# Patient Record
Sex: FEMALE | Race: WHITE | NOT HISPANIC OR LATINO | Employment: OTHER | URBAN - METROPOLITAN AREA
[De-identification: names, ages, dates, MRNs, and addresses within clinical notes are randomized per-mention and may not be internally consistent; named-entity substitution may affect disease eponyms.]

---

## 2017-01-10 ENCOUNTER — HISTORICAL (OUTPATIENT)
Dept: RADIOLOGY | Facility: HOSPITAL | Age: 67
End: 2017-01-10

## 2017-01-23 ENCOUNTER — HISTORICAL (OUTPATIENT)
Dept: LAB | Facility: HOSPITAL | Age: 67
End: 2017-01-23

## 2017-06-16 ENCOUNTER — HISTORICAL (OUTPATIENT)
Dept: RADIOLOGY | Facility: HOSPITAL | Age: 67
End: 2017-06-16

## 2017-10-16 ENCOUNTER — HISTORICAL (OUTPATIENT)
Dept: CARDIOLOGY | Facility: HOSPITAL | Age: 67
End: 2017-10-16

## 2017-10-18 ENCOUNTER — HISTORICAL (OUTPATIENT)
Dept: LAB | Facility: HOSPITAL | Age: 67
End: 2017-10-18

## 2017-10-18 LAB
ABS NEUT (OLG): 5.4 X10(3)/MCL (ref 2.1–9.2)
ALBUMIN SERPL-MCNC: 4 GM/DL (ref 3.4–5)
ALBUMIN/GLOB SERPL: 1.1 RATIO (ref 1.1–2)
ALP SERPL-CCNC: 78 UNIT/L (ref 46–116)
ALT SERPL-CCNC: 27 UNIT/L (ref 12–78)
APTT PPP: 22.3 SECOND(S) (ref 24.5–34.9)
AST SERPL-CCNC: 17 UNIT/L (ref 15–37)
BASOPHILS NFR BLD AUTO: 1 % (ref 0–2)
BILIRUB SERPL-MCNC: 0.3 MG/DL (ref 0.2–1)
BILIRUBIN DIRECT+TOT PNL SERPL-MCNC: 0.11 MG/DL (ref 0–0.2)
BILIRUBIN DIRECT+TOT PNL SERPL-MCNC: 0.23 MG/DL (ref 0–0.8)
BUN SERPL-MCNC: 42.7 MG/DL (ref 7–18)
CALCIUM SERPL-MCNC: 9.5 MG/DL (ref 8.5–10.1)
CHLORIDE SERPL-SCNC: 107 MMOL/L (ref 98–107)
CO2 SERPL-SCNC: 23.2 MMOL/L (ref 21–32)
CREAT SERPL-MCNC: 1.06 MG/DL (ref 0.6–1.3)
EOSINOPHIL # BLD AUTO: 0.2 X10(3)/MCL
EOSINOPHIL NFR BLD AUTO: 3 %
ERYTHROCYTE [DISTWIDTH] IN BLOOD BY AUTOMATED COUNT: 16.6 % (ref 11.5–17)
GLOBULIN SER-MCNC: 3.5 GM/DL (ref 2.4–3.5)
GLUCOSE SERPL-MCNC: 147 MG/DL (ref 74–106)
HCT VFR BLD AUTO: 29.6 % (ref 37–47)
HGB BLD-MCNC: 9.4 GM/DL (ref 12–16)
INR PPP: 0.96 (ref 2–3)
LYMPHOCYTES # BLD AUTO: 1.4 X10(3)/MCL
LYMPHOCYTES NFR BLD AUTO: 18 % (ref 13–40)
MCH RBC QN AUTO: 25 PG (ref 27–31)
MCHC RBC AUTO-ENTMCNC: 31.7 GM/DL (ref 33–36)
MCV RBC AUTO: 78.7 FL (ref 80–94)
MONOCYTES # BLD AUTO: 0.7 X10(3)/MCL
MONOCYTES NFR BLD AUTO: 9 % (ref 2–11)
NEUTROPHILS # BLD AUTO: 5.4 X10(3)/MCL (ref 2.1–9.2)
NEUTROPHILS NFR BLD AUTO: 70 % (ref 47–80)
PLATELET # BLD AUTO: 224 X10(3)/MCL (ref 130–400)
PMV BLD AUTO: 9.3 FL (ref 7.4–10.4)
POTASSIUM SERPL-SCNC: 5.4 MMOL/L (ref 3.5–5.1)
PROT SERPL-MCNC: 7.5 GM/DL (ref 6.4–8.2)
PROTHROMBIN TIME: 9.8 SECOND(S) (ref 9.3–11.4)
RBC # BLD AUTO: 3.77 X10(6)/MCL (ref 4.2–5.4)
SODIUM SERPL-SCNC: 144 MMOL/L (ref 136–145)
WBC # SPEC AUTO: 7.7 X10(3)/MCL (ref 4.5–11.5)

## 2017-10-25 ENCOUNTER — HISTORICAL (OUTPATIENT)
Dept: LAB | Facility: HOSPITAL | Age: 67
End: 2017-10-25

## 2017-10-25 LAB
COLOR STL: NORMAL
CONSISTENCY STL: NORMAL
HEMOCCULT SP1 STL QL: NEGATIVE

## 2017-10-26 LAB
COLOR STL: NORMAL
CONSISTENCY STL: NORMAL
HEMOCCULT SP2 STL QL: NEGATIVE

## 2017-10-27 LAB
COLOR STL: NORMAL
CONSISTENCY STL: NORMAL

## 2017-12-05 ENCOUNTER — HISTORICAL (OUTPATIENT)
Dept: LAB | Facility: HOSPITAL | Age: 67
End: 2017-12-05

## 2017-12-05 LAB
ABS NEUT (OLG): 6.33 X10(3)/MCL (ref 2.1–9.2)
APPEARANCE, UA: CLEAR
BACTERIA SPEC CULT: ABNORMAL /HPF
BASOPHILS # BLD AUTO: 0 X10(3)/MCL (ref 0–0.2)
BASOPHILS NFR BLD AUTO: 0 %
BILIRUB UR QL STRIP: ABNORMAL
BUN SERPL-MCNC: 34 MG/DL (ref 7–18)
CALCIUM SERPL-MCNC: 8.5 MG/DL (ref 8.5–10.1)
CHLORIDE SERPL-SCNC: 106 MMOL/L (ref 98–107)
CHOLEST SERPL-MCNC: 180 MG/DL (ref 0–200)
CHOLEST/HDLC SERPL: 3.6 {RATIO} (ref 0–4)
CO2 SERPL-SCNC: 21 MMOL/L (ref 21–32)
COLOR UR: YELLOW
CREAT SERPL-MCNC: 0.96 MG/DL (ref 0.55–1.02)
EOSINOPHIL # BLD AUTO: 0.3 X10(3)/MCL (ref 0–0.9)
EOSINOPHIL NFR BLD AUTO: 3 %
ERYTHROCYTE [DISTWIDTH] IN BLOOD BY AUTOMATED COUNT: 16.4 % (ref 11.5–17)
EST. AVERAGE GLUCOSE BLD GHB EST-MCNC: 223 MG/DL
GLUCOSE (UA): NEGATIVE
GLUCOSE SERPL-MCNC: 134 MG/DL (ref 74–106)
HBA1C MFR BLD: 9.4 % (ref 4.2–6.3)
HCT VFR BLD AUTO: 32.4 % (ref 37–47)
HDLC SERPL-MCNC: 50 MG/DL (ref 35–60)
HGB BLD-MCNC: 9.3 GM/DL (ref 12–16)
HGB UR QL STRIP: NEGATIVE
KETONES UR QL STRIP: NEGATIVE
LDLC SERPL CALC-MCNC: 96 MG/DL (ref 0–129)
LEUKOCYTE ESTERASE UR QL STRIP: ABNORMAL
LYMPHOCYTES # BLD AUTO: 2.1 X10(3)/MCL (ref 0.6–4.6)
LYMPHOCYTES NFR BLD AUTO: 22 %
MCH RBC QN AUTO: 25.1 PG (ref 27–31)
MCHC RBC AUTO-ENTMCNC: 28.7 GM/DL (ref 33–36)
MCV RBC AUTO: 87.3 FL (ref 80–94)
MONOCYTES # BLD AUTO: 0.8 X10(3)/MCL (ref 0.1–1.3)
MONOCYTES NFR BLD AUTO: 8 %
NEUTROPHILS # BLD AUTO: 6.33 X10(3)/MCL (ref 1.4–7.9)
NEUTROPHILS NFR BLD AUTO: 66 %
NITRITE UR QL STRIP: NEGATIVE
PH UR STRIP: 5 [PH] (ref 5–9)
PLATELET # BLD AUTO: 283 X10(3)/MCL (ref 130–400)
PMV BLD AUTO: 12.1 FL (ref 9.4–12.4)
POTASSIUM SERPL-SCNC: 5.7 MMOL/L (ref 3.5–5.1)
PROT UR QL STRIP: ABNORMAL
RBC # BLD AUTO: 3.71 X10(6)/MCL (ref 4.2–5.4)
RBC #/AREA URNS HPF: ABNORMAL /[HPF]
SODIUM SERPL-SCNC: 139 MMOL/L (ref 136–145)
SP GR UR STRIP: 1.02 (ref 1–1.03)
SQUAMOUS EPITHELIAL, UA: ABNORMAL
TRIGL SERPL-MCNC: 169 MG/DL (ref 30–150)
TSH SERPL-ACNC: 33.3 MIU/ML (ref 0.36–3.74)
UROBILINOGEN UR STRIP-ACNC: 0.2
VLDLC SERPL CALC-MCNC: 34 MG/DL
WBC # SPEC AUTO: 9.6 X10(3)/MCL (ref 4.5–11.5)
WBC #/AREA URNS HPF: ABNORMAL /[HPF]

## 2017-12-20 ENCOUNTER — HISTORICAL (OUTPATIENT)
Dept: ADMINISTRATIVE | Facility: HOSPITAL | Age: 67
End: 2017-12-20

## 2017-12-20 LAB
ABS NEUT (OLG): 5.56 X10(3)/MCL (ref 2.1–9.2)
ANISOCYTOSIS BLD QL SMEAR: 2
BASOPHILS # BLD AUTO: 0 X10(3)/MCL (ref 0–0.2)
BASOPHILS NFR BLD AUTO: 0.5 %
BASOPHILS NFR BLD MANUAL: 1 % (ref 0–2)
EOSINOPHIL # BLD AUTO: 0.2 X10(3)/MCL (ref 0–0.9)
EOSINOPHIL NFR BLD AUTO: 2 %
ERYTHROCYTE [DISTWIDTH] IN BLOOD BY AUTOMATED COUNT: 16.3 % (ref 11.5–17)
FERRITIN SERPL-MCNC: 11.5 NG/ML (ref 8–388)
FOLATE SERPL-MCNC: 24.9 NG/ML (ref 3.1–17.5)
HAPTOGLOB SERPL-MCNC: 254 MG/DL (ref 31–200)
HCT VFR BLD AUTO: 30.1 % (ref 37–47)
HGB BLD-MCNC: 9.2 GM/DL (ref 12–16)
IRON SATN MFR SERPL: 12.5 % (ref 20–50)
IRON SERPL-MCNC: 56 MCG/DL (ref 50–175)
LDH SERPL-CCNC: 198 UNIT/L (ref 84–246)
LYMPHOCYTES # BLD AUTO: 1.8 X10(3)/MCL (ref 0.6–4.6)
LYMPHOCYTES NFR BLD AUTO: 21.9 %
LYMPHOCYTES NFR BLD MANUAL: 27 % (ref 13–40)
MCH RBC QN AUTO: 25.6 PG (ref 27–31)
MCHC RBC AUTO-ENTMCNC: 30.6 GM/DL (ref 33–36)
MCV RBC AUTO: 83.8 FL (ref 80–94)
MICROCYTES BLD QL SMEAR: 2
MONOCYTES # BLD AUTO: 0.6 X10(3)/MCL (ref 0.1–1.3)
MONOCYTES NFR BLD AUTO: 7.5 %
MONOCYTES NFR BLD MANUAL: 2 % (ref 2–11)
NEUTROPHILS # BLD AUTO: 5.6 X10(3)/MCL (ref 2.1–9.2)
NEUTROPHILS NFR BLD AUTO: 68.1 %
NEUTROPHILS NFR BLD MANUAL: 70 % (ref 47–80)
PLATELET # BLD AUTO: 240 X10(3)/MCL (ref 130–400)
PLATELET # BLD EST: NORMAL 10*3/UL
PMV BLD AUTO: 11 FL (ref 9.4–12.4)
PROT SERPL-MCNC: 7 GM/DL
RBC # BLD AUTO: 3.59 X10(6)/MCL (ref 4.2–5.4)
RET# (OHS): 0.08 X10^6/ML (ref 0.02–0.08)
RETICULOCYTE COUNT AUTOMATED (OLG): 2.2 % (ref 1.1–2.1)
RHEUMATOID FACT SERPL-ACNC: 10 IU/ML (ref 0–15)
TIBC SERPL-MCNC: 447 MCG/DL (ref 250–450)
TRANSFERRIN SERPL-MCNC: 356 MG/DL (ref 200–360)
VIT B12 SERPL-MCNC: 231 PG/ML (ref 193–986)
WBC # SPEC AUTO: 8.2 X10(3)/MCL (ref 4.5–11.5)

## 2018-01-10 ENCOUNTER — HISTORICAL (OUTPATIENT)
Dept: RADIOLOGY | Facility: HOSPITAL | Age: 68
End: 2018-01-10

## 2018-01-10 ENCOUNTER — HISTORICAL (OUTPATIENT)
Dept: INFUSION THERAPY | Facility: HOSPITAL | Age: 68
End: 2018-01-10

## 2018-01-24 ENCOUNTER — HISTORICAL (OUTPATIENT)
Dept: RADIOLOGY | Facility: HOSPITAL | Age: 68
End: 2018-01-24

## 2018-01-24 ENCOUNTER — HISTORICAL (OUTPATIENT)
Dept: INFUSION THERAPY | Facility: HOSPITAL | Age: 68
End: 2018-01-24

## 2018-01-26 ENCOUNTER — HISTORICAL (OUTPATIENT)
Dept: ADMINISTRATIVE | Facility: HOSPITAL | Age: 68
End: 2018-01-26

## 2018-02-14 ENCOUNTER — HISTORICAL (OUTPATIENT)
Dept: ADMINISTRATIVE | Facility: HOSPITAL | Age: 68
End: 2018-02-14

## 2018-02-14 LAB
ABS NEUT (OLG): 5.37 X10(3)/MCL (ref 2.1–9.2)
BASOPHILS # BLD AUTO: 0 X10(3)/MCL (ref 0–0.2)
BASOPHILS NFR BLD AUTO: 0.3 %
EOSINOPHIL # BLD AUTO: 0.1 X10(3)/MCL (ref 0–0.9)
EOSINOPHIL NFR BLD AUTO: 1.6 %
ERYTHROCYTE [DISTWIDTH] IN BLOOD BY AUTOMATED COUNT: 18.3 % (ref 11.5–17)
HCT VFR BLD AUTO: 35.1 % (ref 37–47)
HGB BLD-MCNC: 11.1 GM/DL (ref 12–16)
LYMPHOCYTES # BLD AUTO: 1.4 X10(3)/MCL (ref 0.6–4.6)
LYMPHOCYTES NFR BLD AUTO: 19.1 %
MCH RBC QN AUTO: 27.5 PG (ref 27–31)
MCHC RBC AUTO-ENTMCNC: 31.6 GM/DL (ref 33–36)
MCV RBC AUTO: 86.9 FL (ref 80–94)
MONOCYTES # BLD AUTO: 0.5 X10(3)/MCL (ref 0.1–1.3)
MONOCYTES NFR BLD AUTO: 7.2 %
NEUTROPHILS # BLD AUTO: 5.4 X10(3)/MCL (ref 2.1–9.2)
NEUTROPHILS NFR BLD AUTO: 71.8 %
PLATELET # BLD AUTO: 252 X10(3)/MCL (ref 130–400)
PMV BLD AUTO: 11 FL (ref 9.4–12.4)
RBC # BLD AUTO: 4.04 X10(6)/MCL (ref 4.2–5.4)
WBC # SPEC AUTO: 7.5 X10(3)/MCL (ref 4.5–11.5)

## 2018-03-05 ENCOUNTER — HISTORICAL (OUTPATIENT)
Dept: RADIOLOGY | Facility: HOSPITAL | Age: 68
End: 2018-03-05

## 2018-03-05 LAB
BUN SERPL-MCNC: 36.6 MG/DL (ref 7–18)
CREAT SERPL-MCNC: 1.11 MG/DL (ref 0.6–1.3)

## 2018-03-23 ENCOUNTER — HISTORICAL (OUTPATIENT)
Dept: LAB | Facility: HOSPITAL | Age: 68
End: 2018-03-23

## 2018-03-23 LAB
ALBUMIN SERPL-MCNC: 3.7 GM/DL (ref 3.4–5)
ALBUMIN/GLOB SERPL: 1 {RATIO}
ALP SERPL-CCNC: 101 UNIT/L (ref 38–126)
ALT SERPL-CCNC: 20 UNIT/L (ref 12–78)
APPEARANCE, UA: CLEAR
AST SERPL-CCNC: 18 UNIT/L (ref 15–37)
BACTERIA SPEC CULT: ABNORMAL /HPF
BILIRUB SERPL-MCNC: 0.3 MG/DL (ref 0.2–1)
BILIRUB UR QL STRIP: NEGATIVE
BILIRUBIN DIRECT+TOT PNL SERPL-MCNC: 0.1 MG/DL (ref 0–0.2)
BILIRUBIN DIRECT+TOT PNL SERPL-MCNC: 0.2 MG/DL (ref 0–0.8)
BUN SERPL-MCNC: 41 MG/DL (ref 7–18)
CALCIUM SERPL-MCNC: 9.1 MG/DL (ref 8.5–10.1)
CHLORIDE SERPL-SCNC: 104 MMOL/L (ref 98–107)
CO2 SERPL-SCNC: 25 MMOL/L (ref 21–32)
COLOR UR: YELLOW
CREAT SERPL-MCNC: 0.96 MG/DL (ref 0.55–1.02)
CREAT UR-MCNC: 79 MG/DL
EST. AVERAGE GLUCOSE BLD GHB EST-MCNC: 226 MG/DL
GLOBULIN SER-MCNC: 3.7 GM/DL (ref 2.4–3.5)
GLUCOSE (UA): NEGATIVE
GLUCOSE SERPL-MCNC: 97 MG/DL (ref 74–106)
HBA1C MFR BLD: 9.5 % (ref 4.2–6.3)
HGB UR QL STRIP: NEGATIVE
KETONES UR QL STRIP: NEGATIVE
LEUKOCYTE ESTERASE UR QL STRIP: ABNORMAL
MICROALBUMIN UR-MCNC: 20.8 MG/DL
MICROALBUMIN/CREAT RATIO PNL UR: 263.3 MG/GM CR (ref 0–30)
NITRITE UR QL STRIP: NEGATIVE
PH UR STRIP: 5.5 [PH] (ref 5–9)
POTASSIUM SERPL-SCNC: 5.3 MMOL/L (ref 3.5–5.1)
PROT SERPL-MCNC: 7.4 GM/DL (ref 6.4–8.2)
PROT UR QL STRIP: ABNORMAL
RBC #/AREA URNS HPF: ABNORMAL /HPF
SODIUM SERPL-SCNC: 137 MMOL/L (ref 136–145)
SP GR UR STRIP: 1.02 (ref 1–1.03)
SQUAMOUS EPITHELIAL, UA: ABNORMAL
T4 SERPL-MCNC: 7.7 MCG/DL (ref 4.7–13.3)
TSH SERPL-ACNC: 6.68 MIU/ML (ref 0.36–3.74)
UA WBC MAN: ABNORMAL /HPF
UROBILINOGEN UR STRIP-ACNC: 0.2

## 2018-05-10 ENCOUNTER — HISTORICAL (OUTPATIENT)
Dept: LAB | Facility: HOSPITAL | Age: 68
End: 2018-05-10

## 2018-05-10 LAB
T4 FREE SERPL-MCNC: 0.82 NG/DL (ref 0.76–1.46)
TSH SERPL-ACNC: 9.31 MIU/L (ref 0.36–3.74)

## 2018-06-08 ENCOUNTER — HISTORICAL (OUTPATIENT)
Dept: HEMATOLOGY/ONCOLOGY | Facility: CLINIC | Age: 68
End: 2018-06-08

## 2018-06-08 LAB
ABS NEUT (OLG): 5.67 X10(3)/MCL (ref 2.1–9.2)
BASOPHILS # BLD AUTO: 0 X10(3)/MCL (ref 0–0.2)
BASOPHILS NFR BLD AUTO: 0.5 %
EOSINOPHIL # BLD AUTO: 0.2 X10(3)/MCL (ref 0–0.9)
EOSINOPHIL NFR BLD AUTO: 2.1 %
ERYTHROCYTE [DISTWIDTH] IN BLOOD BY AUTOMATED COUNT: 14.3 % (ref 11.5–17)
FERRITIN SERPL-MCNC: 268.6 NG/ML (ref 8–388)
FOLATE SERPL-MCNC: 21.3 NG/ML (ref 3.1–17.5)
HCT VFR BLD AUTO: 31.4 % (ref 37–47)
HGB BLD-MCNC: 9.8 GM/DL (ref 12–16)
IRON SATN MFR SERPL: 13.3 % (ref 20–50)
IRON SERPL-MCNC: 51 MCG/DL (ref 50–175)
LYMPHOCYTES # BLD AUTO: 1.8 X10(3)/MCL (ref 0.6–4.6)
LYMPHOCYTES NFR BLD AUTO: 21.7 %
MCH RBC QN AUTO: 29 PG (ref 27–31)
MCHC RBC AUTO-ENTMCNC: 31.2 GM/DL (ref 33–36)
MCV RBC AUTO: 92.9 FL (ref 80–94)
MONOCYTES # BLD AUTO: 0.7 X10(3)/MCL (ref 0.1–1.3)
MONOCYTES NFR BLD AUTO: 8.2 %
NEUTROPHILS # BLD AUTO: 5.7 X10(3)/MCL (ref 2.1–9.2)
NEUTROPHILS NFR BLD AUTO: 67.5 %
PLATELET # BLD AUTO: 232 X10(3)/MCL (ref 130–400)
PMV BLD AUTO: 11 FL (ref 9.4–12.4)
RBC # BLD AUTO: 3.38 X10(6)/MCL (ref 4.2–5.4)
RET# (OHS): 0.1 X10^6/ML (ref 0.02–0.08)
RETICULOCYTE COUNT AUTOMATED (OLG): 3 % (ref 1.1–2.1)
TIBC SERPL-MCNC: 384 MCG/DL (ref 250–450)
TRANSFERRIN SERPL-MCNC: 294 MG/DL (ref 200–360)
VIT B12 SERPL-MCNC: 303 PG/ML (ref 193–986)
WBC # SPEC AUTO: 8.4 X10(3)/MCL (ref 4.5–11.5)

## 2018-06-15 ENCOUNTER — HISTORICAL (OUTPATIENT)
Dept: INFUSION THERAPY | Facility: HOSPITAL | Age: 68
End: 2018-06-15

## 2018-06-19 ENCOUNTER — HISTORICAL (OUTPATIENT)
Dept: RADIOLOGY | Facility: HOSPITAL | Age: 68
End: 2018-06-19

## 2018-06-20 ENCOUNTER — HISTORICAL (OUTPATIENT)
Dept: ADMINISTRATIVE | Facility: HOSPITAL | Age: 68
End: 2018-06-20

## 2018-06-20 LAB
ABS NEUT (OLG): 5.05 X10(3)/MCL (ref 2.1–9.2)
ALBUMIN SERPL-MCNC: 3.7 GM/DL (ref 3.4–5)
ALBUMIN/GLOB SERPL: 1 {RATIO}
ALP SERPL-CCNC: 87 UNIT/L (ref 38–126)
ALT SERPL-CCNC: 22 UNIT/L (ref 12–78)
AST SERPL-CCNC: 16 UNIT/L (ref 15–37)
BASOPHILS # BLD AUTO: 0 X10(3)/MCL (ref 0–0.2)
BASOPHILS NFR BLD AUTO: 0 %
BILIRUB SERPL-MCNC: 0.2 MG/DL (ref 0.2–1)
BILIRUBIN DIRECT+TOT PNL SERPL-MCNC: 0.1 MG/DL (ref 0–0.2)
BILIRUBIN DIRECT+TOT PNL SERPL-MCNC: 0.1 MG/DL (ref 0–0.8)
BUN SERPL-MCNC: 27 MG/DL (ref 7–18)
CALCIUM SERPL-MCNC: 8.6 MG/DL (ref 8.5–10.1)
CHLORIDE SERPL-SCNC: 105 MMOL/L (ref 98–107)
CO2 SERPL-SCNC: 29 MMOL/L (ref 21–32)
CREAT SERPL-MCNC: 0.79 MG/DL (ref 0.55–1.02)
CREAT UR-MCNC: 98 MG/DL
EOSINOPHIL # BLD AUTO: 0.2 X10(3)/MCL (ref 0–0.9)
EOSINOPHIL NFR BLD AUTO: 2 %
ERYTHROCYTE [DISTWIDTH] IN BLOOD BY AUTOMATED COUNT: 14 % (ref 11.5–17)
EST. AVERAGE GLUCOSE BLD GHB EST-MCNC: 200 MG/DL
GLOBULIN SER-MCNC: 3.6 GM/DL (ref 2.4–3.5)
GLUCOSE SERPL-MCNC: 70 MG/DL (ref 74–106)
HBA1C MFR BLD: 8.6 % (ref 4.2–6.3)
HCT VFR BLD AUTO: 32.9 % (ref 37–47)
HGB BLD-MCNC: 10 GM/DL (ref 12–16)
LYMPHOCYTES # BLD AUTO: 1.5 X10(3)/MCL (ref 0.6–4.6)
LYMPHOCYTES NFR BLD AUTO: 20 %
MCH RBC QN AUTO: 28.1 PG (ref 27–31)
MCHC RBC AUTO-ENTMCNC: 30.4 GM/DL (ref 33–36)
MCV RBC AUTO: 92.4 FL (ref 80–94)
MICROALBUMIN UR-MCNC: 39 MG/DL
MICROALBUMIN/CREAT RATIO PNL UR: 398 MG/GM CR (ref 0–30)
MONOCYTES # BLD AUTO: 0.6 X10(3)/MCL (ref 0.1–1.3)
MONOCYTES NFR BLD AUTO: 8 %
NEUTROPHILS # BLD AUTO: 5.05 X10(3)/MCL (ref 2.1–9.2)
NEUTROPHILS NFR BLD AUTO: 68 %
PLATELET # BLD AUTO: 220 X10(3)/MCL (ref 130–400)
PMV BLD AUTO: 11.4 FL (ref 9.4–12.4)
POTASSIUM SERPL-SCNC: 4 MMOL/L (ref 3.5–5.1)
PROT SERPL-MCNC: 7.3 GM/DL (ref 6.4–8.2)
RBC # BLD AUTO: 3.56 X10(6)/MCL (ref 4.2–5.4)
SODIUM SERPL-SCNC: 143 MMOL/L (ref 136–145)
TSH SERPL-ACNC: 2.76 MIU/L (ref 0.36–3.74)
WBC # SPEC AUTO: 7.4 X10(3)/MCL (ref 4.5–11.5)

## 2018-06-22 ENCOUNTER — HISTORICAL (OUTPATIENT)
Dept: INFUSION THERAPY | Facility: HOSPITAL | Age: 68
End: 2018-06-22

## 2018-06-25 ENCOUNTER — HISTORICAL (OUTPATIENT)
Dept: LAB | Facility: HOSPITAL | Age: 68
End: 2018-06-25

## 2018-06-26 ENCOUNTER — HISTORICAL (OUTPATIENT)
Dept: ADMINISTRATIVE | Facility: HOSPITAL | Age: 68
End: 2018-06-26

## 2018-08-27 ENCOUNTER — HISTORICAL (OUTPATIENT)
Dept: PREADMISSION TESTING | Facility: HOSPITAL | Age: 68
End: 2018-08-27

## 2018-08-27 ENCOUNTER — HISTORICAL (OUTPATIENT)
Dept: LAB | Facility: HOSPITAL | Age: 68
End: 2018-08-27

## 2018-08-27 LAB
ABS NEUT (OLG): 5.04 X10(3)/MCL (ref 2.1–9.2)
ALBUMIN SERPL-MCNC: 3.6 GM/DL (ref 3.4–5)
ALBUMIN/GLOB SERPL: 1.1 {RATIO}
ALP SERPL-CCNC: 172 UNIT/L (ref 38–126)
ALT SERPL-CCNC: 22 UNIT/L (ref 12–78)
AST SERPL-CCNC: 8 UNIT/L (ref 15–37)
BASOPHILS # BLD AUTO: 0 X10(3)/MCL (ref 0–0.2)
BASOPHILS NFR BLD AUTO: 0 %
BILIRUB SERPL-MCNC: 0.5 MG/DL (ref 0.2–1)
BILIRUBIN DIRECT+TOT PNL SERPL-MCNC: 0.1 MG/DL (ref 0–0.2)
BILIRUBIN DIRECT+TOT PNL SERPL-MCNC: 0.4 MG/DL (ref 0–0.8)
BUN SERPL-MCNC: 31 MG/DL (ref 7–18)
CALCIUM SERPL-MCNC: 8.4 MG/DL (ref 8.5–10.1)
CHLORIDE SERPL-SCNC: 104 MMOL/L (ref 98–107)
CO2 SERPL-SCNC: 27 MMOL/L (ref 21–32)
CREAT SERPL-MCNC: 1.1 MG/DL (ref 0.55–1.02)
EOSINOPHIL # BLD AUTO: 0.2 X10(3)/MCL (ref 0–0.9)
EOSINOPHIL NFR BLD AUTO: 2 %
ERYTHROCYTE [DISTWIDTH] IN BLOOD BY AUTOMATED COUNT: 14.6 % (ref 11.5–17)
EST. AVERAGE GLUCOSE BLD GHB EST-MCNC: 194 MG/DL
GLOBULIN SER-MCNC: 3.4 GM/DL (ref 2.4–3.5)
GLUCOSE SERPL-MCNC: 450 MG/DL (ref 74–106)
HBA1C MFR BLD: 8.4 % (ref 4.2–6.3)
HCT VFR BLD AUTO: 33.3 % (ref 37–47)
HGB BLD-MCNC: 10.2 GM/DL (ref 12–16)
LYMPHOCYTES # BLD AUTO: 1.4 X10(3)/MCL (ref 0.6–4.6)
LYMPHOCYTES NFR BLD AUTO: 19 %
MCH RBC QN AUTO: 29.3 PG (ref 27–31)
MCHC RBC AUTO-ENTMCNC: 30.6 GM/DL (ref 33–36)
MCV RBC AUTO: 95.7 FL (ref 80–94)
MONOCYTES # BLD AUTO: 0.6 X10(3)/MCL (ref 0.1–1.3)
MONOCYTES NFR BLD AUTO: 8 %
NEUTROPHILS # BLD AUTO: 5.04 X10(3)/MCL (ref 1.4–7.9)
NEUTROPHILS NFR BLD AUTO: 70 %
PLATELET # BLD AUTO: 180 X10(3)/MCL (ref 130–400)
PMV BLD AUTO: 12.5 FL (ref 9.4–12.4)
POTASSIUM SERPL-SCNC: 4.7 MMOL/L (ref 3.5–5.1)
PROT SERPL-MCNC: 7 GM/DL (ref 6.4–8.2)
RBC # BLD AUTO: 3.48 X10(6)/MCL (ref 4.2–5.4)
SODIUM SERPL-SCNC: 140 MMOL/L (ref 136–145)
WBC # SPEC AUTO: 7.2 X10(3)/MCL (ref 4.5–11.5)

## 2018-09-04 ENCOUNTER — HISTORICAL (OUTPATIENT)
Dept: RADIOLOGY | Facility: HOSPITAL | Age: 68
End: 2018-09-04

## 2018-09-12 ENCOUNTER — HISTORICAL (OUTPATIENT)
Dept: LAB | Facility: HOSPITAL | Age: 68
End: 2018-09-12

## 2018-09-12 LAB
BUN SERPL-MCNC: 28.2 MG/DL (ref 7–18)
CALCIUM SERPL-MCNC: 9.1 MG/DL (ref 8.5–10.1)
CHLORIDE SERPL-SCNC: 103 MMOL/L (ref 98–107)
CO2 SERPL-SCNC: 26.2 MMOL/L (ref 21–32)
CREAT SERPL-MCNC: 0.96 MG/DL (ref 0.6–1.3)
CREAT/UREA NIT SERPL: 29
EST. AVERAGE GLUCOSE BLD GHB EST-MCNC: 194 MG/DL
GLUCOSE SERPL-MCNC: 172 MG/DL (ref 74–106)
HBA1C MFR BLD: 8.4 % (ref 4.5–6.2)
POTASSIUM SERPL-SCNC: 5.3 MMOL/L (ref 3.5–5.1)
SODIUM SERPL-SCNC: 141 MMOL/L (ref 136–145)

## 2018-10-01 ENCOUNTER — HISTORICAL (OUTPATIENT)
Dept: ADMINISTRATIVE | Facility: HOSPITAL | Age: 68
End: 2018-10-01

## 2018-10-01 LAB
ABS NEUT (OLG): 9.23 X10(3)/MCL (ref 2.1–9.2)
ALBUMIN SERPL-MCNC: 3.5 GM/DL (ref 3.4–5)
ALBUMIN/GLOB SERPL: 1 {RATIO}
ALP SERPL-CCNC: 81 UNIT/L (ref 38–126)
ALT SERPL-CCNC: 15 UNIT/L (ref 12–78)
AST SERPL-CCNC: 11 UNIT/L (ref 15–37)
BASOPHILS # BLD AUTO: 0 X10(3)/MCL (ref 0–0.2)
BASOPHILS NFR BLD AUTO: 0 %
BILIRUB SERPL-MCNC: 0.3 MG/DL (ref 0.2–1)
BILIRUBIN DIRECT+TOT PNL SERPL-MCNC: 0.1 MG/DL (ref 0–0.2)
BILIRUBIN DIRECT+TOT PNL SERPL-MCNC: 0.2 MG/DL (ref 0–0.8)
BUN SERPL-MCNC: 29 MG/DL (ref 7–18)
CALCIUM SERPL-MCNC: 8.4 MG/DL (ref 8.5–10.1)
CHLORIDE SERPL-SCNC: 108 MMOL/L (ref 98–107)
CO2 SERPL-SCNC: 26 MMOL/L (ref 21–32)
CREAT SERPL-MCNC: 0.87 MG/DL (ref 0.55–1.02)
EOSINOPHIL # BLD AUTO: 0.1 X10(3)/MCL (ref 0–0.9)
EOSINOPHIL NFR BLD AUTO: 1 %
ERYTHROCYTE [DISTWIDTH] IN BLOOD BY AUTOMATED COUNT: 13.7 % (ref 11.5–17)
FERRITIN SERPL-MCNC: 701.7 NG/ML (ref 8–388)
FOLATE SERPL-MCNC: 20 NG/ML (ref 3.1–17.5)
GLOBULIN SER-MCNC: 3.6 GM/DL (ref 2.4–3.5)
GLUCOSE SERPL-MCNC: 122 MG/DL (ref 74–106)
HCT VFR BLD AUTO: 33.4 % (ref 37–47)
HGB BLD-MCNC: 10.3 GM/DL (ref 12–16)
IRON SATN MFR SERPL: 10 % (ref 20–50)
IRON SERPL-MCNC: 32 MCG/DL (ref 50–175)
LYMPHOCYTES # BLD AUTO: 1.29 X10(3)/MCL (ref 0.6–4.6)
LYMPHOCYTES NFR BLD AUTO: 11 %
MCH RBC QN AUTO: 29.3 PG (ref 27–31)
MCHC RBC AUTO-ENTMCNC: 30.8 GM/DL (ref 33–36)
MCV RBC AUTO: 95.2 FL (ref 80–94)
MONOCYTES # BLD AUTO: 0.73 X10(3)/MCL (ref 0.1–1.3)
MONOCYTES NFR BLD AUTO: 6.4 %
NEUTROPHILS # BLD AUTO: 9.23 X10(3)/MCL (ref 2.1–9.2)
NEUTROPHILS NFR BLD AUTO: 81 %
PLATELET # BLD AUTO: 221 X10(3)/MCL (ref 130–400)
PMV BLD AUTO: 12.1 FL (ref 9.4–12.4)
POTASSIUM SERPL-SCNC: 5 MMOL/L (ref 3.5–5.1)
PROT SERPL-MCNC: 7.1 GM/DL (ref 6.4–8.2)
RBC # BLD AUTO: 3.51 X10(6)/MCL (ref 4.2–5.4)
RET# (OHS): 0.09 X10^6/ML (ref 0.02–0.08)
RETICULOCYTE COUNT AUTOMATED (OLG): 2.7 % (ref 1.1–2.1)
SODIUM SERPL-SCNC: 142 MMOL/L (ref 136–145)
TIBC SERPL-MCNC: 319 MCG/DL (ref 250–450)
TRANSFERRIN SERPL-MCNC: 240 MG/DL (ref 200–360)
VIT B12 SERPL-MCNC: 328 PG/ML (ref 193–986)
WBC # SPEC AUTO: 11.4 X10(3)/MCL (ref 4.5–11.5)

## 2018-10-03 ENCOUNTER — HISTORICAL (OUTPATIENT)
Dept: BARIATRICS | Facility: HOSPITAL | Age: 68
End: 2018-10-03

## 2018-10-18 ENCOUNTER — HISTORICAL (OUTPATIENT)
Dept: INFUSION THERAPY | Facility: HOSPITAL | Age: 68
End: 2018-10-18

## 2018-10-25 ENCOUNTER — HISTORICAL (OUTPATIENT)
Dept: INFUSION THERAPY | Facility: HOSPITAL | Age: 68
End: 2018-10-25

## 2019-01-22 ENCOUNTER — HISTORICAL (OUTPATIENT)
Dept: LAB | Facility: HOSPITAL | Age: 69
End: 2019-01-22

## 2019-01-22 LAB
ABS NEUT (OLG): 5.75 X10(3)/MCL (ref 2.1–9.2)
APPEARANCE, UA: CLEAR
BACTERIA SPEC CULT: ABNORMAL
BASOPHILS # BLD AUTO: 0 X10(3)/MCL (ref 0–0.2)
BASOPHILS NFR BLD AUTO: 1 %
BILIRUB UR QL STRIP: NEGATIVE
BUN SERPL-MCNC: 41.9 MG/DL (ref 7–18)
CALCIUM SERPL-MCNC: 9.4 MG/DL (ref 8.5–10.1)
CHLORIDE SERPL-SCNC: 104 MMOL/L (ref 98–107)
CHOLEST SERPL-MCNC: 168 MG/DL (ref 0–200)
CHOLEST/HDLC SERPL: 3.7 {RATIO} (ref 0–4)
CO2 SERPL-SCNC: 30.6 MMOL/L (ref 21–32)
COLOR UR: YELLOW
CREAT SERPL-MCNC: 1.01 MG/DL (ref 0.6–1.3)
CREAT UR-MCNC: 153.8 MG/DL (ref 30–125)
CREAT/UREA NIT SERPL: 41
EOSINOPHIL # BLD AUTO: 0.3 X10(3)/MCL (ref 0–0.9)
EOSINOPHIL NFR BLD AUTO: 4 %
ERYTHROCYTE [DISTWIDTH] IN BLOOD BY AUTOMATED COUNT: 13.9 % (ref 11.5–17)
GLUCOSE (UA): NEGATIVE
GLUCOSE SERPL-MCNC: 112 MG/DL (ref 74–106)
HBA1C MFR BLD: 7.9 % (ref 4.5–6.2)
HCT VFR BLD AUTO: 34.5 % (ref 37–47)
HDLC SERPL-MCNC: 45 MG/DL (ref 40–60)
HGB BLD-MCNC: 11.1 GM/DL (ref 12–16)
HGB UR QL STRIP: NEGATIVE
IMM GRANULOCYTES # BLD AUTO: 0.06 % (ref 0–0.02)
IMM GRANULOCYTES NFR BLD AUTO: 0.7 % (ref 0–0.43)
KETONES UR QL STRIP: NEGATIVE
LDLC SERPL CALC-MCNC: 53 MG/DL (ref 0–129)
LEUKOCYTE ESTERASE UR QL STRIP: ABNORMAL
LYMPHOCYTES # BLD AUTO: 1.6 X10(3)/MCL (ref 0.6–4.6)
LYMPHOCYTES NFR BLD AUTO: 18 %
MCH RBC QN AUTO: 30.3 PG (ref 27–31)
MCHC RBC AUTO-ENTMCNC: 32.2 GM/DL (ref 33–36)
MCV RBC AUTO: 94.3 FL (ref 80–94)
MICROALBUMIN UR-MCNC: >10 MG/DL (ref 0–3)
MICROALBUMIN/CREAT RATIO PNL UR: >65 MG/GM CR (ref 0–30)
MONOCYTES # BLD AUTO: 0.7 X10(3)/MCL (ref 0.1–1.3)
MONOCYTES NFR BLD AUTO: 8 %
NEUTROPHILS # BLD AUTO: 5.75 X10(3)/MCL (ref 1.4–7.9)
NEUTROPHILS NFR BLD AUTO: 68 %
NITRITE UR QL STRIP: NEGATIVE
PH UR STRIP: 5.5 [PH] (ref 5–9)
PLATELET # BLD AUTO: 247 X10(3)/MCL (ref 130–400)
PMV BLD AUTO: 10.9 FL (ref 9.4–12.4)
POTASSIUM SERPL-SCNC: 4.2 MMOL/L (ref 3.5–5.1)
PROT UR QL STRIP: 30
RBC # BLD AUTO: 3.66 X10(6)/MCL (ref 4.2–5.4)
RBC #/AREA URNS HPF: ABNORMAL /HPF
SODIUM SERPL-SCNC: 144 MMOL/L (ref 136–145)
SP GR UR STRIP: >=1.03 (ref 1–1.03)
SQUAMOUS EPITHELIAL, UA: ABNORMAL
TRIGL SERPL-MCNC: 350 MG/DL
TSH SERPL-ACNC: 37.02 MIU/ML (ref 0.36–3.74)
UROBILINOGEN UR STRIP-ACNC: 0.2
VLDLC SERPL CALC-MCNC: 70 MG/DL
WBC # SPEC AUTO: 8.4 X10(3)/MCL (ref 4.5–11.5)
WBC #/AREA URNS HPF: ABNORMAL /HPF

## 2019-02-04 ENCOUNTER — HISTORICAL (OUTPATIENT)
Dept: HEMATOLOGY/ONCOLOGY | Facility: CLINIC | Age: 69
End: 2019-02-04

## 2019-02-04 LAB
ABS NEUT (OLG): 7.63 X10(3)/MCL (ref 2.1–9.2)
ALBUMIN SERPL-MCNC: 3.8 GM/DL (ref 3.4–5)
ALBUMIN/GLOB SERPL: 1 {RATIO}
ALP SERPL-CCNC: 121 UNIT/L (ref 38–126)
ALT SERPL-CCNC: 23 UNIT/L (ref 12–78)
AST SERPL-CCNC: 16 UNIT/L (ref 15–37)
BASOPHILS # BLD AUTO: 0 X10(3)/MCL (ref 0–0.2)
BASOPHILS NFR BLD AUTO: 0.2 %
BILIRUB SERPL-MCNC: 0.3 MG/DL (ref 0.2–1)
BILIRUBIN DIRECT+TOT PNL SERPL-MCNC: 0.1 MG/DL (ref 0–0.2)
BILIRUBIN DIRECT+TOT PNL SERPL-MCNC: 0.2 MG/DL (ref 0–0.8)
BUN SERPL-MCNC: 39 MG/DL (ref 7–18)
CALCIUM SERPL-MCNC: 8.5 MG/DL (ref 8.5–10.1)
CHLORIDE SERPL-SCNC: 101 MMOL/L (ref 98–107)
CO2 SERPL-SCNC: 29 MMOL/L (ref 21–32)
CREAT SERPL-MCNC: 0.92 MG/DL (ref 0.55–1.02)
EOSINOPHIL # BLD AUTO: 0.2 X10(3)/MCL (ref 0–0.9)
EOSINOPHIL NFR BLD AUTO: 1.8 %
ERYTHROCYTE [DISTWIDTH] IN BLOOD BY AUTOMATED COUNT: 13.5 % (ref 11.5–17)
FERRITIN SERPL-MCNC: 1102.6 NG/ML (ref 8–388)
FOLATE SERPL-MCNC: 17.5 NG/ML (ref 3.1–17.5)
GLOBULIN SER-MCNC: 3.7 GM/DL (ref 2.4–3.5)
GLUCOSE SERPL-MCNC: 208 MG/DL (ref 74–106)
HCT VFR BLD AUTO: 37.1 % (ref 37–47)
HGB BLD-MCNC: 11.6 GM/DL (ref 12–16)
IRON SATN MFR SERPL: 31.8 % (ref 20–50)
IRON SERPL-MCNC: 88 MCG/DL (ref 50–175)
LYMPHOCYTES # BLD AUTO: 1.4 X10(3)/MCL (ref 0.6–4.6)
LYMPHOCYTES NFR BLD AUTO: 14.4 %
MCH RBC QN AUTO: 29.5 PG (ref 27–31)
MCHC RBC AUTO-ENTMCNC: 31.3 GM/DL (ref 33–36)
MCV RBC AUTO: 94.4 FL (ref 80–94)
MONOCYTES # BLD AUTO: 0.7 X10(3)/MCL (ref 0.1–1.3)
MONOCYTES NFR BLD AUTO: 6.6 %
NEUTROPHILS # BLD AUTO: 7.6 X10(3)/MCL (ref 2.1–9.2)
NEUTROPHILS NFR BLD AUTO: 76.3 %
PLATELET # BLD AUTO: 268 X10(3)/MCL (ref 130–400)
PMV BLD AUTO: 10.5 FL (ref 9.4–12.4)
POTASSIUM SERPL-SCNC: 4.9 MMOL/L (ref 3.5–5.1)
PROT SERPL-MCNC: 7.5 GM/DL (ref 6.4–8.2)
RBC # BLD AUTO: 3.93 X10(6)/MCL (ref 4.2–5.4)
RET# (OHS): 0.11 X10^6/ML (ref 0.02–0.08)
RETICULOCYTE COUNT AUTOMATED (OLG): 2.9 % (ref 1.1–2.1)
SODIUM SERPL-SCNC: 136 MMOL/L (ref 136–145)
TIBC SERPL-MCNC: 277 MCG/DL (ref 250–450)
TRANSFERRIN SERPL-MCNC: 223 MG/DL (ref 200–360)
VIT B12 SERPL-MCNC: 346 PG/ML (ref 193–986)
WBC # SPEC AUTO: 10 X10(3)/MCL (ref 4.5–11.5)

## 2019-04-15 ENCOUNTER — HISTORICAL (OUTPATIENT)
Dept: ADMINISTRATIVE | Facility: HOSPITAL | Age: 69
End: 2019-04-15

## 2019-04-15 LAB
BUN SERPL-MCNC: 31 MG/DL (ref 7–18)
CALCIUM SERPL-MCNC: 9.3 MG/DL (ref 8.5–10.1)
CHLORIDE SERPL-SCNC: 108 MMOL/L (ref 98–107)
CO2 SERPL-SCNC: 25 MMOL/L (ref 21–32)
CREAT SERPL-MCNC: 0.84 MG/DL (ref 0.55–1.02)
CREAT/UREA NIT SERPL: 36.9
EST. AVERAGE GLUCOSE BLD GHB EST-MCNC: 180 MG/DL
GLUCOSE SERPL-MCNC: 79 MG/DL (ref 74–106)
HBA1C MFR BLD: 7.9 % (ref 4.2–6.3)
POTASSIUM SERPL-SCNC: 5.2 MMOL/L (ref 3.5–5.1)
SODIUM SERPL-SCNC: 141 MMOL/L (ref 136–145)

## 2019-06-04 ENCOUNTER — HISTORICAL (OUTPATIENT)
Dept: HEMATOLOGY/ONCOLOGY | Facility: CLINIC | Age: 69
End: 2019-06-04

## 2019-06-04 LAB
ABS NEUT (OLG): 6.51 X10(3)/MCL (ref 2.1–9.2)
BASOPHILS # BLD AUTO: 0 X10(3)/MCL (ref 0–0.2)
BASOPHILS NFR BLD AUTO: 0.5 %
EOSINOPHIL # BLD AUTO: 0.2 X10(3)/MCL (ref 0–0.9)
EOSINOPHIL NFR BLD AUTO: 2.5 %
ERYTHROCYTE [DISTWIDTH] IN BLOOD BY AUTOMATED COUNT: 13.7 % (ref 11.5–17)
FERRITIN SERPL-MCNC: 660.2 NG/ML (ref 8–388)
FOLATE SERPL-MCNC: 16.1 NG/ML (ref 3.1–17.5)
HCT VFR BLD AUTO: 34.6 % (ref 37–47)
HGB BLD-MCNC: 10.4 GM/DL (ref 12–16)
IRON SATN MFR SERPL: 23.1 % (ref 20–50)
IRON SERPL-MCNC: 74 MCG/DL (ref 50–175)
LYMPHOCYTES # BLD AUTO: 1.5 X10(3)/MCL (ref 0.6–4.6)
LYMPHOCYTES NFR BLD AUTO: 17.4 %
MCH RBC QN AUTO: 29 PG (ref 27–31)
MCHC RBC AUTO-ENTMCNC: 30.1 GM/DL (ref 33–36)
MCV RBC AUTO: 96.4 FL (ref 80–94)
MONOCYTES # BLD AUTO: 0.5 X10(3)/MCL (ref 0.1–1.3)
MONOCYTES NFR BLD AUTO: 6 %
NEUTROPHILS # BLD AUTO: 6.5 X10(3)/MCL (ref 2.1–9.2)
NEUTROPHILS NFR BLD AUTO: 73.3 %
PLATELET # BLD AUTO: 240 X10(3)/MCL (ref 130–400)
PMV BLD AUTO: 11.3 FL (ref 9.4–12.4)
RBC # BLD AUTO: 3.59 X10(6)/MCL (ref 4.2–5.4)
RET# (OHS): 0.09 X10^6/ML (ref 0.02–0.08)
RETICULOCYTE COUNT AUTOMATED (OLG): 2.4 % (ref 1.1–2.1)
TIBC SERPL-MCNC: 321 MCG/DL (ref 250–450)
TRANSFERRIN SERPL-MCNC: 256 MG/DL (ref 200–360)
VIT B12 SERPL-MCNC: 331 PG/ML (ref 193–986)
WBC # SPEC AUTO: 8.9 X10(3)/MCL (ref 4.5–11.5)

## 2019-10-02 ENCOUNTER — HISTORICAL (OUTPATIENT)
Dept: HEMATOLOGY/ONCOLOGY | Facility: CLINIC | Age: 69
End: 2019-10-02

## 2019-10-02 LAB
ABS NEUT (OLG): 5.41 X10(3)/MCL (ref 2.1–9.2)
BASOPHILS # BLD AUTO: 0 X10(3)/MCL (ref 0–0.2)
BASOPHILS NFR BLD AUTO: 0.4 %
EOSINOPHIL # BLD AUTO: 0.2 X10(3)/MCL (ref 0–0.9)
EOSINOPHIL NFR BLD AUTO: 3 %
ERYTHROCYTE [DISTWIDTH] IN BLOOD BY AUTOMATED COUNT: 13.9 % (ref 11.5–17)
FERRITIN SERPL-MCNC: 614.4 NG/ML (ref 8–388)
FOLATE SERPL-MCNC: 13.7 NG/ML (ref 3.1–17.5)
HCT VFR BLD AUTO: 33.4 % (ref 37–47)
HGB BLD-MCNC: 10.4 GM/DL (ref 12–16)
IRON SATN MFR SERPL: 16.5 % (ref 20–50)
IRON SERPL-MCNC: 62 MCG/DL (ref 50–175)
LYMPHOCYTES # BLD AUTO: 1.6 X10(3)/MCL (ref 0.6–4.6)
LYMPHOCYTES NFR BLD AUTO: 19.9 %
MCH RBC QN AUTO: 28.7 PG (ref 27–31)
MCHC RBC AUTO-ENTMCNC: 31.1 GM/DL (ref 33–36)
MCV RBC AUTO: 92 FL (ref 80–94)
MONOCYTES # BLD AUTO: 0.6 X10(3)/MCL (ref 0.1–1.3)
MONOCYTES NFR BLD AUTO: 7.6 %
NEUTROPHILS # BLD AUTO: 5.4 X10(3)/MCL (ref 2.1–9.2)
NEUTROPHILS NFR BLD AUTO: 68.5 %
PLATELET # BLD AUTO: 215 X10(3)/MCL (ref 130–400)
PMV BLD AUTO: 11.3 FL (ref 9.4–12.4)
RBC # BLD AUTO: 3.63 X10(6)/MCL (ref 4.2–5.4)
RET# (OHS): 0.09 X10^6/ML (ref 0.02–0.08)
RETICULOCYTE COUNT AUTOMATED (OLG): 2.6 % (ref 1.1–2.1)
TIBC SERPL-MCNC: 376 MCG/DL (ref 250–450)
TRANSFERRIN SERPL-MCNC: 266 MG/DL (ref 200–360)
VIT B12 SERPL-MCNC: 778 PG/ML (ref 193–986)
WBC # SPEC AUTO: 7.9 X10(3)/MCL (ref 4.5–11.5)

## 2019-11-21 ENCOUNTER — HISTORICAL (OUTPATIENT)
Dept: BARIATRICS | Facility: HOSPITAL | Age: 69
End: 2019-11-21

## 2020-01-09 ENCOUNTER — HISTORICAL (OUTPATIENT)
Dept: PREADMISSION TESTING | Facility: HOSPITAL | Age: 70
End: 2020-01-09

## 2020-01-09 LAB
ABS NEUT (OLG): 5.13 X10(3)/MCL (ref 2.1–9.2)
BASOPHILS # BLD AUTO: 0.1 X10(3)/MCL (ref 0–0.2)
BASOPHILS NFR BLD AUTO: 1 %
BUN SERPL-MCNC: 30 MG/DL (ref 7–18)
CALCIUM SERPL-MCNC: 9.3 MG/DL (ref 8.5–10.1)
CHLORIDE SERPL-SCNC: 104 MMOL/L (ref 98–107)
CO2 SERPL-SCNC: 22 MMOL/L (ref 21–32)
CREAT SERPL-MCNC: 1.16 MG/DL (ref 0.55–1.02)
CREAT/UREA NIT SERPL: 25.9
EOSINOPHIL # BLD AUTO: 0.4 X10(3)/MCL (ref 0–0.9)
EOSINOPHIL NFR BLD AUTO: 5 %
ERYTHROCYTE [DISTWIDTH] IN BLOOD BY AUTOMATED COUNT: 13.7 % (ref 11.5–17)
GLUCOSE SERPL-MCNC: 272 MG/DL (ref 74–106)
HCT VFR BLD AUTO: 35.2 % (ref 37–47)
HGB BLD-MCNC: 10.7 GM/DL (ref 12–16)
LYMPHOCYTES # BLD AUTO: 1.5 X10(3)/MCL (ref 0.6–4.6)
LYMPHOCYTES NFR BLD AUTO: 20 %
MCH RBC QN AUTO: 28.2 PG (ref 27–31)
MCHC RBC AUTO-ENTMCNC: 30.4 GM/DL (ref 33–36)
MCV RBC AUTO: 92.6 FL (ref 80–94)
MONOCYTES # BLD AUTO: 0.6 X10(3)/MCL (ref 0.1–1.3)
MONOCYTES NFR BLD AUTO: 8 %
NEUTROPHILS # BLD AUTO: 5.13 X10(3)/MCL (ref 2.1–9.2)
NEUTROPHILS NFR BLD AUTO: 66 %
PLATELET # BLD AUTO: 217 X10(3)/MCL (ref 130–400)
PMV BLD AUTO: 12.8 FL (ref 9.4–12.4)
POTASSIUM SERPL-SCNC: 4.9 MMOL/L (ref 3.5–5.1)
RBC # BLD AUTO: 3.8 X10(6)/MCL (ref 4.2–5.4)
SODIUM SERPL-SCNC: 138 MMOL/L (ref 136–145)
WBC # SPEC AUTO: 7.8 X10(3)/MCL (ref 4.5–11.5)

## 2020-01-21 ENCOUNTER — HISTORICAL (OUTPATIENT)
Dept: LAB | Facility: HOSPITAL | Age: 70
End: 2020-01-21

## 2020-01-21 LAB
ABS NEUT (OLG): 6.34 X10(3)/MCL (ref 2.1–9.2)
ALBUMIN SERPL-MCNC: 3.7 GM/DL (ref 3.4–5)
ALBUMIN/GLOB SERPL: 1.1 RATIO (ref 1.1–2)
ALP SERPL-CCNC: 91 UNIT/L (ref 46–116)
ALT SERPL-CCNC: 18 UNIT/L (ref 12–78)
APPEARANCE, UA: CLEAR
AST SERPL-CCNC: 17 UNIT/L (ref 15–37)
BASOPHILS # BLD AUTO: 0 X10(3)/MCL (ref 0–0.2)
BASOPHILS NFR BLD AUTO: 0 %
BILIRUB SERPL-MCNC: 0.2 MG/DL (ref 0.2–1)
BILIRUB UR QL STRIP: NEGATIVE
BILIRUBIN DIRECT+TOT PNL SERPL-MCNC: 0.08 MG/DL (ref 0–0.2)
BILIRUBIN DIRECT+TOT PNL SERPL-MCNC: 0.12 MG/DL (ref 0–0.8)
BUN SERPL-MCNC: 34 MG/DL (ref 7–18)
CALCIUM SERPL-MCNC: 9.5 MG/DL (ref 8.5–10.1)
CHLORIDE SERPL-SCNC: 104 MMOL/L (ref 98–107)
CO2 SERPL-SCNC: 26.9 MMOL/L (ref 21–32)
COLOR UR: YELLOW
CREAT SERPL-MCNC: 0.85 MG/DL (ref 0.6–1.3)
CREAT UR-MCNC: 110 MG/DL
EOSINOPHIL # BLD AUTO: 0.8 X10(3)/MCL (ref 0–0.9)
EOSINOPHIL NFR BLD AUTO: 8 %
ERYTHROCYTE [DISTWIDTH] IN BLOOD BY AUTOMATED COUNT: 14.2 % (ref 11.5–17)
EST. AVERAGE GLUCOSE BLD GHB EST-MCNC: 212 MG/DL
GLOBULIN SER-MCNC: 3.5 GM/DL (ref 2.4–3.5)
GLUCOSE (UA): NEGATIVE
GLUCOSE SERPL-MCNC: 175 MG/DL (ref 74–106)
HBA1C MFR BLD: 9 % (ref 4.5–6.2)
HCT VFR BLD AUTO: 31.6 % (ref 37–47)
HGB BLD-MCNC: 10.1 GM/DL (ref 12–16)
HGB UR QL STRIP: NEGATIVE
IMM GRANULOCYTES # BLD AUTO: 0.06 % (ref 0–0.02)
IMM GRANULOCYTES NFR BLD AUTO: 0.6 % (ref 0–0.43)
KETONES UR QL STRIP: ABNORMAL
LEUKOCYTE ESTERASE UR QL STRIP: ABNORMAL
LYMPHOCYTES # BLD AUTO: 1.6 X10(3)/MCL (ref 0.6–4.6)
LYMPHOCYTES NFR BLD AUTO: 17 %
MCH RBC QN AUTO: 29 PG (ref 27–31)
MCHC RBC AUTO-ENTMCNC: 32 GM/DL (ref 33–36)
MCV RBC AUTO: 90.8 FL (ref 80–94)
MICROALBUMIN UR-MCNC: 27.5 MG/DL
MICROALBUMIN/CREAT RATIO PNL UR: 250 MG/GM CR (ref 0–30)
MONOCYTES # BLD AUTO: 0.7 X10(3)/MCL (ref 0.1–1.3)
MONOCYTES NFR BLD AUTO: 7 %
NEUTROPHILS # BLD AUTO: 6.34 X10(3)/MCL (ref 1.4–7.9)
NEUTROPHILS NFR BLD AUTO: 66 %
NITRITE UR QL STRIP: NEGATIVE
PH UR STRIP: 5.5 [PH] (ref 5–9)
PLATELET # BLD AUTO: 240 X10(3)/MCL (ref 130–400)
PMV BLD AUTO: 11.3 FL (ref 9.4–12.4)
POTASSIUM SERPL-SCNC: 5 MMOL/L (ref 3.5–5.1)
PROT SERPL-MCNC: 7.2 GM/DL (ref 6.4–8.2)
PROT UR QL STRIP: 100
RBC # BLD AUTO: 3.48 X10(6)/MCL (ref 4.2–5.4)
SODIUM SERPL-SCNC: 143 MMOL/L (ref 136–145)
SP GR UR STRIP: >=1.03 (ref 1–1.03)
TSH SERPL-ACNC: 15.71 MIU/ML (ref 0.36–3.74)
UROBILINOGEN UR STRIP-ACNC: 0.2
WBC # SPEC AUTO: 9.6 X10(3)/MCL (ref 4.5–11.5)

## 2020-01-22 ENCOUNTER — HISTORICAL (OUTPATIENT)
Dept: LAB | Facility: HOSPITAL | Age: 70
End: 2020-01-22

## 2020-01-22 LAB
EST. AVERAGE GLUCOSE BLD GHB EST-MCNC: 212 MG/DL
HBA1C MFR BLD: 9 % (ref 4.2–6.3)

## 2020-02-05 ENCOUNTER — HISTORICAL (OUTPATIENT)
Dept: LAB | Facility: HOSPITAL | Age: 70
End: 2020-02-05

## 2020-02-05 ENCOUNTER — HISTORICAL (OUTPATIENT)
Dept: INFUSION THERAPY | Facility: HOSPITAL | Age: 70
End: 2020-02-05

## 2020-02-05 LAB
ABS NEUT (OLG): 7.49 X10(3)/MCL (ref 2.1–9.2)
ALBUMIN SERPL-MCNC: 3.2 GM/DL (ref 3.4–5)
ALBUMIN/GLOB SERPL: 0.8 RATIO (ref 1.1–2)
ALP SERPL-CCNC: 106 UNIT/L (ref 46–116)
ALT SERPL-CCNC: 19 UNIT/L (ref 12–78)
AST SERPL-CCNC: 14 UNIT/L (ref 15–37)
BASOPHILS # BLD AUTO: 0 X10(3)/MCL (ref 0–0.2)
BASOPHILS NFR BLD AUTO: 0 %
BILIRUB SERPL-MCNC: 0.5 MG/DL (ref 0.2–1)
BILIRUBIN DIRECT+TOT PNL SERPL-MCNC: 0.2 MG/DL (ref 0–0.2)
BILIRUBIN DIRECT+TOT PNL SERPL-MCNC: 0.3 MG/DL (ref 0–0.8)
BUN SERPL-MCNC: 17.3 MG/DL (ref 7–18)
CALCIUM SERPL-MCNC: 8.9 MG/DL (ref 8.5–10.1)
CHLORIDE SERPL-SCNC: 106 MMOL/L (ref 98–107)
CO2 SERPL-SCNC: 28.1 MMOL/L (ref 21–32)
CREAT SERPL-MCNC: 0.79 MG/DL (ref 0.6–1.3)
EOSINOPHIL # BLD AUTO: 0.2 X10(3)/MCL (ref 0–0.9)
EOSINOPHIL NFR BLD AUTO: 2 %
ERYTHROCYTE [DISTWIDTH] IN BLOOD BY AUTOMATED COUNT: 14.7 % (ref 11.5–17)
EST. AVERAGE GLUCOSE BLD GHB EST-MCNC: 154 MG/DL
GLOBULIN SER-MCNC: 3.9 GM/DL (ref 2.4–3.5)
GLUCOSE SERPL-MCNC: 120 MG/DL (ref 74–106)
HBA1C MFR BLD: 7 % (ref 4.5–6.2)
HCT VFR BLD AUTO: 33.7 % (ref 37–47)
HGB BLD-MCNC: 10.4 GM/DL (ref 12–16)
IMM GRANULOCYTES # BLD AUTO: 0.24 % (ref 0–0.02)
IMM GRANULOCYTES NFR BLD AUTO: 2.4 % (ref 0–0.43)
LYMPHOCYTES # BLD AUTO: 1.3 X10(3)/MCL (ref 0.6–4.6)
LYMPHOCYTES NFR BLD AUTO: 13 %
MCH RBC QN AUTO: 28.8 PG (ref 27–31)
MCHC RBC AUTO-ENTMCNC: 30.9 GM/DL (ref 33–36)
MCV RBC AUTO: 93.4 FL (ref 80–94)
MONOCYTES # BLD AUTO: 1 X10(3)/MCL (ref 0.1–1.3)
MONOCYTES NFR BLD AUTO: 10 %
NEUTROPHILS # BLD AUTO: 7.49 X10(3)/MCL (ref 1.4–7.9)
NEUTROPHILS NFR BLD AUTO: 73 %
PLATELET # BLD AUTO: 287 X10(3)/MCL (ref 130–400)
PMV BLD AUTO: 10.6 FL (ref 9.4–12.4)
POTASSIUM SERPL-SCNC: 4.2 MMOL/L (ref 3.5–5.1)
PROT SERPL-MCNC: 7.1 GM/DL (ref 6.4–8.2)
RBC # BLD AUTO: 3.61 X10(6)/MCL (ref 4.2–5.4)
SODIUM SERPL-SCNC: 146 MMOL/L (ref 136–145)
WBC # SPEC AUTO: 10.2 X10(3)/MCL (ref 4.5–11.5)

## 2020-03-09 ENCOUNTER — HISTORICAL (OUTPATIENT)
Dept: HEMATOLOGY/ONCOLOGY | Facility: CLINIC | Age: 70
End: 2020-03-09

## 2020-03-09 LAB
ABS NEUT (OLG): 5.16 X10(3)/MCL (ref 2.1–9.2)
BASOPHILS # BLD AUTO: 0 X10(3)/MCL (ref 0–0.2)
BASOPHILS NFR BLD AUTO: 0.4 %
EOSINOPHIL # BLD AUTO: 0.1 X10(3)/MCL (ref 0–0.9)
EOSINOPHIL NFR BLD AUTO: 1.4 %
ERYTHROCYTE [DISTWIDTH] IN BLOOD BY AUTOMATED COUNT: 13.6 % (ref 11.5–17)
ERYTHROCYTE [SEDIMENTATION RATE] IN BLOOD: 62 MM/HR (ref 0–20)
FERRITIN SERPL-MCNC: 1276.7 NG/ML (ref 8–388)
HCT VFR BLD AUTO: 33.5 % (ref 37–47)
HGB BLD-MCNC: 10.2 GM/DL (ref 12–16)
IRON SATN MFR SERPL: 24.7 % (ref 20–50)
IRON SERPL-MCNC: 77 MCG/DL (ref 50–175)
LYMPHOCYTES # BLD AUTO: 1.2 X10(3)/MCL (ref 0.6–4.6)
LYMPHOCYTES NFR BLD AUTO: 16.8 %
MCH RBC QN AUTO: 29 PG (ref 27–31)
MCHC RBC AUTO-ENTMCNC: 30.4 GM/DL (ref 33–36)
MCV RBC AUTO: 95.2 FL (ref 80–94)
MONOCYTES # BLD AUTO: 0.5 X10(3)/MCL (ref 0.1–1.3)
MONOCYTES NFR BLD AUTO: 7.7 %
NEUTROPHILS # BLD AUTO: 5.2 X10(3)/MCL (ref 2.1–9.2)
NEUTROPHILS NFR BLD AUTO: 73.3 %
PLATELET # BLD AUTO: 228 X10(3)/MCL (ref 130–400)
PMV BLD AUTO: 10.7 FL (ref 9.4–12.4)
RBC # BLD AUTO: 3.52 X10(6)/MCL (ref 4.2–5.4)
RET# (OHS): 0.09 X10^6/ML (ref 0.02–0.08)
RETICULOCYTE COUNT AUTOMATED (OLG): 2.5 % (ref 1.1–2.1)
TIBC SERPL-MCNC: 312 MCG/DL (ref 250–450)
TRANSFERRIN SERPL-MCNC: 242 MG/DL (ref 200–360)
VIT B12 SERPL-MCNC: 419 PG/ML (ref 193–986)
WBC # SPEC AUTO: 7 X10(3)/MCL (ref 4.5–11.5)

## 2020-05-13 ENCOUNTER — HISTORICAL (OUTPATIENT)
Dept: ADMINISTRATIVE | Facility: HOSPITAL | Age: 70
End: 2020-05-13

## 2020-05-13 LAB
ABS NEUT (OLG): 3.95 X10(3)/MCL (ref 2.1–9.2)
ALBUMIN SERPL-MCNC: 3.7 GM/DL (ref 3.4–5)
ALBUMIN/GLOB SERPL: 1.1 RATIO (ref 1.1–2)
ALP SERPL-CCNC: 125 UNIT/L (ref 40–150)
ALT SERPL-CCNC: 6 UNIT/L (ref 0–55)
APPEARANCE, UA: CLEAR
AST SERPL-CCNC: 11 UNIT/L (ref 5–34)
BACTERIA SPEC CULT: ABNORMAL /HPF
BASOPHILS # BLD AUTO: 0 X10(3)/MCL (ref 0–0.2)
BASOPHILS NFR BLD AUTO: 0 %
BILIRUB SERPL-MCNC: 0.3 MG/DL
BILIRUB UR QL STRIP: NEGATIVE
BILIRUBIN DIRECT+TOT PNL SERPL-MCNC: 0.1 MG/DL (ref 0–0.5)
BILIRUBIN DIRECT+TOT PNL SERPL-MCNC: 0.2 MG/DL (ref 0–0.8)
BUN SERPL-MCNC: 30.5 MG/DL (ref 9.8–20.1)
CALCIUM SERPL-MCNC: 8.8 MG/DL (ref 8.4–10.2)
CHLORIDE SERPL-SCNC: 102 MMOL/L (ref 98–107)
CHOLEST SERPL-MCNC: 132 MG/DL
CHOLEST/HDLC SERPL: 4 {RATIO} (ref 0–5)
CO2 SERPL-SCNC: 25 MMOL/L (ref 23–31)
COLOR UR: YELLOW
CREAT SERPL-MCNC: 0.98 MG/DL (ref 0.55–1.02)
CREAT UR-MCNC: 79.9 MG/DL (ref 45–106)
EOSINOPHIL # BLD AUTO: 0.6 X10(3)/MCL (ref 0–0.9)
EOSINOPHIL NFR BLD AUTO: 9 %
ERYTHROCYTE [DISTWIDTH] IN BLOOD BY AUTOMATED COUNT: 14.5 % (ref 11.5–17)
EST. AVERAGE GLUCOSE BLD GHB EST-MCNC: 191.5 MG/DL
FERRITIN SERPL-MCNC: 1023.52 NG/ML (ref 4.63–204)
GLOBULIN SER-MCNC: 3.4 GM/DL (ref 2.4–3.5)
GLUCOSE (UA): NEGATIVE
GLUCOSE SERPL-MCNC: 216 MG/DL (ref 82–115)
HBA1C MFR BLD: 8.3 %
HCT VFR BLD AUTO: 31.5 % (ref 37–47)
HDLC SERPL-MCNC: 36 MG/DL (ref 35–60)
HGB BLD-MCNC: 9.7 GM/DL (ref 12–16)
HGB UR QL STRIP: NEGATIVE
IRON SATN MFR SERPL: 23 % (ref 20–50)
IRON SERPL-MCNC: 67 UG/DL (ref 50–170)
KETONES UR QL STRIP: NEGATIVE
LDLC SERPL CALC-MCNC: 49 MG/DL (ref 50–140)
LEUKOCYTE ESTERASE UR QL STRIP: ABNORMAL
LYMPHOCYTES # BLD AUTO: 1.2 X10(3)/MCL (ref 0.6–4.6)
LYMPHOCYTES NFR BLD AUTO: 19 %
MCH RBC QN AUTO: 29 PG (ref 27–31)
MCHC RBC AUTO-ENTMCNC: 30.8 GM/DL (ref 33–36)
MCV RBC AUTO: 94.3 FL (ref 80–94)
MICROALBUMIN UR-MCNC: 317.1 UG/ML
MICROALBUMIN/CREAT RATIO PNL UR: 396.9 MG/GM CR (ref 0–30)
MONOCYTES # BLD AUTO: 0.5 X10(3)/MCL (ref 0.1–1.3)
MONOCYTES NFR BLD AUTO: 8 %
NEUTROPHILS # BLD AUTO: 3.95 X10(3)/MCL (ref 2.1–9.2)
NEUTROPHILS NFR BLD AUTO: 62 %
NITRITE UR QL STRIP: NEGATIVE
PH UR STRIP: 5 [PH] (ref 5–9)
PLATELET # BLD AUTO: 185 X10(3)/MCL (ref 130–400)
PMV BLD AUTO: 12.1 FL (ref 9.4–12.4)
POTASSIUM SERPL-SCNC: 4.9 MMOL/L (ref 3.5–5.1)
PROT SERPL-MCNC: 7.1 GM/DL (ref 5.8–7.6)
PROT UR QL STRIP: ABNORMAL
RBC # BLD AUTO: 3.34 X10(6)/MCL (ref 4.2–5.4)
RBC #/AREA URNS HPF: ABNORMAL /[HPF]
SODIUM SERPL-SCNC: 140 MMOL/L (ref 136–145)
SP GR UR STRIP: 1.02 (ref 1–1.03)
SQUAMOUS EPITHELIAL, UA: ABNORMAL
T4 FREE SERPL-MCNC: 0.85 NG/DL (ref 0.7–1.48)
TIBC SERPL-MCNC: 227 UG/DL (ref 70–310)
TIBC SERPL-MCNC: 294 UG/DL (ref 250–450)
TRANSFERRIN SERPL-MCNC: 251 MG/DL (ref 173–360)
TRIGL SERPL-MCNC: 235 MG/DL (ref 37–140)
TSH SERPL-ACNC: 5.15 UIU/ML (ref 0.35–4.94)
UROBILINOGEN UR STRIP-ACNC: 1
VLDLC SERPL CALC-MCNC: 47 MG/DL
WBC # SPEC AUTO: 6.4 X10(3)/MCL (ref 4.5–11.5)
WBC #/AREA URNS HPF: ABNORMAL /[HPF]

## 2020-06-19 ENCOUNTER — HISTORICAL (OUTPATIENT)
Dept: RADIOLOGY | Facility: HOSPITAL | Age: 70
End: 2020-06-19

## 2020-06-19 LAB — POC CREATININE: 0.8 MG/DL (ref 0.6–1.3)

## 2020-07-02 ENCOUNTER — HISTORICAL (OUTPATIENT)
Dept: RADIOLOGY | Facility: HOSPITAL | Age: 70
End: 2020-07-02

## 2020-07-02 LAB — BMD RECOMMENDATION EXT: NORMAL

## 2020-07-24 ENCOUNTER — HISTORICAL (OUTPATIENT)
Dept: HEMATOLOGY/ONCOLOGY | Facility: CLINIC | Age: 70
End: 2020-07-24

## 2020-07-24 LAB
ABS NEUT (OLG): 5.09 X10(3)/MCL (ref 2.1–9.2)
ALBUMIN SERPL-MCNC: 3.8 GM/DL (ref 3.4–4.8)
ALBUMIN/GLOB SERPL: 1.1 RATIO (ref 1.1–2)
ALP SERPL-CCNC: 98 UNIT/L (ref 40–150)
ALT SERPL-CCNC: 15 UNIT/L (ref 0–55)
AST SERPL-CCNC: 18 UNIT/L (ref 5–34)
BASOPHILS # BLD AUTO: 0 X10(3)/MCL (ref 0–0.2)
BASOPHILS NFR BLD AUTO: 0.4 %
BILIRUB SERPL-MCNC: 0.3 MG/DL
BILIRUBIN DIRECT+TOT PNL SERPL-MCNC: 0.1 MG/DL (ref 0–0.5)
BILIRUBIN DIRECT+TOT PNL SERPL-MCNC: 0.2 MG/DL (ref 0–0.8)
BUN SERPL-MCNC: 55.9 MG/DL (ref 9.8–20.1)
CALCIUM SERPL-MCNC: 9.2 MG/DL (ref 8.4–10.2)
CHLORIDE SERPL-SCNC: 97 MMOL/L (ref 98–107)
CO2 SERPL-SCNC: 25 MMOL/L (ref 23–31)
CREAT SERPL-MCNC: 1.05 MG/DL (ref 0.55–1.02)
EOSINOPHIL # BLD AUTO: 0.2 X10(3)/MCL (ref 0–0.9)
EOSINOPHIL NFR BLD AUTO: 2.6 %
ERYTHROCYTE [DISTWIDTH] IN BLOOD BY AUTOMATED COUNT: 13.8 % (ref 11.5–17)
FERRITIN SERPL-MCNC: 1442.94 NG/ML (ref 4.63–204)
GLOBULIN SER-MCNC: 3.5 GM/DL (ref 2.4–3.5)
GLUCOSE SERPL-MCNC: 307 MG/DL (ref 82–115)
HCT VFR BLD AUTO: 32.3 % (ref 37–47)
HGB BLD-MCNC: 10.4 GM/DL (ref 12–16)
IRON SATN MFR SERPL: 29 % (ref 20–50)
IRON SERPL-MCNC: 82 UG/DL (ref 50–170)
LYMPHOCYTES # BLD AUTO: 1.3 X10(3)/MCL (ref 0.6–4.6)
LYMPHOCYTES NFR BLD AUTO: 17.8 %
MCH RBC QN AUTO: 29 PG (ref 27–31)
MCHC RBC AUTO-ENTMCNC: 32.2 GM/DL (ref 33–36)
MCV RBC AUTO: 90 FL (ref 80–94)
MONOCYTES # BLD AUTO: 0.6 X10(3)/MCL (ref 0.1–1.3)
MONOCYTES NFR BLD AUTO: 7.8 %
NEUTROPHILS # BLD AUTO: 5.1 X10(3)/MCL (ref 2.1–9.2)
NEUTROPHILS NFR BLD AUTO: 71 %
PLATELET # BLD AUTO: 220 X10(3)/MCL (ref 130–400)
PMV BLD AUTO: 11.4 FL (ref 9.4–12.4)
POTASSIUM SERPL-SCNC: 5.6 MMOL/L (ref 3.5–5.1)
PROT SERPL-MCNC: 7.3 GM/DL (ref 5.8–7.6)
RBC # BLD AUTO: 3.59 X10(6)/MCL (ref 4.2–5.4)
RET# (OHS): 0.08 X10^6/ML (ref 0.02–0.08)
RETICULOCYTE COUNT AUTOMATED (OLG): 2.2 % (ref 1.1–2.1)
SODIUM SERPL-SCNC: 134 MMOL/L (ref 136–145)
TIBC SERPL-MCNC: 200 UG/DL (ref 70–310)
TIBC SERPL-MCNC: 282 UG/DL (ref 250–450)
TRANSFERRIN SERPL-MCNC: 242 MG/DL (ref 173–360)
WBC # SPEC AUTO: 7.2 X10(3)/MCL (ref 4.5–11.5)

## 2020-07-27 ENCOUNTER — HISTORICAL (OUTPATIENT)
Dept: ADMINISTRATIVE | Facility: HOSPITAL | Age: 70
End: 2020-07-27

## 2020-07-27 LAB
ANION GAP SERPL CALC-SCNC: 12 MMOL/L
BUN SERPL-MCNC: >50 MG/DL (ref 8–26)
CHLORIDE SERPL-SCNC: 105 MMOL/L (ref 98–109)
CREAT SERPL-MCNC: 0.9 MG/DL (ref 0.6–1.3)
GLUCOSE SERPL-MCNC: 160 MG/DL (ref 70–105)
HCT VFR BLD CALC: 35 % (ref 38–51)
HGB BLD-MCNC: 11.9 MG/DL (ref 12–17)
POC IONIZED CALCIUM: 1.08 MMOL/L (ref 1.12–1.32)
POC TCO2: 24 MMOL/L (ref 24–29)
POTASSIUM BLD-SCNC: 4.9 MMOL/L (ref 3.5–4.9)
SODIUM BLD-SCNC: 135 MMOL/L (ref 138–146)

## 2020-08-04 ENCOUNTER — HISTORICAL (OUTPATIENT)
Dept: RADIOLOGY | Facility: HOSPITAL | Age: 70
End: 2020-08-04

## 2020-08-14 ENCOUNTER — HISTORICAL (OUTPATIENT)
Dept: HEMATOLOGY/ONCOLOGY | Facility: CLINIC | Age: 70
End: 2020-08-14

## 2020-08-14 LAB
ABS NEUT (OLG): 4.47 X10(3)/MCL (ref 2.1–9.2)
ALBUMIN SERPL-MCNC: 3.6 GM/DL (ref 3.4–5)
ALBUMIN/GLOB SERPL: 1.1 RATIO (ref 1.1–2)
ALP SERPL-CCNC: 92 UNIT/L (ref 40–150)
ALT SERPL-CCNC: 12 UNIT/L (ref 0–55)
AST SERPL-CCNC: 10 UNIT/L (ref 5–34)
BASOPHILS # BLD AUTO: 0 X10(3)/MCL (ref 0–0.2)
BASOPHILS NFR BLD AUTO: 0.6 %
BILIRUB SERPL-MCNC: 0.3 MG/DL
BILIRUBIN DIRECT+TOT PNL SERPL-MCNC: 0.1 MG/DL (ref 0–0.5)
BILIRUBIN DIRECT+TOT PNL SERPL-MCNC: 0.2 MG/DL (ref 0–0.8)
BUN SERPL-MCNC: 30.4 MG/DL (ref 9.8–20.1)
CALCIUM SERPL-MCNC: 9.2 MG/DL (ref 8.4–10.2)
CHLORIDE SERPL-SCNC: 102 MMOL/L (ref 98–107)
CO2 SERPL-SCNC: 29 MMOL/L (ref 23–31)
CREAT SERPL-MCNC: 0.9 MG/DL (ref 0.55–1.02)
EOSINOPHIL # BLD AUTO: 0.2 X10(3)/MCL (ref 0–0.9)
EOSINOPHIL NFR BLD AUTO: 3 %
ERYTHROCYTE [DISTWIDTH] IN BLOOD BY AUTOMATED COUNT: 13.7 % (ref 11.5–17)
FERRITIN SERPL-MCNC: 1199.73 NG/ML (ref 4.63–204)
FOLATE SERPL-MCNC: 17 NG/ML (ref 7–31.4)
GLOBULIN SER-MCNC: 3.2 GM/DL (ref 2.4–3.5)
GLUCOSE SERPL-MCNC: 176 MG/DL (ref 82–115)
HCT VFR BLD AUTO: 33.1 % (ref 37–47)
HGB BLD-MCNC: 10.5 GM/DL (ref 12–16)
IRON SATN MFR SERPL: 37 % (ref 20–50)
IRON SERPL-MCNC: 95 UG/DL (ref 50–170)
LYMPHOCYTES # BLD AUTO: 1.3 X10(3)/MCL (ref 0.6–4.6)
LYMPHOCYTES NFR BLD AUTO: 19.7 %
MCH RBC QN AUTO: 29.1 PG (ref 27–31)
MCHC RBC AUTO-ENTMCNC: 31.7 GM/DL (ref 33–36)
MCV RBC AUTO: 91.7 FL (ref 80–94)
MONOCYTES # BLD AUTO: 0.5 X10(3)/MCL (ref 0.1–1.3)
MONOCYTES NFR BLD AUTO: 7.8 %
NEUTROPHILS # BLD AUTO: 4.5 X10(3)/MCL (ref 2.1–9.2)
NEUTROPHILS NFR BLD AUTO: 68.1 %
PLATELET # BLD AUTO: 197 X10(3)/MCL (ref 130–400)
PMV BLD AUTO: 10.7 FL (ref 9.4–12.4)
POTASSIUM SERPL-SCNC: 5.3 MMOL/L (ref 3.5–5.1)
PROT SERPL-MCNC: 6.8 GM/DL (ref 5.8–7.6)
RBC # BLD AUTO: 3.61 X10(6)/MCL (ref 4.2–5.4)
RET# (OHS): 0.1 X10^6/ML (ref 0.02–0.08)
RETICULOCYTE COUNT AUTOMATED (OLG): 2.8 % (ref 1.1–2.1)
SODIUM SERPL-SCNC: 141 MMOL/L (ref 136–145)
TIBC SERPL-MCNC: 164 UG/DL (ref 70–310)
TIBC SERPL-MCNC: 259 UG/DL (ref 250–450)
TRANSFERRIN SERPL-MCNC: 227 MG/DL (ref 173–360)
VIT B12 SERPL-MCNC: 850 PG/ML (ref 213–816)
WBC # SPEC AUTO: 6.6 X10(3)/MCL (ref 4.5–11.5)

## 2020-09-08 ENCOUNTER — HISTORICAL (OUTPATIENT)
Dept: HEPATOLOGY | Facility: HOSPITAL | Age: 70
End: 2020-09-08

## 2020-09-30 ENCOUNTER — HISTORICAL (OUTPATIENT)
Dept: RADIOLOGY | Facility: HOSPITAL | Age: 70
End: 2020-09-30

## 2020-09-30 ENCOUNTER — HISTORICAL (OUTPATIENT)
Dept: ADMINISTRATIVE | Facility: HOSPITAL | Age: 70
End: 2020-09-30

## 2020-09-30 LAB
EST. AVERAGE GLUCOSE BLD GHB EST-MCNC: 203 MG/DL
HBA1C MFR BLD: 8.7 %
TSH SERPL-ACNC: 7.42 UIU/ML (ref 0.35–4.94)

## 2020-10-27 ENCOUNTER — HISTORICAL (OUTPATIENT)
Dept: INFUSION THERAPY | Facility: HOSPITAL | Age: 70
End: 2020-10-27

## 2020-10-28 ENCOUNTER — HISTORICAL (OUTPATIENT)
Dept: INFUSION THERAPY | Facility: HOSPITAL | Age: 70
End: 2020-10-28

## 2020-10-29 ENCOUNTER — HISTORICAL (OUTPATIENT)
Dept: INFUSION THERAPY | Facility: HOSPITAL | Age: 70
End: 2020-10-29

## 2021-01-19 ENCOUNTER — HISTORICAL (OUTPATIENT)
Dept: HEMATOLOGY/ONCOLOGY | Facility: CLINIC | Age: 71
End: 2021-01-19

## 2021-01-19 LAB
ABS NEUT (OLG): 8.72 X10(3)/MCL (ref 2.1–9.2)
ALBUMIN SERPL-MCNC: 4 GM/DL (ref 3.4–4.8)
ALBUMIN/GLOB SERPL: 1.1 RATIO (ref 1.1–2)
ALP SERPL-CCNC: 103 UNIT/L (ref 40–150)
ALT SERPL-CCNC: 10 UNIT/L (ref 0–55)
AST SERPL-CCNC: 15 UNIT/L (ref 5–34)
BASOPHILS # BLD AUTO: 0 X10(3)/MCL (ref 0–0.2)
BASOPHILS NFR BLD AUTO: 0 %
BILIRUB SERPL-MCNC: 0.2 MG/DL
BILIRUBIN DIRECT+TOT PNL SERPL-MCNC: 0.1 MG/DL (ref 0–0.5)
BILIRUBIN DIRECT+TOT PNL SERPL-MCNC: 0.1 MG/DL (ref 0–0.8)
BUN SERPL-MCNC: 36.1 MG/DL (ref 9.8–20.1)
CALCIUM SERPL-MCNC: 8.9 MG/DL (ref 8.4–10.2)
CHLORIDE SERPL-SCNC: 97 MMOL/L (ref 98–107)
CO2 SERPL-SCNC: 24 MMOL/L (ref 23–31)
CREAT SERPL-MCNC: 1.47 MG/DL (ref 0.55–1.02)
DEPRECATED CALCIDIOL+CALCIFEROL SERPL-MC: 40.9 NG/ML (ref 30–80)
EOSINOPHIL # BLD AUTO: 0.2 X10(3)/MCL (ref 0–0.9)
EOSINOPHIL NFR BLD AUTO: 1 %
ERYTHROCYTE [DISTWIDTH] IN BLOOD BY AUTOMATED COUNT: 13.7 % (ref 11.5–17)
EST. AVERAGE GLUCOSE BLD GHB EST-MCNC: 205.9 MG/DL
FERRITIN SERPL-MCNC: 1016.26 NG/ML (ref 4.63–204)
GLOBULIN SER-MCNC: 3.6 GM/DL (ref 2.4–3.5)
GLUCOSE SERPL-MCNC: 379 MG/DL (ref 82–115)
HBA1C MFR BLD: 8.8 %
HCT VFR BLD AUTO: 34.7 % (ref 37–47)
HGB BLD-MCNC: 11 GM/DL (ref 12–16)
IRON SATN MFR SERPL: 28 % (ref 20–50)
IRON SERPL-MCNC: 77 UG/DL (ref 50–170)
LYMPHOCYTES # BLD AUTO: 1.4 X10(3)/MCL (ref 0.6–4.6)
LYMPHOCYTES NFR BLD AUTO: 13 %
MCH RBC QN AUTO: 28.3 PG (ref 27–31)
MCHC RBC AUTO-ENTMCNC: 31.7 GM/DL (ref 33–36)
MCV RBC AUTO: 89.2 FL (ref 80–94)
MONOCYTES # BLD AUTO: 0.8 X10(3)/MCL (ref 0.1–1.3)
MONOCYTES NFR BLD AUTO: 7 %
NEUTROPHILS # BLD AUTO: 8.72 X10(3)/MCL (ref 2.1–9.2)
NEUTROPHILS NFR BLD AUTO: 78 %
PLATELET # BLD AUTO: 223 X10(3)/MCL (ref 130–400)
PMV BLD AUTO: 12.6 FL (ref 9.4–12.4)
POTASSIUM SERPL-SCNC: 4.8 MMOL/L (ref 3.5–5.1)
PROT SERPL-MCNC: 7.6 GM/DL (ref 5.8–7.6)
RBC # BLD AUTO: 3.89 X10(6)/MCL (ref 4.2–5.4)
SODIUM SERPL-SCNC: 134 MMOL/L (ref 136–145)
TIBC SERPL-MCNC: 196 UG/DL (ref 70–310)
TIBC SERPL-MCNC: 273 UG/DL (ref 250–450)
TRANSFERRIN SERPL-MCNC: 238 MG/DL (ref 173–360)
TSH SERPL-ACNC: 7.56 UIU/ML (ref 0.35–4.94)
WBC # SPEC AUTO: 11.1 X10(3)/MCL (ref 4.5–11.5)

## 2021-02-09 LAB
LEFT EYE DM RETINOPATHY: POSITIVE
RIGHT EYE DM RETINOPATHY: POSITIVE

## 2021-02-10 ENCOUNTER — HISTORICAL (OUTPATIENT)
Dept: RADIOLOGY | Facility: HOSPITAL | Age: 71
End: 2021-02-10

## 2021-02-12 ENCOUNTER — HISTORICAL (OUTPATIENT)
Dept: ADMINISTRATIVE | Facility: HOSPITAL | Age: 71
End: 2021-02-12

## 2021-02-12 LAB — SARS-COV-2 RNA RESP QL NAA+PROBE: NOT DETECTED

## 2021-03-11 ENCOUNTER — HISTORICAL (OUTPATIENT)
Dept: HEMATOLOGY/ONCOLOGY | Facility: CLINIC | Age: 71
End: 2021-03-11

## 2021-03-11 LAB
ABS NEUT (OLG): 3.59 X10(3)/MCL (ref 2.1–9.2)
ALBUMIN SERPL-MCNC: 4.2 GM/DL (ref 3.4–4.8)
ALBUMIN/GLOB SERPL: 1.2 RATIO (ref 1.1–2)
ALP SERPL-CCNC: 75 UNIT/L (ref 40–150)
ALT SERPL-CCNC: 12 UNIT/L (ref 0–55)
AST SERPL-CCNC: 14 UNIT/L (ref 5–34)
BASOPHILS # BLD AUTO: 0 X10(3)/MCL (ref 0–0.2)
BASOPHILS NFR BLD AUTO: 0.7 %
BILIRUB SERPL-MCNC: 0.3 MG/DL
BILIRUBIN DIRECT+TOT PNL SERPL-MCNC: 0.1 MG/DL (ref 0–0.5)
BILIRUBIN DIRECT+TOT PNL SERPL-MCNC: 0.2 MG/DL (ref 0–0.8)
BUN SERPL-MCNC: 46.1 MG/DL (ref 9.8–20.1)
CALCIUM SERPL-MCNC: 9.3 MG/DL (ref 8.4–10.2)
CHLORIDE SERPL-SCNC: 105 MMOL/L (ref 98–107)
CO2 SERPL-SCNC: 26 MMOL/L (ref 23–31)
CREAT SERPL-MCNC: 1.2 MG/DL (ref 0.55–1.02)
EOSINOPHIL # BLD AUTO: 0.2 X10(3)/MCL (ref 0–0.9)
EOSINOPHIL NFR BLD AUTO: 3.4 %
ERYTHROCYTE [DISTWIDTH] IN BLOOD BY AUTOMATED COUNT: 14.5 % (ref 11.5–17)
FERRITIN SERPL-MCNC: 719.91 NG/ML (ref 4.63–204)
GLOBULIN SER-MCNC: 3.4 GM/DL (ref 2.4–3.5)
GLUCOSE SERPL-MCNC: 159 MG/DL (ref 82–115)
HCT VFR BLD AUTO: 32.9 % (ref 37–47)
HGB BLD-MCNC: 10.2 GM/DL (ref 12–16)
IRON SATN MFR SERPL: 39 % (ref 20–50)
IRON SERPL-MCNC: 108 UG/DL (ref 50–170)
LYMPHOCYTES # BLD AUTO: 1.3 X10(3)/MCL (ref 0.6–4.6)
LYMPHOCYTES NFR BLD AUTO: 22.6 %
MCH RBC QN AUTO: 29.2 PG (ref 27–31)
MCHC RBC AUTO-ENTMCNC: 31 GM/DL (ref 33–36)
MCV RBC AUTO: 94.3 FL (ref 80–94)
MONOCYTES # BLD AUTO: 0.5 X10(3)/MCL (ref 0.1–1.3)
MONOCYTES NFR BLD AUTO: 8.7 %
NEUTROPHILS # BLD AUTO: 3.6 X10(3)/MCL (ref 2.1–9.2)
NEUTROPHILS NFR BLD AUTO: 63.7 %
PLATELET # BLD AUTO: 203 X10(3)/MCL (ref 130–400)
PMV BLD AUTO: 11.1 FL (ref 9.4–12.4)
POTASSIUM SERPL-SCNC: 5.1 MMOL/L (ref 3.5–5.1)
PROT SERPL-MCNC: 7.6 GM/DL (ref 5.8–7.6)
RBC # BLD AUTO: 3.49 X10(6)/MCL (ref 4.2–5.4)
SODIUM SERPL-SCNC: 145 MMOL/L (ref 136–145)
TIBC SERPL-MCNC: 171 UG/DL (ref 70–310)
TIBC SERPL-MCNC: 279 UG/DL (ref 250–450)
TRANSFERRIN SERPL-MCNC: 241 MG/DL (ref 173–360)
TSH SERPL-ACNC: 14.95 UIU/ML (ref 0.35–4.94)
WBC # SPEC AUTO: 5.6 X10(3)/MCL (ref 4.5–11.5)

## 2021-04-02 ENCOUNTER — HOSPITAL ENCOUNTER (OUTPATIENT)
Dept: ADMINISTRATIVE | Facility: HOSPITAL | Age: 71
End: 2021-04-03
Attending: INTERNAL MEDICINE | Admitting: INTERNAL MEDICINE

## 2021-04-02 LAB
ABS NEUT (OLG): 5.16 X10(3)/MCL (ref 2.1–9.2)
ALBUMIN SERPL-MCNC: 3.8 GM/DL (ref 3.4–4.8)
ALBUMIN/GLOB SERPL: 1 RATIO (ref 1.1–2)
ALP SERPL-CCNC: 104 UNIT/L (ref 40–150)
ALT SERPL-CCNC: 9 UNIT/L (ref 0–55)
APTT PPP: 33.2 SECOND(S) (ref 23.2–33.7)
AST SERPL-CCNC: 14 UNIT/L (ref 5–34)
BASOPHILS # BLD AUTO: 0 X10(3)/MCL (ref 0–0.2)
BASOPHILS NFR BLD AUTO: 0 %
BILIRUB SERPL-MCNC: 0.2 MG/DL
BILIRUBIN DIRECT+TOT PNL SERPL-MCNC: 0.1 MG/DL (ref 0–0.5)
BILIRUBIN DIRECT+TOT PNL SERPL-MCNC: 0.1 MG/DL (ref 0–0.8)
BUN SERPL-MCNC: 42.1 MG/DL (ref 9.8–20.1)
CALCIUM SERPL-MCNC: 8.9 MG/DL (ref 8.4–10.2)
CHLORIDE SERPL-SCNC: 100 MMOL/L (ref 98–107)
CHOLEST SERPL-MCNC: 268 MG/DL
CHOLEST/HDLC SERPL: 9 {RATIO} (ref 0–5)
CK SERPL-CCNC: 141 U/L (ref 29–168)
CO2 SERPL-SCNC: 24 MMOL/L (ref 23–31)
CREAT SERPL-MCNC: 1.33 MG/DL (ref 0.55–1.02)
CRP SERPL HS-MCNC: 0.48 MG/DL
EOSINOPHIL # BLD AUTO: 0.1 X10(3)/MCL (ref 0–0.9)
EOSINOPHIL NFR BLD AUTO: 1 %
ERYTHROCYTE [DISTWIDTH] IN BLOOD BY AUTOMATED COUNT: 14.4 % (ref 11.5–17)
ERYTHROCYTE [SEDIMENTATION RATE] IN BLOOD: 53 MM/HR (ref 0–20)
EST. AVERAGE GLUCOSE BLD GHB EST-MCNC: 185.8 MG/DL
GLOBULIN SER-MCNC: 3.9 GM/DL (ref 2.4–3.5)
GLUCOSE SERPL-MCNC: 468 MG/DL (ref 82–115)
HBA1C MFR BLD: 8.1 %
HCT VFR BLD AUTO: 31.7 % (ref 37–47)
HDLC SERPL-MCNC: 31 MG/DL (ref 35–60)
HGB BLD-MCNC: 10 GM/DL (ref 12–16)
INR PPP: 0.9 (ref 0–1.3)
LDLC SERPL CALC-MCNC: 22 MG/DL (ref 50–140)
LYMPHOCYTES # BLD AUTO: 1.3 X10(3)/MCL (ref 0.6–4.6)
LYMPHOCYTES NFR BLD AUTO: 18 %
MCH RBC QN AUTO: 30 PG (ref 27–31)
MCHC RBC AUTO-ENTMCNC: 31.5 GM/DL (ref 33–36)
MCV RBC AUTO: 95.2 FL (ref 80–94)
MONOCYTES # BLD AUTO: 0.6 X10(3)/MCL (ref 0.1–1.3)
MONOCYTES NFR BLD AUTO: 9 %
NEUTROPHILS # BLD AUTO: 5.16 X10(3)/MCL (ref 2.1–9.2)
NEUTROPHILS NFR BLD AUTO: 71 %
PLATELET # BLD AUTO: 252 X10(3)/MCL (ref 130–400)
PMV BLD AUTO: 11.6 FL (ref 9.4–12.4)
POTASSIUM SERPL-SCNC: 6 MMOL/L (ref 3.5–5.1)
PROT SERPL-MCNC: 7.7 GM/DL (ref 5.8–7.6)
PROTHROMBIN TIME: 12.2 SECOND(S) (ref 11.1–13.7)
RBC # BLD AUTO: 3.33 X10(6)/MCL (ref 4.2–5.4)
SODIUM SERPL-SCNC: 135 MMOL/L (ref 136–145)
TRIGL SERPL-MCNC: 1073 MG/DL (ref 37–140)
TROPONIN I SERPL-MCNC: <0.01 NG/ML (ref 0–0.04)
VLDLC SERPL CALC-MCNC: 215 MG/DL
WBC # SPEC AUTO: 7.2 X10(3)/MCL (ref 4.5–11.5)

## 2021-04-03 LAB
ALBUMIN SERPL-MCNC: 3.3 GM/DL (ref 3.4–4.8)
ALBUMIN/GLOB SERPL: 1 RATIO (ref 1.1–2)
ALP SERPL-CCNC: 85 UNIT/L (ref 40–150)
ALT SERPL-CCNC: 8 UNIT/L (ref 0–55)
AMPHET UR QL SCN: NEGATIVE
APPEARANCE, UA: CLEAR
AST SERPL-CCNC: 10 UNIT/L (ref 5–34)
BACTERIA SPEC CULT: ABNORMAL /HPF
BARBITURATE SCN PRESENT UR: NEGATIVE
BENZODIAZ UR QL SCN: NEGATIVE
BILIRUB SERPL-MCNC: 0.3 MG/DL
BILIRUB UR QL STRIP: NEGATIVE
BILIRUBIN DIRECT+TOT PNL SERPL-MCNC: 0.1 MG/DL (ref 0–0.5)
BILIRUBIN DIRECT+TOT PNL SERPL-MCNC: 0.2 MG/DL (ref 0–0.8)
BUN SERPL-MCNC: 37.9 MG/DL (ref 9.8–20.1)
CALCIUM SERPL-MCNC: 8.5 MG/DL (ref 8.4–10.2)
CANNABINOIDS UR QL SCN: NEGATIVE
CHLORIDE SERPL-SCNC: 106 MMOL/L (ref 98–107)
CO2 SERPL-SCNC: 24 MMOL/L (ref 23–31)
COCAINE UR QL SCN: NEGATIVE
COLOR UR: YELLOW
CREAT SERPL-MCNC: 0.95 MG/DL (ref 0.55–1.02)
GLOBULIN SER-MCNC: 3.2 GM/DL (ref 2.4–3.5)
GLUCOSE (UA): ABNORMAL
GLUCOSE SERPL-MCNC: 278 MG/DL (ref 82–115)
HGB UR QL STRIP: NEGATIVE
KETONES UR QL STRIP: NEGATIVE
LEUKOCYTE ESTERASE UR QL STRIP: NEGATIVE
NITRITE UR QL STRIP: NEGATIVE
OPIATES UR QL SCN: NEGATIVE
PCP UR QL: NEGATIVE
PH UR STRIP.AUTO: 5 [PH] (ref 5–7.5)
PH UR STRIP: 5 [PH] (ref 5–9)
POTASSIUM SERPL-SCNC: 5.4 MMOL/L (ref 3.5–5.1)
PROT SERPL-MCNC: 6.5 GM/DL (ref 5.8–7.6)
PROT UR QL STRIP: NEGATIVE
RBC #/AREA URNS HPF: ABNORMAL /[HPF]
SODIUM SERPL-SCNC: 138 MMOL/L (ref 136–145)
SP GR FLD REFRACTOMETRY: 1.02 (ref 1–1.03)
SP GR UR STRIP: 1.02 (ref 1–1.03)
SQUAMOUS EPITHELIAL, UA: ABNORMAL /HPF (ref 0–4)
UROBILINOGEN UR STRIP-ACNC: 0.2
WBC #/AREA URNS HPF: ABNORMAL /[HPF]

## 2021-04-08 ENCOUNTER — HISTORICAL (OUTPATIENT)
Dept: ADMINISTRATIVE | Facility: HOSPITAL | Age: 71
End: 2021-04-08

## 2021-04-08 LAB
ALBUMIN SERPL-MCNC: 4.1 GM/DL (ref 3.4–4.8)
ALBUMIN/GLOB SERPL: 1.2 RATIO (ref 1.1–2)
ALP SERPL-CCNC: 75 UNIT/L (ref 40–150)
ALT SERPL-CCNC: 11 UNIT/L (ref 0–55)
AST SERPL-CCNC: 18 UNIT/L (ref 5–34)
BILIRUB SERPL-MCNC: 0.2 MG/DL
BILIRUBIN DIRECT+TOT PNL SERPL-MCNC: 0.1 MG/DL (ref 0–0.5)
BILIRUBIN DIRECT+TOT PNL SERPL-MCNC: 0.1 MG/DL (ref 0–0.8)
BUN SERPL-MCNC: 31.7 MG/DL (ref 9.8–20.1)
CALCIUM SERPL-MCNC: 10.3 MG/DL (ref 8.4–10.2)
CHLORIDE SERPL-SCNC: 108 MMOL/L (ref 98–107)
CHOLEST SERPL-MCNC: 208 MG/DL
CHOLEST/HDLC SERPL: 5 {RATIO} (ref 0–5)
CO2 SERPL-SCNC: 21 MMOL/L (ref 23–31)
CREAT SERPL-MCNC: 0.99 MG/DL (ref 0.55–1.02)
GLOBULIN SER-MCNC: 3.5 GM/DL (ref 2.4–3.5)
GLUCOSE SERPL-MCNC: 140 MG/DL (ref 82–115)
HDLC SERPL-MCNC: 41 MG/DL (ref 35–60)
LDLC SERPL CALC-MCNC: 115 MG/DL (ref 50–140)
POTASSIUM SERPL-SCNC: 4.6 MMOL/L (ref 3.5–5.1)
PROT SERPL-MCNC: 7.6 GM/DL (ref 5.8–7.6)
SODIUM SERPL-SCNC: 143 MMOL/L (ref 136–145)
T3RU NFR SERPL: 35.5 % (ref 31–39)
T4 FREE SERPL-MCNC: 1.07 NG/DL (ref 0.7–1.48)
TRIGL SERPL-MCNC: 261 MG/DL (ref 37–140)
TSH SERPL-ACNC: 5.84 UIU/ML (ref 0.35–4.94)
VLDLC SERPL CALC-MCNC: 52 MG/DL

## 2021-04-30 ENCOUNTER — HISTORICAL (OUTPATIENT)
Dept: ADMINISTRATIVE | Facility: HOSPITAL | Age: 71
End: 2021-04-30

## 2021-06-04 ENCOUNTER — HISTORICAL (OUTPATIENT)
Dept: HEMATOLOGY/ONCOLOGY | Facility: CLINIC | Age: 71
End: 2021-06-04

## 2021-06-04 LAB
ABS NEUT (OLG): 3.39 X10(3)/MCL (ref 2.1–9.2)
ALBUMIN % SPEP (OHS): 52.74 % (ref 48.1–59.5)
ALBUMIN SERPL-MCNC: 3.7 GM/DL (ref 3.4–4.8)
ALBUMIN SERPL-MCNC: 3.7 GM/DL (ref 3.4–4.8)
ALBUMIN/GLOB SERPL: 1.2 RATIO (ref 1.1–2)
ALBUMIN/GLOB SERPL: 1.2 RATIO (ref 1.1–2)
ALP SERPL-CCNC: 105 UNIT/L (ref 40–150)
ALPHA 1 GLOB (OHS): 0.23 GM/DL (ref 0–0.4)
ALPHA 1 GLOB% (OHS): 3.27 % (ref 2.3–4.9)
ALPHA 2 GLOB % (OHS): 14.25 % (ref 6.9–13)
ALPHA 2 GLOB (OHS): 0.98 GM/DL (ref 0.4–1)
ALT SERPL-CCNC: 11 UNIT/L (ref 0–55)
AST SERPL-CCNC: 14 UNIT/L (ref 5–34)
BASOPHILS # BLD AUTO: 0 X10(3)/MCL (ref 0–0.2)
BASOPHILS NFR BLD AUTO: 0.6 %
BETA GLOB (OHS): 1.11 GM/DL (ref 0.7–1.3)
BETA GLOB% (OHS): 16.05 % (ref 13.8–19.7)
BILIRUB SERPL-MCNC: 0.2 MG/DL
BILIRUBIN DIRECT+TOT PNL SERPL-MCNC: 0.1 MG/DL (ref 0–0.5)
BILIRUBIN DIRECT+TOT PNL SERPL-MCNC: 0.1 MG/DL (ref 0–0.8)
BUN SERPL-MCNC: 31.1 MG/DL (ref 9.8–20.1)
CALCIUM SERPL-MCNC: 9.4 MG/DL (ref 8.4–10.2)
CHLORIDE SERPL-SCNC: 111 MMOL/L (ref 98–107)
CO2 SERPL-SCNC: 23 MMOL/L (ref 23–31)
CREAT SERPL-MCNC: 0.82 MG/DL (ref 0.55–1.02)
EOSINOPHIL # BLD AUTO: 0.2 X10(3)/MCL (ref 0–0.9)
EOSINOPHIL NFR BLD AUTO: 3 %
ERYTHROCYTE [DISTWIDTH] IN BLOOD BY AUTOMATED COUNT: 13.1 % (ref 11.5–17)
FERRITIN SERPL-MCNC: 775.01 NG/ML (ref 4.63–204)
FOLATE SERPL-MCNC: 12.7 NG/ML (ref 7–31.4)
GAMMA GLOBULIN % (OHS): 13.7 % (ref 10.1–21.9)
GAMMA GLOBULIN (OHS): 0.95 GM/DL (ref 0.4–1.8)
GLOBULIN SER-MCNC: 3.2 GM/DL (ref 2.4–3.5)
GLOBULIN SER-MCNC: 3.2 GM/DL (ref 2.4–3.5)
GLUCOSE SERPL-MCNC: 129 MG/DL (ref 82–115)
HCT VFR BLD AUTO: 30.6 % (ref 37–47)
HGB BLD-MCNC: 9.7 GM/DL (ref 12–16)
IFE INTERPRETATION (OHS): ABNORMAL
IGA SERPL-MCNC: 151 MG/DL (ref 69–517)
IGG SERPL-MCNC: 897 MG/DL (ref 552–1632)
IGM SERPL-MCNC: 53 MG/DL (ref 33–293)
IRON SATN MFR SERPL: 26 % (ref 20–50)
IRON SERPL-MCNC: 71 UG/DL (ref 50–170)
LYMPHOCYTES # BLD AUTO: 1.2 X10(3)/MCL (ref 0.6–4.6)
LYMPHOCYTES NFR BLD AUTO: 21.6 %
M SPIKE % (OHS): ABNORMAL
M SPIKE (OHS): ABNORMAL
MCH RBC QN AUTO: 29.4 PG (ref 27–31)
MCHC RBC AUTO-ENTMCNC: 31.7 GM/DL (ref 33–36)
MCV RBC AUTO: 92.7 FL (ref 80–94)
MONOCYTES # BLD AUTO: 0.5 X10(3)/MCL (ref 0.1–1.3)
MONOCYTES NFR BLD AUTO: 10.2 %
NEUTROPHILS # BLD AUTO: 3.4 X10(3)/MCL (ref 2.1–9.2)
NEUTROPHILS NFR BLD AUTO: 63.7 %
PEP GRAPH (OHS): ABNORMAL
PLATELET # BLD AUTO: 196 X10(3)/MCL (ref 130–400)
PMV BLD AUTO: 10.2 FL (ref 9.4–12.4)
POTASSIUM SERPL-SCNC: 4.5 MMOL/L (ref 3.5–5.1)
PROT SERPL-MCNC: 6.9 GM/DL (ref 5.8–7.6)
PROT SERPL-MCNC: 6.9 GM/DL (ref 5.8–7.6)
RBC # BLD AUTO: 3.3 X10(6)/MCL (ref 4.2–5.4)
SODIUM SERPL-SCNC: 146 MMOL/L (ref 136–145)
TIBC SERPL-MCNC: 205 UG/DL (ref 70–310)
TIBC SERPL-MCNC: 276 UG/DL (ref 250–450)
TRANSFERRIN SERPL-MCNC: 254 MG/DL (ref 173–360)
VIT B12 SERPL-MCNC: 611 PG/ML (ref 213–816)
WBC # SPEC AUTO: 5.3 X10(3)/MCL (ref 4.5–11.5)

## 2021-06-17 ENCOUNTER — HISTORICAL (OUTPATIENT)
Dept: INFUSION THERAPY | Facility: HOSPITAL | Age: 71
End: 2021-06-17

## 2021-06-17 LAB
ABS NEUT (OLG): 6.13 X10(3)/MCL (ref 2.1–9.2)
BASOPHILS # BLD AUTO: 0 X10(3)/MCL (ref 0–0.2)
BASOPHILS NFR BLD AUTO: 0.3 %
EOSINOPHIL # BLD AUTO: 0.2 X10(3)/MCL (ref 0–0.9)
EOSINOPHIL NFR BLD AUTO: 2.8 %
ERYTHROCYTE [DISTWIDTH] IN BLOOD BY AUTOMATED COUNT: 13.2 % (ref 11.5–17)
HCT VFR BLD AUTO: 31.9 % (ref 37–47)
HGB BLD-MCNC: 10.2 GM/DL (ref 12–16)
LYMPHOCYTES # BLD AUTO: 1.8 X10(3)/MCL (ref 0.6–4.6)
LYMPHOCYTES NFR BLD AUTO: 20 %
MCH RBC QN AUTO: 28.7 PG (ref 27–31)
MCHC RBC AUTO-ENTMCNC: 32 GM/DL (ref 33–36)
MCV RBC AUTO: 89.9 FL (ref 80–94)
MONOCYTES # BLD AUTO: 0.7 X10(3)/MCL (ref 0.1–1.3)
MONOCYTES NFR BLD AUTO: 7.7 %
NEUTROPHILS # BLD AUTO: 6.1 X10(3)/MCL (ref 2.1–9.2)
NEUTROPHILS NFR BLD AUTO: 68.8 %
PLATELET # BLD AUTO: 246 X10(3)/MCL (ref 130–400)
PMV BLD AUTO: 10.7 FL (ref 9.4–12.4)
RBC # BLD AUTO: 3.55 X10(6)/MCL (ref 4.2–5.4)
WBC # SPEC AUTO: 8.9 X10(3)/MCL (ref 4.5–11.5)

## 2021-06-30 ENCOUNTER — HISTORICAL (OUTPATIENT)
Dept: INFUSION THERAPY | Facility: HOSPITAL | Age: 71
End: 2021-06-30

## 2021-06-30 LAB
ABS NEUT (OLG): 6.13 X10(3)/MCL (ref 2.1–9.2)
BASOPHILS # BLD AUTO: 0 X10(3)/MCL (ref 0–0.2)
BASOPHILS NFR BLD AUTO: 0.4 %
EOSINOPHIL # BLD AUTO: 0.2 X10(3)/MCL (ref 0–0.9)
EOSINOPHIL NFR BLD AUTO: 1.9 %
ERYTHROCYTE [DISTWIDTH] IN BLOOD BY AUTOMATED COUNT: 14.3 % (ref 11.5–17)
HCT VFR BLD AUTO: 31.6 % (ref 37–47)
HGB BLD-MCNC: 9.9 GM/DL (ref 12–16)
LYMPHOCYTES # BLD AUTO: 1.3 X10(3)/MCL (ref 0.6–4.6)
LYMPHOCYTES NFR BLD AUTO: 15.9 %
MCH RBC QN AUTO: 28.4 PG (ref 27–31)
MCHC RBC AUTO-ENTMCNC: 31.3 GM/DL (ref 33–36)
MCV RBC AUTO: 90.5 FL (ref 80–94)
MONOCYTES # BLD AUTO: 0.7 X10(3)/MCL (ref 0.1–1.3)
MONOCYTES NFR BLD AUTO: 8.3 %
NEUTROPHILS # BLD AUTO: 6.1 X10(3)/MCL (ref 2.1–9.2)
NEUTROPHILS NFR BLD AUTO: 73.3 %
PLATELET # BLD AUTO: 224 X10(3)/MCL (ref 130–400)
PMV BLD AUTO: 10.2 FL (ref 9.4–12.4)
RBC # BLD AUTO: 3.49 X10(6)/MCL (ref 4.2–5.4)
WBC # SPEC AUTO: 8.4 X10(3)/MCL (ref 4.5–11.5)

## 2021-07-15 ENCOUNTER — HISTORICAL (OUTPATIENT)
Dept: INFUSION THERAPY | Facility: HOSPITAL | Age: 71
End: 2021-07-15

## 2021-07-15 LAB
ABS NEUT (OLG): 5.3 X10(3)/MCL (ref 2.1–9.2)
ALBUMIN SERPL-MCNC: 3.5 GM/DL (ref 3.4–4.8)
ALBUMIN/GLOB SERPL: 1 RATIO (ref 1.1–2)
ALP SERPL-CCNC: 108 UNIT/L (ref 40–150)
ALT SERPL-CCNC: 14 UNIT/L (ref 0–55)
AST SERPL-CCNC: 17 UNIT/L (ref 5–34)
BASOPHILS # BLD AUTO: 0 X10(3)/MCL (ref 0–0.2)
BASOPHILS NFR BLD AUTO: 0.4 %
BILIRUB SERPL-MCNC: 0.3 MG/DL
BILIRUBIN DIRECT+TOT PNL SERPL-MCNC: 0.1 MG/DL (ref 0–0.5)
BILIRUBIN DIRECT+TOT PNL SERPL-MCNC: 0.2 MG/DL (ref 0–0.8)
BUN SERPL-MCNC: 29.9 MG/DL (ref 9.8–20.1)
CALCIUM SERPL-MCNC: 9.6 MG/DL (ref 8.4–10.2)
CHLORIDE SERPL-SCNC: 107 MMOL/L (ref 98–107)
CO2 SERPL-SCNC: 24 MMOL/L (ref 23–31)
CREAT SERPL-MCNC: 0.92 MG/DL (ref 0.55–1.02)
EOSINOPHIL # BLD AUTO: 0.2 X10(3)/MCL (ref 0–0.9)
EOSINOPHIL NFR BLD AUTO: 2.1 %
ERYTHROCYTE [DISTWIDTH] IN BLOOD BY AUTOMATED COUNT: 14.3 % (ref 11.5–17)
GLOBULIN SER-MCNC: 3.5 GM/DL (ref 2.4–3.5)
GLUCOSE SERPL-MCNC: 254 MG/DL (ref 82–115)
HCT VFR BLD AUTO: 34.2 % (ref 37–47)
HGB BLD-MCNC: 10.7 GM/DL (ref 12–16)
LYMPHOCYTES # BLD AUTO: 1.2 X10(3)/MCL (ref 0.6–4.6)
LYMPHOCYTES NFR BLD AUTO: 16 %
MCH RBC QN AUTO: 27.9 PG (ref 27–31)
MCHC RBC AUTO-ENTMCNC: 31.3 GM/DL (ref 33–36)
MCV RBC AUTO: 89.3 FL (ref 80–94)
MONOCYTES # BLD AUTO: 0.6 X10(3)/MCL (ref 0.1–1.3)
MONOCYTES NFR BLD AUTO: 7.6 %
NEUTROPHILS # BLD AUTO: 5.3 X10(3)/MCL (ref 2.1–9.2)
NEUTROPHILS NFR BLD AUTO: 73.6 %
PLATELET # BLD AUTO: 231 X10(3)/MCL (ref 130–400)
PMV BLD AUTO: 10.9 FL (ref 9.4–12.4)
POTASSIUM SERPL-SCNC: 5.6 MMOL/L (ref 3.5–5.1)
PROT SERPL-MCNC: 7 GM/DL (ref 5.8–7.6)
RBC # BLD AUTO: 3.83 X10(6)/MCL (ref 4.2–5.4)
SODIUM SERPL-SCNC: 143 MMOL/L (ref 136–145)
WBC # SPEC AUTO: 7.2 X10(3)/MCL (ref 4.5–11.5)

## 2021-07-21 ENCOUNTER — HISTORICAL (OUTPATIENT)
Dept: ADMINISTRATIVE | Facility: HOSPITAL | Age: 71
End: 2021-07-21

## 2021-07-21 LAB
ABS NEUT (OLG): 6.8 X10(3)/MCL (ref 2.1–9.2)
BASOPHILS # BLD AUTO: 0 X10(3)/MCL (ref 0–0.2)
BASOPHILS NFR BLD AUTO: 0.4 %
EOSINOPHIL # BLD AUTO: 0.2 X10(3)/MCL (ref 0–0.9)
EOSINOPHIL NFR BLD AUTO: 2 %
ERYTHROCYTE [DISTWIDTH] IN BLOOD BY AUTOMATED COUNT: 14.6 % (ref 11.5–17)
HCT VFR BLD AUTO: 35.7 % (ref 37–47)
HGB BLD-MCNC: 11 GM/DL (ref 12–16)
LYMPHOCYTES # BLD AUTO: 1.7 X10(3)/MCL (ref 0.6–4.6)
LYMPHOCYTES NFR BLD AUTO: 17.6 %
MCH RBC QN AUTO: 28.1 PG (ref 27–31)
MCHC RBC AUTO-ENTMCNC: 30.8 GM/DL (ref 33–36)
MCV RBC AUTO: 91.1 FL (ref 80–94)
MONOCYTES # BLD AUTO: 0.9 X10(3)/MCL (ref 0.1–1.3)
MONOCYTES NFR BLD AUTO: 9.1 %
NEUTROPHILS # BLD AUTO: 6.8 X10(3)/MCL (ref 2.1–9.2)
NEUTROPHILS NFR BLD AUTO: 70.1 %
PLATELET # BLD AUTO: 286 X10(3)/MCL (ref 130–400)
PMV BLD AUTO: 10.6 FL (ref 9.4–12.4)
RBC # BLD AUTO: 3.92 X10(6)/MCL (ref 4.2–5.4)
WBC # SPEC AUTO: 9.7 X10(3)/MCL (ref 4.5–11.5)

## 2021-07-28 ENCOUNTER — HISTORICAL (OUTPATIENT)
Dept: INFUSION THERAPY | Facility: HOSPITAL | Age: 71
End: 2021-07-28

## 2021-07-28 LAB
ABS NEUT (OLG): 4.96 X10(3)/MCL (ref 2.1–9.2)
BASOPHILS # BLD AUTO: 0 X10(3)/MCL (ref 0–0.2)
BASOPHILS NFR BLD AUTO: 0.4 %
EOSINOPHIL # BLD AUTO: 0.1 X10(3)/MCL (ref 0–0.9)
EOSINOPHIL NFR BLD AUTO: 1.8 %
ERYTHROCYTE [DISTWIDTH] IN BLOOD BY AUTOMATED COUNT: 14.4 % (ref 11.5–17)
HCT VFR BLD AUTO: 37.5 % (ref 37–47)
HGB BLD-MCNC: 11.7 GM/DL (ref 12–16)
LYMPHOCYTES # BLD AUTO: 1.5 X10(3)/MCL (ref 0.6–4.6)
LYMPHOCYTES NFR BLD AUTO: 20.3 %
MCH RBC QN AUTO: 28.1 PG (ref 27–31)
MCHC RBC AUTO-ENTMCNC: 31.2 GM/DL (ref 33–36)
MCV RBC AUTO: 89.9 FL (ref 80–94)
MONOCYTES # BLD AUTO: 0.6 X10(3)/MCL (ref 0.1–1.3)
MONOCYTES NFR BLD AUTO: 8.2 %
NEUTROPHILS # BLD AUTO: 5 X10(3)/MCL (ref 2.1–9.2)
NEUTROPHILS NFR BLD AUTO: 69 %
PLATELET # BLD AUTO: 242 X10(3)/MCL (ref 130–400)
PMV BLD AUTO: 11 FL (ref 9.4–12.4)
RBC # BLD AUTO: 4.17 X10(6)/MCL (ref 4.2–5.4)
WBC # SPEC AUTO: 7.2 X10(3)/MCL (ref 4.5–11.5)

## 2021-08-11 ENCOUNTER — HISTORICAL (OUTPATIENT)
Dept: INFUSION THERAPY | Facility: HOSPITAL | Age: 71
End: 2021-08-11

## 2021-08-11 LAB
ABS NEUT (OLG): 4.78 X10(3)/MCL (ref 2.1–9.2)
BASOPHILS # BLD AUTO: 0 X10(3)/MCL (ref 0–0.2)
BASOPHILS NFR BLD AUTO: 0.3 %
EOSINOPHIL # BLD AUTO: 0.3 X10(3)/MCL (ref 0–0.9)
EOSINOPHIL NFR BLD AUTO: 3.6 %
ERYTHROCYTE [DISTWIDTH] IN BLOOD BY AUTOMATED COUNT: 13.9 % (ref 11.5–17)
HCT VFR BLD AUTO: 31.4 % (ref 37–47)
HGB BLD-MCNC: 9.7 GM/DL (ref 12–16)
LYMPHOCYTES # BLD AUTO: 1.6 X10(3)/MCL (ref 0.6–4.6)
LYMPHOCYTES NFR BLD AUTO: 22.2 %
MCH RBC QN AUTO: 27.6 PG (ref 27–31)
MCHC RBC AUTO-ENTMCNC: 30.9 GM/DL (ref 33–36)
MCV RBC AUTO: 89.5 FL (ref 80–94)
MONOCYTES # BLD AUTO: 0.6 X10(3)/MCL (ref 0.1–1.3)
MONOCYTES NFR BLD AUTO: 7.7 %
NEUTROPHILS # BLD AUTO: 4.8 X10(3)/MCL (ref 2.1–9.2)
NEUTROPHILS NFR BLD AUTO: 65.8 %
PLATELET # BLD AUTO: 197 X10(3)/MCL (ref 130–400)
PMV BLD AUTO: 10.7 FL (ref 9.4–12.4)
RBC # BLD AUTO: 3.51 X10(6)/MCL (ref 4.2–5.4)
WBC # SPEC AUTO: 7.3 X10(3)/MCL (ref 4.5–11.5)

## 2021-08-25 ENCOUNTER — HISTORICAL (OUTPATIENT)
Dept: INFUSION THERAPY | Facility: HOSPITAL | Age: 71
End: 2021-08-25

## 2021-08-25 LAB
ABS NEUT (OLG): 4.97 X10(3)/MCL (ref 2.1–9.2)
BASOPHILS # BLD AUTO: 0 X10(3)/MCL (ref 0–0.2)
BASOPHILS NFR BLD AUTO: 0.3 %
EOSINOPHIL # BLD AUTO: 0.2 X10(3)/MCL (ref 0–0.9)
EOSINOPHIL NFR BLD AUTO: 3.2 %
ERYTHROCYTE [DISTWIDTH] IN BLOOD BY AUTOMATED COUNT: 14.9 % (ref 11.5–17)
HCT VFR BLD AUTO: 34.5 % (ref 37–47)
HGB BLD-MCNC: 10.4 GM/DL (ref 12–16)
LYMPHOCYTES # BLD AUTO: 1.3 X10(3)/MCL (ref 0.6–4.6)
LYMPHOCYTES NFR BLD AUTO: 17.9 %
MCH RBC QN AUTO: 27.7 PG (ref 27–31)
MCHC RBC AUTO-ENTMCNC: 30.1 GM/DL (ref 33–36)
MCV RBC AUTO: 92 FL (ref 80–94)
MONOCYTES # BLD AUTO: 0.6 X10(3)/MCL (ref 0.1–1.3)
MONOCYTES NFR BLD AUTO: 8.1 %
NEUTROPHILS # BLD AUTO: 5 X10(3)/MCL (ref 2.1–9.2)
NEUTROPHILS NFR BLD AUTO: 70.2 %
PLATELET # BLD AUTO: 208 X10(3)/MCL (ref 130–400)
PMV BLD AUTO: 11 FL (ref 9.4–12.4)
RBC # BLD AUTO: 3.75 X10(6)/MCL (ref 4.2–5.4)
WBC # SPEC AUTO: 7.1 X10(3)/MCL (ref 4.5–11.5)

## 2021-09-01 ENCOUNTER — HISTORICAL (OUTPATIENT)
Dept: ADMINISTRATIVE | Facility: HOSPITAL | Age: 71
End: 2021-09-01

## 2021-09-01 LAB
ABS NEUT (OLG): 6.94 X10(3)/MCL (ref 2.1–9.2)
ALBUMIN SERPL-MCNC: 3.8 GM/DL (ref 3.4–4.8)
ALBUMIN/GLOB SERPL: 1.2 RATIO (ref 1.1–2)
ALP SERPL-CCNC: 85 UNIT/L (ref 40–150)
ALT SERPL-CCNC: 15 UNIT/L (ref 0–55)
APPEARANCE, UA: CLEAR
AST SERPL-CCNC: 13 UNIT/L (ref 5–34)
BACTERIA SPEC CULT: ABNORMAL /HPF
BASOPHILS # BLD AUTO: 0 X10(3)/MCL (ref 0–0.2)
BASOPHILS NFR BLD AUTO: 0 %
BILIRUB SERPL-MCNC: 0.3 MG/DL
BILIRUB UR QL STRIP: NEGATIVE
BILIRUBIN DIRECT+TOT PNL SERPL-MCNC: 0.1 MG/DL (ref 0–0.5)
BILIRUBIN DIRECT+TOT PNL SERPL-MCNC: 0.2 MG/DL (ref 0–0.8)
BUN SERPL-MCNC: 43.3 MG/DL (ref 9.8–20.1)
CALCIUM SERPL-MCNC: 9 MG/DL (ref 8.4–10.2)
CHLORIDE SERPL-SCNC: 106 MMOL/L (ref 98–107)
CHOLEST SERPL-MCNC: 163 MG/DL
CHOLEST/HDLC SERPL: 5 {RATIO} (ref 0–5)
CO2 SERPL-SCNC: 25 MMOL/L (ref 23–31)
COLOR UR: YELLOW
CREAT SERPL-MCNC: 1.01 MG/DL (ref 0.55–1.02)
CREAT UR-MCNC: 124.7 MG/DL (ref 45–106)
DEPRECATED CALCIDIOL+CALCIFEROL SERPL-MC: 41 NG/ML (ref 30–80)
EOSINOPHIL # BLD AUTO: 0.2 X10(3)/MCL (ref 0–0.9)
EOSINOPHIL NFR BLD AUTO: 2 %
ERYTHROCYTE [DISTWIDTH] IN BLOOD BY AUTOMATED COUNT: 15.7 % (ref 11.5–17)
EST. AVERAGE GLUCOSE BLD GHB EST-MCNC: 174.3 MG/DL
GLOBULIN SER-MCNC: 3.2 GM/DL (ref 2.4–3.5)
GLUCOSE (UA): NEGATIVE
GLUCOSE SERPL-MCNC: 202 MG/DL (ref 82–115)
HBA1C MFR BLD: 7.7 %
HCT VFR BLD AUTO: 35.7 % (ref 37–47)
HDLC SERPL-MCNC: 33 MG/DL (ref 35–60)
HGB BLD-MCNC: 10.8 GM/DL (ref 12–16)
HGB UR QL STRIP: NEGATIVE
IMM GRANULOCYTES # BLD AUTO: 0.07 10*3/UL
IMM GRANULOCYTES NFR BLD AUTO: 1 %
KETONES UR QL STRIP: ABNORMAL
LDLC SERPL CALC-MCNC: ABNORMAL MG/DL (ref 50–140)
LEUKOCYTE ESTERASE UR QL STRIP: ABNORMAL
LYMPHOCYTES # BLD AUTO: 1.3 X10(3)/MCL (ref 0.6–4.6)
LYMPHOCYTES NFR BLD AUTO: 14 %
MCH RBC QN AUTO: 27.6 PG (ref 27–31)
MCHC RBC AUTO-ENTMCNC: 30.3 GM/DL (ref 33–36)
MCV RBC AUTO: 91.3 FL (ref 80–94)
MICROALBUMIN UR-MCNC: 625.6 UG/ML
MICROALBUMIN/CREAT RATIO PNL UR: 501.7 MG/GM CR (ref 0–30)
MONOCYTES # BLD AUTO: 0.7 X10(3)/MCL (ref 0.1–1.3)
MONOCYTES NFR BLD AUTO: 8 %
NEUTROPHILS # BLD AUTO: 6.94 X10(3)/MCL (ref 2.1–9.2)
NEUTROPHILS NFR BLD AUTO: 75 %
NITRITE UR QL STRIP: NEGATIVE
PH UR STRIP: 5 [PH] (ref 5–9)
PLATELET # BLD AUTO: 224 X10(3)/MCL (ref 130–400)
PMV BLD AUTO: 11.5 FL (ref 9.4–12.4)
POTASSIUM SERPL-SCNC: 5 MMOL/L (ref 3.5–5.1)
PROT SERPL-MCNC: 7 GM/DL (ref 5.8–7.6)
PROT UR QL STRIP: ABNORMAL
RBC # BLD AUTO: 3.91 X10(6)/MCL (ref 4.2–5.4)
RBC #/AREA URNS HPF: ABNORMAL /[HPF]
SODIUM SERPL-SCNC: 145 MMOL/L (ref 136–145)
SP GR UR STRIP: 1.02 (ref 1–1.03)
SQUAMOUS EPITHELIAL, UA: ABNORMAL /HPF (ref 0–4)
TRIGL SERPL-MCNC: 492 MG/DL (ref 37–140)
TSH SERPL-ACNC: 0.46 UIU/ML (ref 0.35–4.94)
UROBILINOGEN UR STRIP-ACNC: 1
VLDLC SERPL CALC-MCNC: ABNORMAL MG/DL
WBC # SPEC AUTO: 9.3 X10(3)/MCL (ref 4.5–11.5)
WBC #/AREA URNS HPF: 14 /HPF (ref 0–3)

## 2021-09-08 ENCOUNTER — HISTORICAL (OUTPATIENT)
Dept: RADIOLOGY | Facility: HOSPITAL | Age: 71
End: 2021-09-08

## 2021-10-20 ENCOUNTER — HISTORICAL (OUTPATIENT)
Dept: INFUSION THERAPY | Facility: HOSPITAL | Age: 71
End: 2021-10-20

## 2021-10-20 LAB
ABS NEUT (OLG): 5.63 X10(3)/MCL (ref 2.1–9.2)
ALBUMIN SERPL-MCNC: 3.6 GM/DL (ref 3.4–4.8)
ALBUMIN/GLOB SERPL: 1.1 RATIO (ref 1.1–2)
ALP SERPL-CCNC: 110 UNIT/L (ref 40–150)
ALT SERPL-CCNC: 10 UNIT/L (ref 0–55)
AST SERPL-CCNC: 10 UNIT/L (ref 5–34)
BASOPHILS # BLD AUTO: 0 X10(3)/MCL (ref 0–0.2)
BASOPHILS NFR BLD AUTO: 0.6 %
BILIRUB SERPL-MCNC: 0.2 MG/DL
BILIRUBIN DIRECT+TOT PNL SERPL-MCNC: 0.1 MG/DL (ref 0–0.5)
BILIRUBIN DIRECT+TOT PNL SERPL-MCNC: 0.1 MG/DL (ref 0–0.8)
BUN SERPL-MCNC: 30.9 MG/DL (ref 9.8–20.1)
CALCIUM SERPL-MCNC: 9.6 MG/DL (ref 8.7–10.5)
CHLORIDE SERPL-SCNC: 111 MMOL/L (ref 98–107)
CO2 SERPL-SCNC: 26 MMOL/L (ref 23–31)
CREAT SERPL-MCNC: 0.94 MG/DL (ref 0.55–1.02)
EOSINOPHIL # BLD AUTO: 0.2 X10(3)/MCL (ref 0–0.9)
EOSINOPHIL NFR BLD AUTO: 2.6 %
ERYTHROCYTE [DISTWIDTH] IN BLOOD BY AUTOMATED COUNT: 14.6 % (ref 11.5–17)
GLOBULIN SER-MCNC: 3.4 GM/DL (ref 2.4–3.5)
GLUCOSE SERPL-MCNC: 170 MG/DL (ref 82–115)
HCT VFR BLD AUTO: 30.9 % (ref 37–47)
HGB BLD-MCNC: 9.2 GM/DL (ref 12–16)
LYMPHOCYTES # BLD AUTO: 1.5 X10(3)/MCL (ref 0.6–4.6)
LYMPHOCYTES NFR BLD AUTO: 18.7 %
MCH RBC QN AUTO: 27.5 PG (ref 27–31)
MCHC RBC AUTO-ENTMCNC: 29.8 GM/DL (ref 33–36)
MCV RBC AUTO: 92.5 FL (ref 80–94)
MONOCYTES # BLD AUTO: 0.8 X10(3)/MCL (ref 0.1–1.3)
MONOCYTES NFR BLD AUTO: 9.2 %
NEUTROPHILS # BLD AUTO: 5.6 X10(3)/MCL (ref 2.1–9.2)
NEUTROPHILS NFR BLD AUTO: 68.4 %
PLATELET # BLD AUTO: 216 X10(3)/MCL (ref 130–400)
PMV BLD AUTO: 10.9 FL (ref 9.4–12.4)
POTASSIUM SERPL-SCNC: 5 MMOL/L (ref 3.5–5.1)
PROT SERPL-MCNC: 7 GM/DL (ref 5.8–7.6)
RBC # BLD AUTO: 3.34 X10(6)/MCL (ref 4.2–5.4)
SODIUM SERPL-SCNC: 147 MMOL/L (ref 136–145)
WBC # SPEC AUTO: 8.2 X10(3)/MCL (ref 4.5–11.5)

## 2021-11-03 ENCOUNTER — HISTORICAL (OUTPATIENT)
Dept: INFUSION THERAPY | Facility: HOSPITAL | Age: 71
End: 2021-11-03

## 2021-11-03 LAB
ABS NEUT (OLG): 4.58 X10(3)/MCL (ref 2.1–9.2)
BASOPHILS # BLD AUTO: 0 X10(3)/MCL (ref 0–0.2)
BASOPHILS NFR BLD AUTO: 0.6 %
EOSINOPHIL # BLD AUTO: 0.1 X10(3)/MCL (ref 0–0.9)
EOSINOPHIL NFR BLD AUTO: 1.9 %
ERYTHROCYTE [DISTWIDTH] IN BLOOD BY AUTOMATED COUNT: 14.6 % (ref 11.5–17)
HCT VFR BLD AUTO: 32.4 % (ref 37–47)
HGB BLD-MCNC: 10.1 GM/DL (ref 12–16)
LYMPHOCYTES # BLD AUTO: 1.1 X10(3)/MCL (ref 0.6–4.6)
LYMPHOCYTES NFR BLD AUTO: 17.6 %
MCH RBC QN AUTO: 28.2 PG (ref 27–31)
MCHC RBC AUTO-ENTMCNC: 31.2 GM/DL (ref 33–36)
MCV RBC AUTO: 90.5 FL (ref 80–94)
MONOCYTES # BLD AUTO: 0.5 X10(3)/MCL (ref 0.1–1.3)
MONOCYTES NFR BLD AUTO: 7.4 %
NEUTROPHILS # BLD AUTO: 4.6 X10(3)/MCL (ref 2.1–9.2)
NEUTROPHILS NFR BLD AUTO: 72.2 %
PLATELET # BLD AUTO: 223 X10(3)/MCL (ref 130–400)
PMV BLD AUTO: 11.4 FL (ref 9.4–12.4)
RBC # BLD AUTO: 3.58 X10(6)/MCL (ref 4.2–5.4)
WBC # SPEC AUTO: 6.4 X10(3)/MCL (ref 4.5–11.5)

## 2021-11-17 ENCOUNTER — HISTORICAL (OUTPATIENT)
Dept: INFUSION THERAPY | Facility: HOSPITAL | Age: 71
End: 2021-11-17

## 2021-11-17 LAB
ABS NEUT (OLG): 4.14 X10(3)/MCL (ref 2.1–9.2)
BASOPHILS # BLD AUTO: 0 X10(3)/MCL (ref 0–0.2)
BASOPHILS NFR BLD AUTO: 0.5 %
EOSINOPHIL # BLD AUTO: 0.1 X10(3)/MCL (ref 0–0.9)
EOSINOPHIL NFR BLD AUTO: 1.8 %
ERYTHROCYTE [DISTWIDTH] IN BLOOD BY AUTOMATED COUNT: 14.3 % (ref 11.5–17)
HCT VFR BLD AUTO: 34.1 % (ref 37–47)
HGB BLD-MCNC: 10.4 GM/DL (ref 12–16)
LYMPHOCYTES # BLD AUTO: 1.3 X10(3)/MCL (ref 0.6–4.6)
LYMPHOCYTES NFR BLD AUTO: 20.9 %
MCH RBC QN AUTO: 27.9 PG (ref 27–31)
MCHC RBC AUTO-ENTMCNC: 30.5 GM/DL (ref 33–36)
MCV RBC AUTO: 91.4 FL (ref 80–94)
MONOCYTES # BLD AUTO: 0.6 X10(3)/MCL (ref 0.1–1.3)
MONOCYTES NFR BLD AUTO: 10.3 %
NEUTROPHILS # BLD AUTO: 4.1 X10(3)/MCL (ref 2.1–9.2)
NEUTROPHILS NFR BLD AUTO: 66.3 %
PLATELET # BLD AUTO: 200 X10(3)/MCL (ref 130–400)
PMV BLD AUTO: 11.2 FL (ref 9.4–12.4)
RBC # BLD AUTO: 3.73 X10(6)/MCL (ref 4.2–5.4)
WBC # SPEC AUTO: 6.2 X10(3)/MCL (ref 4.5–11.5)

## 2021-12-01 LAB — CRC RECOMMENDATION EXT: NORMAL

## 2021-12-02 ENCOUNTER — HISTORICAL (OUTPATIENT)
Dept: INFUSION THERAPY | Facility: HOSPITAL | Age: 71
End: 2021-12-02

## 2021-12-02 LAB
ABS NEUT (OLG): 6.48 X10(3)/MCL (ref 2.1–9.2)
BASOPHILS # BLD AUTO: 0 X10(3)/MCL (ref 0–0.2)
BASOPHILS NFR BLD AUTO: 0.3 %
EOSINOPHIL # BLD AUTO: 0.1 X10(3)/MCL (ref 0–0.9)
EOSINOPHIL NFR BLD AUTO: 1.4 %
ERYTHROCYTE [DISTWIDTH] IN BLOOD BY AUTOMATED COUNT: 14 % (ref 11.5–17)
HCT VFR BLD AUTO: 37.7 % (ref 37–47)
HGB BLD-MCNC: 11.9 GM/DL (ref 12–16)
LYMPHOCYTES # BLD AUTO: 1.3 X10(3)/MCL (ref 0.6–4.6)
LYMPHOCYTES NFR BLD AUTO: 15.4 %
MCH RBC QN AUTO: 27.9 PG (ref 27–31)
MCHC RBC AUTO-ENTMCNC: 31.6 GM/DL (ref 33–36)
MCV RBC AUTO: 88.3 FL (ref 80–94)
MONOCYTES # BLD AUTO: 0.6 X10(3)/MCL (ref 0.1–1.3)
MONOCYTES NFR BLD AUTO: 7.4 %
NEUTROPHILS # BLD AUTO: 6.5 X10(3)/MCL (ref 2.1–9.2)
NEUTROPHILS NFR BLD AUTO: 75.3 %
PLATELET # BLD AUTO: 242 X10(3)/MCL (ref 130–400)
PMV BLD AUTO: 11.4 FL (ref 9.4–12.4)
RBC # BLD AUTO: 4.27 X10(6)/MCL (ref 4.2–5.4)
WBC # SPEC AUTO: 8.6 X10(3)/MCL (ref 4.5–11.5)

## 2021-12-15 ENCOUNTER — HISTORICAL (OUTPATIENT)
Dept: INFUSION THERAPY | Facility: HOSPITAL | Age: 71
End: 2021-12-15

## 2021-12-15 LAB
ABS NEUT (OLG): 5.54 X10(3)/MCL (ref 2.1–9.2)
BASOPHILS # BLD AUTO: 0 X10(3)/MCL (ref 0–0.2)
BASOPHILS NFR BLD AUTO: 0.4 %
EOSINOPHIL # BLD AUTO: 0.2 X10(3)/MCL (ref 0–0.9)
EOSINOPHIL NFR BLD AUTO: 2 %
ERYTHROCYTE [DISTWIDTH] IN BLOOD BY AUTOMATED COUNT: 14 % (ref 11.5–17)
HCT VFR BLD AUTO: 34.9 % (ref 37–47)
HGB BLD-MCNC: 10.8 GM/DL (ref 12–16)
LYMPHOCYTES # BLD AUTO: 1.5 X10(3)/MCL (ref 0.6–4.6)
LYMPHOCYTES NFR BLD AUTO: 19.5 %
MCH RBC QN AUTO: 27.8 PG (ref 27–31)
MCHC RBC AUTO-ENTMCNC: 30.9 GM/DL (ref 33–36)
MCV RBC AUTO: 89.9 FL (ref 80–94)
MONOCYTES # BLD AUTO: 0.6 X10(3)/MCL (ref 0.1–1.3)
MONOCYTES NFR BLD AUTO: 7 %
NEUTROPHILS # BLD AUTO: 5.5 X10(3)/MCL (ref 2.1–9.2)
NEUTROPHILS NFR BLD AUTO: 70.7 %
PLATELET # BLD AUTO: 210 X10(3)/MCL (ref 130–400)
PMV BLD AUTO: 11.1 FL (ref 9.4–12.4)
RBC # BLD AUTO: 3.88 X10(6)/MCL (ref 4.2–5.4)
WBC # SPEC AUTO: 7.8 X10(3)/MCL (ref 4.5–11.5)

## 2021-12-29 ENCOUNTER — HISTORICAL (OUTPATIENT)
Dept: INFUSION THERAPY | Facility: HOSPITAL | Age: 71
End: 2021-12-29

## 2021-12-29 LAB
ABS NEUT (OLG): 5.16 X10(3)/MCL (ref 2.1–9.2)
BASOPHILS # BLD AUTO: 0 X10(3)/MCL (ref 0–0.2)
BASOPHILS NFR BLD AUTO: 0.4 %
EOSINOPHIL # BLD AUTO: 0.1 X10(3)/MCL (ref 0–0.9)
EOSINOPHIL NFR BLD AUTO: 1.7 %
ERYTHROCYTE [DISTWIDTH] IN BLOOD BY AUTOMATED COUNT: 14.6 % (ref 11.5–17)
HCT VFR BLD AUTO: 37.8 % (ref 37–47)
HGB BLD-MCNC: 11.8 GM/DL (ref 12–16)
LYMPHOCYTES # BLD AUTO: 1.1 X10(3)/MCL (ref 0.6–4.6)
LYMPHOCYTES NFR BLD AUTO: 15.4 %
MCH RBC QN AUTO: 27.9 PG (ref 27–31)
MCHC RBC AUTO-ENTMCNC: 31.2 GM/DL (ref 33–36)
MCV RBC AUTO: 89.4 FL (ref 80–94)
MONOCYTES # BLD AUTO: 0.5 X10(3)/MCL (ref 0.1–1.3)
MONOCYTES NFR BLD AUTO: 7.4 %
NEUTROPHILS # BLD AUTO: 5.2 X10(3)/MCL (ref 2.1–9.2)
NEUTROPHILS NFR BLD AUTO: 74.8 %
PLATELET # BLD AUTO: 199 X10(3)/MCL (ref 130–400)
PMV BLD AUTO: 11.2 FL (ref 9.4–12.4)
RBC # BLD AUTO: 4.23 X10(6)/MCL (ref 4.2–5.4)
WBC # SPEC AUTO: 6.9 X10(3)/MCL (ref 4.5–11.5)

## 2022-01-12 LAB
LEFT EYE DM RETINOPATHY: POSITIVE
RIGHT EYE DM RETINOPATHY: POSITIVE

## 2022-01-20 ENCOUNTER — HISTORICAL (OUTPATIENT)
Dept: ADMINISTRATIVE | Facility: HOSPITAL | Age: 72
End: 2022-01-20

## 2022-01-20 LAB
ABS NEUT (OLG): 8.05 X10(3)/MCL (ref 2.1–9.2)
ALBUMIN SERPL-MCNC: 3.8 GM/DL (ref 3.4–4.8)
ALBUMIN/GLOB SERPL: 1.1 RATIO (ref 1.1–2)
ALP SERPL-CCNC: 93 UNIT/L (ref 40–150)
ALT SERPL-CCNC: 12 UNIT/L (ref 0–55)
AST SERPL-CCNC: 14 UNIT/L (ref 5–34)
BASOPHILS # BLD AUTO: 0 X10(3)/MCL (ref 0–0.2)
BASOPHILS NFR BLD AUTO: 0.3 %
BILIRUB SERPL-MCNC: 0.3 MG/DL
BILIRUBIN DIRECT+TOT PNL SERPL-MCNC: 0.1 MG/DL (ref 0–0.5)
BILIRUBIN DIRECT+TOT PNL SERPL-MCNC: 0.2 MG/DL (ref 0–0.8)
BUN SERPL-MCNC: 22.6 MG/DL (ref 9.8–20.1)
CALCIUM SERPL-MCNC: 9.8 MG/DL (ref 8.7–10.5)
CHLORIDE SERPL-SCNC: 102 MMOL/L (ref 98–107)
CO2 SERPL-SCNC: 25 MMOL/L (ref 23–31)
CREAT SERPL-MCNC: 0.96 MG/DL (ref 0.55–1.02)
EOSINOPHIL # BLD AUTO: 0.1 X10(3)/MCL (ref 0–0.9)
EOSINOPHIL NFR BLD AUTO: 0.8 %
ERYTHROCYTE [DISTWIDTH] IN BLOOD BY AUTOMATED COUNT: 14 % (ref 11.5–17)
EST CREAT CLEARANCE SER (OHS): 39.07 ML/MIN
FERRITIN SERPL-MCNC: 1098.16 NG/ML (ref 4.63–204)
FOLATE SERPL-MCNC: 8.8 NG/ML (ref 7–31.4)
GLOBULIN SER-MCNC: 3.4 GM/DL (ref 2.4–3.5)
GLUCOSE SERPL-MCNC: 167 MG/DL (ref 82–115)
HCT VFR BLD AUTO: 34.2 % (ref 37–47)
HGB BLD-MCNC: 10.5 GM/DL (ref 12–16)
IRON SATN MFR SERPL: 23 % (ref 20–50)
IRON SERPL-MCNC: 68 UG/DL (ref 50–170)
LYMPHOCYTES # BLD AUTO: 1.2 X10(3)/MCL (ref 0.6–4.6)
LYMPHOCYTES NFR BLD AUTO: 11.5 %
MCH RBC QN AUTO: 28 PG (ref 27–31)
MCHC RBC AUTO-ENTMCNC: 30.7 GM/DL (ref 33–36)
MCV RBC AUTO: 91.2 FL (ref 80–94)
MONOCYTES # BLD AUTO: 0.6 X10(3)/MCL (ref 0.1–1.3)
MONOCYTES NFR BLD AUTO: 6.5 %
NEUTROPHILS # BLD AUTO: 8 X10(3)/MCL (ref 2.1–9.2)
NEUTROPHILS NFR BLD AUTO: 80.5 %
PLATELET # BLD AUTO: 263 X10(3)/MCL (ref 130–400)
PMV BLD AUTO: 11.2 FL (ref 9.4–12.4)
POTASSIUM SERPL-SCNC: 5.8 MMOL/L (ref 3.5–5.1)
PROT SERPL-MCNC: 7.2 GM/DL (ref 5.8–7.6)
RBC # BLD AUTO: 3.75 X10(6)/MCL (ref 4.2–5.4)
RET# (OHS): 0.08 X10^6/ML (ref 0.02–0.08)
RETICULOCYTE COUNT AUTOMATED (OLG): 2.1 % (ref 1.1–2.1)
SODIUM SERPL-SCNC: 139 MMOL/L (ref 136–145)
TIBC SERPL-MCNC: 222 UG/DL (ref 70–310)
TIBC SERPL-MCNC: 290 UG/DL (ref 250–450)
TRANSFERRIN SERPL-MCNC: 255 MG/DL (ref 173–360)
VIT B12 SERPL-MCNC: 415 PG/ML (ref 213–816)
WBC # SPEC AUTO: 10 X10(3)/MCL (ref 4.5–11.5)

## 2022-02-02 ENCOUNTER — HISTORICAL (OUTPATIENT)
Dept: INFUSION THERAPY | Facility: HOSPITAL | Age: 72
End: 2022-02-02

## 2022-02-02 LAB
ABS NEUT (OLG): 4.63 (ref 2.1–9.2)
BASOPHILS # BLD AUTO: 0 10*3/UL (ref 0–0.2)
BASOPHILS NFR BLD AUTO: 0.3 %
EOSINOPHIL # BLD AUTO: 0.2 10*3/UL (ref 0–0.9)
EOSINOPHIL NFR BLD AUTO: 3 %
ERYTHROCYTE [DISTWIDTH] IN BLOOD BY AUTOMATED COUNT: 14.5 % (ref 11.5–17)
HCT VFR BLD AUTO: 30 % (ref 37–47)
HGB BLD-MCNC: 9.3 G/DL (ref 12–16)
LYMPHOCYTES # BLD AUTO: 1.4 10*3/UL (ref 0.6–4.6)
LYMPHOCYTES NFR BLD AUTO: 19.4 %
MANUAL DIFF? (OHS): NO
MCH RBC QN AUTO: 28.2 PG (ref 27–31)
MCHC RBC AUTO-ENTMCNC: 31 G/DL (ref 33–36)
MCV RBC AUTO: 90.9 FL (ref 80–94)
MONOCYTES # BLD AUTO: 0.7 10*3/UL (ref 0.1–1.3)
MONOCYTES NFR BLD AUTO: 10 %
NEUTROPHILS # BLD AUTO: 4.6 10*3/UL (ref 2.1–9.2)
NEUTROPHILS NFR BLD AUTO: 65.9 %
PLATELET # BLD AUTO: 203 10*3/UL (ref 130–400)
PMV BLD AUTO: 11 FL (ref 9.4–12.4)
RBC # BLD AUTO: 3.3 10*6/UL (ref 4.2–5.4)
WBC # SPEC AUTO: 7 10*3/UL (ref 4.5–11.5)

## 2022-02-16 ENCOUNTER — HISTORICAL (OUTPATIENT)
Dept: INFUSION THERAPY | Facility: HOSPITAL | Age: 72
End: 2022-02-16

## 2022-02-16 LAB
ABS NEUT (OLG): 3.68 (ref 2.1–9.2)
BASOPHILS # BLD AUTO: 0 10*3/UL (ref 0–0.2)
BASOPHILS NFR BLD AUTO: 0.4 %
EOSINOPHIL # BLD AUTO: 0.1 10*3/UL (ref 0–0.9)
EOSINOPHIL NFR BLD AUTO: 2 %
ERYTHROCYTE [DISTWIDTH] IN BLOOD BY AUTOMATED COUNT: 15 % (ref 11.5–17)
HCT VFR BLD AUTO: 33.1 % (ref 37–47)
HGB BLD-MCNC: 10 G/DL (ref 12–16)
LYMPHOCYTES # BLD AUTO: 1 10*3/UL (ref 0.6–4.6)
LYMPHOCYTES NFR BLD AUTO: 19.4 %
MANUAL DIFF? (OHS): NO
MCH RBC QN AUTO: 27.8 PG (ref 27–31)
MCHC RBC AUTO-ENTMCNC: 30.2 G/DL (ref 33–36)
MCV RBC AUTO: 91.9 FL (ref 80–94)
MONOCYTES # BLD AUTO: 0.5 10*3/UL (ref 0.1–1.3)
MONOCYTES NFR BLD AUTO: 9.3 %
NEUTROPHILS # BLD AUTO: 3.7 10*3/UL (ref 2.1–9.2)
NEUTROPHILS NFR BLD AUTO: 68.5 %
PLATELET # BLD AUTO: 219 10*3/UL (ref 130–400)
PMV BLD AUTO: 10.9 FL (ref 9.4–12.4)
RBC # BLD AUTO: 3.6 10*6/UL (ref 4.2–5.4)
WBC # SPEC AUTO: 5.4 10*3/UL (ref 4.5–11.5)

## 2022-03-11 ENCOUNTER — HISTORICAL (OUTPATIENT)
Dept: INFUSION THERAPY | Facility: HOSPITAL | Age: 72
End: 2022-03-11

## 2022-03-11 ENCOUNTER — HISTORICAL (OUTPATIENT)
Dept: ADMINISTRATIVE | Facility: HOSPITAL | Age: 72
End: 2022-03-11

## 2022-03-11 LAB
ABS NEUT (OLG): 5.03 (ref 2.1–9.2)
BASOPHILS # BLD AUTO: 0 10*3/UL (ref 0–0.2)
BASOPHILS NFR BLD AUTO: 0.1 %
EOSINOPHIL # BLD AUTO: 0.1 10*3/UL (ref 0–0.9)
EOSINOPHIL NFR BLD AUTO: 0.9 %
ERYTHROCYTE [DISTWIDTH] IN BLOOD BY AUTOMATED COUNT: 13.7 % (ref 11.5–17)
FLUAV AG UPPER RESP QL IA.RAPID: NEGATIVE
FLUBV AG UPPER RESP QL IA.RAPID: NEGATIVE
HCT VFR BLD AUTO: 33.7 % (ref 37–47)
HGB BLD-MCNC: 10.5 G/DL (ref 12–16)
LYMPHOCYTES # BLD AUTO: 1.2 10*3/UL (ref 0.6–4.6)
LYMPHOCYTES NFR BLD AUTO: 16.9 %
MANUAL DIFF? (OHS): NO
MCH RBC QN AUTO: 27.9 PG (ref 27–31)
MCHC RBC AUTO-ENTMCNC: 31.2 G/DL (ref 33–36)
MCV RBC AUTO: 89.4 FL (ref 80–94)
MONOCYTES # BLD AUTO: 0.6 10*3/UL (ref 0.1–1.3)
MONOCYTES NFR BLD AUTO: 8.4 %
NEUTROPHILS # BLD AUTO: 5 10*3/UL (ref 2.1–9.2)
NEUTROPHILS NFR BLD AUTO: 73.3 %
PLATELET # BLD AUTO: 227 10*3/UL (ref 130–400)
PMV BLD AUTO: 11.1 FL (ref 9.4–12.4)
PROBE CHECK (OHS): NORMAL
RBC # BLD AUTO: 3.77 10*6/UL (ref 4.2–5.4)
SARS-COV-2 RNA RESP QL NAA+PROBE: DETECTED
WBC # SPEC AUTO: 6.9 10*3/UL (ref 4.5–11.5)

## 2022-03-25 ENCOUNTER — HISTORICAL (OUTPATIENT)
Dept: INFUSION THERAPY | Facility: HOSPITAL | Age: 72
End: 2022-03-25

## 2022-03-25 LAB
ABS NEUT (OLG): 4.78 (ref 2.1–9.2)
BASOPHILS # BLD AUTO: 0 10*3/UL (ref 0–0.2)
BASOPHILS NFR BLD AUTO: 0.5 %
EOSINOPHIL # BLD AUTO: 0.1 10*3/UL (ref 0–0.9)
EOSINOPHIL NFR BLD AUTO: 1.4 %
ERYTHROCYTE [DISTWIDTH] IN BLOOD BY AUTOMATED COUNT: 14.5 % (ref 11.5–17)
HCT VFR BLD AUTO: 37.5 % (ref 37–47)
HGB BLD-MCNC: 11.4 G/DL (ref 12–16)
LYMPHOCYTES # BLD AUTO: 1.1 10*3/UL (ref 0.6–4.6)
LYMPHOCYTES NFR BLD AUTO: 16.9 %
MANUAL DIFF? (OHS): NO
MCH RBC QN AUTO: 27.7 PG (ref 27–31)
MCHC RBC AUTO-ENTMCNC: 30.4 G/DL (ref 33–36)
MCV RBC AUTO: 91 FL (ref 80–94)
MONOCYTES # BLD AUTO: 0.6 10*3/UL (ref 0.1–1.3)
MONOCYTES NFR BLD AUTO: 8.4 %
NEUTROPHILS # BLD AUTO: 4.8 10*3/UL (ref 2.1–9.2)
NEUTROPHILS NFR BLD AUTO: 72.6 %
PLATELET # BLD AUTO: 281 10*3/UL (ref 130–400)
PMV BLD AUTO: 10.8 FL (ref 9.4–12.4)
RBC # BLD AUTO: 4.12 10*6/UL (ref 4.2–5.4)
WBC # SPEC AUTO: 6.6 10*3/UL (ref 4.5–11.5)

## 2022-04-08 ENCOUNTER — HISTORICAL (OUTPATIENT)
Dept: INFUSION THERAPY | Facility: HOSPITAL | Age: 72
End: 2022-04-08

## 2022-04-08 LAB
ABS NEUT (OLG): 6.15 (ref 2.1–9.2)
BASOPHILS # BLD AUTO: 0 10*3/UL (ref 0–0.2)
BASOPHILS NFR BLD AUTO: 0.2 %
EOSINOPHIL # BLD AUTO: 0.2 10*3/UL (ref 0–0.9)
EOSINOPHIL NFR BLD AUTO: 1.9 %
ERYTHROCYTE [DISTWIDTH] IN BLOOD BY AUTOMATED COUNT: 13.9 % (ref 11.5–17)
HCT VFR BLD AUTO: 37.7 % (ref 37–47)
HGB BLD-MCNC: 11.5 G/DL (ref 12–16)
LYMPHOCYTES # BLD AUTO: 1.6 10*3/UL (ref 0.6–4.6)
LYMPHOCYTES NFR BLD AUTO: 18.7 %
MANUAL DIFF? (OHS): NO
MCH RBC QN AUTO: 27.4 PG (ref 27–31)
MCHC RBC AUTO-ENTMCNC: 30.5 G/DL (ref 33–36)
MCV RBC AUTO: 89.8 FL (ref 80–94)
MONOCYTES # BLD AUTO: 0.6 10*3/UL (ref 0.1–1.3)
MONOCYTES NFR BLD AUTO: 6.5 %
NEUTROPHILS # BLD AUTO: 6.2 10*3/UL (ref 2.1–9.2)
NEUTROPHILS NFR BLD AUTO: 72.5 %
PLATELET # BLD AUTO: 253 10*3/UL (ref 130–400)
PMV BLD AUTO: 11 FL (ref 9.4–12.4)
RBC # BLD AUTO: 4.2 10*6/UL (ref 4.2–5.4)
WBC # SPEC AUTO: 8.5 10*3/UL (ref 4.5–11.5)

## 2022-04-21 DIAGNOSIS — D64.9 ANEMIA, UNSPECIFIED TYPE: ICD-10-CM

## 2022-04-22 ENCOUNTER — HISTORICAL (OUTPATIENT)
Dept: INFUSION THERAPY | Facility: HOSPITAL | Age: 72
End: 2022-04-22
Payer: MEDICARE

## 2022-04-22 LAB
ABS NEUT (OLG): 4.4 (ref 2.1–9.2)
BASOPHILS # BLD AUTO: 0 10*3/UL (ref 0–0.2)
BASOPHILS NFR BLD AUTO: 0.1 %
EOSINOPHIL # BLD AUTO: 0.2 10*3/UL (ref 0–0.9)
EOSINOPHIL NFR BLD AUTO: 2.2 %
ERYTHROCYTE [DISTWIDTH] IN BLOOD BY AUTOMATED COUNT: 14.3 % (ref 11.5–17)
HCT VFR BLD AUTO: 34 % (ref 37–47)
HGB BLD-MCNC: 10.5 G/DL (ref 12–16)
LYMPHOCYTES # BLD AUTO: 1.8 10*3/UL (ref 0.6–4.6)
LYMPHOCYTES NFR BLD AUTO: 25.7 %
MANUAL DIFF? (OHS): NO
MCH RBC QN AUTO: 27.7 PG (ref 27–31)
MCHC RBC AUTO-ENTMCNC: 30.9 G/DL (ref 33–36)
MCV RBC AUTO: 89.7 FL (ref 80–94)
MONOCYTES # BLD AUTO: 0.5 10*3/UL (ref 0.1–1.3)
MONOCYTES NFR BLD AUTO: 7.7 %
NEUTROPHILS # BLD AUTO: 4.4 10*3/UL (ref 2.1–9.2)
NEUTROPHILS NFR BLD AUTO: 64 %
PLATELET # BLD AUTO: 232 10*3/UL (ref 130–400)
PMV BLD AUTO: 10.7 FL (ref 9.4–12.4)
RBC # BLD AUTO: 3.79 10*6/UL (ref 4.2–5.4)
WBC # SPEC AUTO: 6.9 10*3/UL (ref 4.5–11.5)

## 2022-04-29 DIAGNOSIS — N18.9 ANEMIA IN CHRONIC KIDNEY DISEASE, UNSPECIFIED CKD STAGE: ICD-10-CM

## 2022-04-29 DIAGNOSIS — D64.9 ANEMIA, UNSPECIFIED TYPE: Primary | ICD-10-CM

## 2022-04-29 DIAGNOSIS — D63.1 ANEMIA IN CHRONIC KIDNEY DISEASE, UNSPECIFIED CKD STAGE: ICD-10-CM

## 2022-04-30 NOTE — OP NOTE
Patient:   Alison Langston            MRN: [id number]            FIN: [id number]               Age:   70 years     Sex:  Female     :  1950   Associated Diagnoses:   None   Author:   Feliciano Mills MD      PREOPERATIVE DIAGNOSIS: Cataract Right eye    POSTOPERATIVE DIAGNOSIS: Cataract Right eye    PROCEDURE: Phacoemulsification with intraocular lens implantations Right eye    SURGEON: Feliciano Mills MD    ASSISTANT: ROGELIO Tillman    ANESTHEISA: MAC    COMPLICATIONS: NONE    DESCRIPTION OF PROCEDURE: The patient was to the [employer] laser.  The patient was marked at 0 & 180 degrees.   A time out was performed.  The capsulotomy and lens fragmentation were completed.  Then the patient was brought into the operating suite, where the patient was correctly identified as was the operative eye via timeout.  The patient was prepped and draped in a sterile ophthalmic fashion.  A lid speculum was placed in the operative eye and the microscope was brought into place.  The eye was then marked using a axis marker for the desired position of the IOL implant.  A 1.0 mm paracentesis was then made at (12) o'clock.  The anterior chamber was filled with Endocoat.  A (temporal) clear corneal incision was made with a 2.4 mm keratome blade.  A 6.0 mm corneal marking ring was used to meng the cornea centered over the visual axis.  A 5.00 mm continuous curvilinear capsulorhexis was fashioned using a cystotome and microcapsular forceps.  Hydrodissection and hydrodelineation was performed with unpreserved 1% Xylocaine.  The nucleus was then phacoemulsified with the Abbott phacoemulsification hand-piece with a total of (2) EFX.  The cortex was then removed with the I/A hand-piece.  The capsular bag was filled with Helon.  The lens model ([id number]) with the power of (22.0) was placed in the capsular bag at (160) degrees.  The Helon was then removed from the eye with the I/A hand -piece.  The anterior chamber was inflated and the  wounds were hydrated with BSS.  The wounds were checked with Weck-Deann sponges and found to be watertight.  The lid speculum was removed and topical antibiotics were placed on the operative eye.  The patient was brought to the PACU in good condition.

## 2022-04-30 NOTE — PROGRESS NOTES
Patient:   Alison Langston            MRN: [id number]            FIN: [id number]               Age:   70 years     Sex:  Female     :  1950   Associated Diagnoses:   None   Author:   Martine QUINN, Yeny BOSTON        Referring Physician: Dr Howard  PCP: Same       Visit Information     Problem List:  1. Normocytic/hypochromic anemia with normal bone marrow biopsy done 18.    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 Iron deficiency  3. Multiple medical comorbidities, including hypothyroidism.     Current Treatment:  Observation     Treatment History:  Injectafter x 2 1/10 and 18.   PO Iron prior to that  Injectafer x2 treatments 06/15/18 and 18, and in   Desferal 500mg x 3 doses 10/27/20-10/29/20 for iron overload    HPI/Clinical History:  70-year-old female kindly referred by Dr. Howard for further evaluation workup of her normocytic anemia, fairly long-standing in nature. The patient states she's been anemic off and on since she was first pregnant many years ago. She denies any history of any GI bleeding, but states she has taken iron pills in the past. She underwent a colonoscopy 2017 by Dr. Cardenas which showed normal mucosa and the entire colon. Single polyp of benign appearance was noted in the sigmoid colon with additional medium internal hemorrhoids noted. EGD was done the same day with no report available, with biopsies from the EGD revealing unremarkable intestinal mucosa from duodenal biopsy with no evidence of celiac sprue. Biopsy of the stomach reveal mild reactive gastropathy only. No evidence of H. pylori.  The patient went back to see Dr. Howard in December at which time CBC revealed hemoglobin of 9.3, hematocrit 32.4, MCV of 87.3, and normal WBC and platelets. TSH was elevated at 33.3, and her diabetes was noted to be under fairly poor control based on her hemoglobin A1c.    She was then referred to hematology for  further evaluation and workup of her normocytic anemia.  Lab work done 12/20/17 showed iron sat of 12.5 and Ferritin of 11.5 (patient had been on PO iron bid).  Siria negative;  SPEP/WILLIAMS negative;  Retic count normal.  Epo level normal at 11.5.   MJ/DS DNA all normal.  Peripheral smear showed mild normocytic, hypochromic anemia with little variation in red cell size and shape.  Morphologic red cell variants include a few gisele cells, ovalocytes, elliptocytes, and rare acanthocytes.  No schistocytes are seen.  Mild rouleaux formation is seen.  Polychromasia is present.  Leukocytes are normal in number, differential, and morphology.  A few reactive lymphocytes are present.  Platelets are normal in number and morphology.  A few giant platelets are present.  Bone marrow biopsy done 1/26/2018 showed normocellular bone marrow with trilineage hematopoiesis--> no evidence of dysplasia or malignancy.  Normal cytogenetics noted.      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  Patient had persistent elevation of ferritin, therefore a MRI of the abdomen was done on 9/30/2020 and showed hepatomegaly with estimated liver iron concentration of 126 µmol/g, additional iron deposition in the spleen.  There was also a suspected 0.9 cm cystic lesion within the pancreatic body and a 1 year follow-up MRCP is suggested for surveillance.       PMHx:  Hypertension, hypothyroidism, diabetes mellitus, history of TIAs  PSHx:  Multiple surgeries in the past including removal of benign skull tumor, thyroidectomy for Hashimoto's thyroiditis, parotidectomy, back surgery, carpal tunnel surgery, total abdominal hysterectomy, hernia repair  Social Hx:  Patient is  and retired. She is a nonsmoker and nondrinker.  Family Hx:  Negative for any family history of cancers or other blood disorders.      Chief Complaint    Follow Up      Interval History     Patient presents to clinic for scheduled follow up appointment. She  "continues to report chronic fatigue which is unchanged. Denies HA, SOB, CP, N/V/C/D. No melena or BRBPR. No night sweats or unintentional weightloss. Appetite stable. VSS. CBC today reviewed- shows Hgb at 9.2, remainder of counts stable overall. CMP pending.       The National Cancer [place] Toxicity Scores:      Review of Systems   14 point review of systems done in full with pertinent positives as noted above in interval history.      Health Status   Allergies:    Allergic Reactions (Selected)  Severity Not Documented  Aricept- Hallucinations.  Baclofen- Hallucinations.  Contrast Dye- Rash.  Erythromycin- Upset stomach.  Grass- Per allergy test.  Iodine- Rash.  Lortab- "makes me crazy".  Shrimp- Rash.,    Allergies (8) Active Reaction  Aricept Hallucinations  baclofen Hallucinations  Contrast Dye Rash  erythromycin Upset Stomach  Grass per allergy test  iodine Rash  Lortab "Makes me crazy"  Shrimp Rash     Current medications:  (Selected)   Outpatient Medications  Future  Retacrit: 20,000 units, form: Soln, Subcutaneous, Once, first dose 09/08/21 14:00:00 CDT, stop date 09/08/21 14:00:00 CDT, Routine, Non-dialysis patients, Weeks 11, Future Order  Prescriptions  Prescribed  Diabetic shoes and 3 pairs of insoles: Diabetic shoes and 3 pairs of insoles, See Instructions, Diabetic shoes and 3 pairs of insoles, # 1 EA, 0 Refill(s), Pharmacy: [facility]  Flonase 50 mcg/inh nasal spray: 1 spray(s), Nasal, BID, # 1 bottle(s), 0 Refill(s), Pharmacy: [facility]  Tresiba FlexTouch 200 units/mL subcutaneous solution: 12 units, Subcutaneous, Daily, Increase insulin by 2 units every 3 days until you reach fasting blood sugar goal between 80 and 130 or you reach 20 units, # 9 mL, 6 Refill(s), other reason (Rx)  True Metrix Test Strips: True Metrix Test Strips, See Instructions, Patient testing BID  E11.9, # 100 EA, 11 Refill(s), Pharmacy: [facility], 154, cm, Height/Length Dosing, 06/01/20 " 13:33:00 CDT, 70.7, kg, Weight Dosing, 06/01/20 13:33:00 CDT  True metrix: True metrix, See Instructions, Patient testing BID   E11.9, # 100 EA, 11 Refill(s), Pharmacy: [facility]  atorvastatin 20 mg oral tablet: See Instructions, Take 1 tablet by mouth once daily, # 90 tab(s), 2 Refill(s), Pharmacy: [facility], 155, cm, Height/Length Dosing, 04/03/21 3:14:00 CDT, 73.6, kg, Weight Dosing, 04/03/21 3:14:00 CDT  betamethasone-clotrimazole 0.05%-1% topical cream: 1 adi, TOP, BID, # 45 gm, 0 Refill(s), Pharmacy: [facility], 154, cm, Height/Length Dosing, 04/19/21 10:31:00 CDT, 73.45, kg, Weight Dosing, 04/19/21 10:31:00 CDT  busPIRone 10 mg oral tablet: 10 mg = 1 tab(s), Oral, TID, PRN PRN anxiety, # 90 tab(s), 1 Refill(s), Pharmacy: [facility], 153, cm, Height/Length Dosing, 04/05/21 9:35:00 CDT, 72, kg, Weight Dosing, 04/05/21 9:35:00 CDT  gabapentin 600 mg oral tablet: 600 mg = 1 tab(s), Oral, TID, # 180 tab(s), 4 Refill(s), Pharmacy: [facility], 154, cm, Height/Length Dosing, 09/01/21 9:03:00 CDT, 70.8, kg, Weight Dosing, 09/01/21 9:03:00 CDT  gemfibrozil 600 mg oral tablet: See Instructions, TAKE 1 TABLET TWICE A DAY, # 180 tab(s), 3 Refill(s), Pharmacy: [facility], 155, cm, Height/Length Dosing, 03/24/21 8:47:00 CDT, 74.5, kg, Weight Dosing, 03/24/21 8:47:00 CDT  hydrochlorothiazide 12.5 mg oral tablet: 12.5 mg = 1 tab(s), Oral, Daily, # 90 tab(s), 1 Refill(s), Pharmacy: [facility], 154, cm, Height/Length Dosing, 04/30/21 9:36:00 CDT, 73.45, kg, Weight Dosing, 04/30/21 9:36:00 CDT  levothyroxine 200 mcg (0.2 mg) oral tablet: 200 mcg = 1 tab(s), Oral, Daily, # 90 tab(s), 3 Refill(s), Pharmacy: [facility], 155, cm, Height/Length Dosing, 01/26/21 9:29:00 CST, 72.5, kg, Weight Dosing, 01/26/21 9:31:00 CST  losartan 100 mg oral tablet: 100 mg = 1 tab(s), Oral, Daily, # 90 tab(s), 4 Refill(s), Pharmacy: [facility], 155, cm,  Height/Length Dosing, 10/29/20 14:23:00 CDT, 72.3, kg, Weight Dosing, 10/29/20 14:23:00 CDT  meclizine 12.5 mg oral tablet: 12.5 mg = 1 tab(s), Oral, TID, PRN PRN for dizziness, # 30 tab(s), 1 Refill(s), Pharmacy: [facility] 415, 155, cm, Height/Length Dosing, 10/29/20 14:23:00 CDT, 72.3, kg, Weight Dosing, 10/29/20 14:23:00 CDT  metFORMIN 1000 mg oral tablet: See Instructions, TAKE 1 TABLET TWICE A DAY, # 180 tab(s), 3 Refill(s), Pharmacy: [facility] 415, 155, cm, Height/Length Dosing, 10/29/20 14:23:00 CDT, 72.3, kg, Weight Dosing, 10/29/20 14:23:00 CDT  omeprazole 40 mg oral DR capsule: 40 mg = 1 cap(s), Oral, Daily, # 90 cap(s), 4 Refill(s), Pharmacy: [facility], 155, cm, Height/Length Dosing, 01/26/21 9:29:00 CST, 72.5, kg, Weight Dosing, 01/26/21 9:31:00 CST  traZODone 100 mg oral tablet: 200 mg = 2 tab(s), Oral, Once a day (at bedtime), # 180 tab(s), 0 Refill(s), Pharmacy: [facility]  true metrix glucose monitor: true metrix glucose monitor, See Instructions, to check bloodsugars BID, E11.9, # 1 EA, 5 Refill(s), Pharmacy: [facility], 154, cm, Height/Length Dosing, 04/19/21 10:31:00 CDT, 73.45, kg, Weight Dosing, 04/19/21 10:31:00 CDT  true metrix glucose testing strips: true metrix glucose testing strips, See Instructions, true metrix glucose testing strips to check glucose twice daily  Dx: E11.9, # 100 EA, 3 Refill(s), Pharmacy: [facility], 155, cm, Height/Length Dosing, 10/29/20 14:23:00 CDT, 72.3, kg, We...  Documented Medications  Documented  NovoLIN 70/30 FlexPen subcutaneous suspension: Subcutaneous, Daily, PRN PRN blood glucose, TID as needed per sliding scale, 0 Refill(s)  aspirin 81 mg oral Delayed Release (EC) tablet: 81 mg = 1 tab(s), Oral, Daily, 0 Refill(s)   Problem list:    All Problems  Anemia / SNOMED CT 567461352 / Confirmed  Anemia in CKD (chronic kidney disease) / SNOMED CT 7388388952 / Confirmed  Anxiety / SNOMED CT 78525670  / Confirmed  Arthritis / SNOMED CT 5360494 / Confirmed  Cerebrovascular accident / SNOMED CT 508908347 / Confirmed  Chronic back pain / SNOMED CT 742783462 / Confirmed  Depressive disorder / SNOMED CT 00968111 / Confirmed  Benign essential tremor / SNOMED CT 4690332530 / Confirmed  Gastroesophageal reflux disease / SNOMED CT 147625778 / Confirmed  H/O cardiac surgery / SNOMED CT 638299350 / Confirmed  H/O: TIA / SNOMED CT 295337503 / Confirmed  2007  History of CVA in adulthood / SNOMED CT 3848650371 / Confirmed  Hyperlipidemia / SNOMED CT 31203229 / Confirmed  Hypertension / SNOMED CT [id number] / Confirmed  Hypothyroidism / SNOMED CT 73405261 / Confirmed  Incisional hernia / SNOMED CT 606682194 / Confirmed  Incisional hernia / SNOMED CT 263745467 / Confirmed  CRISTINA (iron deficiency anemia) / SNOMED CT 545978819 / Confirmed  Anxiety and depression / SNOMED CT 401004335 / Confirmed  Myoclonus / SNOMED CT 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 / Confirmed  Normocytic anemia / SNOMED CT 410663630 / Confirmed  Osteoporosis / SNOMED CT 890490331 / Confirmed  Elevated ferritin / SNOMED CT 996356476 / Confirmed  Tremor / SNOMED CT 24128067 / Confirmed  Type 2 diabetes mellitus with hyperglycemia, with long-term current use of insulin / SNOMED CT 210389598 / Confirmed  Vertigo / SNOMED CT [id number] / Confirmed  wears glasses / SNOMED CT 754995442 / Confirmed  Restless leg syndrome / SNOMED CT 0227930218 / Confirmed  Resolved: Anxiety / ICD-9-.00  Resolved: At risk for aspiration / SNOMED CT 7505853390  Problem automatically updated based on documentation on Cough, Oxygen Therapy, Aspiration Risk, CN IX, X Swallowing Gag Reflex, GI Symptoms, Nutrition Risk Factors, and/or Enteral Tube Type.  Resolved: At risk for falls / SNOMED CT 761731449  Problem automatically updated based on documentation on History of Falls, History of Falls in last 3 months [place], [place] Fall Risk Score and/or  ADLs.  Resolved: At risk for impaired skin integrity / SNOMED CT 471983434  Problem automatically updated based on documentation on Skin Abnormality Type, Pressure Ulcer Present on Admission, Oxygen Therapy, Mask/Delivery Type, Pressure Point, and/or a Agusto Score of less than 16.  Resolved: At risk for infection / SNOMED CT 192316799  Problem automatically updated based on documentation on Surgical Drain Tube Type and/or Skin Abnormality Type.  Resolved: At risk of pressure sore / SNOMED CT 594014164  Resolved: Bowel dysfunction / SNOMED CT 524959078  Problem added automatically by system based on documentation of Bowel Sounds as Hyperactive, Hyperactive, or Absent;  GI Symptoms as Hyperactive, Constipation, or Diarrhea and/or Passing Flatus as No.  Resolved: Diabetes / ICD-9-.00  Resolved: Disorder of fluid balance / SNOMED CT 951067583  Problem automatically updated based on documentation on Edema, Anasarca, Edema, Generalized, Edema, Bilateral Periorbital, Edema, Face, Edema,, Bilateral Arm, Edema, Left Arm, Edema, Right Arm, Edema, Bilateral Hand, Edema, Left Hand, Edema, Right Hand, Edema, Labia, Edema, Scrotum, Edema, Penile, Edema, Bilateral Upper Leg, Edema, Bilateral Lower Leg, Edema, Bilateral Pretibial, Edema, Bilateral Ankle, Edema, Bilateral Pedal, GI Symptoms, Skin Turgor General, and/or Gestational Age Method.  Resolved: H/O: arthritis / SNOMED CT 855732568  Resolved: blood clots / SNOMED CT 6086980307  Resolved: Hernia / SNOMED CT 2794951437  Resolved: Hypertension / ICD-9-.9  Resolved: Impaired mobility / SNOMED CT 129602294  Problem automatically updated based on documentation on Assistive Device, ADLs, Activity Status ADLs, Musculoskeletal Range of Motion, Musculoskeletal Activity, Special Orthopedic Devices, and/or Traction Type.  Resolved: Impaired skin integrity / SNOMED CT 58074462  Problem automatically updated based on documentation on Skin Abnormality Type and/or Pressure  Ulcer Present on Admisison.  Resolved: Ineffective airway clearance / SNOMED CT 100569907  Problem automatically updated based on documentation of Shortness of Breath on Respiratory Symptoms; SpO2 less than 92%; Unable to clear secretions or Weak on Cough.  Resolved: Iron overload / SNOMED CT 671754489  Resolved: Kidney stone / SNOMED CT 096876631  Resolved: Kidney stones / SNOMED CT 094XF426-[id number]  Resolved: migraines / SNOMED CT 13402433  Resolved: Obesity / SNOMED CT 7889891395  Resolved: Pancreatitis / SNOMED CT 966472250  Resolved: Pancreatitis / SNOMED CT [id number]  last attack many yrs ago  Resolved: Panic attacks / SNOMED CT [id number]  Resolved: Patent ductus arteriosus / SNOMED CT 899168338  had closure of PDA in 2007- no further issues  Resolved: Review of care plan / SNOMED CT 8719830188  Resolved: Shortness of breath / SNOMED CT 797240214  Resolved: Thyroid disease / SNOMED CT 158884432  Resolved: TIA / SNOMED CT 757041530  2010  Resolved: Type 2 diabetes mellitus / SNOMED CT 768417765  Resolved: Vertigo / SNOMED CT 8137871025  Resolved: weakness / SNOMED CT 02180985  Canceled: Acid reflux / SNOMED CT 433223744  Canceled: Depression / SNOMED CT 50566321  Canceled: Cataracts / SNOMED CT 9196968932  Canceled: dementia / SNOMED CT 17237745  Canceled: Diabetes mellitus / SNOMED CT 106499005  Canceled: thyroid disease / SNOMED CT 36611944  Canceled: Gastric reflux / SNOMED CT 397371676  Canceled: Hypertensive disorder / SNOMED CT 4900043723  Canceled: Hypothyroidism / SNOMED CT [id number]-[id number]  Canceled: high cholesterol / SNOMED CT 701951208  cardiologist is Dr. Valentino; last date of visit=unknown  Canceled: Restless legs syndrome / SNOMED CT 33014888  Canceled: Risk for falls / SNOMED CT [id number]-[id number]  Canceled: Anemia / SNOMED CT 716456436,    Active Problems (28)  Anemia   Anemia in  CKD (chronic kidney disease)   Anxiety   Anxiety and depression   Arthritis   Benign essential tremor   Cerebrovascular accident   Chronic back pain   Depressive disorder   Elevated ferritin   Gastroesophageal reflux disease   H/O cardiac surgery   H/O: TIA   History of CVA in adulthood   Hyperlipidemia   Hypertension   Hypothyroidism   CRISTINA (iron deficiency anemia)   Incisional hernia   Incisional hernia   Myoclonus   Normocytic anemia   Osteoporosis   Restless leg syndrome   Tremor   Type 2 diabetes mellitus with hyperglycemia, with long-term current use of insulin   Vertigo   wears glasses         Physical Examination   Vital Signs   10/20/2021 13:07 CDT     Temperature Oral          36.6 DegC                             Temperature Oral (calculated)             97.88 DegF                             Peripheral Pulse Rate     75 bpm                             Respiratory Rate          18 br/min                             SpO2                      99 %                             Oxygen Therapy            Room air                             Systolic Blood Pressure   164 mmHg  HI                             Diastolic Blood Pressure  65 mmHg                             Blood Pressure Location   Right arm                             Manual Cuff BP            No                             O2 SAT at rest            99 %     Measurements from flowsheet : Measurements   10/20/2021 13:07 CDT     Weight Dosing             69.000 kg                             Weight Measured           69 kg                             Weight Measured and Calculated in Lbs     152.12 lb                             Height/Length Dosing      154.00 cm                             Height/Length Measured    154 cm                             BSA Measured              1.72 m2                             Body Mass Index Measured  29.09 kg/m2              General:  Alert and oriented, No acute distress.    Eye:  Pupils are equal, round and  reactive to light, Extraocular movements are intact, Normal conjunctiva.    HENT:  Normocephalic, Oral mucosa is moist.    Neck:  Supple, No lymphadenopathy.    Respiratory:  Lungs are clear to auscultation, Respirations are non-labored, Breath sounds are equal.    Cardiovascular:  Normal rate, Regular rhythm, No edema.    Gastrointestinal:  Soft, Non-tender, Normal bowel sounds, obese abdomen- unable to palpate splenic tip.    Integumentary:  Warm, Dry.    Neurologic:  Alert, Oriented, No focal deficits.    Psychiatric:  Cooperative, Appropriate mood & affect.    Lymphatics:  No lymphadenopathy neck, axilla, groin.    Cognition and Speech:  Oriented, Speech clear and coherent.    The National Cancer [place] Toxicity Scores:   ECOG Performance Scale: 1- Strenuous physical activity restricted; fully ambulatory and able to carry out light work.      Review / Management        Results review:  Lab results   10/20/2021 12:57 CDT     WBC                       8.2 x10(3)/mcL                             RBC                       3.34 x10(6)/mcL  LOW                             Hgb                       9.2 gm/dL  LOW                             Hct                       30.9 %  LOW                             Platelet                  216 x10(3)/mcL                             MCV                       92.5 fL                             MCH                       27.5 pg                             MCHC                      29.8 gm/dL  LOW                             RDW                       14.6 %                             MPV                       10.9 fL                             Abs Neut                  5.63 x10(3)/mcL                             NEUT%                     68.4 %  NA                             NEUT#                     5.6 x10(3)/mcL                             LYMPH%                    18.7 %  NA                             LYMPH#                    1.5 x10(3)/mcL                              MONO%                     9.2 %  NA                             MONO#                     0.8 x10(3)/mcL                             EOS%                      2.6 %  NA                             EOS#                      0.2 x10(3)/mcL                             BASO%                     0.6 %  NA                             BASO#                     0.0 x10(3)/mcL  , Interpretation.       Impression and Plan   Diagnoses:  1. Normocytic anemia with component of iron deficiency, in addition to anemia chronic disease, requiring IV Iron after poor response to PO iron.   2. Hypothyroidism, thought to be a component contributing to her anemia.    3. Normal Bone marrow biopsy done 1/26/18  4. Multiple medical comorbidities  5. Elevated ferritin -ruled out hemochromatosis. Received Desferal 500mg x 3 doses 10/27/20-10/29/20.    Plan:  -Patient doing well overall clinically with no worsening clinical symptoms    -Continue Procrit subq as needed--  next injection today    -Return to clinic in 3 months for OV and clinical examination     -Labs: CBC, CMP, iron studies, b12, folate prior to appt    All questions were answered to the best of my ability.   Yeny MATUTE, FNP-C

## 2022-05-02 DIAGNOSIS — D64.9 ANEMIA, UNSPECIFIED TYPE: Primary | ICD-10-CM

## 2022-05-02 NOTE — HISTORICAL [facility] CERNER
This is a historical note converted from Johnnie. Formatting and pictures may have been removed.  Please reference Johnnie for original formatting and attached multimedia. Chief Complaint  patient reports confusion, dizziness and increased weakness on right side earlier today. hx TIA  History of Present Illness  On call for Dr Howard  ?  Ms. Langston is a 70-year-old female?with HTN, HLD, DM 2, CVA, TIA and DVT that presented to the ED with complaints of dizziness?and some right-sided leg weakness.  According to the patient she has been having some dizziness?for the last few days. ?They had a crawfish boil yesterday?during which patient felt somewhat lightheaded?and dizziness with the room spinning kind of feeling. ?She also complained of some blurry vision. ?Never had any slurred speech or?focal deficit except for the subjective feeling of right-sided weakness.  She?presented to the ED and was admitted for a TIA work-up.  ?  Patient was seen and examined this morning.? Her  was at bedside and I?called her daughter Adrienne?who is a nurse and spoke to her over the phone.  Patient has a history of a spinal cord stimulator?and the MRI will not be?completed until Monday.? However patient?very eager to go home and?seems to be back at baseline.? She does not want to stay?over the weekend.? After discussing with Adrienne, we?decided to repeat a CT head?and depending on the results, possible discharge later today?if?CT head remains stable.  ?  Review of Systems  Constitutional:?no fever, fatigue, weakness  Eye:?no vision loss, eye redness, drainage, or pain  ENMT:?no sore throat, ear pain, sinus pain/congestion, nasal congestion/drainage  Respiratory:?no cough, no wheezing, no shortness of breath  Cardiovascular:?no chest pain, no palpitations, no edema  Gastrointestinal:?no nausea, vomiting, or diarrhea. No abdominal pain  Genitourinary:?no dysuria, no urinary frequency or urgency, no hematuria  Hema/Lymph:?no abnormal  bruising or bleeding  Endocrine:?no heat or cold intolerance, no excessive thirst or excessive urination  Musculoskeletal:?no muscle or joint pain, no joint swelling, chronic neuropathy  Integumentary:?no skin rash or abnormal lesion  Neurologic: no headache,? dizziness,? weakness, no ?numbness  ?  Physical Exam  Vitals & Measurements  T:?37? ?C (Oral)? TMIN:?37? ?C (Oral)? TMAX:?37.1? ?C (Oral)? HR:?69(Peripheral)? RR:?19? BP:?159/63? SpO2:?94%? WT:?73.6?kg? BMI:?30.63?  ??General: ?Alert, oriented,?no acute distress?  ??Skin: ?Warm, dry, intact?  ??Head: ?Normocephalic, atraumatic?  ??Neck: ?Supple, trachea midline, no tenderness?  ??Eye: ?Pupils are equal, round and reactive to light, extraocular movements are intact?  ??Cardiovascular:?S1-S2,?regular rate and rhythm, No murmur, Normal peripheral perfusion?  ??Respiratory: ?Lungs are clear to auscultation, respirations are non-labored?  ??Gastrointestinal: ?Soft, Nontender, obese, bowel sounds present  ??Back: ?Nontender, Normal range of motion. ?History of spinal cord stimulator  ??Musculoskeletal: ?Normal ROM, Not normal strength, ?  ??Neurological: ?Alert and oriented to person, place, time, and situation, No focal neurological deficit observed, unable to?appreciate?any right-sided weakness on exam  ??Psychiatric: ?Cooperative, appropriate mood & affect?  ?  Assessment/Plan  Benign essential HTN?I10  ?-Home medications will be resumed, patient is completely intact  -Low-sodium diet  ?  ?  Focal motor weakness?[id number]  ?-Possible TIA  -Initial CT?negative in ED  -Patient does have a spinal cord stimulator,?MRI cannot be done until Monday  -At this time?we will repeat another CT?and if remains stable, possible discharge later today  -Carotid ultrasound negative?for any?hemodynamically significant stenosis bilaterally  -Echocardiogram pending, will get completed before DC  -Patient already on statin, continue  ?  ?  Generalized  weakness?R53.1  ?-Patient seems to be back at baseline  ?  Ordered:  MRI Brain W/O Contrast, Awaiting DC, 04/03/21 8:14:00 CDT, Other (please specify), CVA, None, Ambulatory, Patient Has IV?, TODAY, if after hours in AM, Rad Type, Generalized weakness, 04/03/21 8:14:00 CDT  ?  Orders:  CT Head W/O Contrast, Stat, 04/03/21 9:19:00 CDT, Unilateral weakness/focal weakness, None, Ambulatory, Patient Has IV?, Rad Type, Schedule this test, 04/03/21 9:19:00 CDT  Dietary Supplements, 04/03/21 8:15:00 CDT, Boost Glucose Control Chocolate, TID  Diabetes mellitus type 2  -Insulin sliding scale with Accu-Cheks before meals and at bedtime  -Resume Metformin  -Holding Tresiba since patient was n.p.o.?until this morning  ?  Hyperlipidemia  -Continue statin  ?  Peripheral neuropathy  -On gabapentin, resumed this morning  ?  Plan of care has been discussed with the patient, her ?and their daughter in detail.? Possible discharge later today depending on CT results.? All of their questions have been addressed and answered to the best of their understanding and satisfaction  ?   Problem List/Past Medical History  Ongoing  Anemia  Anxiety  Arthritis  Benign essential tremor  Cerebrovascular accident  Chronic back pain  Depressive disorder  Elevated ferritin  Gastroesophageal reflux disease  H/O cardiac surgery  H/O: TIA  Hyperlipidemia  Hypertension  Hypothyroidism  Incisional hernia  Type 2 diabetes mellitus  Procedure/Surgical History  Application of short arm splint (forearm to hand); static (04/13/2020)  Immobilization of Left Lower Arm using Splint (04/13/2020)  Hernia Repair Incisional Laparoscopic (.) (01/27/2020)  Supplement Abdominal Wall with Synthetic Substitute, Percutaneous Endoscopic Approach (01/27/2020)  Spinal cord stimulation (05.2019)  Diabetic retinal eye exam (02/05/2018)  Biopsy Bone Marrow Aspiration (., Right) (01/26/2018)  Diagnostic bone marrow; biopsy(ies) (01/26/2018)  Extraction of Iliac Bone Marrow,  Percutaneous Approach, Diagnostic (2018)  Left shoulder repair (03/15/2017)  Arthroscopy, shoulder, surgical; decompression of subacromial space with partial acromioplasty, with coracoacromial ligament (ie, arch) release, when performed (List separately in addition to code for primary procedure). (2013)  Arthroscopy, shoulder, surgical; with rotator cuff repair. 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 ()   delivery ()  acrylic plate on skull  Biopsy of breast  bone spur  Carpal tunnel release  Cholecystectomy  Colonoscopy  Exploratory laparotomy  Hernia repair  Laparoscopy, surgical, repair, recurrent incisional hernia (includes mesh insertion, when performed); incarcerated or strangulated  Parotidectomy  PDA - Patent ductus arteriosus  Thyroidectomy  Trigger finger  Tubal ligation   Medications  Inpatient  aspirin 81 mg oral Delayed Release (EC) tablet, 81 mg= 1 tab(s), Oral, Daily  atorvastatin, 20 mg= 2 tab(s), Oral, Daily  Dextrose 50% and Water (50 mL vial/syringe), 12.5 gm= 25 mL, IV Push, Once, PRN  Dextrose 50% and Water (50 mL vial/syringe), 12.5 gm= 25 mL, IV Push, As Directed, PRN  Dextrose 50% and Water (50 mL vial/syringe), 25 gm= 50 mL, IV Push, As Directed, PRN  Dextrose 50% in Water intravenous solution, 12.5 gm= 25 mL, IV Push, As Directed, PRN  gabapentin 800 mg oral tablet, 800 mg= 1 tab(s), Oral, TID  gemfibrozil, 600 mg= 1 tab(s), Oral, BID  glucagon, 1 mg= 1 EA, IM, q10min, PRN  glucagon, 1 mg= 1 EA, IM, q10min, PRN  hydrALAZINE 20 mg/mL injectable solution, 10 mg= 0.5 mL, IV Push, q4hr, PRN  hydrochlorothiazide, 12.5 mg= 0.5 tab(s), Oral, Daily  insulin lispro, 2-14 units, Subcutaneous, As Directed, PRN  labetalol, 10 mg= 2 mL, IV Push, q10min, PRN  levothyroxine 200 mcg  (0.2 mg) oral tablet, 200 mcg= 1 tab(s), Oral, Daily  losartan, 100 mg= 4 tab(s), Oral, Daily  meclizine, 12.5 mg= 0.5 tab(s), Oral, TID, PRN  metformin 500 mg oral tablet, 500 mg= 1 tab(s), Oral, BID  NS 1,000 mL, 1000 mL, IV  Protonix, 40 mg= 1 tab(s), Oral, Daily  traZODone 100 mg oral tablet, 200 mg= 2 tab(s), Oral, Once a day (at bedtime)  Home  acetaminophen-oxycodone 325 mg-5 mg oral tablet, 1 tab(s), Oral, q6hr,? ?Not taking: Last Dose Date/Time Unknown  atorvastatin 20 mg oral tablet, See Instructions  cholecalciferol 1000 intl units (25 mcg) oral capsule, 1000 IntUnit= 1 cap(s), Oral, Daily,? ?Not taking  Diabetic shoes and 3 pairs of insoles, See Instructions  Flonase 50 mcg/inh nasal spray, 1 spray(s), Nasal, BID  gabapentin 600 mg oral tablet, See Instructions, 3 refills  gemfibrozil 600 mg oral tablet, See Instructions, 3 refills  hydrochlorothiazide 12.5 mg oral tablet, 12.5 mg= 1 tab(s), Oral, Daily  hydrochlorothiazide 25 mg oral tablet, 25 mg= 1 tab(s), Oral, Daily,? ?Not Taking per Prescriber  levothyroxine 200 mcg (0.2 mg) oral tablet, 200 mcg= 1 tab(s), Oral, Daily, 3 refills  losartan 100 mg oral tablet, 100 mg= 1 tab(s), Oral, Daily, 4 refills  meclizine 12.5 mg oral tablet, 12.5 mg= 1 tab(s), Oral, TID, PRN, 1 refills  metFORMIN 1000 mg oral tablet, See Instructions, 3 refills  omeprazole 40 mg oral DR capsule, 40 mg= 1 cap(s), Oral, Daily, 4 refills  traZODone 100 mg oral tablet, 200 mg= 2 tab(s), Oral, Once a day (at bedtime)  Tresiba FlexTouch 200 units/mL subcutaneous solution, 12 units, Subcutaneous, Daily, 6 refills,? ?Not Taking, Completed Rx  True metrix, See Instructions, 11 refills  true metrix glucose monitor, See Instructions  true metrix glucose testing strips, See Instructions, 3 refills  True Metrix Test Strips, See Instructions, 11 refills  VENLAFAXINE HCL  MG CP24, 150 mg= 1 cap(s), Oral, Daily,? ?Not taking  Zofran ODT 8 mg oral tablet, disintegrating, 8 mg= 1 tab(s),  Oral, q8hr,? ?Not taking  Allergies  Aricept?(Hallucinations)  Contrast Dye?(Rash)  Grass?(per allergy test)  Lortab?(Makes me crazy)  Shrimp?(Rash)  baclofen?(Hallucinations)  erythromycin?(Upset Stomach)  iodine?(Rash)  Social History  Abuse/Neglect  No, 2021  No, No, Yes, 2021  Alcohol - Denies Alcohol Use, 2012  Past, 2016  Employment/School  disabled, Highest education level: High school., 2013  Exercise  Exercise frequency: 1-2 times/week., 2016  Home/Environment  Lives with Spouse. Living situation: Home/Independent., 2016  Nutrition/Health  Diabetic, 2016  Sexual  Sexually active: Yes., 2016  Spiritual/Cultural  Scientology, Yes, 2020  Substance Use - Denies Substance Abuse, 2015  Never, 2016  Tobacco - Denies Tobacco Use, 2012  Never (less than 100 in lifetime), N/A, 2021  Never (less than 100 in lifetime), N/A, 2021  Family History  Alzheimers disease: Father.  Diabetes mellitus type 2: Mother and Father.  GI bleeding: Mother.  Hypoglycaemia: Negative: Father.  Primary malignant neoplasm of lung: Father.  Lab Results  Labs Last 24 Hours?  ?Chemistry? Hematology/Coagulation?   Sodium Lvl: 138 mmol/L (21 05:40:00) PT: 12.2 second(s) (21 21:45:00)   Potassium Lvl:?5.4 mmol/L?High (21 05:40:00) INR: 0.9 (21 21:45:00)   Chloride: 106 mmol/L (21 05:40:00) PTT: 33.2 second(s) (21 21:45:00)   CO2: 24 mmol/L (21 05:40:00) WBC: 7.2 x10(3)/mcL (21 21:45:00)   Calcium Lvl: 8.5 mg/dL (21 05:40:00) RBC:?3.33 x10(6)/mcL?Low (21 21:45:00)   Glucose Lvl:?278 mg/dL?High (21 05:40:00) Hgb:?10 gm/dL?Low (21 21:45:00)   EA.8 mg/dL (21 21:45:00) Hct:?31.7 %?Low (21 21:45:00)   BUN:?37.9 mg/dL?High (21 05:40:00) Platelet: 252 x10(3)/mcL (21 21:45:00)   Creatinine: 0.95 mg/dL (21 05:40:00) MCV:?95.2 fL?High (21 21:45:00)   Est Creat  Clearance Ser: 41.63 mL/min (04/03/21 07:07:01) MCH: 30 pg (04/02/21 21:45:00)   eGFR-AA: >60 (04/03/21 05:40:00) MCHC:?31.5 gm/dL?Low (04/02/21 21:45:00)   eGFR-GARCIA: >60 (04/03/21 05:40:00) RDW: 14.4 % (04/02/21 21:45:00)   Bili Total: 0.3 mg/dL (04/03/21 05:40:00) MPV: 11.6 fL (04/02/21 21:45:00)   Bili Direct: 0.1 mg/dL (04/03/21 05:40:00) Abs Neut: 5.16 x10(3)/mcL (04/02/21 21:45:00)   Bili Indirect: 0.2 mg/dL (04/03/21 05:40:00) Neutro Auto: 71 % (04/02/21 21:45:00)   AST: 10 unit/L (04/03/21 05:40:00) Lymph Auto: 18 % (04/02/21 21:45:00)   ALT: 8 unit/L (04/03/21 05:40:00) Mono Auto: 9 % (04/02/21 21:45:00)   Alk Phos: 85 unit/L (04/03/21 05:40:00) Eos Auto: 1 % (04/02/21 21:45:00)   Total Protein: 6.5 gm/dL (04/03/21 05:40:00) Abs Eos: 0.1 x10(3)/mcL (04/02/21 21:45:00)   Albumin Lvl:?3.3 gm/dL?Low (04/03/21 05:40:00) Basophil Auto: 0 % (04/02/21 21:45:00)   Globulin: 3.2 gm/dL (04/03/21 05:40:00) Abs Neutro: 5.16 x10(3)/mcL (04/02/21 21:45:00)   A/G Ratio:?1 ratio?Low (04/03/21 05:40:00) Abs Lymph: 1.3 x10(3)/mcL (04/02/21 21:45:00)   Hgb A1c:?8.1 %?High (04/02/21 21:45:00) Abs Mono: 0.6 x10(3)/mcL (04/02/21 21:45:00)   CRP High Sens: 0.48 mg/dL (04/02/21 21:45:00) Abs Baso: 0 x10(3)/mcL (04/02/21 21:45:00)   Chol:?268 mg/dL?High (04/02/21 21:45:00) Sed Rate:?53 mm/hr?High (04/02/21 21:45:00)   HDL:?31 mg/dL?Low (04/02/21 21:45:00)    Trig:?1073 mg/dL?High (04/02/21 21:45:00)    LDL:?22 mg/dL?Low (04/02/21 21:45:00)    Chol/HDL:?9?High (04/02/21 21:45:00)    VLDL: 215 (04/02/21 21:45:00)    Total CK: 141 U/L (04/02/21 21:45:00)    Troponin-I: <0.010 (04/02/21 21:45:00)    U pH: 5 (04/03/21 02:35:00)    U Spec Grav: 1.017 (04/03/21 02:35:00)    Diagnostic Results  Accession:?[id number]  Order:?XR Chest 1 View  Report Dt/Tm:?04/03/2021 08:34  Report:?  one view of the chest  ?  ?  IMPRESSION:?  ?  No acute intracranial normality. Changes of microangiopathy. Stable  appearance of the calvarium.  ?   CT HEAD:  IMPRESSION:?  ?  No acute intracranial normality. Changes of microangiopathy. Stable  appearance of the calvarium.  ?  ?

## 2022-05-09 ENCOUNTER — TELEPHONE (OUTPATIENT)
Dept: INTERNAL MEDICINE | Facility: CLINIC | Age: 72
End: 2022-05-09
Payer: MEDICARE

## 2022-05-09 NOTE — TELEPHONE ENCOUNTER
----- Message from Kenny Carlson MA sent at 5/9/2022  7:59 AM CDT -----  Regarding: PV 5/16/22 @ 9:40 Dr. Howard  1. Are there any outstanding tasks in the patient's chart? Yes, fasting labs    2. Is there any documentation in the chart? No    3.Has patient been seen in an ER, Urgent care clinic, or been admitted since last visit?  If yes, When, where, and why    4. Has patient seen any other healthcare providers since last visit?  If yes, when, where, and why    5. Has patient had any bloodwork or XR done since last visit?    6. Is patient signed up for patient portal?

## 2022-05-09 NOTE — TELEPHONE ENCOUNTER
----- Message from Camille Boogie sent at 5/9/2022  1:28 PM CDT -----  Regarding: Advice  Type:  Needs Medical Advice    Who Called: Patient  Symptoms (please be specific): difficulty swallowing, sore throat, ear pain   How long has patient had these symptoms:  Thursday  Pharmacy name and phone #:  [facility]  Would the patient rather a call back or a response via [facility]? call  Best Call Back Number: [phone]  Additional Information: Please advise

## 2022-05-09 NOTE — TELEPHONE ENCOUNTER
Spoke to pt. She stated that this has been going on since Thursday 5/5/22.  I informed pt to go to [facility] to get swabbed.

## 2022-05-10 ENCOUNTER — LAB VISIT (OUTPATIENT)
Dept: LAB | Facility: HOSPITAL | Age: 72
End: 2022-05-10
Attending: INTERNAL MEDICINE
Payer: MEDICARE

## 2022-05-10 ENCOUNTER — TELEPHONE (OUTPATIENT)
Dept: INTERNAL MEDICINE | Facility: CLINIC | Age: 72
End: 2022-05-10
Payer: MEDICARE

## 2022-05-10 DIAGNOSIS — N18.9 ANEMIA IN CHRONIC KIDNEY DISEASE, UNSPECIFIED CKD STAGE: ICD-10-CM

## 2022-05-10 DIAGNOSIS — E11.9 TYPE 2 DIABETES MELLITUS WITHOUT COMPLICATION, UNSPECIFIED WHETHER LONG TERM INSULIN USE: Primary | ICD-10-CM

## 2022-05-10 DIAGNOSIS — D63.1 ANEMIA IN CHRONIC KIDNEY DISEASE, UNSPECIFIED CKD STAGE: ICD-10-CM

## 2022-05-10 DIAGNOSIS — D64.9 ANEMIA, UNSPECIFIED TYPE: ICD-10-CM

## 2022-05-10 LAB
ALBUMIN SERPL-MCNC: 3.4 GM/DL (ref 3.4–4.8)
ALBUMIN/GLOB SERPL: 1.1 RATIO (ref 1.1–2)
ALP SERPL-CCNC: 87 UNIT/L (ref 40–150)
ALT SERPL-CCNC: 13 UNIT/L (ref 0–55)
AST SERPL-CCNC: 16 UNIT/L (ref 5–34)
BASOPHILS # BLD AUTO: 0.02 X10(3)/MCL (ref 0–0.2)
BASOPHILS NFR BLD AUTO: 0.2 %
BILIRUBIN DIRECT+TOT PNL SERPL-MCNC: 0.1 MG/DL (ref 0–0.5)
BILIRUBIN DIRECT+TOT PNL SERPL-MCNC: 0.1 MG/DL (ref 0–0.8)
BILIRUBIN DIRECT+TOT PNL SERPL-MCNC: 0.2 MG/DL
BUN SERPL-MCNC: 24.8 MG/DL (ref 9.8–20.1)
CALCIUM SERPL-MCNC: 9.1 MG/DL (ref 8.4–10.2)
CHLORIDE SERPL-SCNC: 106 MMOL/L (ref 98–107)
CO2 SERPL-SCNC: 26 MMOL/L (ref 23–31)
CREAT SERPL-MCNC: 0.91 MG/DL (ref 0.55–1.02)
EOSINOPHIL # BLD AUTO: 0.1 X10(3)/MCL (ref 0–0.9)
EOSINOPHIL NFR BLD AUTO: 1.1 %
ERYTHROCYTE [DISTWIDTH] IN BLOOD BY AUTOMATED COUNT: 14.8 % (ref 11.5–17)
FERRITIN SERPL-MCNC: 787.32 NG/ML (ref 4.63–204)
FOLATE SERPL-MCNC: 9.9 NG/ML (ref 7–31.4)
GLOBULIN SER-MCNC: 3.2 GM/DL (ref 2.4–3.5)
GLUCOSE SERPL-MCNC: 197 MG/DL (ref 82–115)
HCT VFR BLD AUTO: 34.1 % (ref 37–47)
HGB BLD-MCNC: 10.3 GM/DL (ref 12–16)
IMM GRANULOCYTES # BLD AUTO: 0.02 X10(3)/MCL (ref 0–0.02)
IMM GRANULOCYTES NFR BLD AUTO: 0.2 % (ref 0–0.43)
IRON SATN MFR SERPL: 21 % (ref 20–50)
IRON SERPL-MCNC: 43 UG/DL (ref 50–170)
LYMPHOCYTES # BLD AUTO: 1.07 X10(3)/MCL (ref 0.6–4.6)
LYMPHOCYTES NFR BLD AUTO: 11.8 %
MCH RBC QN AUTO: 27.5 PG (ref 27–31)
MCHC RBC AUTO-ENTMCNC: 30.2 MG/DL (ref 33–36)
MCV RBC AUTO: 90.9 FL (ref 80–94)
MONOCYTES # BLD AUTO: 0.7 X10(3)/MCL (ref 0.1–1.3)
MONOCYTES NFR BLD AUTO: 7.7 %
NEUTROPHILS # BLD AUTO: 7.2 X10(3)/MCL (ref 2.1–9.2)
NEUTROPHILS NFR BLD AUTO: 79 %
PLATELET # BLD AUTO: 234 X10(3)/MCL (ref 130–400)
PMV BLD AUTO: 11 FL (ref 9.4–12.4)
POTASSIUM SERPL-SCNC: 5.1 MMOL/L (ref 3.5–5.1)
PROT SERPL-MCNC: 6.6 GM/DL (ref 5.8–7.6)
RBC # BLD AUTO: 3.75 X10(6)/MCL (ref 4.2–5.4)
SODIUM SERPL-SCNC: 142 MMOL/L (ref 136–145)
TIBC SERPL-MCNC: 165 UG/DL (ref 70–310)
TIBC SERPL-MCNC: 208 UG/DL (ref 250–450)
TRANSFERRIN SERPL-MCNC: 190 MG/DL (ref 173–360)
VIT B12 SERPL-MCNC: 345 PG/ML (ref 213–816)
WBC # SPEC AUTO: 9.1 X10(3)/MCL (ref 4.5–11.5)

## 2022-05-10 PROCEDURE — 82607 VITAMIN B-12: CPT

## 2022-05-10 PROCEDURE — 83540 ASSAY OF IRON: CPT

## 2022-05-10 PROCEDURE — 36415 COLL VENOUS BLD VENIPUNCTURE: CPT

## 2022-05-10 PROCEDURE — 84075 ASSAY ALKALINE PHOSPHATASE: CPT

## 2022-05-10 PROCEDURE — 85025 COMPLETE CBC W/AUTO DIFF WBC: CPT

## 2022-05-10 PROCEDURE — 82746 ASSAY OF FOLIC ACID SERUM: CPT

## 2022-05-10 PROCEDURE — 82728 ASSAY OF FERRITIN: CPT

## 2022-05-10 NOTE — TELEPHONE ENCOUNTER
----- Message from Noah Tafoya MD sent at 5/10/2022 11:38 AM CDT -----  Regarding: RE: Covid Result  As advised patient needs appointment.  She also needs to be tested for flu which is currently circulating please schedule with [facility].  Strep swab etc      ----- Message -----  From: Karin Bales  Sent: 5/10/2022  10:02 AM CDT  To: Noah Tafoya MD  Subject: Covid Result                                     Pt called stated she did a home Covid test on last night and it was neg. Pt stated she have  a bad sore throat ,cough, ear pain and a low grade fever. Pt wanted to see what she can take Please advise?

## 2022-05-11 ENCOUNTER — OFFICE VISIT (OUTPATIENT)
Dept: INTERNAL MEDICINE | Facility: CLINIC | Age: 72
End: 2022-05-11
Payer: MEDICARE

## 2022-05-11 VITALS — OXYGEN SATURATION: 98 % | WEIGHT: 137.13 LBS | SYSTOLIC BLOOD PRESSURE: 137 MMHG | DIASTOLIC BLOOD PRESSURE: 68 MMHG

## 2022-05-11 DIAGNOSIS — J02.9 PHARYNGITIS, UNSPECIFIED ETIOLOGY: Primary | ICD-10-CM

## 2022-05-11 PROBLEM — E89.2 POSTPROCEDURAL HYPOPARATHYROIDISM: Status: ACTIVE | Noted: 2022-05-11

## 2022-05-11 PROBLEM — Z79.4 TYPE 2 DIABETES MELLITUS WITH HYPERGLYCEMIA, WITH LONG-TERM CURRENT USE OF INSULIN: Status: ACTIVE | Noted: 2022-05-11

## 2022-05-11 PROBLEM — E11.65 TYPE 2 DIABETES MELLITUS WITH HYPERGLYCEMIA, WITH LONG-TERM CURRENT USE OF INSULIN: Status: ACTIVE | Noted: 2022-05-11

## 2022-05-11 PROBLEM — I20.9 ANGINA PECTORIS, UNSPECIFIED: Status: ACTIVE | Noted: 2022-05-11

## 2022-05-11 PROCEDURE — 3288F PR FALLS RISK ASSESSMENT DOCUMENTED: ICD-10-PCS | Mod: CPTII,,, | Performed by: NURSE PRACTITIONER

## 2022-05-11 PROCEDURE — 1125F AMNT PAIN NOTED PAIN PRSNT: CPT | Mod: CPTII,,, | Performed by: NURSE PRACTITIONER

## 2022-05-11 PROCEDURE — 99214 OFFICE O/P EST MOD 30 MIN: CPT | Mod: ,,, | Performed by: NURSE PRACTITIONER

## 2022-05-11 PROCEDURE — 1160F PR REVIEW ALL MEDS BY PRESCRIBER/CLIN PHARMACIST DOCUMENTED: ICD-10-PCS | Mod: CPTII,,, | Performed by: NURSE PRACTITIONER

## 2022-05-11 PROCEDURE — 1160F RVW MEDS BY RX/DR IN RCRD: CPT | Mod: CPTII,,, | Performed by: NURSE PRACTITIONER

## 2022-05-11 PROCEDURE — 1159F PR MEDICATION LIST DOCUMENTED IN MEDICAL RECORD: ICD-10-PCS | Mod: CPTII,,, | Performed by: NURSE PRACTITIONER

## 2022-05-11 PROCEDURE — 1159F MED LIST DOCD IN RCRD: CPT | Mod: CPTII,,, | Performed by: NURSE PRACTITIONER

## 2022-05-11 PROCEDURE — 3078F DIAST BP <80 MM HG: CPT | Mod: CPTII,,, | Performed by: NURSE PRACTITIONER

## 2022-05-11 PROCEDURE — 3078F PR MOST RECENT DIASTOLIC BLOOD PRESSURE < 80 MM HG: ICD-10-PCS | Mod: CPTII,,, | Performed by: NURSE PRACTITIONER

## 2022-05-11 PROCEDURE — 99214 PR OFFICE/OUTPT VISIT, EST, LEVL IV, 30-39 MIN: ICD-10-PCS | Mod: ,,, | Performed by: NURSE PRACTITIONER

## 2022-05-11 PROCEDURE — 3075F PR MOST RECENT SYSTOLIC BLOOD PRESS GE 130-139MM HG: ICD-10-PCS | Mod: CPTII,,, | Performed by: NURSE PRACTITIONER

## 2022-05-11 PROCEDURE — 4010F PR ACE/ARB THEARPY RXD/TAKEN: ICD-10-PCS | Mod: CPTII,,, | Performed by: NURSE PRACTITIONER

## 2022-05-11 PROCEDURE — 4010F ACE/ARB THERAPY RXD/TAKEN: CPT | Mod: CPTII,,, | Performed by: NURSE PRACTITIONER

## 2022-05-11 PROCEDURE — 3288F FALL RISK ASSESSMENT DOCD: CPT | Mod: CPTII,,, | Performed by: NURSE PRACTITIONER

## 2022-05-11 PROCEDURE — 1100F PR PT FALLS ASSESS DOC 2+ FALLS/FALL W/INJURY/YR: ICD-10-PCS | Mod: CPTII,,, | Performed by: NURSE PRACTITIONER

## 2022-05-11 PROCEDURE — 3075F SYST BP GE 130 - 139MM HG: CPT | Mod: CPTII,,, | Performed by: NURSE PRACTITIONER

## 2022-05-11 PROCEDURE — 1100F PTFALLS ASSESS-DOCD GE2>/YR: CPT | Mod: CPTII,,, | Performed by: NURSE PRACTITIONER

## 2022-05-11 PROCEDURE — 1125F PR PAIN SEVERITY QUANTIFIED, PAIN PRESENT: ICD-10-PCS | Mod: CPTII,,, | Performed by: NURSE PRACTITIONER

## 2022-05-11 RX ORDER — FLUTICASONE PROPIONATE 50 MCG
1 SPRAY, SUSPENSION (ML) NASAL 2 TIMES DAILY
COMMUNITY
Start: 2022-03-30

## 2022-05-11 RX ORDER — MECLIZINE HCL 12.5 MG 12.5 MG/1
12.5 TABLET ORAL 3 TIMES DAILY PRN
COMMUNITY
Start: 2022-03-31

## 2022-05-11 RX ORDER — ATORVASTATIN CALCIUM 20 MG/1
20 TABLET, FILM COATED ORAL DAILY
COMMUNITY
Start: 2022-04-12 | End: 2023-02-03

## 2022-05-11 RX ORDER — HYDROCHLOROTHIAZIDE 12.5 MG/1
12.5 TABLET ORAL
COMMUNITY
Start: 2021-12-20 | End: 2022-12-20 | Stop reason: SDUPTHER

## 2022-05-11 RX ORDER — GABAPENTIN 300 MG/1
300 CAPSULE ORAL
COMMUNITY
Start: 2021-11-15 | End: 2022-12-15

## 2022-05-11 RX ORDER — TRAZODONE HYDROCHLORIDE 100 MG/1
200 TABLET ORAL NIGHTLY
COMMUNITY
Start: 2022-04-19 | End: 2022-10-13 | Stop reason: SDUPTHER

## 2022-05-11 RX ORDER — OMEPRAZOLE 40 MG/1
40 CAPSULE, DELAYED RELEASE ORAL
COMMUNITY
Start: 2021-02-17 | End: 2022-10-31

## 2022-05-11 RX ORDER — LEVOTHYROXINE SODIUM 200 UG/1
TABLET ORAL
COMMUNITY
Start: 2022-03-29 | End: 2023-04-05

## 2022-05-11 RX ORDER — AMOXICILLIN AND CLAVULANATE POTASSIUM 875; 125 MG/1; MG/1
1 TABLET, FILM COATED ORAL EVERY 12 HOURS
Qty: 14 TABLET | Refills: 0 | Status: SHIPPED | OUTPATIENT
Start: 2022-05-11 | End: 2022-05-18

## 2022-05-11 RX ORDER — LOSARTAN POTASSIUM 100 MG/1
100 TABLET ORAL
COMMUNITY
Start: 2022-01-31 | End: 2022-11-02

## 2022-05-11 RX ORDER — BENZONATATE 200 MG/1
200 CAPSULE ORAL 3 TIMES DAILY
COMMUNITY
Start: 2021-12-31 | End: 2022-12-15 | Stop reason: ALTCHOICE

## 2022-05-11 RX ORDER — METFORMIN HYDROCHLORIDE 1000 MG/1
TABLET ORAL
COMMUNITY
Start: 2022-02-07 | End: 2023-04-03

## 2022-05-11 RX ORDER — DEXTROSE 4 G
TABLET,CHEWABLE ORAL
COMMUNITY
Start: 2021-04-29

## 2022-05-11 RX ORDER — PRAVASTATIN SODIUM 40 MG/1
40 TABLET ORAL
COMMUNITY
End: 2022-12-15 | Stop reason: ALTCHOICE

## 2022-05-11 RX ORDER — VENLAFAXINE HYDROCHLORIDE 150 MG/1
150 CAPSULE, EXTENDED RELEASE ORAL DAILY
COMMUNITY
End: 2023-01-31 | Stop reason: SDUPTHER

## 2022-05-11 RX ORDER — GEMFIBROZIL 600 MG/1
TABLET, FILM COATED ORAL
COMMUNITY
End: 2022-07-28

## 2022-05-11 RX ORDER — INSULIN GLARGINE 100 [IU]/ML
90 INJECTION, SOLUTION SUBCUTANEOUS
COMMUNITY
End: 2024-02-01

## 2022-05-11 NOTE — PROGRESS NOTES
"     Patient ID: [id number]     Chief Complaint: Sore Throat (Pain level 10, pt state that its hard to swallow it feels like an lump, )        HPI:     Alison Langston is a 71 y.o. female here today for sore throat x 3 days  Denies fever or any other associated symptoms  No known sick contacts  Has been using chloraseptic spray prn without relief  t max 99.9    Referred to Podiatry at last visit a couple weeks ago, patient having some numbness to left foot, wants referral to physical therapy, will find out where her insurance covers and is now      MRI Lumbar spine 2018- lumbar stenosis  History of stroke.  She was seen by Dr. Felix at ENT, doing physical therapy for balance issues, at [facility] 7/2020- Osteoporosis--does not want treatment other than Ca and Vit D  UTD with pneumonia, flu, COVID vaccines  Needs shingles vaccine  Dr. Cardenas- GI; Cscope 2017- repeat in 3 years, due 12/2021  Dr. Cordova-GYN  Dr. Perez- Ortho  Dr. Williamson- hem/onc for anemia-- started on procrit every other week; thinks anemia is from CKD  Dr. Valentino- cardiology, has annual visit in January  Dr. Colby- urologist      Past Medical History:  Anemia     History reviewed. No pertinent surgical history.    Review of patient's allergies indicates:   Allergen Reactions    Baclofen Hallucinations    Donepezil Hallucinations    Erythromycin      Other reaction(s): Upset Stomach    Fluoxetine      Doesn't work    Grass pollen      Other reaction(s): per allergy test    Hydrocodone-acetaminophen      Other reaction(s): "Makes me crazy"    Ivp dye [iodinated contrast media]      Other reaction(s): Rash    Metoclopramide hcl     Rosuvastatin     Iodine Rash    Shrimp Rash       Outpatient Medications Marked as Taking for the 5/11/22 encounter (Office Visit) with JAKOB Rodríguez   Medication Sig Dispense Refill    atorvastatin (LIPITOR) 20 MG tablet Take 20 mg by mouth once daily.      benzonatate (TESSALON) 200 MG capsule " Take 200 mg by mouth 3 (three) times daily.      blood-glucose meter Misc   true metrix glucose monitor, See Instructions, to check bloodsugars BID, E11.9, # 1 EA, 5 Refill(s), Pharmacy: [facility], 154, cm, Height/Length Dosing, 04/19/21 10:31:00 CDT, 73.45, kg, Weight Dosing, 04/19/21 10:31:00 CDT      fluticasone propionate (FLONASE) 50 mcg/actuation nasal spray 1 spray by Each Nostril route 2 (two) times daily.      gabapentin (NEURONTIN) 300 MG capsule Take 300 mg by mouth.      gemfibroziL (LOPID) 600 MG tablet gemfibrozil Take No date recorded No form recorded No frequency recorded No route recorded No set duration recorded No set duration amount recorded active No dosage strength recorded No dosage strength units of measure recorded      hydroCHLOROthiazide (HYDRODIURIL) 12.5 MG Tab Take 12.5 mg by mouth.      insulin glargine (LANTUS) 100 unit/mL injection Inject 90 Units into the skin.      levothyroxine (SYNTHROID) 200 MCG tablet   See Instructions, Take 1 tablet by mouth once daily, # 90 tab(s), 3 Refill(s), Pharmacy: [facility], 153, cm, Height/Length Dosing, 03/11/22 8:36:00 CST, 65, kg, Weight Dosing, 03/11/22 8:36:00 CST      losartan (COZAAR) 100 MG tablet Take 100 mg by mouth.      meclizine (ANTIVERT) 12.5 mg tablet Take 12.5 mg by mouth 3 (three) times daily as needed.      metFORMIN (GLUCOPHAGE) 1000 MG tablet   See Instructions, TAKE 1 TABLET TWICE A DAY, # 180 tab(s), 3 Refill(s), Pharmacy: [facility], 153, cm, Height/Length Dosing, 02/02/22 8:09:00 CST, 65, kg, Weight Dosing, 02/02/22 8:09:00 CST      omeprazole (PRILOSEC) 40 MG capsule Take 40 mg by mouth.      pravastatin (PRAVACHOL) 40 MG tablet Take 40 mg by mouth.      traZODone (DESYREL) 100 MG tablet Take 200 mg by mouth nightly.      venlafaxine HCl (EFFEXOR XR ORAL) Effexor XR Take No date recorded No form recorded No frequency recorded No route recorded No set duration recorded No set  duration amount recorded active No dosage strength recorded No dosage strength units of measure recorded         Social History     Socioeconomic History    Marital status:    Tobacco Use    Smoking status: Never Smoker    Smokeless tobacco: Never Used        History reviewed. No pertinent family history.     Subjective:     ROS      See HPI for details  Constitutional: No fever,  no fatigue, no chills, no night sweats, no weight gain, no weight loss, no changes in appetite.   Eye: No redness, no acute vision loss, no blurred vision, no double vision, no eye pain  ENMT: + sore throat, no nasal drainage, no nose bleeds,  no headache, no ear pain, no ear drainage, no acute hearing loss  Respiratory: No cough, no sputum production, no shortness of breath, no hemoptysis, no wheezing.  Musculoskeletal: No muscle pain, no muscle weakness, no joint pain, no red or swollen joints.  Integumentary: No rash, no pruritis, no hair or nail changes.  All Other ROS: Negative with exception of what is documented in the history of present illness      All Other ROS: Negative except as stated in HPI.       Objective:     /68 (BP Location: Right arm, Patient Position: Sitting, BP Method: Small (Automatic))   Wt 62.2 kg (137 lb 1.6 oz)   SpO2 98%     Physical Exam  General : Alert and oriented, No acute distress, well, developed, well nourished, afebrile   Eye :  Normal conjunctiva without injection. Sclerae are nonicteric. No pallor.  HEENT : Normocephalic. Neck supple. Normal hearing. Oral mucosa is moist.  Posterior pharynx erythematous with multiple white pustules  Respiratory :  Symmetrical chest wall expansion.   Integumentary : Warm to touch. Intact. No rash.    Neurologic : Alert and oriented.  Psychiatric : Cooperative, Appropriate mood & affect.           Assessment:       ICD-10-CM ICD-9-CM   1. Pharyngitis, unspecified etiology  J02.9 462        Plan:     1. Pharyngitis, unspecified etiology  Will cover for  strept and also discussed herpangina with patient  Magic mouthwash prn pain    - amoxicillin-clavulanate 875-125mg (AUGMENTIN) 875-125 mg per tablet; Take 1 tablet by mouth every 12 (twelve) hours. for 7 days  Dispense: 14 tablet; Refill: 0  - diphenhydrAMINE-aluminum-magnesium hydroxide-simethicone-LIDOcaine HCl 2%; Magic Mouthwash:    1 Part viscous lidocaine 2%   1 Part Maalox  1 Part diphenhydramine 12.5 mg per 5 ml elixir     Swish, gargle, and spit one to two teaspoonfuls every six hours as needed. Shake well before using.    Dispense 120ml  Dispense: 120 mL; Refill: 0            f/u prn      Follow up in about 3 months (around 8/11/2022) for Revisit with Jb Jane revisit.

## 2022-05-18 ENCOUNTER — OFFICE VISIT (OUTPATIENT)
Dept: HEMATOLOGY/ONCOLOGY | Facility: CLINIC | Age: 72
End: 2022-05-18
Payer: MEDICARE

## 2022-05-18 VITALS
BODY MASS INDEX: 26.97 KG/M2 | SYSTOLIC BLOOD PRESSURE: 149 MMHG | OXYGEN SATURATION: 98 % | TEMPERATURE: 99 F | WEIGHT: 137.38 LBS | HEART RATE: 68 BPM | RESPIRATION RATE: 18 BRPM | HEIGHT: 60 IN | DIASTOLIC BLOOD PRESSURE: 69 MMHG

## 2022-05-18 DIAGNOSIS — D63.1 ANEMIA IN CHRONIC KIDNEY DISEASE, UNSPECIFIED CKD STAGE: ICD-10-CM

## 2022-05-18 DIAGNOSIS — N18.9 ANEMIA IN CHRONIC KIDNEY DISEASE, UNSPECIFIED CKD STAGE: ICD-10-CM

## 2022-05-18 DIAGNOSIS — D64.9 ANEMIA, UNSPECIFIED TYPE: Primary | ICD-10-CM

## 2022-05-18 PROCEDURE — 4010F ACE/ARB THERAPY RXD/TAKEN: CPT | Mod: CPTII,S$GLB,, | Performed by: INTERNAL MEDICINE

## 2022-05-18 PROCEDURE — 1159F PR MEDICATION LIST DOCUMENTED IN MEDICAL RECORD: ICD-10-PCS | Mod: CPTII,S$GLB,, | Performed by: INTERNAL MEDICINE

## 2022-05-18 PROCEDURE — 99213 OFFICE O/P EST LOW 20 MIN: CPT | Mod: S$GLB,,, | Performed by: INTERNAL MEDICINE

## 2022-05-18 PROCEDURE — 3008F PR BODY MASS INDEX (BMI) DOCUMENTED: ICD-10-PCS | Mod: CPTII,S$GLB,, | Performed by: INTERNAL MEDICINE

## 2022-05-18 PROCEDURE — 3077F PR MOST RECENT SYSTOLIC BLOOD PRESSURE >= 140 MM HG: ICD-10-PCS | Mod: CPTII,S$GLB,, | Performed by: INTERNAL MEDICINE

## 2022-05-18 PROCEDURE — 3078F PR MOST RECENT DIASTOLIC BLOOD PRESSURE < 80 MM HG: ICD-10-PCS | Mod: CPTII,S$GLB,, | Performed by: INTERNAL MEDICINE

## 2022-05-18 PROCEDURE — 3077F SYST BP >= 140 MM HG: CPT | Mod: CPTII,S$GLB,, | Performed by: INTERNAL MEDICINE

## 2022-05-18 PROCEDURE — 3008F BODY MASS INDEX DOCD: CPT | Mod: CPTII,S$GLB,, | Performed by: INTERNAL MEDICINE

## 2022-05-18 PROCEDURE — 1126F AMNT PAIN NOTED NONE PRSNT: CPT | Mod: CPTII,S$GLB,, | Performed by: INTERNAL MEDICINE

## 2022-05-18 PROCEDURE — 99213 PR OFFICE/OUTPT VISIT, EST, LEVL III, 20-29 MIN: ICD-10-PCS | Mod: S$GLB,,, | Performed by: INTERNAL MEDICINE

## 2022-05-18 PROCEDURE — 1160F PR REVIEW ALL MEDS BY PRESCRIBER/CLIN PHARMACIST DOCUMENTED: ICD-10-PCS | Mod: CPTII,S$GLB,, | Performed by: INTERNAL MEDICINE

## 2022-05-18 PROCEDURE — 1126F PR PAIN SEVERITY QUANTIFIED, NO PAIN PRESENT: ICD-10-PCS | Mod: CPTII,S$GLB,, | Performed by: INTERNAL MEDICINE

## 2022-05-18 PROCEDURE — 4010F PR ACE/ARB THEARPY RXD/TAKEN: ICD-10-PCS | Mod: CPTII,S$GLB,, | Performed by: INTERNAL MEDICINE

## 2022-05-18 PROCEDURE — 3288F PR FALLS RISK ASSESSMENT DOCUMENTED: ICD-10-PCS | Mod: CPTII,S$GLB,, | Performed by: INTERNAL MEDICINE

## 2022-05-18 PROCEDURE — 1101F PR PT FALLS ASSESS DOC 0-1 FALLS W/OUT INJ PAST YR: ICD-10-PCS | Mod: CPTII,S$GLB,, | Performed by: INTERNAL MEDICINE

## 2022-05-18 PROCEDURE — 3288F FALL RISK ASSESSMENT DOCD: CPT | Mod: CPTII,S$GLB,, | Performed by: INTERNAL MEDICINE

## 2022-05-18 PROCEDURE — 1101F PT FALLS ASSESS-DOCD LE1/YR: CPT | Mod: CPTII,S$GLB,, | Performed by: INTERNAL MEDICINE

## 2022-05-18 PROCEDURE — 1160F RVW MEDS BY RX/DR IN RCRD: CPT | Mod: CPTII,S$GLB,, | Performed by: INTERNAL MEDICINE

## 2022-05-18 PROCEDURE — 99999 PR PBB SHADOW E&M-EST. PATIENT-LVL V: CPT | Mod: PBBFAC,,, | Performed by: INTERNAL MEDICINE

## 2022-05-18 PROCEDURE — 3078F DIAST BP <80 MM HG: CPT | Mod: CPTII,S$GLB,, | Performed by: INTERNAL MEDICINE

## 2022-05-18 PROCEDURE — 1159F MED LIST DOCD IN RCRD: CPT | Mod: CPTII,S$GLB,, | Performed by: INTERNAL MEDICINE

## 2022-05-18 PROCEDURE — 99999 PR PBB SHADOW E&M-EST. PATIENT-LVL V: ICD-10-PCS | Mod: PBBFAC,,, | Performed by: INTERNAL MEDICINE

## 2022-05-18 NOTE — PROGRESS NOTES
Subjective:       Patient ID: Alison Langston is a 71 y.o. female.    Chief Complaint: No chief complaint on file.      Diagnosis:  1. Normocytic/hypochromic anemia with normal bone marrow biopsy done 1/26/18.    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 Iron deficiency  3. Multiple medical comorbidities, including hypothyroidism.     Current Treatment:   Observation       Treatment History:  Injectafter x 2 1/10 and 1/17/18.   PO Iron prior to that  Injectafer x2 treatments 06/15/18 and 06/22/18, and in October, 2018  Desferal 500mg x 3 doses 10/27/20-10/29/20 for iron overload      HPI   Patient kindly referred by Dr. Howard for further evaluation workup of her normocytic anemia, fairly long-standing in nature. The patient states she's been anemic off and on since she was first pregnant many years ago. She denies any history of any GI bleeding, but states she has taken iron pills in the past. She underwent a colonoscopy September 18, 2017 by Dr. Cardenas which showed normal mucosa and the entire colon. Single polyp of benign appearance was noted in the sigmoid colon with additional medium internal hemorrhoids noted. EGD was done the same day with no report available, with biopsies from the EGD revealing unremarkable intestinal mucosa from duodenal biopsy with no evidence of celiac sprue. Biopsy of the stomach reveal mild reactive gastropathy only. No evidence of H. pylori.  The patient went back to see Dr. Howard in December at which time CBC revealed hemoglobin of 9.3, hematocrit 32.4, MCV of 87.3, and normal WBC and platelets. TSH was elevated at 33.3, and her diabetes was noted to be under fairly poor control based on her hemoglobin A1c.    She was then referred to hematology for further evaluation and workup of her normocytic anemia.  Lab work done 12/20/17 showed iron sat of 12.5 and Ferritin of 11.5 (patient had been on PO iron bid).  Siria negative;  SPEP/WILLIAMS negative;  Retic  count normal.  Epo level normal at 11.5.   MJ/DS DNA all normal.  Peripheral smear showed mild normocytic, hypochromic anemia with little variation in red cell size and shape.  Morphologic red cell variants include a few gisele cells, ovalocytes, elliptocytes, and rare acanthocytes.  No schistocytes are seen.  Mild rouleaux formation is seen.  Polychromasia is present.  Leukocytes are normal in number, differential, and morphology.  A few reactive lymphocytes are present.  Platelets are normal in number and morphology.  A few giant platelets are present.  Bone marrow biopsy done 1/26/2018 showed normocellular bone marrow with trilineage hematopoiesis--> no evidence of dysplasia or malignancy.  Normal cytogenetics noted.      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  Patient had persistent elevation of ferritin, therefore a MRI of the abdomen was done on 9/30/2020 and showed hepatomegaly with estimated liver iron concentration of 126 µmol/g, additional iron deposition in the spleen.  There was also a suspected 0.9 cm cystic lesion within the pancreatic body and a 1 year follow-up MRCP is suggested for surveillance.         Interval History:   Patient presents to clinic for scheduled follow up appointment. She reports numbness to lower extremities as well as falls secondary to not being able to feel her feet.      Past Medical History:   Diagnosis Date    Anemia       No past surgical history on file.  Social History     Socioeconomic History    Marital status:    Tobacco Use    Smoking status: Never Smoker    Smokeless tobacco: Never Used      No family history on file.   Review of patient's allergies indicates:   Allergen Reactions    Baclofen Hallucinations    Donepezil Hallucinations    Erythromycin      Other reaction(s): Upset Stomach    Fluoxetine      Doesn't work    Grass pollen      Other reaction(s): per allergy test    Hydrocodone-acetaminophen      Other  "reaction(s): "Makes me crazy"    Ivp dye [iodinated contrast media]      Other reaction(s): Rash    Metoclopramide hcl     Rosuvastatin     Iodine Rash    Shrimp Rash      Review of Systems   Constitutional: Negative for chills, diaphoresis, fatigue, fever and unexpected weight change.   HENT: Negative for nasal congestion, mouth sores, sinus pressure/congestion and sore throat.    Eyes: Negative for pain and visual disturbance.   Respiratory: Negative for cough, chest tightness and shortness of breath.    Cardiovascular: Negative for chest pain, palpitations and leg swelling.   Gastrointestinal: Negative for abdominal distention, abdominal pain, blood in stool, constipation and diarrhea.   Genitourinary: Negative for dysuria, frequency and hematuria.   Musculoskeletal: Negative for arthralgias and back pain.   Integumentary:  Negative for rash.   Neurological: Positive for numbness. Negative for dizziness, weakness and headaches.        Falls from numbness to her foot   Hematological: Negative for adenopathy.   Psychiatric/Behavioral: Negative for confusion.         Objective:      Physical Exam  Vitals reviewed. Exam conducted with a chaperone present.   Constitutional:       General: She is not in acute distress.     Appearance: Normal appearance.   HENT:      Head: Normocephalic and atraumatic.      Nose: Nose normal.      Mouth/Throat:      Mouth: Mucous membranes are moist.   Eyes:      Extraocular Movements: Extraocular movements intact.      Conjunctiva/sclera: Conjunctivae normal.   Cardiovascular:      Rate and Rhythm: Normal rate and regular rhythm.      Pulses: Normal pulses.      Heart sounds: Normal heart sounds.   Pulmonary:      Effort: Pulmonary effort is normal.      Breath sounds: Normal breath sounds.   Abdominal:      General: Bowel sounds are normal.      Palpations: Abdomen is soft.   Musculoskeletal:         General: No swelling. Normal range of motion.      Cervical back: Normal range " of motion and neck supple.      Right lower leg: No edema.      Left lower leg: No edema.   Lymphadenopathy:      Cervical: No cervical adenopathy.   Skin:     General: Skin is warm and dry.   Neurological:      General: No focal deficit present.      Mental Status: She is alert and oriented to person, place, and time. Mental status is at baseline.   Psychiatric:         Mood and Affect: Mood normal.         Behavior: Behavior normal.         LABS AND IMAGING REVIEWED IN EPIC          Assessment:     1. Normocytic anemia with component of iron deficiency, in addition to anemia chronic disease, requiring IV Iron after poor response to PO iron.   2. Hypothyroidism, thought to be a component contributing to her anemia.    3. Normal Bone marrow biopsy done 1/26/18  4. Multiple medical comorbidities  5. Elevated ferritin -ruled out hemochromatosis. Received Desferal 500mg x 3 doses 10/27/20-10/29/20.  6. Neuropathy        Plan:         Patient doing well overall clinically with no worsening clinical symptoms    Continue Procrit subq as needed    Return to clinic in 4 months with a CBC, CMP, Iron studies, B12, Folate prior to appt    We will refer to GI for follow-up pancreatic cyst with MRCP.    Also, recommended that she follow-up with Dr. Howard for her neuropathy causing falls.      Mele Smith II, MD I, Natali Ribeiro LPN, acted solely as a scribe for and in the presence of, Dr. Mele Smith who performed the service.

## 2022-05-24 ENCOUNTER — TELEPHONE (OUTPATIENT)
Dept: INTERNAL MEDICINE | Facility: CLINIC | Age: 72
End: 2022-05-24
Payer: MEDICARE

## 2022-05-24 DIAGNOSIS — E11.9 TYPE 2 DIABETES MELLITUS WITHOUT COMPLICATION, UNSPECIFIED WHETHER LONG TERM INSULIN USE: Primary | ICD-10-CM

## 2022-05-24 NOTE — TELEPHONE ENCOUNTER
----- Message from Kenny Carlson MA sent at 5/24/2022  8:23 AM CDT -----  Regarding: PV 5/31/22 @ 2:40 Dr. Howard  1. Are there any outstanding tasks in the patient's chart? Yes, fasting labs    2. Is there any documentation in the chart? No    3.Has patient been seen in an ER, Urgent care clinic, or been admitted since last visit?  If yes, When, where, and why    4. Has patient seen any other healthcare providers since last visit?  If yes, when, where, and why    5. Has patient had any bloodwork or XR done since last visit?    6. Is patient signed up for patient portal?

## 2022-05-27 ENCOUNTER — DOCUMENTATION ONLY (OUTPATIENT)
Dept: HEMATOLOGY/ONCOLOGY | Facility: CLINIC | Age: 72
End: 2022-05-27
Payer: MEDICARE

## 2022-05-31 ENCOUNTER — HOSPITAL ENCOUNTER (OUTPATIENT)
Dept: RADIOLOGY | Facility: HOSPITAL | Age: 72
Discharge: HOME | End: 2022-05-31
Attending: INTERNAL MEDICINE
Payer: MEDICARE

## 2022-05-31 ENCOUNTER — OFFICE VISIT (OUTPATIENT)
Dept: INTERNAL MEDICINE | Facility: CLINIC | Age: 72
End: 2022-05-31
Payer: MEDICARE

## 2022-05-31 VITALS
TEMPERATURE: 98 F | DIASTOLIC BLOOD PRESSURE: 62 MMHG | WEIGHT: 137.88 LBS | HEIGHT: 60 IN | HEART RATE: 75 BPM | SYSTOLIC BLOOD PRESSURE: 164 MMHG | BODY MASS INDEX: 27.07 KG/M2

## 2022-05-31 DIAGNOSIS — E89.2 POSTPROCEDURAL HYPOPARATHYROIDISM: ICD-10-CM

## 2022-05-31 DIAGNOSIS — M79.675 GREAT TOE PAIN, LEFT: ICD-10-CM

## 2022-05-31 DIAGNOSIS — E11.65 TYPE 2 DIABETES MELLITUS WITH HYPERGLYCEMIA, WITH LONG-TERM CURRENT USE OF INSULIN: ICD-10-CM

## 2022-05-31 DIAGNOSIS — R20.2 PARESTHESIAS: Primary | ICD-10-CM
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
## 2022-05-31 DIAGNOSIS — Z79.4 TYPE 2 DIABETES MELLITUS WITH HYPERGLYCEMIA, WITH LONG-TERM CURRENT USE OF INSULIN: ICD-10-CM

## 2022-05-31 DIAGNOSIS — I20.9 ANGINA PECTORIS, UNSPECIFIED: ICD-10-CM

## 2022-05-31 PROBLEM — M54.9 CHRONIC BACK PAIN: Status: ACTIVE | Noted: 2022-05-31

## 2022-05-31 PROBLEM — G89.29 CHRONIC BACK PAIN: Status: ACTIVE | Noted: 2022-05-31

## 2022-05-31 PROCEDURE — 1125F AMNT PAIN NOTED PAIN PRSNT: CPT | Mod: CPTII,,, | Performed by: INTERNAL MEDICINE

## 2022-05-31 PROCEDURE — 4010F ACE/ARB THERAPY RXD/TAKEN: CPT | Mod: CPTII,,, | Performed by: INTERNAL MEDICINE

## 2022-05-31 PROCEDURE — 3051F PR MOST RECENT HEMOGLOBIN A1C LEVEL 7.0 - < 8.0%: ICD-10-PCS | Mod: CPTII,,, | Performed by: INTERNAL MEDICINE

## 2022-05-31 PROCEDURE — 3078F DIAST BP <80 MM HG: CPT | Mod: CPTII,,, | Performed by: INTERNAL MEDICINE

## 2022-05-31 PROCEDURE — 1101F PR PT FALLS ASSESS DOC 0-1 FALLS W/OUT INJ PAST YR: ICD-10-PCS | Mod: CPTII,,, | Performed by: INTERNAL MEDICINE

## 2022-05-31 PROCEDURE — 1159F PR MEDICATION LIST DOCUMENTED IN MEDICAL RECORD: ICD-10-PCS | Mod: CPTII,,, | Performed by: INTERNAL MEDICINE

## 2022-05-31 PROCEDURE — 3077F PR MOST RECENT SYSTOLIC BLOOD PRESSURE >= 140 MM HG: ICD-10-PCS | Mod: CPTII,,, | Performed by: INTERNAL MEDICINE

## 2022-05-31 PROCEDURE — 1101F PT FALLS ASSESS-DOCD LE1/YR: CPT | Mod: CPTII,,, | Performed by: INTERNAL MEDICINE

## 2022-05-31 PROCEDURE — 3288F PR FALLS RISK ASSESSMENT DOCUMENTED: ICD-10-PCS | Mod: CPTII,,, | Performed by: INTERNAL MEDICINE

## 2022-05-31 PROCEDURE — 3078F PR MOST RECENT DIASTOLIC BLOOD PRESSURE < 80 MM HG: ICD-10-PCS | Mod: CPTII,,, | Performed by: INTERNAL MEDICINE

## 2022-05-31 PROCEDURE — 72110 X-RAY EXAM L-2 SPINE 4/>VWS: CPT | Mod: TC

## 2022-05-31 PROCEDURE — 3051F HG A1C>EQUAL 7.0%<8.0%: CPT | Mod: CPTII,,, | Performed by: INTERNAL MEDICINE

## 2022-05-31 PROCEDURE — 99214 OFFICE O/P EST MOD 30 MIN: CPT | Mod: ,,, | Performed by: INTERNAL MEDICINE

## 2022-05-31 PROCEDURE — 3288F FALL RISK ASSESSMENT DOCD: CPT | Mod: CPTII,,, | Performed by: INTERNAL MEDICINE

## 2022-05-31 PROCEDURE — 3077F SYST BP >= 140 MM HG: CPT | Mod: CPTII,,, | Performed by: INTERNAL MEDICINE

## 2022-05-31 PROCEDURE — 1159F MED LIST DOCD IN RCRD: CPT | Mod: CPTII,,, | Performed by: INTERNAL MEDICINE

## 2022-05-31 PROCEDURE — 73630 X-RAY EXAM OF FOOT: CPT | Mod: TC,LT

## 2022-05-31 PROCEDURE — 3008F BODY MASS INDEX DOCD: CPT | Mod: CPTII,,, | Performed by: INTERNAL MEDICINE

## 2022-05-31 PROCEDURE — 4010F PR ACE/ARB THEARPY RXD/TAKEN: ICD-10-PCS | Mod: CPTII,,, | Performed by: INTERNAL MEDICINE

## 2022-05-31 PROCEDURE — 99214 PR OFFICE/OUTPT VISIT, EST, LEVL IV, 30-39 MIN: ICD-10-PCS | Mod: ,,, | Performed by: INTERNAL MEDICINE

## 2022-05-31 PROCEDURE — 3008F PR BODY MASS INDEX (BMI) DOCUMENTED: ICD-10-PCS | Mod: CPTII,,, | Performed by: INTERNAL MEDICINE

## 2022-05-31 PROCEDURE — 1125F PR PAIN SEVERITY QUANTIFIED, PAIN PRESENT: ICD-10-PCS | Mod: CPTII,,, | Performed by: INTERNAL MEDICINE

## 2022-05-31 NOTE — ASSESSMENT & PLAN NOTE
Patient with recent fall yesterday, lumbar images have been ordered with further recommendations to follow

## 2022-05-31 NOTE — ASSESSMENT & PLAN NOTE
Patient has had some subungual hematoma to that 1st great toe for months seen by Podiatry with really no recommendations I am going to send a referral to Foot [facility] especially in light of her numbness and she has got some dactylitis on that 2nd digit.  Which is probably some degree of diabetic foot side like for her to get established to see the have any other recommendations.  May need to consider biopsy of that toenail

## 2022-05-31 NOTE — PROGRESS NOTES
X-ray of the back reviewed, there appears to be no evidence of any new fracture.  Patient to keep me posted over the next several days to let me know if her back pain improves if not we may need to do a CT scan.

## 2022-05-31 NOTE — PROGRESS NOTES
Subjective:      Patient ID: Alison Langston is a 71 y.o. female.    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  Hematoma noted under the left great toenail and left great toenail tender to palpation with no surrounding evidence of infection and patient requested podiatry referral.  Seems as though that same toe pain is on her chief complaint today so that needs to be addressed  She also has lumbar stenosis as noted from MRI back in 2018 in the area of the lumbar spine  Chronic balance issues followed by ENT, Dr. Willingham  Osteoporosis does not wish to have treatment  More than calcium and vitamin-D followed by Dr. Carmen oliver for GI with last colonoscopy in 2017 with 3 year recall  Dr. Cordova for gyn  Dr. Mick Mohr for Orthopedics  Dr. Spencer for her Hematology for anemia of chronic disease as related to CKD started on Procrit every other week  Dr. Valentino for Cardiology  Dr. Gisela Patino for Urology    She has not had labs in 5 months. Last A1c of 7.7 9 months ago.     The toenail to date still has subungual hematoma present she states she saw podiatrist in [place] who did offer any recommendations.  She has got numbness between the 1st and 2nd digits on that toe she also reports having a fall in the shower yesterday and hitting her back.        Problem List Items Addressed This Visit        Cardiac/Vascular    RESOLVED: Angina pectoris, unspecified       Endocrine    Type 2 diabetes mellitus with hyperglycemia, with long-term current use of insulin    Relevant Orders    Ambulatory referral/consult to Orthopedics    CBC Auto Differential    Comprehensive Metabolic Panel    Urinalysis, Reflex to Urine Culture Urine, Clean Catch    Microalbumin/Creatinine Ratio, Urine     Hemoglobin A1C    RESOLVED: Postprocedural hypoparathyroidism       Orthopedic    Great toe pain, left    Current Assessment & Plan     Patient has had some subungual hematoma to that 1st great toe for months seen by Podiatry with really no recommendations I am going to send a referral to Foot Ortho especially in light of her numbness and she has got some dactylitis on that 2nd digit.  Which is probably some degree of diabetic foot side like for her to get established to see the have any other recommendations.  May need to consider biopsy of that toenail           Relevant Orders    X-Ray Lumbar Spine 5 View    Fall    Current Assessment & Plan     Patient with recent fall yesterday, lumbar images have been ordered with further recommendations to follow           Relevant Orders    X-Ray Foot Complete Left      Other Visit Diagnoses     Paresthesias    -  Primary    Relevant Orders    Vitamin B12              Past Medical History:  Past Medical History:   Diagnosis Date    Anemia     Anxiety     Arthritis     Cerebrovascular accident     Chronic back pain     Depression     Diabetes mellitus     Elevated ferritin     GERD (gastroesophageal reflux disease)     HLD (hyperlipidemia)     Hypertension     Hypothyroidism, unspecified     CRISTINA (iron deficiency anemia)     Incisional hernia     Myoclonus     Osteoporosis     Osteoporosis     Vertigo      Past Surgical History:   Procedure Laterality Date    biopsy of breast      BONE MARROW BIOPSY W/ ASPIRATION Right 2018    CATARACT EXTRACTION Right 04/30/2021    CHOLECYSTECTOMY      COLONOSCOPY      HERNIA REPAIR      HERNIA REPAIR  01/27/2020    HYSTERECTOMY  2013    PATENT DUCTUS ARTERIOUS LIGATION      SHOULDER SURGERY Left     THYROIDECTOMY       Review of patient's allergies indicates:   Allergen Reactions    Baclofen Hallucinations    Donepezil Hallucinations    Erythromycin      Other reaction(s): Upset Stomach    Fluoxetine       "Doesn't work    Grass pollen      Other reaction(s): per allergy test    Hydrocodone-acetaminophen      Other reaction(s): "Makes me crazy"    Ivp dye [iodinated contrast media]      Other reaction(s): Rash    Metoclopramide hcl     Rosuvastatin     Iodine Rash    Shrimp Rash     Current Outpatient Medications on File Prior to Visit   Medication Sig Dispense Refill    atorvastatin (LIPITOR) 20 MG tablet Take 20 mg by mouth once daily.      blood-glucose meter Misc   true metrix glucose monitor, See Instructions, to check bloodsugars BID, E11.9, # 1 EA, 5 Refill(s), Pharmacy: [facility], 154, cm, Height/Length Dosing, 04/19/21 10:31:00 CDT, 73.45, kg, Weight Dosing, 04/19/21 10:31:00 CDT      fluticasone propionate (FLONASE) 50 mcg/actuation nasal spray 1 spray by Each Nostril route 2 (two) times daily.      gemfibroziL (LOPID) 600 MG tablet gemfibrozil Take No date recorded No form recorded No frequency recorded No route recorded No set duration recorded No set duration amount recorded active No dosage strength recorded No dosage strength units of measure recorded      hydroCHLOROthiazide (HYDRODIURIL) 12.5 MG Tab Take 12.5 mg by mouth.      levothyroxine (SYNTHROID) 200 MCG tablet   See Instructions, Take 1 tablet by mouth once daily, # 90 tab(s), 3 Refill(s), Pharmacy: [facility] 415, 153, cm, Height/Length Dosing, 03/11/22 8:36:00 CST, 65, kg, Weight Dosing, 03/11/22 8:36:00 CST      losartan (COZAAR) 100 MG tablet Take 100 mg by mouth.      meclizine (ANTIVERT) 12.5 mg tablet Take 12.5 mg by mouth 3 (three) times daily as needed.      metFORMIN (GLUCOPHAGE) 1000 MG tablet   See Instructions, TAKE 1 TABLET TWICE A DAY, # 180 tab(s), 3 Refill(s), Pharmacy: [facility], 153, cm, Height/Length Dosing, 02/02/22 8:09:00 CST, 65, kg, Weight Dosing, 02/02/22 8:09:00 CST      traZODone (DESYREL) 100 MG tablet Take 200 mg by mouth nightly.      venlafaxine HCl (EFFEXOR XR ORAL) " Effexor XR Take No date recorded No form recorded No frequency recorded No route recorded No set duration recorded No set duration amount recorded active No dosage strength recorded No dosage strength units of measure recorded      benzonatate (TESSALON) 200 MG capsule Take 200 mg by mouth 3 (three) times daily.      diphenhydrAMINE-aluminum-magnesium hydroxide-simethicone-LIDOcaine HCl 2% Magic Mouthwash:    1 Part viscous lidocaine 2%   1 Part Maalox  1 Part diphenhydramine 12.5 mg per 5 ml elixir     Swish, gargle, and spit one to two teaspoonfuls every six hours as needed. Shake well before using.    Dispense 120ml (Patient not taking: Reported on 5/31/2022) 120 mL 0    gabapentin (NEURONTIN) 300 MG capsule Take 300 mg by mouth.      insulin glargine (LANTUS) 100 unit/mL injection Inject 90 Units into the skin.      omeprazole (PRILOSEC) 40 MG capsule Take 40 mg by mouth.      pravastatin (PRAVACHOL) 40 MG tablet Take 40 mg by mouth.       No current facility-administered medications on file prior to visit.     Social History     Socioeconomic History    Marital status:    Tobacco Use    Smoking status: Never Smoker    Smokeless tobacco: Never Used   Substance and Sexual Activity    Alcohol use: Never    Drug use: Never     Family History   Problem Relation Age of Onset    Diabetes Mother     GI problems Mother     Lung cancer Father     Diabetes Father     Alzheimer's disease Father            Review of Systems  Constitutional: No fever,  no fatigue, no chills, no night sweats, no weight gain, no weight loss, no changes in appetite.   Eye: No redness, no acute vision loss, no blurred vision, no double vision, no eye pain  ENMT: No sore throat, no nasal drainage, no nose bleeds,  no headache, no ear pain, no ear drainage, no acute hearing loss  Respiratory: No cough, no sputum production, no shortness of breath, no hemoptysis, no wheezing.  Cardiovascular: No chest pain, no chest  tightness, no PASCUAL, no PND, no orthopnea, no swelling, no palpitations.  Gastrointestinal: No abdominal pain, no nausea, no vomiting, no diarrhea, no constipation, no difficulty swallowing, no change in bowel habits, no rectal bleeding  Genitourinary: no urgency, no frequency, no burning or pain when urinating, no blood in urine, no incontinence  Heme/Lymph: No easy bruising and/or bleeding, no swollen or painful glands.  Endocrine: No polyuria, no polydipsia, no polyphagia, no heat or cold intolerance.  Musculoskeletal: + muscle pain, no muscle weakness, no joint pain, no red or swollen joints.  Integumentary: No rash, no pruritis, no hair or nail changes.  Neurologic: No dizziness, no fainting, no tremors, no tingling and/ or numbness.  Psychiatric: No anxiety, no depression, no memory loss  All Other ROS: Negative with exception of what is documented in the history of present illness     Objective:   BP (!) 164/62 (BP Location: Right arm, Patient Position: Sitting, BP Method: Medium (Manual))   Pulse 75   Temp 98.3 °F (36.8 °C) (Temporal)   Ht 5' (1.524 m)   Wt 62.6 kg (137 lb 14.4 oz)   BMI 26.93 kg/m²     Physical Exam  General : Alert and oriented, No acute distress, well, developed, well nourished, afebrile   Eye : PERRLA. EOMI. Normal conjunctiva without injection. Sclerae are nonicteric. No pallor.  HEENT : Normocephalic. Neck supple. Normal hearing. Oral mucosa is moist.  Respiratory : Lungs are clear to auscultation bilaterally, non-labored. Symmetrical chest wall expansion. No crackles, wheeze, or rhonci.  Cardiovascular : Normal rate, Regular rhythm. No murmurs, rubs, or gallops. Pulses 2+ in all extremities. No Edema.  Gastrointestinal : Soft, nontender, non-distended, bowel sounds normal, no organomegaly, no guarding, no rebound.  Musculoskeletal : Normal ROM.  No muscle tenderness.  Integumentary : Warm to touch. Intact. No rash.   left great toe nail with subungual hematoma, 2nd digit with  dactylitis  Neurologic : Alert and oriented. No focal deficits.   Psychiatric : Cooperative, Appropriate mood & affect. Normal judgment.          Assessment:     1. Paresthesias    2. Type 2 diabetes mellitus with hyperglycemia, with long-term current use of insulin    3. Postprocedural hypoparathyroidism    4. Angina pectoris, unspecified    5. Great toe pain, left    6. Fall, initial encounter          Plan:       I am having Alison Langston maintain her gemfibroziL, blood-glucose meter, atorvastatin, benzonatate, fluticasone propionate, gabapentin, hydroCHLOROthiazide, insulin glargine, levothyroxine, losartan, meclizine, metFORMIN, omeprazole, pravastatin, traZODone, venlafaxine HCl (EFFEXOR XR ORAL), and diphenhydrAMINE-aluminum-magnesium hydroxide-simethicone-LIDOcaine HCl 2%.    Labs and x-rays have been ordered, further recommendations  To follow I think she would benefit from seeing a foot orthopedist; referral has been placed      Problem List Items Addressed This Visit        Cardiac/Vascular    RESOLVED: Angina pectoris, unspecified       Endocrine    Type 2 diabetes mellitus with hyperglycemia, with long-term current use of insulin    Relevant Orders    Ambulatory referral/consult to Orthopedics    CBC Auto Differential    Comprehensive Metabolic Panel    Urinalysis, Reflex to Urine Culture Urine, Clean Catch    Microalbumin/Creatinine Ratio, Urine    Hemoglobin A1C    RESOLVED: Postprocedural hypoparathyroidism       Orthopedic    Great toe pain, left     Patient has had some subungual hematoma to that 1st great toe for months seen by Podiatry with really no recommendations I am going to send a referral to Foot Ortho especially in light of her numbness and she has got some dactylitis on that 2nd digit.  Which is probably some degree of diabetic foot side like for her to get established to see the have any other recommendations.  May need to consider biopsy of that toenail           Relevant Orders     X-Ray Lumbar Spine 5 View    Fall     Patient with recent fall yesterday, lumbar images have been ordered with further recommendations to follow           Relevant Orders    X-Ray Foot Complete Left      Other Visit Diagnoses     Paresthesias    -  Primary    Relevant Orders    Vitamin B12            Alison was seen today for revisit diabetes , left toe pain  and back pain.    Diagnoses and all orders for this visit:    Paresthesias  -     Vitamin B12; Future    Type 2 diabetes mellitus with hyperglycemia, with long-term current use of insulin  -     Ambulatory referral/consult to Orthopedics; Future  -     CBC Auto Differential; Future  -     Comprehensive Metabolic Panel; Future  -     Urinalysis, Reflex to Urine Culture Urine, Clean Catch; Future  -     Microalbumin/Creatinine Ratio, Urine; Future  -     Hemoglobin A1C; Future    Postprocedural hypoparathyroidism    Angina pectoris, unspecified    Great toe pain, left  -     X-Ray Lumbar Spine 5 View; Future    Fall, initial encounter  -     X-Ray Foot Complete Left; Future               [unfilled]  Orders Placed This Encounter   Procedures    X-Ray Lumbar Spine 5 View     Standing Status:   Future     Number of Occurrences:   1     Standing Expiration Date:   5/31/2023     Order Specific Question:   May the Radiologist modify the order per protocol to meet the clinical needs of the patient?     Answer:   Yes    X-Ray Foot Complete Left     Standing Status:   Future     Number of Occurrences:   1     Standing Expiration Date:   5/31/2023     Order Specific Question:   May the Radiologist modify the order per protocol to meet the clinical needs of the patient?     Answer:   Yes     Order Specific Question:   Release to patient     Answer:   Immediate    CBC Auto Differential     Standing Status:   Future     Number of Occurrences:   1     Standing Expiration Date:   8/31/2022    Comprehensive Metabolic Panel     Standing Status:   Future     Number of  Occurrences:   1     Standing Expiration Date:   8/31/2022    Urinalysis, Reflex to Urine Culture Urine, Clean Catch     Standing Status:   Future     Number of Occurrences:   1     Standing Expiration Date:   8/31/2022     Order Specific Question:   Preferred Collection Type     Answer:   Urine, Clean Catch     Order Specific Question:   Specimen Source     Answer:   Urine    Microalbumin/Creatinine Ratio, Urine     Standing Status:   Future     Number of Occurrences:   1     Standing Expiration Date:   8/31/2022     Order Specific Question:   Specimen Source     Answer:   Urine    Hemoglobin A1C     Standing Status:   Future     Number of Occurrences:   1     Standing Expiration Date:   8/31/2022    Vitamin B12     Standing Status:   Future     Number of Occurrences:   1     Standing Expiration Date:   7/30/2023    Ambulatory referral/consult to Orthopedics     Standing Status:   Future     Standing Expiration Date:   6/30/2023     Referral Priority:   Routine     Referral Type:   Consultation     Referred to Provider:   Joel Feng MD     Requested Specialty:   Orthopedic Surgery     Number of Visits Requested:   1       Medication List with Changes/Refills   Current Medications    ATORVASTATIN (LIPITOR) 20 MG TABLET    Take 20 mg by mouth once daily.    BENZONATATE (TESSALON) 200 MG CAPSULE    Take 200 mg by mouth 3 (three) times daily.    BLOOD-GLUCOSE METER MISC      true metrix glucose monitor, See Instructions, to check bloodsugars BID, E11.9, # 1 EA, 5 Refill(s), Pharmacy: [facility], 154, cm, Height/Length Dosing, 04/19/21 10:31:00 CDT, 73.45, kg, Weight Dosing, 04/19/21 10:31:00 CDT    DIPHENHYDRAMINE-ALUMINUM-MAGNESIUM HYDROXIDE-SIMETHICONE-LIDOCAINE HCL 2%    Magic Mouthwash:    1 Part viscous lidocaine 2%   1 Part Maalox  1 Part diphenhydramine 12.5 mg per 5 ml elixir     Swish, gargle, and spit one to two teaspoonfuls every six hours as needed. Shake well before  using.    Dispense 120ml    FLUTICASONE PROPIONATE (FLONASE) 50 MCG/ACTUATION NASAL SPRAY    1 spray by Each Nostril route 2 (two) times daily.    GABAPENTIN (NEURONTIN) 300 MG CAPSULE    Take 300 mg by mouth.    GEMFIBROZIL (LOPID) 600 MG TABLET    gemfibrozil Take No date recorded No form recorded No frequency recorded No route recorded No set duration recorded No set duration amount recorded active No dosage strength recorded No dosage strength units of measure recorded    HYDROCHLOROTHIAZIDE (HYDRODIURIL) 12.5 MG TAB    Take 12.5 mg by mouth.    INSULIN GLARGINE (LANTUS) 100 UNIT/ML INJECTION    Inject 90 Units into the skin.    LEVOTHYROXINE (SYNTHROID) 200 MCG TABLET      See Instructions, Take 1 tablet by mouth once daily, # 90 tab(s), 3 Refill(s), Pharmacy: [facility], 153, cm, Height/Length Dosing, 03/11/22 8:36:00 CST, 65, kg, Weight Dosing, 03/11/22 8:36:00 CST    LOSARTAN (COZAAR) 100 MG TABLET    Take 100 mg by mouth.    MECLIZINE (ANTIVERT) 12.5 MG TABLET    Take 12.5 mg by mouth 3 (three) times daily as needed.    METFORMIN (GLUCOPHAGE) 1000 MG TABLET      See Instructions, TAKE 1 TABLET TWICE A DAY, # 180 tab(s), 3 Refill(s), Pharmacy: [facility], 153, cm, Height/Length Dosing, 02/02/22 8:09:00 CST, 65, kg, Weight Dosing, 02/02/22 8:09:00 CST    OMEPRAZOLE (PRILOSEC) 40 MG CAPSULE    Take 40 mg by mouth.    PRAVASTATIN (PRAVACHOL) 40 MG TABLET    Take 40 mg by mouth.    TRAZODONE (DESYREL) 100 MG TABLET    Take 200 mg by mouth nightly.    VENLAFAXINE HCL (EFFEXOR XR ORAL)    Effexor XR Take No date recorded No form recorded No frequency recorded No route recorded No set duration recorded No set duration amount recorded active No dosage strength recorded No dosage strength units of measure recorded      Medication List with Changes/Refills   Current Medications    ATORVASTATIN (LIPITOR) 20 MG TABLET    Take 20 mg by mouth once daily.       Start Date: 4/12/2022 End Date: --     BENZONATATE (TESSALON) 200 MG CAPSULE    Take 200 mg by mouth 3 (three) times daily.       Start Date: 12/31/2021End Date: --    BLOOD-GLUCOSE METER MISC      true metrix glucose monitor, See Instructions, to check bloodsugars BID, E11.9, # 1 EA, 5 Refill(s), Pharmacy: [facility], 154, cm, Height/Length Dosing, 04/19/21 10:31:00 CDT, 73.45, kg, Weight Dosing, 04/19/21 10:31:00 CDT       Start Date: 4/29/2021 End Date: --    DIPHENHYDRAMINE-ALUMINUM-MAGNESIUM HYDROXIDE-SIMETHICONE-LIDOCAINE HCL 2%    Magic Mouthwash:    1 Part viscous lidocaine 2%   1 Part Maalox  1 Part diphenhydramine 12.5 mg per 5 ml elixir     Swish, gargle, and spit one to two teaspoonfuls every six hours as needed. Shake well before using.    Dispense 120ml       Start Date: 5/11/2022 End Date: --    FLUTICASONE PROPIONATE (FLONASE) 50 MCG/ACTUATION NASAL SPRAY    1 spray by Each Nostril route 2 (two) times daily.       Start Date: 3/30/2022 End Date: --    GABAPENTIN (NEURONTIN) 300 MG CAPSULE    Take 300 mg by mouth.       Start Date: 11/15/2021End Date: --    GEMFIBROZIL (LOPID) 600 MG TABLET    gemfibrozil Take No date recorded No form recorded No frequency recorded No route recorded No set duration recorded No set duration amount recorded active No dosage strength recorded No dosage strength units of measure recorded       Start Date: --        End Date: --    HYDROCHLOROTHIAZIDE (HYDRODIURIL) 12.5 MG TAB    Take 12.5 mg by mouth.       Start Date: 12/20/2021End Date: --    INSULIN GLARGINE (LANTUS) 100 UNIT/ML INJECTION    Inject 90 Units into the skin.       Start Date: --        End Date: --    LEVOTHYROXINE (SYNTHROID) 200 MCG TABLET      See Instructions, Take 1 tablet by mouth once daily, # 90 tab(s), 3 Refill(s), Pharmacy: [facility], 153, cm, Height/Length Dosing, 03/11/22 8:36:00 CST, 65, kg, Weight Dosing, 03/11/22 8:36:00 CST       Start Date: 3/29/2022 End Date: --    LOSARTAN (COZAAR) 100 MG TABLET    Take  100 mg by mouth.       Start Date: 1/31/2022 End Date: --    MECLIZINE (ANTIVERT) 12.5 MG TABLET    Take 12.5 mg by mouth 3 (three) times daily as needed.       Start Date: 3/31/2022 End Date: --    METFORMIN (GLUCOPHAGE) 1000 MG TABLET      See Instructions, TAKE 1 TABLET TWICE A DAY, # 180 tab(s), 3 Refill(s), Pharmacy: [facility], 153, cm, Height/Length Dosing, 02/02/22 8:09:00 CST, 65, kg, Weight Dosing, 02/02/22 8:09:00 CST       Start Date: 2/7/2022  End Date: --    OMEPRAZOLE (PRILOSEC) 40 MG CAPSULE    Take 40 mg by mouth.       Start Date: 2/17/2021 End Date: 5/13/2022    PRAVASTATIN (PRAVACHOL) 40 MG TABLET    Take 40 mg by mouth.       Start Date: --        End Date: --    TRAZODONE (DESYREL) 100 MG TABLET    Take 200 mg by mouth nightly.       Start Date: 4/19/2022 End Date: --    VENLAFAXINE HCL (EFFEXOR XR ORAL)    Effexor XR Take No date recorded No form recorded No frequency recorded No route recorded No set duration recorded No set duration amount recorded active No dosage strength recorded No dosage strength units of measure recorded       Start Date: --        End Date: --            No follow-ups on file.

## 2022-06-01 ENCOUNTER — TELEPHONE (OUTPATIENT)
Dept: INTERNAL MEDICINE | Facility: CLINIC | Age: 72
End: 2022-06-01
Payer: MEDICARE

## 2022-06-01 NOTE — TELEPHONE ENCOUNTER
----- Message from Noah Tafoya MD sent at 5/31/2022  4:56 PM CDT -----  X-ray of the back reviewed, there appears to be no evidence of any new fracture.  Patient to keep me posted over the next several days to let me know if her back pain improves if not we may need to do a CT scan.

## 2022-06-01 NOTE — TELEPHONE ENCOUNTER
----- Message from Noah Tafoya MD sent at 6/1/2022  8:30 AM CDT -----  Laboratory studies review, hemoglobin A1c is at 8  She definitely needs to drink a little bit more water otherwise her labs are stable  Continue current management

## 2022-06-28 ENCOUNTER — DOCUMENTATION ONLY (OUTPATIENT)
Dept: HEMATOLOGY/ONCOLOGY | Facility: CLINIC | Age: 72
End: 2022-06-28
Payer: MEDICARE

## 2022-07-19 ENCOUNTER — TELEPHONE (OUTPATIENT)
Dept: INTERNAL MEDICINE | Facility: CLINIC | Age: 72
End: 2022-07-19
Payer: MEDICARE

## 2022-07-19 DIAGNOSIS — R05.9 COUGH: Primary | ICD-10-CM

## 2022-07-19 LAB
FLUAV AG UPPER RESP QL IA.RAPID: NOT DETECTED
FLUBV AG UPPER RESP QL IA.RAPID: NOT DETECTED
SARS-COV-2 RNA RESP QL NAA+PROBE: NOT DETECTED

## 2022-07-19 PROCEDURE — 87636 SARSCOV2 & INF A&B AMP PRB: CPT | Performed by: INTERNAL MEDICINE

## 2022-07-19 NOTE — TELEPHONE ENCOUNTER
----- Message from Noah Tafoya MD sent at 7/19/2022  2:55 PM CDT -----  Yes they can come by and just sit in the car and call us when they get here  ----- Message -----  From: Shara Rodriguez LPN  Sent: 7/19/2022   2:13 PM CDT  To: Noah Tafoya MD    Is it Ok for patient and  to come to office to test for Covid?  ----- Message -----  From: Rodri Shepard  Sent: 7/19/2022   1:46 PM CDT  To: [facility] Staff    .Type:  Needs Medical Advice    Who Called: Alison  Symptoms (please be specific):    How long has patient had these symptoms:    Pharmacy name and phone #:    Would the patient rather a call back or a response via [facility]?   Best Call Back Number: [phone]  Additional Information: She called to speak with office about going over there to get tested for covid for her and her  as she told me that they both were exposed to someone with covid. I tried to provide her with the locations of the testing sites we have but she told me that the doctor had tested them before at the office. As I started this message the patient dropped the call.             
Patient notified of appointment.  
37

## 2022-09-20 DIAGNOSIS — D64.9 ANEMIA, UNSPECIFIED TYPE: Primary | ICD-10-CM

## 2022-09-20 DIAGNOSIS — D63.1 ANEMIA IN CHRONIC KIDNEY DISEASE, UNSPECIFIED CKD STAGE: ICD-10-CM

## 2022-09-20 DIAGNOSIS — N18.9 ANEMIA IN CHRONIC KIDNEY DISEASE, UNSPECIFIED CKD STAGE: ICD-10-CM

## 2022-09-21 ENCOUNTER — INFUSION (OUTPATIENT)
Dept: INFUSION THERAPY | Facility: HOSPITAL | Age: 72
End: 2022-09-21
Attending: INTERNAL MEDICINE
Payer: MEDICARE

## 2022-09-21 VITALS
TEMPERATURE: 98 F | SYSTOLIC BLOOD PRESSURE: 165 MMHG | OXYGEN SATURATION: 100 % | DIASTOLIC BLOOD PRESSURE: 63 MMHG | RESPIRATION RATE: 18 BRPM | HEART RATE: 66 BPM

## 2022-09-21 DIAGNOSIS — D64.9 ANEMIA, UNSPECIFIED TYPE: Primary | ICD-10-CM

## 2022-09-21 PROCEDURE — 63600175 PHARM REV CODE 636 W HCPCS: Mod: JG,EC | Performed by: NURSE PRACTITIONER

## 2022-09-21 PROCEDURE — 96372 THER/PROPH/DIAG INJ SC/IM: CPT

## 2022-09-21 RX ADMIN — EPOETIN ALFA 20000 UNITS: 20000 SOLUTION INTRAVENOUS; SUBCUTANEOUS at 12:09

## 2022-10-05 ENCOUNTER — TELEPHONE (OUTPATIENT)
Dept: INTERNAL MEDICINE | Facility: CLINIC | Age: 72
End: 2022-10-05
Payer: MEDICARE

## 2022-10-05 ENCOUNTER — LAB VISIT (OUTPATIENT)
Dept: LAB | Facility: HOSPITAL | Age: 72
End: 2022-10-05
Attending: INTERNAL MEDICINE
Payer: MEDICARE

## 2022-10-05 ENCOUNTER — INFUSION (OUTPATIENT)
Dept: INFUSION THERAPY | Facility: HOSPITAL | Age: 72
End: 2022-10-05
Attending: INTERNAL MEDICINE
Payer: MEDICARE

## 2022-10-05 VITALS — HEART RATE: 75 BPM | DIASTOLIC BLOOD PRESSURE: 55 MMHG | SYSTOLIC BLOOD PRESSURE: 169 MMHG

## 2022-10-05 DIAGNOSIS — E78.1 HYPERTRIGLYCERIDEMIA: Primary | ICD-10-CM

## 2022-10-05 DIAGNOSIS — D63.1 ANEMIA IN CHRONIC KIDNEY DISEASE, UNSPECIFIED CKD STAGE: ICD-10-CM

## 2022-10-05 DIAGNOSIS — D64.9 ANEMIA, UNSPECIFIED TYPE: ICD-10-CM

## 2022-10-05 DIAGNOSIS — N18.9 ANEMIA IN CHRONIC KIDNEY DISEASE, UNSPECIFIED CKD STAGE: ICD-10-CM

## 2022-10-05 LAB
ALBUMIN SERPL-MCNC: 3.6 GM/DL (ref 3.4–4.8)
ALBUMIN/GLOB SERPL: 1 RATIO (ref 1.1–2)
ALP SERPL-CCNC: 117 UNIT/L (ref 40–150)
ALT SERPL-CCNC: 13 UNIT/L (ref 0–55)
AST SERPL-CCNC: 10 UNIT/L (ref 5–34)
BASOPHILS # BLD AUTO: 0.02 X10(3)/MCL (ref 0–0.2)
BASOPHILS NFR BLD AUTO: 0.3 %
BILIRUBIN DIRECT+TOT PNL SERPL-MCNC: 0.3 MG/DL
BUN SERPL-MCNC: 42.5 MG/DL (ref 9.8–20.1)
CALCIUM SERPL-MCNC: 9.6 MG/DL (ref 8.4–10.2)
CHLORIDE SERPL-SCNC: 106 MMOL/L (ref 98–107)
CO2 SERPL-SCNC: 24 MMOL/L (ref 23–31)
CREAT SERPL-MCNC: 1.07 MG/DL (ref 0.55–1.02)
EOSINOPHIL # BLD AUTO: 0.17 X10(3)/MCL (ref 0–0.9)
EOSINOPHIL NFR BLD AUTO: 2.3 %
ERYTHROCYTE [DISTWIDTH] IN BLOOD BY AUTOMATED COUNT: 13.3 % (ref 11.5–17)
FERRITIN SERPL-MCNC: 803.81 NG/ML (ref 4.63–204)
FOLATE SERPL-MCNC: 9.5 NG/ML (ref 7–31.4)
GFR SERPLBLD CREATININE-BSD FMLA CKD-EPI: 56 MLS/MIN/1.73/M2
GLOBULIN SER-MCNC: 3.5 GM/DL (ref 2.4–3.5)
GLUCOSE SERPL-MCNC: 247 MG/DL (ref 82–115)
HCT VFR BLD AUTO: 35.6 % (ref 37–47)
HGB BLD-MCNC: 11.1 GM/DL (ref 12–16)
IMM GRANULOCYTES # BLD AUTO: 0.02 X10(3)/MCL (ref 0–0.04)
IMM GRANULOCYTES NFR BLD AUTO: 0.3 %
IRON SATN MFR SERPL: 33 % (ref 20–50)
IRON SERPL-MCNC: 89 UG/DL (ref 50–170)
LYMPHOCYTES # BLD AUTO: 1.37 X10(3)/MCL (ref 0.6–4.6)
LYMPHOCYTES NFR BLD AUTO: 18.5 %
MCH RBC QN AUTO: 27.6 PG (ref 27–31)
MCHC RBC AUTO-ENTMCNC: 31.2 MG/DL (ref 33–36)
MCV RBC AUTO: 88.6 FL (ref 80–94)
MONOCYTES # BLD AUTO: 0.58 X10(3)/MCL (ref 0.1–1.3)
MONOCYTES NFR BLD AUTO: 7.8 %
NEUTROPHILS # BLD AUTO: 5.2 X10(3)/MCL (ref 2.1–9.2)
NEUTROPHILS NFR BLD AUTO: 70.8 %
PLATELET # BLD AUTO: 233 X10(3)/MCL (ref 130–400)
PMV BLD AUTO: 11.5 FL (ref 7.4–10.4)
POTASSIUM SERPL-SCNC: 5.3 MMOL/L (ref 3.5–5.1)
PROT SERPL-MCNC: 7.1 GM/DL (ref 5.8–7.6)
RBC # BLD AUTO: 4.02 X10(6)/MCL (ref 4.2–5.4)
SODIUM SERPL-SCNC: 140 MMOL/L (ref 136–145)
TIBC SERPL-MCNC: 184 UG/DL (ref 70–310)
TIBC SERPL-MCNC: 273 UG/DL (ref 250–450)
TRANSFERRIN SERPL-MCNC: 236 MG/DL (ref 173–360)
VIT B12 SERPL-MCNC: >2000 PG/ML (ref 213–816)
WBC # SPEC AUTO: 7.4 X10(3)/MCL (ref 4.5–11.5)

## 2022-10-05 PROCEDURE — 83540 ASSAY OF IRON: CPT

## 2022-10-05 PROCEDURE — 85025 COMPLETE CBC W/AUTO DIFF WBC: CPT

## 2022-10-05 PROCEDURE — 82728 ASSAY OF FERRITIN: CPT

## 2022-10-05 PROCEDURE — 36415 COLL VENOUS BLD VENIPUNCTURE: CPT

## 2022-10-05 PROCEDURE — 80053 COMPREHEN METABOLIC PANEL: CPT

## 2022-10-05 PROCEDURE — 82746 ASSAY OF FOLIC ACID SERUM: CPT

## 2022-10-05 PROCEDURE — 82607 VITAMIN B-12: CPT

## 2022-10-05 RX ORDER — GEMFIBROZIL 600 MG/1
TABLET, FILM COATED ORAL
Qty: 120 TABLET | Refills: 0 | Status: SHIPPED | OUTPATIENT
Start: 2022-10-05 | End: 2022-12-20 | Stop reason: SDUPTHER

## 2022-10-13 ENCOUNTER — TELEPHONE (OUTPATIENT)
Dept: INTERNAL MEDICINE | Facility: CLINIC | Age: 72
End: 2022-10-13
Payer: MEDICARE

## 2022-10-13 DIAGNOSIS — G47.00 INSOMNIA, UNSPECIFIED TYPE: Primary | ICD-10-CM

## 2022-10-13 RX ORDER — TRAZODONE HYDROCHLORIDE 100 MG/1
200 TABLET ORAL NIGHTLY
Qty: 60 TABLET | Refills: 5 | Status: SHIPPED | OUTPATIENT
Start: 2022-10-13 | End: 2023-04-03

## 2022-10-13 NOTE — TELEPHONE ENCOUNTER
----- Message from Noah Tafoya MD sent at 10/13/2022  2:12 PM CDT -----  Ok to send  ----- Message -----  From: Cassidy Reese  Sent: 10/13/2022   2:12 PM CDT  To: Noah Tafoya MD    Pt stated Angela Feng use to prescribe her Trazadone 100MG, but she is no longer seeing her.  Pt wanted to know if you would be able to refill her Trazadone

## 2022-10-21 ENCOUNTER — TELEPHONE (OUTPATIENT)
Dept: INTERNAL MEDICINE | Facility: CLINIC | Age: 72
End: 2022-10-21
Payer: MEDICARE

## 2022-10-21 DIAGNOSIS — Z12.31 ENCOUNTER FOR SCREENING MAMMOGRAM FOR BREAST CANCER: Primary | ICD-10-CM

## 2022-10-27 ENCOUNTER — HOSPITAL ENCOUNTER (OUTPATIENT)
Dept: RADIOLOGY | Facility: HOSPITAL | Age: 72
Discharge: HOME | End: 2022-10-27
Attending: INTERNAL MEDICINE
Payer: MEDICARE

## 2022-10-27 DIAGNOSIS — Z12.31 ENCOUNTER FOR SCREENING MAMMOGRAM FOR BREAST CANCER: ICD-10-CM

## 2022-10-27 PROCEDURE — 77067 MAMMO DIGITAL SCREENING BILAT WITH TOMO: ICD-10-PCS | Mod: 26,,, | Performed by: RADIOLOGY

## 2022-10-27 PROCEDURE — 77063 BREAST TOMOSYNTHESIS BI: CPT | Mod: 26,,, | Performed by: RADIOLOGY

## 2022-10-27 PROCEDURE — 77067 SCR MAMMO BI INCL CAD: CPT | Mod: 26,,, | Performed by: RADIOLOGY

## 2022-10-27 PROCEDURE — 77063 MAMMO DIGITAL SCREENING BILAT WITH TOMO: ICD-10-PCS | Mod: 26,,, | Performed by: RADIOLOGY

## 2022-10-27 PROCEDURE — 77067 SCR MAMMO BI INCL CAD: CPT | Mod: TC

## 2022-10-28 ENCOUNTER — TELEPHONE (OUTPATIENT)
Dept: INTERNAL MEDICINE | Facility: CLINIC | Age: 72
End: 2022-10-28
Payer: MEDICARE

## 2022-10-28 NOTE — TELEPHONE ENCOUNTER
----- Message from JAKOB Hoty sent at 10/27/2022  3:14 PM CDT -----  Normal MMG. Repeat in 1 year.

## 2022-11-28 ENCOUNTER — DOCUMENTATION ONLY (OUTPATIENT)
Dept: ADMINISTRATIVE | Facility: HOSPITAL | Age: 72
End: 2022-11-28
Payer: MEDICARE

## 2022-11-29 ENCOUNTER — TELEPHONE (OUTPATIENT)
Dept: INTERNAL MEDICINE | Facility: CLINIC | Age: 72
End: 2022-11-29
Payer: MEDICARE

## 2022-11-29 DIAGNOSIS — R21 RASH: Primary | ICD-10-CM

## 2022-11-29 RX ORDER — CLOTRIMAZOLE AND BETAMETHASONE DIPROPIONATE 10; .64 MG/G; MG/G
CREAM TOPICAL 2 TIMES DAILY
Qty: 45 G | Refills: 1 | Status: SHIPPED | OUTPATIENT
Start: 2022-11-29

## 2022-12-07 ENCOUNTER — TELEPHONE (OUTPATIENT)
Dept: INTERNAL MEDICINE | Facility: CLINIC | Age: 72
End: 2022-12-07
Payer: MEDICARE

## 2022-12-07 DIAGNOSIS — E55.9 VITAMIN D DEFICIENCY: ICD-10-CM

## 2022-12-07 DIAGNOSIS — Z79.4 TYPE 2 DIABETES MELLITUS WITH HYPERGLYCEMIA, WITH LONG-TERM CURRENT USE OF INSULIN: Primary | ICD-10-CM

## 2022-12-07 DIAGNOSIS — E78.1 HYPERTRIGLYCERIDEMIA: ICD-10-CM

## 2022-12-07 DIAGNOSIS — E11.65 TYPE 2 DIABETES MELLITUS WITH HYPERGLYCEMIA, WITH LONG-TERM CURRENT USE OF INSULIN: Primary | ICD-10-CM

## 2022-12-07 DIAGNOSIS — M25.551 RIGHT HIP PAIN: Primary | ICD-10-CM

## 2022-12-08 ENCOUNTER — TELEPHONE (OUTPATIENT)
Dept: INTERNAL MEDICINE | Facility: CLINIC | Age: 72
End: 2022-12-08
Payer: MEDICARE

## 2022-12-08 NOTE — TELEPHONE ENCOUNTER
----- Message from Kenny Carlson MA sent at 12/8/2022  8:15 AM CST -----  Regarding: PV 12/15/22 @ 1:20 Dr. Howard  1. Are there any outstanding tasks in the patient's chart? Yes, fasting labs    2. Is there any documentation in the chart? No    3.Has patient been seen in an ER, Urgent care clinic, or been admitted since last visit?  If yes, When, where, and why    4. Has patient seen any other healthcare providers since last visit?  If yes, when, where, and why    5. Has patient had any bloodwork or XR done since last visit?    6. Is patient signed up for patient portal?

## 2022-12-15 ENCOUNTER — LAB VISIT (OUTPATIENT)
Dept: LAB | Facility: HOSPITAL | Age: 72
End: 2022-12-15
Attending: INTERNAL MEDICINE
Payer: MEDICARE

## 2022-12-15 ENCOUNTER — OFFICE VISIT (OUTPATIENT)
Dept: INTERNAL MEDICINE | Facility: CLINIC | Age: 72
End: 2022-12-15
Payer: MEDICARE

## 2022-12-15 VITALS
BODY MASS INDEX: 27.68 KG/M2 | DIASTOLIC BLOOD PRESSURE: 74 MMHG | HEIGHT: 60 IN | OXYGEN SATURATION: 96 % | WEIGHT: 141 LBS | HEART RATE: 72 BPM | TEMPERATURE: 98 F | SYSTOLIC BLOOD PRESSURE: 134 MMHG

## 2022-12-15 DIAGNOSIS — E78.1 HYPERTRIGLYCERIDEMIA: ICD-10-CM

## 2022-12-15 DIAGNOSIS — E11.65 TYPE 2 DIABETES MELLITUS WITH HYPERGLYCEMIA, WITH LONG-TERM CURRENT USE OF INSULIN: ICD-10-CM

## 2022-12-15 DIAGNOSIS — Z79.4 TYPE 2 DIABETES MELLITUS WITH HYPERGLYCEMIA, WITH LONG-TERM CURRENT USE OF INSULIN: ICD-10-CM

## 2022-12-15 DIAGNOSIS — D64.9 ANEMIA, UNSPECIFIED TYPE: Primary | ICD-10-CM

## 2022-12-15 DIAGNOSIS — E11.9 TYPE 2 DIABETES MELLITUS WITHOUT COMPLICATION, UNSPECIFIED WHETHER LONG TERM INSULIN USE: ICD-10-CM

## 2022-12-15 DIAGNOSIS — E55.9 VITAMIN D DEFICIENCY: ICD-10-CM

## 2022-12-15 LAB
ALBUMIN SERPL-MCNC: 3.5 G/DL (ref 3.4–4.8)
ALBUMIN/GLOB SERPL: 1.1 RATIO (ref 1.1–2)
ALP SERPL-CCNC: 98 UNIT/L (ref 40–150)
ALT SERPL-CCNC: 11 UNIT/L (ref 0–55)
APPEARANCE UR: CLEAR
AST SERPL-CCNC: 13 UNIT/L (ref 5–34)
BACTERIA #/AREA URNS AUTO: NORMAL /HPF
BASOPHILS # BLD AUTO: 0.02 X10(3)/MCL (ref 0–0.2)
BASOPHILS NFR BLD AUTO: 0.3 %
BILIRUB UR QL STRIP.AUTO: NEGATIVE MG/DL
BILIRUBIN DIRECT+TOT PNL SERPL-MCNC: 0.3 MG/DL
BUN SERPL-MCNC: 36.3 MG/DL (ref 9.8–20.1)
CALCIUM SERPL-MCNC: 9.7 MG/DL (ref 8.4–10.2)
CHLORIDE SERPL-SCNC: 111 MMOL/L (ref 98–107)
CHOLEST SERPL-MCNC: 162 MG/DL
CHOLEST/HDLC SERPL: 4 {RATIO} (ref 0–5)
CO2 SERPL-SCNC: 22 MMOL/L (ref 23–31)
COLOR UR AUTO: YELLOW
CREAT SERPL-MCNC: 1.12 MG/DL (ref 0.55–1.02)
CREAT UR-MCNC: 185.2 MG/DL (ref 47–110)
DEPRECATED CALCIDIOL+CALCIFEROL SERPL-MC: 45.3 NG/ML (ref 30–80)
EOSINOPHIL # BLD AUTO: 0.14 X10(3)/MCL (ref 0–0.9)
EOSINOPHIL NFR BLD AUTO: 1.8 %
ERYTHROCYTE [DISTWIDTH] IN BLOOD BY AUTOMATED COUNT: 13.9 % (ref 11–14.5)
EST. AVERAGE GLUCOSE BLD GHB EST-MCNC: 231.7 MG/DL
GFR SERPLBLD CREATININE-BSD FMLA CKD-EPI: 52 MLS/MIN/1.73/M2
GLOBULIN SER-MCNC: 3.1 GM/DL (ref 2.4–3.5)
GLUCOSE SERPL-MCNC: 233 MG/DL (ref 82–115)
GLUCOSE UR QL STRIP.AUTO: NEGATIVE MG/DL
HBA1C MFR BLD: 9.7 %
HCT VFR BLD AUTO: 29.9 % (ref 37–47)
HDLC SERPL-MCNC: 38 MG/DL (ref 35–60)
HGB BLD-MCNC: 9.1 GM/DL (ref 12–16)
IMM GRANULOCYTES # BLD AUTO: 0.05 X10(3)/MCL (ref 0–0.04)
IMM GRANULOCYTES NFR BLD AUTO: 0.7 %
KETONES UR QL STRIP.AUTO: ABNORMAL MG/DL
LDLC SERPL CALC-MCNC: 73 MG/DL (ref 50–140)
LEUKOCYTE ESTERASE UR QL STRIP.AUTO: ABNORMAL UNIT/L
LYMPHOCYTES # BLD AUTO: 1.11 X10(3)/MCL (ref 0.6–4.6)
LYMPHOCYTES NFR BLD AUTO: 14.6 %
MCH RBC QN AUTO: 27.5 PG
MCHC RBC AUTO-ENTMCNC: 30.4 MG/DL (ref 33–36)
MCV RBC AUTO: 90.3 FL (ref 80–94)
MICROALBUMIN UR-MCNC: 959.1 UG/ML
MICROALBUMIN/CREAT RATIO PNL UR: 517.9 MG/GM CR (ref 0–30)
MONOCYTES # BLD AUTO: 0.7 X10(3)/MCL (ref 0.1–1.3)
MONOCYTES NFR BLD AUTO: 9.2 %
NEUTROPHILS # BLD AUTO: 5.59 X10(3)/MCL (ref 2.1–9.2)
NEUTROPHILS NFR BLD AUTO: 73.4 %
NITRITE UR QL STRIP.AUTO: NEGATIVE
NRBC BLD AUTO-RTO: 0 % (ref 0–1)
PH UR STRIP.AUTO: 5 [PH]
PLATELET # BLD AUTO: 199 X10(3)/MCL (ref 140–371)
PMV BLD AUTO: 11.8 FL (ref 9.4–12.4)
POTASSIUM SERPL-SCNC: 4.9 MMOL/L (ref 3.5–5.1)
PROT SERPL-MCNC: 6.6 GM/DL (ref 5.8–7.6)
PROT UR QL STRIP.AUTO: ABNORMAL MG/DL
RBC # BLD AUTO: 3.31 X10(6)/MCL (ref 4.2–5.4)
RBC #/AREA URNS AUTO: <5 /HPF
RBC UR QL AUTO: NEGATIVE UNIT/L
SODIUM SERPL-SCNC: 143 MMOL/L (ref 136–145)
SP GR UR STRIP.AUTO: 1.02 (ref 1–1.03)
SQUAMOUS #/AREA URNS AUTO: <5 /HPF
TRIGL SERPL-MCNC: 255 MG/DL (ref 37–140)
TSH SERPL-ACNC: 0.02 UIU/ML (ref 0.35–4.94)
UROBILINOGEN UR STRIP-ACNC: 0.2 MG/DL
VLDLC SERPL CALC-MCNC: 51 MG/DL
WBC # SPEC AUTO: 7.6 X10(3)/MCL (ref 4.5–11.5)
WBC #/AREA URNS AUTO: 5 /HPF

## 2022-12-15 PROCEDURE — 3078F DIAST BP <80 MM HG: CPT | Mod: CPTII,,, | Performed by: INTERNAL MEDICINE

## 2022-12-15 PROCEDURE — 80061 LIPID PANEL: CPT

## 2022-12-15 PROCEDURE — 3008F BODY MASS INDEX DOCD: CPT | Mod: CPTII,,, | Performed by: INTERNAL MEDICINE

## 2022-12-15 PROCEDURE — 99214 OFFICE O/P EST MOD 30 MIN: CPT | Mod: ,,, | Performed by: INTERNAL MEDICINE

## 2022-12-15 PROCEDURE — 81001 URINALYSIS AUTO W/SCOPE: CPT

## 2022-12-15 PROCEDURE — 3075F SYST BP GE 130 - 139MM HG: CPT | Mod: CPTII,,, | Performed by: INTERNAL MEDICINE

## 2022-12-15 PROCEDURE — 1160F PR REVIEW ALL MEDS BY PRESCRIBER/CLIN PHARMACIST DOCUMENTED: ICD-10-PCS | Mod: CPTII,,, | Performed by: INTERNAL MEDICINE

## 2022-12-15 PROCEDURE — 3066F PR DOCUMENTATION OF TREATMENT FOR NEPHROPATHY: ICD-10-PCS | Mod: CPTII,,, | Performed by: INTERNAL MEDICINE

## 2022-12-15 PROCEDURE — 82043 UR ALBUMIN QUANTITATIVE: CPT

## 2022-12-15 PROCEDURE — 1160F RVW MEDS BY RX/DR IN RCRD: CPT | Mod: CPTII,,, | Performed by: INTERNAL MEDICINE

## 2022-12-15 PROCEDURE — 36415 COLL VENOUS BLD VENIPUNCTURE: CPT

## 2022-12-15 PROCEDURE — 4010F PR ACE/ARB THEARPY RXD/TAKEN: ICD-10-PCS | Mod: CPTII,,, | Performed by: INTERNAL MEDICINE

## 2022-12-15 PROCEDURE — 3008F PR BODY MASS INDEX (BMI) DOCUMENTED: ICD-10-PCS | Mod: CPTII,,, | Performed by: INTERNAL MEDICINE

## 2022-12-15 PROCEDURE — 84443 ASSAY THYROID STIM HORMONE: CPT

## 2022-12-15 PROCEDURE — 80053 COMPREHEN METABOLIC PANEL: CPT

## 2022-12-15 PROCEDURE — 4010F ACE/ARB THERAPY RXD/TAKEN: CPT | Mod: CPTII,,, | Performed by: INTERNAL MEDICINE

## 2022-12-15 PROCEDURE — 1159F MED LIST DOCD IN RCRD: CPT | Mod: CPTII,,, | Performed by: INTERNAL MEDICINE

## 2022-12-15 PROCEDURE — 99214 PR OFFICE/OUTPT VISIT, EST, LEVL IV, 30-39 MIN: ICD-10-PCS | Mod: ,,, | Performed by: INTERNAL MEDICINE

## 2022-12-15 PROCEDURE — 3062F PR POS MACROALBUMINURIA RESULT DOCUMENTED/REVIEW: ICD-10-PCS | Mod: CPTII,,, | Performed by: INTERNAL MEDICINE

## 2022-12-15 PROCEDURE — 1159F PR MEDICATION LIST DOCUMENTED IN MEDICAL RECORD: ICD-10-PCS | Mod: CPTII,,, | Performed by: INTERNAL MEDICINE

## 2022-12-15 PROCEDURE — 3075F PR MOST RECENT SYSTOLIC BLOOD PRESS GE 130-139MM HG: ICD-10-PCS | Mod: CPTII,,, | Performed by: INTERNAL MEDICINE

## 2022-12-15 PROCEDURE — 3066F NEPHROPATHY DOC TX: CPT | Mod: CPTII,,, | Performed by: INTERNAL MEDICINE

## 2022-12-15 PROCEDURE — 83036 HEMOGLOBIN GLYCOSYLATED A1C: CPT

## 2022-12-15 PROCEDURE — 3078F PR MOST RECENT DIASTOLIC BLOOD PRESSURE < 80 MM HG: ICD-10-PCS | Mod: CPTII,,, | Performed by: INTERNAL MEDICINE

## 2022-12-15 PROCEDURE — 82306 VITAMIN D 25 HYDROXY: CPT

## 2022-12-15 PROCEDURE — 85025 COMPLETE CBC W/AUTO DIFF WBC: CPT

## 2022-12-15 PROCEDURE — 3062F POS MACROALBUMINURIA REV: CPT | Mod: CPTII,,, | Performed by: INTERNAL MEDICINE

## 2022-12-15 RX ORDER — GLIPIZIDE 5 MG/1
5 TABLET, FILM COATED, EXTENDED RELEASE ORAL
Qty: 90 TABLET | Refills: 3 | Status: SHIPPED | OUTPATIENT
Start: 2022-12-15 | End: 2023-06-01 | Stop reason: SDUPTHER

## 2022-12-15 RX ORDER — MUPIROCIN 20 MG/G
1 OINTMENT TOPICAL 2 TIMES DAILY
COMMUNITY
Start: 2022-04-22 | End: 2024-02-01

## 2022-12-15 NOTE — PROGRESS NOTES
Subjective:      Patient ID: Alison Langston is a 72 y.o. female.    Chief Complaint: Follow-up (6 mth  DM, labs done)      HPI:  72 year old female here for diabetic revisit  She also has lumbar stenosis as noted from MRI back in 2018 in the area of the lumbar spine  Chronic balance issues followed by ENT, Dr. Willingham  Osteoporosis does not wish to have treatment  More than calcium and vitamin-D followed by Dr. Carmen oliver for GI with last colonoscopy in 2017 with 3 year recall  Dr. Cordova for gyn  Dr. Mick Mohr for Orthopedics  Dr. Spencer for her Hematology for anemia of chronic disease as related to CKD started on Procrit every other week  Dr. Valentino for Cardiology  Dr. Gisela Patino for Urology  She is on 70/30 she does 60 units at bedtime. She buys it over the counter.  Occasionally will have to skip doses because it is just too expensive.  Her A1c is currently at 9.7 with fasting sugar at 233.  She also is anemic on her labs with hemoglobin of 9.1 and 29.9 she was followed by Hematology and was getting Procrit every other week but states she has not gotten an injection in a long time and has not had iron in a long time.    Past Medical History:  Past Medical History:   Diagnosis Date    Anemia     Anxiety     Arthritis     Cerebrovascular accident     Chronic back pain     Depression     Diabetes mellitus     Elevated ferritin     External hemorrhoids     GERD (gastroesophageal reflux disease)     HLD (hyperlipidemia)     Hypertension     Hypothyroidism, unspecified     CRISTINA (iron deficiency anemia)     Incisional hernia     Internal hemorrhoids     Myoclonus     Osteoporosis     Osteoporosis     Personal history of colonic polyps     Vertigo      Past Surgical History:   Procedure Laterality Date    biopsy of breast      BONE MARROW BIOPSY W/ ASPIRATION Right 2018    CATARACT EXTRACTION Right 04/30/2021    CHOLECYSTECTOMY      COLONOSCOPY  12/01/2021    Dr. Junior Cardenas  Repeat colon 2026    HERNIA REPAIR   "    HERNIA REPAIR  01/27/2020    HYSTERECTOMY  2013    PATENT DUCTUS ARTERIOUS LIGATION      SHOULDER SURGERY Left     THYROIDECTOMY       Review of patient's allergies indicates:   Allergen Reactions    Baclofen Hallucinations    Donepezil Hallucinations    Erythromycin      Other reaction(s): Upset Stomach    Fluoxetine      Doesn't work    Grass pollen      Other reaction(s): per allergy test    Hydrocodone-acetaminophen      Other reaction(s): "Makes me crazy"    Ivp dye [iodinated contrast media]      Other reaction(s): Rash    Metoclopramide hcl     Rosuvastatin     Iodine Rash    Shrimp Rash     Current Outpatient Medications on File Prior to Visit   Medication Sig Dispense Refill    atorvastatin (LIPITOR) 20 MG tablet Take 20 mg by mouth once daily.      blood-glucose meter Misc   true metrix glucose monitor, See Instructions, to check bloodsugars BID, E11.9, # 1 EA, 5 Refill(s), Pharmacy: [facility], 154, cm, Height/Length Dosing, 04/19/21 10:31:00 CDT, 73.45, kg, Weight Dosing, 04/19/21 10:31:00 CDT      clotrimazole-betamethasone 1-0.05% (LOTRISONE) cream Apply topically 2 (two) times daily. 45 g 1    fluticasone propionate (FLONASE) 50 mcg/actuation nasal spray 1 spray by Each Nostril route 2 (two) times daily.      gemfibroziL (LOPID) 600 MG tablet TAKE 1 TABLET BY MOUTH TWICE DAILY ONE AT 9 IN THE MORNING AND ONE AT 5 IN THE EVENING Strength: 600 mg 120 tablet 0    hydroCHLOROthiazide (HYDRODIURIL) 12.5 MG Tab Take 12.5 mg by mouth.      levothyroxine (SYNTHROID) 200 MCG tablet   See Instructions, Take 1 tablet by mouth once daily, # 90 tab(s), 3 Refill(s), Pharmacy: [facility], 153, cm, Height/Length Dosing, 03/11/22 8:36:00 CST, 65, kg, Weight Dosing, 03/11/22 8:36:00 CST      losartan (COZAAR) 100 MG tablet Take 1 tablet (100 mg total) by mouth once daily. 90 tablet 1    meclizine (ANTIVERT) 12.5 mg tablet Take 12.5 mg by mouth 3 (three) times daily as needed.      metFORMIN " (GLUCOPHAGE) 1000 MG tablet   See Instructions, TAKE 1 TABLET TWICE A DAY, # 180 tab(s), 3 Refill(s), Pharmacy: [facility], 153, cm, Height/Length Dosing, 02/02/22 8:09:00 CST, 65, kg, Weight Dosing, 02/02/22 8:09:00 CST      mupirocin (BACTROBAN) 2 % ointment Apply 1 g topically 2 (two) times a day.      omeprazole (PRILOSEC) 40 MG capsule Take 1 capsule (40 mg total) by mouth every morning. 90 capsule 3    traZODone (DESYREL) 100 MG tablet Take 2 tablets (200 mg total) by mouth nightly. 60 tablet 5    venlafaxine (EFFEXOR-XR) 150 MG Cp24 Take 150 mg by mouth once daily.      gabapentin (NEURONTIN) 300 MG capsule Take 300 mg by mouth.      insulin glargine (LANTUS) 100 unit/mL injection Inject 90 Units into the skin.      [DISCONTINUED] benzonatate (TESSALON) 200 MG capsule Take 200 mg by mouth 3 (three) times daily.      [DISCONTINUED] diphenhydrAMINE-aluminum-magnesium hydroxide-simethicone-LIDOcaine HCl 2% Magic Mouthwash:    1 Part viscous lidocaine 2%   1 Part Maalox  1 Part diphenhydramine 12.5 mg per 5 ml elixir     Swish, gargle, and spit one to two teaspoonfuls every six hours as needed. Shake well before using.    Dispense 120ml 120 mL 0    [DISCONTINUED] pravastatin (PRAVACHOL) 40 MG tablet Take 40 mg by mouth.       No current facility-administered medications on file prior to visit.     Social History     Socioeconomic History    Marital status:    Tobacco Use    Smoking status: Never    Smokeless tobacco: Never   Substance and Sexual Activity    Alcohol use: Never    Drug use: Never     Family History   Problem Relation Age of Onset    Diabetes Mother     GI problems Mother     Lung cancer Father     Diabetes Father     Alzheimer's disease Father        Review of Systems  A comprehensive review of systems was performed and was negative with exception of what is documented above.     Objective:   /74   Pulse 72   Temp 97.6 °F (36.4 °C) (Temporal)   Ht 5' (1.524 m)   Wt 64 kg  (141 lb)   SpO2 96%   BMI 27.54 kg/m²   Physical Exam  General : Alert and oriented, No acute distress, afebrile.  Eye : PERRLA. EOMI. Normal conjunctiva, Sclerae are nonicteric.   Respiratory : Respirations are non-labored and clear to auscultation bilaterally. Symmetrical air entry bilaterally, no crackles, no wheezes, no rhonchi. No cyanosis, no clubbing.  Cardiovascular : Normal rate, Regular rhythm. 2/6 LETTY Pulses are 2+ throughout. No JVD. No Edema.  Gastrointestinal : Soft, nontender, non-distended, bowel sounds are present in all quadrants, no organomegaly, no guarding, no rebound.  Musculoskeletal : Normal range of motion throughout. No muscle tenderness.  Integumentary : Warm, moist, intact.  Neurologic : Alert, Oriented  Psychiatric : Cooperative, Appropriate mood & affect.   Assessment/ Plan:   1. Anemia, unspecified type  Assessment & Plan:  Patient needs to follow up with her hematologist current hemoglobin and hematocrit at 9.1 and 29.  Endoscopic evaluation was negative.  There is no recent iron studies and she states she has not been seen by her hematologist in over a year.  So will get her back to Dr. Spencer for evaluation    Orders:  -     Ambulatory referral/consult to Hematology / Oncology; Future; Expected date: 12/22/2022    2. Type 2 diabetes mellitus with hyperglycemia, with long-term current use of insulin  Assessment & Plan:  Patient having a lot of trouble buying her insulin and she takes 60 units a day.  She does not have any pharmaceutical benefit for insulin coverage we have tried numerous times in the past so she is buying 70 30 over-the-counter.  We are going to add some glipizide 5 mg and see if it will allow us to back off on her insulin dosing some.  I want to see her back in 3 months for recheck of her A1c and her blood sugar    Orders:  -     Hemoglobin A1C; Future; Expected date: 03/15/2023  -     Comprehensive Metabolic Panel; Future; Expected date: 03/15/2023    Other  orders  -     glipiZIDE 5 MG TR24; Take 1 tablet (5 mg total) by mouth daily with breakfast.  Dispense: 90 tablet; Refill: 3             Follow up in about 3 months (around 3/15/2023) for with labs prior to visit, NURSE PRACTITIONER, DIABETIC REVISIT.    An office visit for an established patient was performed. 10 minutes was used for reviewing the patients chart prior to the inoffice visit done on that same day. 15 minutes was used during the visit in regards to taking the patient history and physical exam. There was also an additional 5 minutes spent on education and counseling regarding medical conditions, current medications including risk/benefit and side effects/adverse events, vaccine counseling. After leaving the exam room, the provider then spent an additional 5 minutes completing the electronic health record.    The patient is receptive, expresses understanding and is agreeable to plan. All questions answered; total time spent was 35 minutes.

## 2022-12-15 NOTE — ASSESSMENT & PLAN NOTE
Patient having a lot of trouble buying her insulin and she takes 60 units a day.  She does not have any pharmaceutical benefit for insulin coverage we have tried numerous times in the past so she is buying 70 30 over-the-counter.  We are going to add some glipizide 5 mg and see if it will allow us to back off on her insulin dosing some.  I want to see her back in 3 months for recheck of her A1c and her blood sugar

## 2022-12-15 NOTE — ASSESSMENT & PLAN NOTE
Patient needs to follow up with her hematologist current hemoglobin and hematocrit at 9.1 and 29.  Endoscopic evaluation was negative.  There is no recent iron studies and she states she has not been seen by her hematologist in over a year.  So will get her back to Dr. Spencer for evaluation

## 2022-12-20 ENCOUNTER — TELEPHONE (OUTPATIENT)
Dept: INTERNAL MEDICINE | Facility: CLINIC | Age: 72
End: 2022-12-20
Payer: MEDICARE

## 2022-12-20 DIAGNOSIS — E78.1 HYPERTRIGLYCERIDEMIA: ICD-10-CM

## 2022-12-20 RX ORDER — HYDROCHLOROTHIAZIDE 12.5 MG/1
12.5 TABLET ORAL DAILY
Qty: 90 TABLET | Refills: 3 | Status: SHIPPED | OUTPATIENT
Start: 2022-12-20 | End: 2023-12-29

## 2022-12-20 RX ORDER — GEMFIBROZIL 600 MG/1
TABLET, FILM COATED ORAL
Qty: 180 TABLET | Refills: 3 | Status: SHIPPED | OUTPATIENT
Start: 2022-12-20 | End: 2024-02-01

## 2022-12-20 NOTE — TELEPHONE ENCOUNTER
----- Message from Csasidy Reese sent at 12/20/2022  9:50 AM CST -----  Hydrochlorothiazide 12.5MG, and  Genfibrozil 600MG

## 2022-12-22 ENCOUNTER — TELEPHONE (OUTPATIENT)
Dept: INTERNAL MEDICINE | Facility: CLINIC | Age: 72
End: 2022-12-22
Payer: MEDICARE

## 2022-12-22 RX ORDER — AMOXICILLIN 500 MG/1
500 TABLET, FILM COATED ORAL EVERY 12 HOURS
Qty: 20 TABLET | Refills: 0 | Status: SHIPPED | OUTPATIENT
Start: 2022-12-22 | End: 2023-01-01

## 2022-12-22 NOTE — TELEPHONE ENCOUNTER
----- Message from Noah Tafoya MD sent at 12/22/2022  3:09 PM CST -----  Rx sent  ----- Message -----  From: Gelacio Prasad  Sent: 12/22/2022  11:45 AM CST  To: Noah Tafoya MD    Pt called stating from her last OV from 12/15 the cough and congestion got worse. Pt wanted to see if you can call something in .. please advise?

## 2022-12-22 NOTE — TELEPHONE ENCOUNTER
----- Message from Gelacio Prasad sent at 12/22/2022 11:44 AM CST -----  Pt called stating from her last OV from 12/15 the cough and congestion got worse. Pt wanted to see if you can call something in .. please advise?

## 2022-12-27 ENCOUNTER — TELEPHONE (OUTPATIENT)
Dept: INTERNAL MEDICINE | Facility: CLINIC | Age: 72
End: 2022-12-27
Payer: MEDICARE

## 2022-12-27 NOTE — TELEPHONE ENCOUNTER
----- Message from Noah Tafoya MD sent at 12/27/2022  4:06 PM CST -----  Shes going to need to go to the urgent care is they have that concern  I dont have any openings   ----- Message -----  From: NavdeepJazminpuneet Prasad  Sent: 12/27/2022   2:25 PM CST  To: Noah Tafoya MD    Patient called stating the Amoxicillin she's been on for 5 days now still isn't working  She can hear herself wheezing and she spoke with her daughter (a nurse) and was told to get evaluated for possibly having pneumonia

## 2023-01-02 ENCOUNTER — HOSPITAL ENCOUNTER (EMERGENCY)
Facility: HOSPITAL | Age: 73
Discharge: HOME | End: 2023-01-02
Attending: EMERGENCY MEDICINE
Payer: MEDICARE

## 2023-01-02 VITALS
HEART RATE: 74 BPM | BODY MASS INDEX: 27.02 KG/M2 | RESPIRATION RATE: 20 BRPM | TEMPERATURE: 99 F | HEIGHT: 60 IN | DIASTOLIC BLOOD PRESSURE: 71 MMHG | SYSTOLIC BLOOD PRESSURE: 162 MMHG | WEIGHT: 137.63 LBS | OXYGEN SATURATION: 98 %

## 2023-01-02 DIAGNOSIS — R05.9 COUGHING: ICD-10-CM

## 2023-01-02 DIAGNOSIS — J20.8 ACUTE BRONCHITIS, BACTERIAL: Primary | ICD-10-CM

## 2023-01-02 DIAGNOSIS — B96.89 ACUTE BRONCHITIS, BACTERIAL: Primary | ICD-10-CM

## 2023-01-02 PROCEDURE — 0240U COVID/FLU A&B PCR: CPT | Performed by: EMERGENCY MEDICINE

## 2023-01-02 PROCEDURE — 99284 EMERGENCY DEPT VISIT MOD MDM: CPT | Mod: 25

## 2023-01-02 RX ORDER — ALBUTEROL SULFATE 90 UG/1
2 AEROSOL, METERED RESPIRATORY (INHALATION) EVERY 6 HOURS PRN
Qty: 6.7 G | Refills: 2 | Status: SHIPPED | OUTPATIENT
Start: 2023-01-02 | End: 2023-11-08 | Stop reason: SDUPTHER

## 2023-01-02 RX ORDER — DOXYCYCLINE 100 MG/1
100 CAPSULE ORAL 2 TIMES DAILY
Qty: 20 CAPSULE | Refills: 0 | Status: SHIPPED | OUTPATIENT
Start: 2023-01-02 | End: 2023-01-12

## 2023-01-02 RX ORDER — BENZONATATE 200 MG/1
200 CAPSULE ORAL 3 TIMES DAILY PRN
Qty: 21 CAPSULE | Refills: 0 | Status: SHIPPED | OUTPATIENT
Start: 2023-01-02 | End: 2023-01-09 | Stop reason: SDUPTHER

## 2023-01-02 NOTE — DISCHARGE INSTRUCTIONS
Doxycycline twice a day  Albuterol 2 puffs every 6 hours as needed for wheezing  Benzonatate as needed for cough

## 2023-01-02 NOTE — ED PROVIDER NOTES
"Encounter Date: 1/2/2023       History     Chief Complaint   Patient presents with    Cough     Complains of coughing x 3 weeks. Saw her PMD and was started on antibiotics but not better. Denies fever     72-year-old female presents to ER complaining of continued cough and chest congestion.  She states she is had a cough for 3 weeks now having dark-colored sputum.  She was seen by her primary care physician after the 1st week and started on amoxicillin which she has completed but states she is not any better.  She denies any fever.  She reports mild shortness of breath with excessive coughing.  She reports no nausea, vomiting or diarrhea.    The history is provided by the patient. No  was used.   Review of patient's allergies indicates:   Allergen Reactions    Baclofen Hallucinations    Donepezil Hallucinations    Erythromycin      Other reaction(s): Upset Stomach    Fluoxetine      Doesn't work    Grass pollen      Other reaction(s): per allergy test    Hydrocodone-acetaminophen      Other reaction(s): "Makes me crazy"    Ivp dye [iodinated contrast media]      Other reaction(s): Rash    Metoclopramide hcl     Rosuvastatin     Iodine Rash    Shrimp Rash     Past Medical History:   Diagnosis Date    Anemia     Anxiety     Arthritis     Cerebrovascular accident     Chronic back pain     Depression     Diabetes mellitus     Elevated ferritin     External hemorrhoids     GERD (gastroesophageal reflux disease)     HLD (hyperlipidemia)     Hypertension     Hypothyroidism, unspecified     CRISTINA (iron deficiency anemia)     Incisional hernia     Internal hemorrhoids     Myoclonus     Osteoporosis     Osteoporosis     Personal history of colonic polyps     Vertigo      Past Surgical History:   Procedure Laterality Date    biopsy of breast      BONE MARROW BIOPSY W/ ASPIRATION Right 2018    CATARACT EXTRACTION Right 04/30/2021    CHOLECYSTECTOMY      COLONOSCOPY  12/01/2021    Dr. Junior Cardenas  Repeat " colon 2026    HERNIA REPAIR      HERNIA REPAIR  01/27/2020    HYSTERECTOMY  2013    PATENT DUCTUS ARTERIOUS LIGATION      SHOULDER SURGERY Left     THYROIDECTOMY       Family History   Problem Relation Age of Onset    Diabetes Mother     GI problems Mother     Lung cancer Father     Diabetes Father     Alzheimer's disease Father      Social History     Tobacco Use    Smoking status: Never    Smokeless tobacco: Never   Substance Use Topics    Alcohol use: Never    Drug use: Never     Review of Systems   Constitutional:  Negative for chills and fever.   HENT:  Positive for congestion.    Respiratory:  Positive for cough, shortness of breath and wheezing.    Gastrointestinal:  Negative for diarrhea and vomiting.   Musculoskeletal:  Negative for myalgias.   Skin:  Negative for rash.   All other systems reviewed and are negative.    Physical Exam     Initial Vitals [01/02/23 1354]   BP Pulse Resp Temp SpO2   (!) 179/73 76 20 98.8 °F (37.1 °C) 97 %      MAP       --         Physical Exam    Constitutional: She appears well-developed and well-nourished.   HENT:   Head: Normocephalic.   Eyes: EOM are normal. Pupils are equal, round, and reactive to light.   Neck: Neck supple.   Normal range of motion.  Cardiovascular:  Normal rate, regular rhythm and normal heart sounds.           Pulmonary/Chest: No respiratory distress. She has wheezes.   Abdominal: She exhibits no distension. There is no abdominal tenderness.   Musculoskeletal:         General: Normal range of motion.      Cervical back: Normal range of motion and neck supple.     Neurological: She is alert and oriented to person, place, and time.   Skin: Skin is warm and dry. Capillary refill takes less than 2 seconds.   Psychiatric: She has a normal mood and affect.       ED Course   Procedures  Labs Reviewed   COVID/FLU A&B PCR - Normal    Narrative:     The Xpert Xpress SARS-CoV-2/FLU/RSV plus is a rapid, multiplexed real-time PCR test intended for the simultaneous  qualitative detection and differentiation of SARS-CoV-2, Influenza A, Influenza B, and respiratory syncytial virus (RSV) viral RNA in either nasopharyngeal swab or nasal swab specimens.                Imaging Results              X-Ray Chest PA And Lateral (Final result)  Result time 01/02/23 14:47:11      Final result by Raciel Feng MD (01/02/23 14:47:11)                   Impression:      No acute pulmonary process appreciated.      Electronically signed by: Raciel Feng  Date:    01/02/2023  Time:    14:47               Narrative:    EXAMINATION:  XR CHEST PA AND LATERAL    CLINICAL HISTORY:  Cough, unspecified    TECHNIQUE:  PA and lateral radiographs of the chest    COMPARISON:  Radiography 04/02/2021    FINDINGS:  Normal cardiac silhouette. The lungs are well-inflated. No consolidation identified. No pleural effusion or discernible pneumothorax.                                       Medications - No data to display  Medical Decision Making:   Differential Diagnosis:   COVID, influenza, pneumonia, bronchitis  Clinical Tests:   Lab Tests: Ordered and Reviewed       <> Summary of Lab: COVID and influenza are negative  Radiological Study: Ordered and Reviewed  ED Management:  Patient's COVID and influenza were negative.  She has had a course of amoxicillin when symptoms 1st began 3 weeks ago.  She states she is now having dark colored Fuson and excessive coughing.  Given patient's length of symptoms and now having a changes.  Color will start her on doxycycline, albuterol inhaler.  Will avoid steroids as patient is a diabetic.                        Clinical Impression:   Final diagnoses:  [R05.9] Coughing  [J20.8, B96.89] Acute bronchitis, bacterial (Primary)        ED Disposition Condition    Discharge Stable          ED Prescriptions       Medication Sig Dispense Start Date End Date Auth. Provider    albuterol (PROVENTIL/VENTOLIN HFA) 90 mcg/actuation inhaler Inhale 2 puffs into the lungs every 6 (six)  hours as needed for Wheezing or Shortness of Breath. 6.7 g 1/2/2023 1/2/2024 JAKOB Subramanian    benzonatate (TESSALON) 200 MG capsule Take 1 capsule (200 mg total) by mouth 3 (three) times daily as needed for Cough. 21 capsule 1/2/2023 1/12/2023 JAKOB Subramanian    doxycycline (VIBRAMYCIN) 100 MG Cap Take 1 capsule (100 mg total) by mouth 2 (two) times daily. for 10 days 20 capsule 1/2/2023 1/12/2023 JAKOB Subramanian          Follow-up Information    None          JAKOB Subramanian  01/02/23 3625

## 2023-01-09 ENCOUNTER — TELEPHONE (OUTPATIENT)
Dept: INTERNAL MEDICINE | Facility: CLINIC | Age: 73
End: 2023-01-09
Payer: MEDICARE

## 2023-01-09 DIAGNOSIS — R05.9 COUGH, UNSPECIFIED TYPE: Primary | ICD-10-CM

## 2023-01-09 RX ORDER — BENZONATATE 200 MG/1
200 CAPSULE ORAL 3 TIMES DAILY PRN
Qty: 21 CAPSULE | Refills: 0 | Status: SHIPPED | OUTPATIENT
Start: 2023-01-09 | End: 2023-01-19

## 2023-01-09 NOTE — TELEPHONE ENCOUNTER
----- Message from Noah Tafoya MD sent at 1/9/2023  3:30 PM CST -----  Ok to send tessalon as requested  Did she have a chest x ray?  Any shortness of breath?  ----- Message -----  From: Karin Bales MA  Sent: 1/9/2023   2:39 PM CST  To: Noah Tafoya MD    Pt went to [facility] . Pt still having a bad cough and still very congested. Please advise?   ----- Message -----  From: Gelacio Prasad  Sent: 1/9/2023   2:09 PM CST  To: Rosalio Zamora Staff    Pt went to the emergency room on 01/02 for a bad sinus infection and was wanting to know if she can get a refill on the Tessalon 200 mg sent to [facility]

## 2023-01-18 ENCOUNTER — OFFICE VISIT (OUTPATIENT)
Dept: ORTHOPEDICS | Facility: CLINIC | Age: 73
End: 2023-01-18
Payer: MEDICARE

## 2023-01-18 DIAGNOSIS — M25.551 RIGHT HIP PAIN: Primary | ICD-10-CM

## 2023-01-18 DIAGNOSIS — M54.16 LUMBAR RADICULOPATHY: ICD-10-CM

## 2023-01-18 DIAGNOSIS — M70.61 TROCHANTERIC BURSITIS OF RIGHT HIP: ICD-10-CM

## 2023-01-18 PROCEDURE — 99204 OFFICE O/P NEW MOD 45 MIN: CPT | Mod: ,,, | Performed by: ORTHOPAEDIC SURGERY

## 2023-01-18 PROCEDURE — 1125F PR PAIN SEVERITY QUANTIFIED, PAIN PRESENT: ICD-10-PCS | Mod: CPTII,,, | Performed by: ORTHOPAEDIC SURGERY

## 2023-01-18 PROCEDURE — 1160F PR REVIEW ALL MEDS BY PRESCRIBER/CLIN PHARMACIST DOCUMENTED: ICD-10-PCS | Mod: CPTII,,, | Performed by: ORTHOPAEDIC SURGERY

## 2023-01-18 PROCEDURE — 1125F AMNT PAIN NOTED PAIN PRSNT: CPT | Mod: CPTII,,, | Performed by: ORTHOPAEDIC SURGERY

## 2023-01-18 PROCEDURE — 1159F PR MEDICATION LIST DOCUMENTED IN MEDICAL RECORD: ICD-10-PCS | Mod: CPTII,,, | Performed by: ORTHOPAEDIC SURGERY

## 2023-01-18 PROCEDURE — 1160F RVW MEDS BY RX/DR IN RCRD: CPT | Mod: CPTII,,, | Performed by: ORTHOPAEDIC SURGERY

## 2023-01-18 PROCEDURE — 1159F MED LIST DOCD IN RCRD: CPT | Mod: CPTII,,, | Performed by: ORTHOPAEDIC SURGERY

## 2023-01-18 PROCEDURE — 1100F PTFALLS ASSESS-DOCD GE2>/YR: CPT | Mod: CPTII,,, | Performed by: ORTHOPAEDIC SURGERY

## 2023-01-18 PROCEDURE — 3288F PR FALLS RISK ASSESSMENT DOCUMENTED: ICD-10-PCS | Mod: CPTII,,, | Performed by: ORTHOPAEDIC SURGERY

## 2023-01-18 PROCEDURE — 99204 PR OFFICE/OUTPT VISIT, NEW, LEVL IV, 45-59 MIN: ICD-10-PCS | Mod: ,,, | Performed by: ORTHOPAEDIC SURGERY

## 2023-01-18 PROCEDURE — 3288F FALL RISK ASSESSMENT DOCD: CPT | Mod: CPTII,,, | Performed by: ORTHOPAEDIC SURGERY

## 2023-01-18 PROCEDURE — 1100F PR PT FALLS ASSESS DOC 2+ FALLS/FALL W/INJURY/YR: ICD-10-PCS | Mod: CPTII,,, | Performed by: ORTHOPAEDIC SURGERY

## 2023-01-18 NOTE — PROGRESS NOTES
Subjective:    CC: Pain of the Right Hip and Hip Pain (Rt hip pain,started about a year ago. pt states she has fallen a few times in the last year for no reason. not taking pain meds.)       HPI:  Patient comes in today for 1st visit.  Patient complains of right hip pain for over 1 year.  She states she is had falls in the past.  She is not taking any pain medications.  She does have a history of lumbar radiculopathy, sciatica.  States he has pain in the buttock, occasional pain on the side especially while lying on the right hip.  She denies any especially groin pain.  She denies any numbness or tingling today.  She does have a history of diabetes.    ROS: Refer to HPI for pertinent ROS. All other 12 point systems negative.    Objective:  Vitals:    01/18/23 1255   BP: Comment: unable to obtain        Physical Exam:  Patient is well-nourished developed female she is awake alert and oriented x3 she is an apparent stress is pleasant and cooperative.  Examination of the right lower extremity compartment soft and warm.  Skin is intact.  There is no signs symptoms of DVT infection.  She is tender along the lateral aspect of the right hip she is mildly tender along the trochanteric area of the right hip.  She has mild discomfort with resisted hip abduction.  She is a negative Stinchfield exam no groin pain, no obvious long track signs today.  She walks appropriate gait, neurovascular intact distally.    Images:  X-rays two views right hip demonstrates minimal arthritic changes, history of a spinal stimulator image. Images Reviewed and discussed with patient.    Assessment:  1. Right hip pain  - X-Ray Hip 2 or 3 views Right (with Pelvis when performed); Future  - Ambulatory referral/consult to Orthopedics    2. Lumbar radiculopathy  - Ambulatory referral/consult to Physical/Occupational Therapy; Future    3. Trochanteric bursitis of right hip  - Ambulatory referral/consult to Physical/Occupational Therapy; Future         Plan:  At this time we discussed her physical exam and x-ray findings.  We have discussed her trochanteric bursitis, she would like to hold off on injection.  We have discussed formal therapy for her her hip, we have also discussed follow up with her spine specialist for her lumbar spine, likely also contributing to her discomfort, I would like see back in 4 weeks to see how she is progressing.    Follow UP: No follow-ups on file.

## 2023-01-19 ENCOUNTER — TELEPHONE (OUTPATIENT)
Dept: INTERNAL MEDICINE | Facility: CLINIC | Age: 73
End: 2023-01-19
Payer: MEDICARE

## 2023-01-19 RX ORDER — BENZONATATE 200 MG/1
200 CAPSULE ORAL 3 TIMES DAILY PRN
Qty: 30 CAPSULE | Refills: 0 | Status: SHIPPED | OUTPATIENT
Start: 2023-01-19 | End: 2023-01-29

## 2023-01-19 NOTE — TELEPHONE ENCOUNTER
----- Message from Gelacio Prasad sent at 1/19/2023  2:27 PM CST -----  Pt requesting a refill on the benzonatate ..  cough isn't getting any better and she's starting to cough up mucus

## 2023-01-30 ENCOUNTER — TELEPHONE (OUTPATIENT)
Dept: INTERNAL MEDICINE | Facility: CLINIC | Age: 73
End: 2023-01-30
Payer: MEDICARE

## 2023-01-30 NOTE — TELEPHONE ENCOUNTER
----- Message from Karin Bales MA sent at 1/30/2023  9:54 AM CST -----  Patient will need to schedule an appointment   ----- Message -----  From: Gelacio Osmar  Sent: 1/30/2023   9:16 AM CST  To: Rosalio Zamora Staff    Pt requesting for a script of venlafaxine 150 mg .. she was on it before but Dr. PEÑA has never prescribed it to her .. stated her mood swings are starting to come back like they were before .     [place] - [facility] .

## 2023-01-31 ENCOUNTER — OFFICE VISIT (OUTPATIENT)
Dept: INTERNAL MEDICINE | Facility: CLINIC | Age: 73
End: 2023-01-31
Payer: MEDICARE

## 2023-01-31 VITALS
WEIGHT: 136 LBS | BODY MASS INDEX: 26.7 KG/M2 | DIASTOLIC BLOOD PRESSURE: 82 MMHG | RESPIRATION RATE: 16 BRPM | SYSTOLIC BLOOD PRESSURE: 134 MMHG | HEART RATE: 84 BPM | OXYGEN SATURATION: 99 % | HEIGHT: 60 IN | TEMPERATURE: 97 F

## 2023-01-31 DIAGNOSIS — Z79.4 TYPE 2 DIABETES MELLITUS WITHOUT COMPLICATION, WITH LONG-TERM CURRENT USE OF INSULIN: ICD-10-CM

## 2023-01-31 DIAGNOSIS — E11.9 TYPE 2 DIABETES MELLITUS WITHOUT COMPLICATION, WITH LONG-TERM CURRENT USE OF INSULIN: ICD-10-CM

## 2023-01-31 DIAGNOSIS — F32.A DEPRESSION, UNSPECIFIED DEPRESSION TYPE: Primary | ICD-10-CM

## 2023-01-31 PROCEDURE — 1160F RVW MEDS BY RX/DR IN RCRD: CPT | Mod: CPTII,,, | Performed by: INTERNAL MEDICINE

## 2023-01-31 PROCEDURE — 1101F PT FALLS ASSESS-DOCD LE1/YR: CPT | Mod: CPTII,,, | Performed by: INTERNAL MEDICINE

## 2023-01-31 PROCEDURE — 1101F PR PT FALLS ASSESS DOC 0-1 FALLS W/OUT INJ PAST YR: ICD-10-PCS | Mod: CPTII,,, | Performed by: INTERNAL MEDICINE

## 2023-01-31 PROCEDURE — 3079F PR MOST RECENT DIASTOLIC BLOOD PRESSURE 80-89 MM HG: ICD-10-PCS | Mod: CPTII,,, | Performed by: INTERNAL MEDICINE

## 2023-01-31 PROCEDURE — 3075F SYST BP GE 130 - 139MM HG: CPT | Mod: CPTII,,, | Performed by: INTERNAL MEDICINE

## 2023-01-31 PROCEDURE — 3079F DIAST BP 80-89 MM HG: CPT | Mod: CPTII,,, | Performed by: INTERNAL MEDICINE

## 2023-01-31 PROCEDURE — 3288F PR FALLS RISK ASSESSMENT DOCUMENTED: ICD-10-PCS | Mod: CPTII,,, | Performed by: INTERNAL MEDICINE

## 2023-01-31 PROCEDURE — 99213 OFFICE O/P EST LOW 20 MIN: CPT | Mod: ,,, | Performed by: INTERNAL MEDICINE

## 2023-01-31 PROCEDURE — 1159F MED LIST DOCD IN RCRD: CPT | Mod: CPTII,,, | Performed by: INTERNAL MEDICINE

## 2023-01-31 PROCEDURE — 3288F FALL RISK ASSESSMENT DOCD: CPT | Mod: CPTII,,, | Performed by: INTERNAL MEDICINE

## 2023-01-31 PROCEDURE — 1159F PR MEDICATION LIST DOCUMENTED IN MEDICAL RECORD: ICD-10-PCS | Mod: CPTII,,, | Performed by: INTERNAL MEDICINE

## 2023-01-31 PROCEDURE — 3008F BODY MASS INDEX DOCD: CPT | Mod: CPTII,,, | Performed by: INTERNAL MEDICINE

## 2023-01-31 PROCEDURE — 99213 PR OFFICE/OUTPT VISIT, EST, LEVL III, 20-29 MIN: ICD-10-PCS | Mod: ,,, | Performed by: INTERNAL MEDICINE

## 2023-01-31 PROCEDURE — 1160F PR REVIEW ALL MEDS BY PRESCRIBER/CLIN PHARMACIST DOCUMENTED: ICD-10-PCS | Mod: CPTII,,, | Performed by: INTERNAL MEDICINE

## 2023-01-31 PROCEDURE — 3008F PR BODY MASS INDEX (BMI) DOCUMENTED: ICD-10-PCS | Mod: CPTII,,, | Performed by: INTERNAL MEDICINE

## 2023-01-31 PROCEDURE — 3075F PR MOST RECENT SYSTOLIC BLOOD PRESS GE 130-139MM HG: ICD-10-PCS | Mod: CPTII,,, | Performed by: INTERNAL MEDICINE

## 2023-01-31 RX ORDER — VENLAFAXINE HYDROCHLORIDE 150 MG/1
150 CAPSULE, EXTENDED RELEASE ORAL DAILY
Qty: 90 CAPSULE | Refills: 3 | Status: SHIPPED | OUTPATIENT
Start: 2023-01-31 | End: 2024-02-05

## 2023-01-31 NOTE — PROGRESS NOTES
"Subjective:      Patient ID: Alison Langston is a 72 y.o. female.    Chief Complaint: Depression      HPI:  Patient chronic depression, stable on Effexor 150 mg previously prescribed by psychiatric nurse practitioner she is been out of her medication for the past 2 days and reports brain fog, tremulous behavior.  She would like the medication resumed at the current dose.  Patient due for diabetic eye exam    Past Medical History:  Past Medical History:   Diagnosis Date    Anemia     Anxiety     Arthritis     Cerebrovascular accident     Chronic back pain     Depression     Diabetes mellitus     Elevated ferritin     External hemorrhoids     GERD (gastroesophageal reflux disease)     HLD (hyperlipidemia)     Hypertension     Hypothyroidism, unspecified     CRISTINA (iron deficiency anemia)     Incisional hernia     Internal hemorrhoids     Myoclonus     Osteoporosis     Osteoporosis     Personal history of colonic polyps     Vertigo      Past Surgical History:   Procedure Laterality Date    biopsy of breast      BONE MARROW BIOPSY W/ ASPIRATION Right 2018    CATARACT EXTRACTION Right 04/30/2021    CHOLECYSTECTOMY      COLONOSCOPY  12/01/2021    Dr. Junior Cardenas  Repeat colon 2026    HERNIA REPAIR      HERNIA REPAIR  01/27/2020    HYSTERECTOMY  2013    PATENT DUCTUS ARTERIOUS LIGATION      SHOULDER SURGERY Left     THYROIDECTOMY       Review of patient's allergies indicates:   Allergen Reactions    Baclofen Hallucinations    Donepezil Hallucinations    Erythromycin      Other reaction(s): Upset Stomach    Fluoxetine      Doesn't work    Grass pollen      Other reaction(s): per allergy test    Hydrocodone-acetaminophen      Other reaction(s): "Makes me crazy"    Ivp dye [iodinated contrast media]      Other reaction(s): Rash    Metoclopramide hcl     Rosuvastatin     Iodine Rash    Shrimp Rash     Current Outpatient Medications on File Prior to Visit   Medication Sig Dispense Refill    albuterol (PROVENTIL/VENTOLIN " HFA) 90 mcg/actuation inhaler Inhale 2 puffs into the lungs every 6 (six) hours as needed for Wheezing or Shortness of Breath. 6.7 g 2    atorvastatin (LIPITOR) 20 MG tablet Take 20 mg by mouth once daily.      blood-glucose meter Misc   true metrix glucose monitor, See Instructions, to check bloodsugars BID, E11.9, # 1 EA, 5 Refill(s), Pharmacy: [facility], 154, cm, Height/Length Dosing, 04/19/21 10:31:00 CDT, 73.45, kg, Weight Dosing, 04/19/21 10:31:00 CDT      clotrimazole-betamethasone 1-0.05% (LOTRISONE) cream Apply topically 2 (two) times daily. 45 g 1    fluticasone propionate (FLONASE) 50 mcg/actuation nasal spray 1 spray by Each Nostril route 2 (two) times daily.      gemfibroziL (LOPID) 600 MG tablet TAKE 1 TABLET BY MOUTH TWICE DAILY ONE AT 9 IN THE MORNING AND ONE AT 5 IN THE EVENING Strength: 600 mg 180 tablet 3    glipiZIDE 5 MG TR24 Take 1 tablet (5 mg total) by mouth daily with breakfast. 90 tablet 3    hydroCHLOROthiazide (HYDRODIURIL) 12.5 MG Tab Take 1 tablet (12.5 mg total) by mouth once daily. 90 tablet 3    insulin glargine (LANTUS) 100 unit/mL injection Inject 90 Units into the skin.      levothyroxine (SYNTHROID) 200 MCG tablet   See Instructions, Take 1 tablet by mouth once daily, # 90 tab(s), 3 Refill(s), Pharmacy: [facility], 153, cm, Height/Length Dosing, 03/11/22 8:36:00 CST, 65, kg, Weight Dosing, 03/11/22 8:36:00 CST      losartan (COZAAR) 100 MG tablet Take 1 tablet (100 mg total) by mouth once daily. 90 tablet 1    meclizine (ANTIVERT) 12.5 mg tablet Take 12.5 mg by mouth 3 (three) times daily as needed.      metFORMIN (GLUCOPHAGE) 1000 MG tablet   See Instructions, TAKE 1 TABLET TWICE A DAY, # 180 tab(s), 3 Refill(s), Pharmacy: [facility], 153, cm, Height/Length Dosing, 02/02/22 8:09:00 CST, 65, kg, Weight Dosing, 02/02/22 8:09:00 CST      mupirocin (BACTROBAN) 2 % ointment Apply 1 g topically 2 (two) times a day.      omeprazole (PRILOSEC) 40 MG  capsule Take 1 capsule (40 mg total) by mouth every morning. 90 capsule 3    traZODone (DESYREL) 100 MG tablet Take 2 tablets (200 mg total) by mouth nightly. 60 tablet 5    [DISCONTINUED] venlafaxine (EFFEXOR-XR) 150 MG Cp24 Take 150 mg by mouth once daily.       No current facility-administered medications on file prior to visit.     Social History     Socioeconomic History    Marital status:    Tobacco Use    Smoking status: Never    Smokeless tobacco: Never   Substance and Sexual Activity    Alcohol use: Never    Drug use: Never     Family History   Problem Relation Age of Onset    Diabetes Mother     GI problems Mother     Lung cancer Father     Diabetes Father     Alzheimer's disease Father        Review of Systems  A comprehensive review of systems was performed and was negative with exception of what is documented above.     Objective:   /82 (BP Location: Right arm, Patient Position: Sitting, BP Method: Medium (Manual))   Pulse 84   Temp 97.3 °F (36.3 °C) (Temporal)   Resp 16   Ht 5' (1.524 m)   Wt 61.7 kg (136 lb)   SpO2 99%   BMI 26.56 kg/m²   Physical Exam  General : Alert and oriented, No acute distress, afebrile.  Eye : PERRLA. EOMI. Normal conjunctiva, Sclerae are nonicteric.  Musculoskeletal : Normal range of motion throughout. No muscle tenderness.  Integumentary : Warm, moist, intact.  Neurologic : Alert, Oriented  Psychiatric : Cooperative, Appropriate mood & affect.   Assessment/ Plan:   1. Depression, unspecified depression type  Assessment & Plan:  Resume Effexor at current dose      2. Type 2 diabetes mellitus without complication, with long-term current use of insulin  -     Ambulatory referral/consult to Ophthalmology; Future; Expected date: 02/07/2023    Other orders  -     venlafaxine (EFFEXOR-XR) 150 MG Cp24; Take 1 capsule (150 mg total) by mouth once daily.  Dispense: 90 capsule; Refill: 3             Follow up if symptoms worsen or fail to improve.

## 2023-02-08 DIAGNOSIS — D63.1 ANEMIA IN CHRONIC KIDNEY DISEASE, UNSPECIFIED CKD STAGE: Primary | ICD-10-CM

## 2023-02-08 DIAGNOSIS — N18.9 ANEMIA IN CHRONIC KIDNEY DISEASE, UNSPECIFIED CKD STAGE: Primary | ICD-10-CM

## 2023-02-28 ENCOUNTER — DOCUMENTATION ONLY (OUTPATIENT)
Dept: ADMINISTRATIVE | Facility: HOSPITAL | Age: 73
End: 2023-02-28
Payer: MEDICARE

## 2023-03-01 ENCOUNTER — OFFICE VISIT (OUTPATIENT)
Dept: ORTHOPEDICS | Facility: CLINIC | Age: 73
End: 2023-03-01
Payer: MEDICARE

## 2023-03-01 ENCOUNTER — DOCUMENTATION ONLY (OUTPATIENT)
Dept: ADMINISTRATIVE | Facility: HOSPITAL | Age: 73
End: 2023-03-01
Payer: MEDICARE

## 2023-03-01 VITALS — WEIGHT: 136 LBS | HEIGHT: 60 IN | BODY MASS INDEX: 26.7 KG/M2

## 2023-03-01 DIAGNOSIS — M70.61 TROCHANTERIC BURSITIS OF RIGHT HIP: Primary | ICD-10-CM

## 2023-03-01 DIAGNOSIS — M54.16 LUMBAR RADICULOPATHY: ICD-10-CM

## 2023-03-01 PROCEDURE — 4010F PR ACE/ARB THEARPY RXD/TAKEN: ICD-10-PCS | Mod: CPTII,,, | Performed by: ORTHOPAEDIC SURGERY

## 2023-03-01 PROCEDURE — 1159F PR MEDICATION LIST DOCUMENTED IN MEDICAL RECORD: ICD-10-PCS | Mod: CPTII,,, | Performed by: ORTHOPAEDIC SURGERY

## 2023-03-01 PROCEDURE — 20610 LARGE JOINT ASPIRATION/INJECTION: R GREATER TROCHANTERIC BURSA: ICD-10-PCS | Mod: RT,,, | Performed by: ORTHOPAEDIC SURGERY

## 2023-03-01 PROCEDURE — 3288F FALL RISK ASSESSMENT DOCD: CPT | Mod: CPTII,,, | Performed by: ORTHOPAEDIC SURGERY

## 2023-03-01 PROCEDURE — 1159F MED LIST DOCD IN RCRD: CPT | Mod: CPTII,,, | Performed by: ORTHOPAEDIC SURGERY

## 2023-03-01 PROCEDURE — 1101F PT FALLS ASSESS-DOCD LE1/YR: CPT | Mod: CPTII,,, | Performed by: ORTHOPAEDIC SURGERY

## 2023-03-01 PROCEDURE — 3008F PR BODY MASS INDEX (BMI) DOCUMENTED: ICD-10-PCS | Mod: CPTII,,, | Performed by: ORTHOPAEDIC SURGERY

## 2023-03-01 PROCEDURE — 1160F RVW MEDS BY RX/DR IN RCRD: CPT | Mod: CPTII,,, | Performed by: ORTHOPAEDIC SURGERY

## 2023-03-01 PROCEDURE — 1101F PR PT FALLS ASSESS DOC 0-1 FALLS W/OUT INJ PAST YR: ICD-10-PCS | Mod: CPTII,,, | Performed by: ORTHOPAEDIC SURGERY

## 2023-03-01 PROCEDURE — 3288F PR FALLS RISK ASSESSMENT DOCUMENTED: ICD-10-PCS | Mod: CPTII,,, | Performed by: ORTHOPAEDIC SURGERY

## 2023-03-01 PROCEDURE — 4010F ACE/ARB THERAPY RXD/TAKEN: CPT | Mod: CPTII,,, | Performed by: ORTHOPAEDIC SURGERY

## 2023-03-01 PROCEDURE — 20610 DRAIN/INJ JOINT/BURSA W/O US: CPT | Mod: RT,,, | Performed by: ORTHOPAEDIC SURGERY

## 2023-03-01 PROCEDURE — 99213 PR OFFICE/OUTPT VISIT, EST, LEVL III, 20-29 MIN: ICD-10-PCS | Mod: 25,,, | Performed by: ORTHOPAEDIC SURGERY

## 2023-03-01 PROCEDURE — 3008F BODY MASS INDEX DOCD: CPT | Mod: CPTII,,, | Performed by: ORTHOPAEDIC SURGERY

## 2023-03-01 PROCEDURE — 1160F PR REVIEW ALL MEDS BY PRESCRIBER/CLIN PHARMACIST DOCUMENTED: ICD-10-PCS | Mod: CPTII,,, | Performed by: ORTHOPAEDIC SURGERY

## 2023-03-01 PROCEDURE — 99213 OFFICE O/P EST LOW 20 MIN: CPT | Mod: 25,,, | Performed by: ORTHOPAEDIC SURGERY

## 2023-03-01 RX ORDER — BETAMETHASONE SODIUM PHOSPHATE AND BETAMETHASONE ACETATE 3; 3 MG/ML; MG/ML
12 INJECTION, SUSPENSION INTRA-ARTICULAR; INTRALESIONAL; INTRAMUSCULAR; SOFT TISSUE
Status: DISCONTINUED | OUTPATIENT
Start: 2023-03-01 | End: 2023-03-01 | Stop reason: HOSPADM

## 2023-03-01 RX ORDER — LIDOCAINE HYDROCHLORIDE 20 MG/ML
3 INJECTION, SOLUTION INFILTRATION; PERINEURAL
Status: DISCONTINUED | OUTPATIENT
Start: 2023-03-01 | End: 2023-03-01 | Stop reason: HOSPADM

## 2023-03-01 RX ADMIN — BETAMETHASONE SODIUM PHOSPHATE AND BETAMETHASONE ACETATE 12 MG: 3; 3 INJECTION, SUSPENSION INTRA-ARTICULAR; INTRALESIONAL; INTRAMUSCULAR; SOFT TISSUE at 01:03

## 2023-03-01 RX ADMIN — LIDOCAINE HYDROCHLORIDE 3 MG: 20 INJECTION, SOLUTION INFILTRATION; PERINEURAL at 01:03

## 2023-03-01 NOTE — PROGRESS NOTES
Subjective:    CC: Pain of the Right Hip and Follow-up (R hip pain - pt states that she has not been able to do physical therapy yet. pt states that she still has pain in her hip. she is requesting cortisone injection - td)       HPI:  Patient returns today for repeat exam.  Patient states she is not been able to go to physical therapy.  She continues to have pain outside part her right hip.  She denies other complaints.    ROS: Refer to [facility] for pertinent ROS. All other 12 point systems negative.    Objective:  Vitals:    03/01/23 1300   Weight: 61.7 kg (136 lb)   Height: 5' (1.524 m)        Physical Exam:  Right lower extremity compartment soft and warm.  Skin is intact.  There is no signs symptoms of DVT infection.  She remains be tender along the lateral aspect of the right hip she does have pain with resisted hip abduction questionable long tract signs, negative Stinchfield exam, neurovascular intact distally.    Images: . Images Reviewed and discussed with patient.    Assessment:  1. Trochanteric bursitis of right hip  - Ambulatory referral/consult to Physical/Occupational Therapy; Future  - Large Joint Aspiration/Injection: R greater trochanteric bursa    2. Lumbar radiculopathy        Plan:  At this time we discussed her physical exam and previous imaging findings.  We will start with formal therapy, she tolerated her trochanteric injection very well, like see back in 3 months to see how she is progressing.    Follow UP: No follow-ups on file.    Large Joint Aspiration/Injection: R greater trochanteric bursa    Date/Time: 3/1/2023 1:00 PM  Performed by: Wang Ruiz MD  Authorized by: Wang Ruiz MD     Consent Done?:  Yes (Verbal)  Indications:  Pain  Site marked: the procedure site was marked    Timeout: prior to procedure the correct patient, procedure, and site was verified    Prep: patient was prepped and draped in usual sterile fashion    Local anesthesia used?: No      Details:  Needle Size:  22  G  Approach:  Lateral  Location:  Hip  Site:  R greater trochanteric bursa  Medications:  12 mg betamethasone acetate-betamethasone sodium phosphate 6 mg/mL; 3 mg LIDOcaine HCL 20 mg/ml (2%) 20 mg/mL (2 %)  Patient tolerance:  Patient tolerated the procedure well with no immediate complications

## 2023-03-01 NOTE — PROCEDURES
Large Joint Aspiration/Injection: R greater trochanteric bursa    Date/Time: 3/1/2023 1:00 PM  Performed by: Wang Ruiz MD  Authorized by: Wang Ruiz MD     Consent Done?:  Yes (Verbal)  Indications:  Pain  Site marked: the procedure site was marked    Timeout: prior to procedure the correct patient, procedure, and site was verified    Prep: patient was prepped and draped in usual sterile fashion    Local anesthesia used?: No      Details:  Needle Size:  22 G  Approach:  Lateral  Location:  Hip  Site:  R greater trochanteric bursa  Medications:  12 mg betamethasone acetate-betamethasone sodium phosphate 6 mg/mL; 3 mg LIDOcaine HCL 20 mg/ml (2%) 20 mg/mL (2 %)  Patient tolerance:  Patient tolerated the procedure well with no immediate complications

## 2023-03-08 ENCOUNTER — OFFICE VISIT (OUTPATIENT)
Dept: HEMATOLOGY/ONCOLOGY | Facility: CLINIC | Age: 73
End: 2023-03-08
Payer: MEDICARE

## 2023-03-08 ENCOUNTER — INFUSION (OUTPATIENT)
Dept: INFUSION THERAPY | Facility: HOSPITAL | Age: 73
End: 2023-03-08
Attending: NURSE PRACTITIONER
Payer: MEDICARE

## 2023-03-08 ENCOUNTER — TELEPHONE (OUTPATIENT)
Dept: INTERNAL MEDICINE | Facility: CLINIC | Age: 73
End: 2023-03-08
Payer: MEDICARE

## 2023-03-08 VITALS
RESPIRATION RATE: 18 BRPM | SYSTOLIC BLOOD PRESSURE: 168 MMHG | OXYGEN SATURATION: 97 % | WEIGHT: 140.19 LBS | HEART RATE: 77 BPM | TEMPERATURE: 99 F | HEIGHT: 60 IN | BODY MASS INDEX: 27.52 KG/M2 | DIASTOLIC BLOOD PRESSURE: 63 MMHG

## 2023-03-08 DIAGNOSIS — E87.5 HYPERKALEMIA: Primary | ICD-10-CM

## 2023-03-08 DIAGNOSIS — D64.9 ANEMIA, UNSPECIFIED TYPE: Primary | ICD-10-CM

## 2023-03-08 PROCEDURE — 4010F PR ACE/ARB THEARPY RXD/TAKEN: ICD-10-PCS | Mod: CPTII,S$GLB,, | Performed by: NURSE PRACTITIONER

## 2023-03-08 PROCEDURE — 3078F DIAST BP <80 MM HG: CPT | Mod: CPTII,S$GLB,, | Performed by: NURSE PRACTITIONER

## 2023-03-08 PROCEDURE — 99214 PR OFFICE/OUTPT VISIT, EST, LEVL IV, 30-39 MIN: ICD-10-PCS | Mod: S$GLB,,, | Performed by: NURSE PRACTITIONER

## 2023-03-08 PROCEDURE — 1160F RVW MEDS BY RX/DR IN RCRD: CPT | Mod: CPTII,S$GLB,, | Performed by: NURSE PRACTITIONER

## 2023-03-08 PROCEDURE — 63600175 PHARM REV CODE 636 W HCPCS: Mod: JG | Performed by: NURSE PRACTITIONER

## 2023-03-08 PROCEDURE — 1101F PR PT FALLS ASSESS DOC 0-1 FALLS W/OUT INJ PAST YR: ICD-10-PCS | Mod: CPTII,S$GLB,, | Performed by: NURSE PRACTITIONER

## 2023-03-08 PROCEDURE — 1101F PT FALLS ASSESS-DOCD LE1/YR: CPT | Mod: CPTII,S$GLB,, | Performed by: NURSE PRACTITIONER

## 2023-03-08 PROCEDURE — 1159F PR MEDICATION LIST DOCUMENTED IN MEDICAL RECORD: ICD-10-PCS | Mod: CPTII,S$GLB,, | Performed by: NURSE PRACTITIONER

## 2023-03-08 PROCEDURE — 3288F PR FALLS RISK ASSESSMENT DOCUMENTED: ICD-10-PCS | Mod: CPTII,S$GLB,, | Performed by: NURSE PRACTITIONER

## 2023-03-08 PROCEDURE — 3077F SYST BP >= 140 MM HG: CPT | Mod: CPTII,S$GLB,, | Performed by: NURSE PRACTITIONER

## 2023-03-08 PROCEDURE — 1160F PR REVIEW ALL MEDS BY PRESCRIBER/CLIN PHARMACIST DOCUMENTED: ICD-10-PCS | Mod: CPTII,S$GLB,, | Performed by: NURSE PRACTITIONER

## 2023-03-08 PROCEDURE — 3008F PR BODY MASS INDEX (BMI) DOCUMENTED: ICD-10-PCS | Mod: CPTII,S$GLB,, | Performed by: NURSE PRACTITIONER

## 2023-03-08 PROCEDURE — 3288F FALL RISK ASSESSMENT DOCD: CPT | Mod: CPTII,S$GLB,, | Performed by: NURSE PRACTITIONER

## 2023-03-08 PROCEDURE — 3078F PR MOST RECENT DIASTOLIC BLOOD PRESSURE < 80 MM HG: ICD-10-PCS | Mod: CPTII,S$GLB,, | Performed by: NURSE PRACTITIONER

## 2023-03-08 PROCEDURE — 99214 OFFICE O/P EST MOD 30 MIN: CPT | Mod: S$GLB,,, | Performed by: NURSE PRACTITIONER

## 2023-03-08 PROCEDURE — 96372 THER/PROPH/DIAG INJ SC/IM: CPT

## 2023-03-08 PROCEDURE — 4010F ACE/ARB THERAPY RXD/TAKEN: CPT | Mod: CPTII,S$GLB,, | Performed by: NURSE PRACTITIONER

## 2023-03-08 PROCEDURE — 3077F PR MOST RECENT SYSTOLIC BLOOD PRESSURE >= 140 MM HG: ICD-10-PCS | Mod: CPTII,S$GLB,, | Performed by: NURSE PRACTITIONER

## 2023-03-08 PROCEDURE — 3008F BODY MASS INDEX DOCD: CPT | Mod: CPTII,S$GLB,, | Performed by: NURSE PRACTITIONER

## 2023-03-08 PROCEDURE — 99999 PR PBB SHADOW E&M-EST. PATIENT-LVL IV: ICD-10-PCS | Mod: PBBFAC,,, | Performed by: NURSE PRACTITIONER

## 2023-03-08 PROCEDURE — 99999 PR PBB SHADOW E&M-EST. PATIENT-LVL IV: CPT | Mod: PBBFAC,,, | Performed by: NURSE PRACTITIONER

## 2023-03-08 PROCEDURE — 1159F MED LIST DOCD IN RCRD: CPT | Mod: CPTII,S$GLB,, | Performed by: NURSE PRACTITIONER

## 2023-03-08 RX ADMIN — EPOETIN ALFA 20000 UNITS: 20000 SOLUTION INTRAVENOUS; SUBCUTANEOUS at 03:03

## 2023-03-08 NOTE — PROGRESS NOTES
Subjective:       Patient ID: Alison Langston is a 72 y.o. female.    Chief Complaint: Anemia (Pt reports ongoing fatigue for a while now.)      Diagnosis:  1. Normocytic/hypochromic anemia with normal bone marrow biopsy done 1/26/18.    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 Iron deficiency  3. Multiple medical comorbidities, including hypothyroidism.     Current Treatment:   Observation       Treatment History:  Injectafter x 2 1/10 and 1/17/18.   PO Iron prior to that  Injectafer x2 treatments 06/15/18 and 06/22/18, and in October, 2018  Desferal 500mg x 3 doses 10/27/20-10/29/20 for iron overload      HPI   Patient kindly referred by Dr. Howard for further evaluation workup of her normocytic anemia, fairly long-standing in nature. The patient states she's been anemic off and on since she was first pregnant many years ago. She denies any history of any GI bleeding, but states she has taken iron pills in the past. She underwent a colonoscopy September 18, 2017 by Dr. Cardenas which showed normal mucosa and the entire colon. Single polyp of benign appearance was noted in the sigmoid colon with additional medium internal hemorrhoids noted. EGD was done the same day with no report available, with biopsies from the EGD revealing unremarkable intestinal mucosa from duodenal biopsy with no evidence of celiac sprue. Biopsy of the stomach reveal mild reactive gastropathy only. No evidence of H. pylori.  The patient went back to see Dr. Howard in December at which time CBC revealed hemoglobin of 9.3, hematocrit 32.4, MCV of 87.3, and normal WBC and platelets. TSH was elevated at 33.3, and her diabetes was noted to be under fairly poor control based on her hemoglobin A1c.    She was then referred to hematology for further evaluation and workup of her normocytic anemia.  Lab work done 12/20/17 showed iron sat of 12.5 and Ferritin of 11.5 (patient had been on PO iron bid).  Siria negative;   SPEP/WILLIAMS negative;  Retic count normal.  Epo level normal at 11.5.   MJ/DS DNA all normal.  Peripheral smear showed mild normocytic, hypochromic anemia with little variation in red cell size and shape.  Morphologic red cell variants include a few gisele cells, ovalocytes, elliptocytes, and rare acanthocytes.  No schistocytes are seen.  Mild rouleaux formation is seen.  Polychromasia is present.  Leukocytes are normal in number, differential, and morphology.  A few reactive lymphocytes are present.  Platelets are normal in number and morphology.  A few giant platelets are present.  Bone marrow biopsy done 1/26/2018 showed normocellular bone marrow with trilineage hematopoiesis--> no evidence of dysplasia or malignancy.  Normal cytogenetics noted.      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  Patient had persistent elevation of ferritin, therefore a MRI of the abdomen was done on 9/30/2020 and showed hepatomegaly with estimated liver iron concentration of 126 µmol/g, additional iron deposition in the spleen.  There was also a suspected 0.9 cm cystic lesion within the pancreatic body and a 1 year follow-up MRCP is suggested for surveillance.         Interval History:   Patient presents to clinic for scheduled follow up appointment.  She feels well overall other than fatigue lately.  She denies any bleeding or dark stools.  No night sweats, unintentional weight loss, fevers.    Past Medical History:   Diagnosis Date    Anemia     Anxiety     Arthritis     Cerebrovascular accident     Chronic back pain     Depression     Diabetes mellitus     Elevated ferritin     External hemorrhoids     GERD (gastroesophageal reflux disease)     HLD (hyperlipidemia)     Hypertension     Hypothyroidism, unspecified     CRISTINA (iron deficiency anemia)     Incisional hernia     Internal hemorrhoids     Myoclonus     Osteoporosis     Osteoporosis     Personal history of colonic polyps     Vertigo       Past Surgical  "History:   Procedure Laterality Date    biopsy of breast      BONE MARROW BIOPSY W/ ASPIRATION Right 2018    CATARACT EXTRACTION Right 04/30/2021    CHOLECYSTECTOMY      COLONOSCOPY  12/01/2021    Dr. Junior Cardenas  Repeat colon 2026    HERNIA REPAIR      HERNIA REPAIR  01/27/2020    HYSTERECTOMY  2013    PATENT DUCTUS ARTERIOUS LIGATION      SHOULDER SURGERY Left     THYROIDECTOMY       Social History     Socioeconomic History    Marital status:    Tobacco Use    Smoking status: Never    Smokeless tobacco: Never   Substance and Sexual Activity    Alcohol use: Never    Drug use: Never      Family History   Problem Relation Age of Onset    Diabetes Mother     GI problems Mother     Lung cancer Father     Diabetes Father     Alzheimer's disease Father       Review of patient's allergies indicates:   Allergen Reactions    Baclofen Hallucinations    Donepezil Hallucinations    Erythromycin      Other reaction(s): Upset Stomach    Fluoxetine      Doesn't work    Grass pollen      Other reaction(s): per allergy test    Hydrocodone-acetaminophen      Other reaction(s): "Makes me crazy"    Ivp dye [iodinated contrast media]      Other reaction(s): Rash    Metoclopramide hcl     Rosuvastatin     Iodine Rash    Shrimp Rash      Review of Systems   Constitutional:  Negative for chills, diaphoresis, fatigue, fever and unexpected weight change.   HENT:  Negative for nasal congestion, mouth sores, sinus pressure/congestion and sore throat.    Eyes:  Negative for pain and visual disturbance.   Respiratory:  Negative for cough, chest tightness and shortness of breath.    Cardiovascular:  Negative for chest pain, palpitations and leg swelling.   Gastrointestinal:  Negative for abdominal distention, abdominal pain, blood in stool, constipation and diarrhea.   Genitourinary:  Negative for dysuria, frequency and hematuria.   Musculoskeletal:  Negative for arthralgias and back pain.   Integumentary:  Negative for rash. "   Neurological:  Positive for numbness. Negative for dizziness, weakness and headaches.        Falls from numbness to her foot   Hematological:  Negative for adenopathy.   Psychiatric/Behavioral:  Negative for confusion.        Objective:      Physical Exam  Vitals reviewed.   Constitutional:       General: She is not in acute distress.     Appearance: Normal appearance.   HENT:      Head: Normocephalic and atraumatic.      Nose: Nose normal.      Mouth/Throat:      Mouth: Mucous membranes are moist.   Eyes:      Extraocular Movements: Extraocular movements intact.      Conjunctiva/sclera: Conjunctivae normal.   Cardiovascular:      Rate and Rhythm: Normal rate and regular rhythm.      Pulses: Normal pulses.      Heart sounds: Normal heart sounds.   Pulmonary:      Effort: Pulmonary effort is normal.      Breath sounds: Normal breath sounds.   Abdominal:      General: Bowel sounds are normal.      Palpations: Abdomen is soft.   Musculoskeletal:         General: No swelling. Normal range of motion.      Cervical back: Normal range of motion and neck supple.      Right lower leg: No edema.      Left lower leg: No edema.   Lymphadenopathy:      Cervical: No cervical adenopathy.   Skin:     General: Skin is warm and dry.   Neurological:      General: No focal deficit present.      Mental Status: She is alert and oriented to person, place, and time. Mental status is at baseline.   Psychiatric:         Mood and Affect: Mood normal.         Behavior: Behavior normal.       LABS AND IMAGING REVIEWED IN EPIC          Assessment:     1. Normocytic anemia with component of iron deficiency, in addition to anemia chronic disease, requiring IV Iron after poor response to PO iron.   2. Hypothyroidism, thought to be a component contributing to her anemia.    3. Normal Bone marrow biopsy done 1/26/18  4. Multiple medical comorbidities  5. Elevated ferritin -ruled out hemochromatosis. Received Desferal 500mg x 3 doses  10/27/20-10/29/20.  6. Neuropathy        Plan:         Patient doing well overall clinically with no worsening clinical symptoms    Continue Procrit subq as needed, last was 9/2022. Hgb today 10.5, will give today    CBC Q4w    Return to clinic in 4 months with a CBC, CMP, Iron studies, B12, Folate prior to appt    She did see Dr. Connor for pancreatic cyst, we will request these records.      VINNY Lemus

## 2023-03-08 NOTE — TELEPHONE ENCOUNTER
1. Are there any outstanding tasks in the patient's chart? Yes, fasting labs    2. Is there any documentation in the chart? No    3.Has patient been seen in an ER, Urgent care clinic, or been admitted since last visit?  If yes, When, where, and why    4. Has patient seen any other healthcare providers since last visit?  If yes, when, where, and why    5. Has patient had any bloodwork or XR done since last visit?    6. Is patient signed up for patient portal?    Spoke to pt. Pt Verbally confirmed understanding that she has appointment on 3/15/23 @ 9:30 with JAKOB Santana and needs fasting labs done at least 2 days prior to appointment.

## 2023-03-30 ENCOUNTER — TELEPHONE (OUTPATIENT)
Dept: INTERNAL MEDICINE | Facility: CLINIC | Age: 73
End: 2023-03-30
Payer: MEDICARE

## 2023-03-30 NOTE — TELEPHONE ENCOUNTER
----- Message from Kenny Carlson MA sent at 3/30/2023  1:33 PM CDT -----  Regarding: PV 4/6/23 @ 1:30 JAKOB Espinoza  1. Are there any outstanding tasks in the patient's chart? Yes, fasting labs    2. Is there any documentation in the chart? No    3.Has patient been seen in an ER, Urgent care clinic, or been admitted since last visit?  If yes, When, where, and why    4. Has patient seen any other healthcare providers since last visit?  If yes, when, where, and why    5. Has patient had any bloodwork or XR done since last visit?    6. Is patient signed up for patient portal?

## 2023-04-04 ENCOUNTER — INFUSION (OUTPATIENT)
Dept: INFUSION THERAPY | Facility: HOSPITAL | Age: 73
End: 2023-04-04
Attending: NURSE PRACTITIONER
Payer: MEDICARE

## 2023-04-04 VITALS
TEMPERATURE: 99 F | SYSTOLIC BLOOD PRESSURE: 168 MMHG | BODY MASS INDEX: 27.68 KG/M2 | HEART RATE: 71 BPM | WEIGHT: 141 LBS | HEIGHT: 60 IN | RESPIRATION RATE: 18 BRPM | DIASTOLIC BLOOD PRESSURE: 70 MMHG

## 2023-04-04 DIAGNOSIS — D64.9 ANEMIA, UNSPECIFIED TYPE: Primary | ICD-10-CM

## 2023-04-04 PROCEDURE — 63600175 PHARM REV CODE 636 W HCPCS: Mod: JG,EC | Performed by: NURSE PRACTITIONER

## 2023-04-04 PROCEDURE — 96372 THER/PROPH/DIAG INJ SC/IM: CPT

## 2023-04-04 RX ADMIN — EPOETIN ALFA 20000 UNITS: 20000 SOLUTION INTRAVENOUS; SUBCUTANEOUS at 09:04

## 2023-05-02 ENCOUNTER — TELEPHONE (OUTPATIENT)
Dept: INFUSION THERAPY | Facility: HOSPITAL | Age: 73
End: 2023-05-02
Payer: MEDICARE

## 2023-05-03 DIAGNOSIS — E78.1 HYPERTRIGLYCERIDEMIA: ICD-10-CM

## 2023-05-03 DIAGNOSIS — E11.65 TYPE 2 DIABETES MELLITUS WITH HYPERGLYCEMIA, WITH LONG-TERM CURRENT USE OF INSULIN: ICD-10-CM

## 2023-05-03 DIAGNOSIS — Z79.4 TYPE 2 DIABETES MELLITUS WITH HYPERGLYCEMIA, WITH LONG-TERM CURRENT USE OF INSULIN: ICD-10-CM

## 2023-05-03 RX ORDER — ATORVASTATIN CALCIUM 20 MG/1
TABLET, FILM COATED ORAL
Qty: 90 TABLET | Refills: 2 | Status: SHIPPED | OUTPATIENT
Start: 2023-05-03 | End: 2024-02-05

## 2023-05-03 RX ORDER — LOSARTAN POTASSIUM 100 MG/1
TABLET ORAL
Qty: 90 TABLET | Refills: 2 | Status: SHIPPED | OUTPATIENT
Start: 2023-05-03 | End: 2024-02-05

## 2023-05-05 ENCOUNTER — OFFICE VISIT (OUTPATIENT)
Dept: INTERNAL MEDICINE | Facility: CLINIC | Age: 73
End: 2023-05-05
Payer: MEDICARE

## 2023-05-05 VITALS
TEMPERATURE: 98 F | RESPIRATION RATE: 16 BRPM | WEIGHT: 139 LBS | HEART RATE: 80 BPM | SYSTOLIC BLOOD PRESSURE: 160 MMHG | OXYGEN SATURATION: 98 % | DIASTOLIC BLOOD PRESSURE: 98 MMHG | HEIGHT: 60 IN | BODY MASS INDEX: 27.29 KG/M2

## 2023-05-05 DIAGNOSIS — J06.9 UPPER RESPIRATORY TRACT INFECTION, UNSPECIFIED TYPE: ICD-10-CM

## 2023-05-05 PROCEDURE — 1159F MED LIST DOCD IN RCRD: CPT | Mod: CPTII,,, | Performed by: INTERNAL MEDICINE

## 2023-05-05 PROCEDURE — 3080F DIAST BP >= 90 MM HG: CPT | Mod: CPTII,,, | Performed by: INTERNAL MEDICINE

## 2023-05-05 PROCEDURE — 4010F ACE/ARB THERAPY RXD/TAKEN: CPT | Mod: CPTII,,, | Performed by: INTERNAL MEDICINE

## 2023-05-05 PROCEDURE — 4010F PR ACE/ARB THEARPY RXD/TAKEN: ICD-10-PCS | Mod: CPTII,,, | Performed by: INTERNAL MEDICINE

## 2023-05-05 PROCEDURE — 1101F PT FALLS ASSESS-DOCD LE1/YR: CPT | Mod: CPTII,,, | Performed by: INTERNAL MEDICINE

## 2023-05-05 PROCEDURE — 1160F PR REVIEW ALL MEDS BY PRESCRIBER/CLIN PHARMACIST DOCUMENTED: ICD-10-PCS | Mod: CPTII,,, | Performed by: INTERNAL MEDICINE

## 2023-05-05 PROCEDURE — 99214 PR OFFICE/OUTPT VISIT, EST, LEVL IV, 30-39 MIN: ICD-10-PCS | Mod: ,,, | Performed by: INTERNAL MEDICINE

## 2023-05-05 PROCEDURE — 3008F PR BODY MASS INDEX (BMI) DOCUMENTED: ICD-10-PCS | Mod: CPTII,,, | Performed by: INTERNAL MEDICINE

## 2023-05-05 PROCEDURE — 1160F RVW MEDS BY RX/DR IN RCRD: CPT | Mod: CPTII,,, | Performed by: INTERNAL MEDICINE

## 2023-05-05 PROCEDURE — 1159F PR MEDICATION LIST DOCUMENTED IN MEDICAL RECORD: ICD-10-PCS | Mod: CPTII,,, | Performed by: INTERNAL MEDICINE

## 2023-05-05 PROCEDURE — 3288F PR FALLS RISK ASSESSMENT DOCUMENTED: ICD-10-PCS | Mod: CPTII,,, | Performed by: INTERNAL MEDICINE

## 2023-05-05 PROCEDURE — 3288F FALL RISK ASSESSMENT DOCD: CPT | Mod: CPTII,,, | Performed by: INTERNAL MEDICINE

## 2023-05-05 PROCEDURE — 3077F SYST BP >= 140 MM HG: CPT | Mod: CPTII,,, | Performed by: INTERNAL MEDICINE

## 2023-05-05 PROCEDURE — 3080F PR MOST RECENT DIASTOLIC BLOOD PRESSURE >= 90 MM HG: ICD-10-PCS | Mod: CPTII,,, | Performed by: INTERNAL MEDICINE

## 2023-05-05 PROCEDURE — 3008F BODY MASS INDEX DOCD: CPT | Mod: CPTII,,, | Performed by: INTERNAL MEDICINE

## 2023-05-05 PROCEDURE — 1101F PR PT FALLS ASSESS DOC 0-1 FALLS W/OUT INJ PAST YR: ICD-10-PCS | Mod: CPTII,,, | Performed by: INTERNAL MEDICINE

## 2023-05-05 PROCEDURE — 3077F PR MOST RECENT SYSTOLIC BLOOD PRESSURE >= 140 MM HG: ICD-10-PCS | Mod: CPTII,,, | Performed by: INTERNAL MEDICINE

## 2023-05-05 PROCEDURE — 99214 OFFICE O/P EST MOD 30 MIN: CPT | Mod: ,,, | Performed by: INTERNAL MEDICINE

## 2023-05-05 RX ORDER — BENZONATATE 200 MG/1
200 CAPSULE ORAL 3 TIMES DAILY PRN
Qty: 30 CAPSULE | Refills: 0 | Status: SHIPPED | OUTPATIENT
Start: 2023-05-05 | End: 2023-05-15

## 2023-05-05 RX ORDER — AMOXICILLIN 500 MG/1
500 CAPSULE ORAL EVERY 12 HOURS
Qty: 14 CAPSULE | Refills: 0 | Status: SHIPPED | OUTPATIENT
Start: 2023-05-05 | End: 2023-05-12

## 2023-05-05 RX ORDER — FLUTICASONE PROPIONATE 50 MCG
1 SPRAY, SUSPENSION (ML) NASAL DAILY
Qty: 11.1 ML | Refills: 0 | Status: SHIPPED | OUTPATIENT
Start: 2023-05-05 | End: 2024-02-01

## 2023-05-05 NOTE — PROGRESS NOTES
"Subjective:      Patient ID: Alison Langston is a 72 y.o. female.    Chief Complaint: Sinus Problem      HPI:  72-year-old here today with complaints of productive cough productive of yellow to green mucus.  Did a home COVID test that was negative no constitutional symptoms, no fever chills or night sweats.  No wheezing or shortness of breath, hemodynamics are stable with saturations noted to be 98% on room air.  She does have poorly-controlled diabetes mellitus.    Past Medical History:  Past Medical History:   Diagnosis Date    Anemia     Anxiety     Cerebrovascular accident     Chronic back pain     Depression     Elevated ferritin     External hemorrhoids     GERD (gastroesophageal reflux disease)     HLD (hyperlipidemia)     Hypertension     Hypothyroidism, unspecified     CRISTINA (iron deficiency anemia)     Incisional hernia     Internal hemorrhoids     Myoclonus     Osteoporosis     Personal history of colonic polyps     Vertigo      Past Surgical History:   Procedure Laterality Date    biopsy of breast      BONE MARROW BIOPSY W/ ASPIRATION Right 2018    CATARACT EXTRACTION Right 04/30/2021    CHOLECYSTECTOMY      COLONOSCOPY  12/01/2021    Dr. Junior Cardenas  Repeat colon 2026    HERNIA REPAIR      HERNIA REPAIR  01/27/2020    HYSTERECTOMY  2013    PATENT DUCTUS ARTERIOUS LIGATION      SHOULDER SURGERY Left     THYROIDECTOMY       Review of patient's allergies indicates:   Allergen Reactions    Baclofen Hallucinations    Donepezil Hallucinations    Erythromycin      Other reaction(s): Upset Stomach    Fluoxetine      Doesn't work    Grass pollen      Other reaction(s): per allergy test    Hydrocodone-acetaminophen      Other reaction(s): "Makes me crazy"    Ivp dye [iodinated contrast media]      Other reaction(s): Rash    Metoclopramide hcl     Rosuvastatin     Iodine Rash    Shrimp Rash     Current Outpatient Medications on File Prior to Visit   Medication Sig Dispense Refill    albuterol " (PROVENTIL/VENTOLIN HFA) 90 mcg/actuation inhaler Inhale 2 puffs into the lungs every 6 (six) hours as needed for Wheezing or Shortness of Breath. 6.7 g 2    atorvastatin (LIPITOR) 20 MG tablet Take 1 tablet by mouth once daily 90 tablet 2    blood-glucose meter Misc   true metrix glucose monitor, See Instructions, to check bloodsugars BID, E11.9, # 1 EA, 5 Refill(s), Pharmacy: [facility] 415, 154, cm, Height/Length Dosing, 04/19/21 10:31:00 CDT, 73.45, kg, Weight Dosing, 04/19/21 10:31:00 CDT      clotrimazole-betamethasone 1-0.05% (LOTRISONE) cream Apply topically 2 (two) times daily. 45 g 1    fluticasone propionate (FLONASE) 50 mcg/actuation nasal spray 1 spray by Each Nostril route 2 (two) times daily.      gemfibroziL (LOPID) 600 MG tablet TAKE 1 TABLET BY MOUTH TWICE DAILY ONE AT 9 IN THE MORNING AND ONE AT 5 IN THE EVENING Strength: 600 mg 180 tablet 3    glipiZIDE 5 MG TR24 Take 1 tablet (5 mg total) by mouth daily with breakfast. 90 tablet 3    hydroCHLOROthiazide (HYDRODIURIL) 12.5 MG Tab Take 1 tablet (12.5 mg total) by mouth once daily. 90 tablet 3    insulin glargine (LANTUS) 100 unit/mL injection Inject 90 Units into the skin.      levothyroxine (SYNTHROID) 200 MCG tablet Take 1 tablet by mouth once daily 90 tablet 0    losartan (COZAAR) 100 MG tablet Take 1 tablet by mouth once daily 90 tablet 2    meclizine (ANTIVERT) 12.5 mg tablet Take 12.5 mg by mouth 3 (three) times daily as needed.      metFORMIN (GLUCOPHAGE) 1000 MG tablet TAKE 1 TABLET BY MOUTH TWICE DAILY AT  9AM  AND  5PM 180 tablet 0    mupirocin (BACTROBAN) 2 % ointment Apply 1 g topically 2 (two) times a day.      omeprazole (PRILOSEC) 40 MG capsule Take 1 capsule (40 mg total) by mouth every morning. 90 capsule 3    traZODone (DESYREL) 100 MG tablet TAKE 2 TABLETS BY MOUTH BY MOUTH AT 9 IN THE EVENING AT  BEDTIME 90 tablet 0    venlafaxine (EFFEXOR-XR) 150 MG Cp24 Take 1 capsule (150 mg total) by mouth once daily. 90 capsule 3      No current facility-administered medications on file prior to visit.     Social History     Socioeconomic History    Marital status:    Tobacco Use    Smoking status: Never    Smokeless tobacco: Never   Substance and Sexual Activity    Alcohol use: Never    Drug use: Never     Family History   Problem Relation Age of Onset    Diabetes Mother     GI problems Mother     Lung cancer Father     Diabetes Father     Alzheimer's disease Father        Review of Systems  A comprehensive review of systems was performed and was negative with exception of what is documented above.     Objective:   BP (!) 160/98 (BP Location: Left arm, Patient Position: Sitting, BP Method: Medium (Manual))   Pulse 80   Temp 97.9 °F (36.6 °C) (Temporal)   Resp 16   Ht 5' (1.524 m)   Wt 63 kg (139 lb)   SpO2 98%   BMI 27.15 kg/m²   Physical Exam  General : Alert and oriented, No acute distress, afebrile.  Eye : PERRLA. EOMI. Normal conjunctiva, Sclerae are nonicteric.   Respiratory : Respirations are non-labored and clear to auscultation bilaterally. Symmetrical air entry bilaterally, no crackles, no wheezes, no rhonchi. No cyanosis, no clubbing.  Cardiovascular : Normal rate, Regular rhythm. No murmurs, rubs, or gallops. Pulses are 2+ throughout. No JVD. No Edema.  Gastrointestinal : Soft, nontender, non-distended, bowel sounds are present in all quadrants, no organomegaly, no guarding, no rebound.  Musculoskeletal : Normal range of motion throughout. No muscle tenderness.  Integumentary : Warm, moist, intact.  Neurologic : Alert, Oriented  Psychiatric : Cooperative, Appropriate mood & affect.   Assessment/ Plan:   1. Upper respiratory tract infection, unspecified type  Assessment & Plan:  Rx Amoxil, Flonase and Zyrtec  P.r.n. Tessalon        Other orders  -     amoxicillin (AMOXIL) 500 MG capsule; Take 1 capsule (500 mg total) by mouth every 12 (twelve) hours. for 7 days  Dispense: 14 capsule; Refill: 0  -     fluticasone  propionate (FLONASE) 50 mcg/actuation nasal spray; 1 spray (50 mcg total) by Each Nostril route once daily.  Dispense: 11.1 mL; Refill: 0  -     benzonatate (TESSALON) 200 MG capsule; Take 1 capsule (200 mg total) by mouth 3 (three) times daily as needed for Cough.  Dispense: 30 capsule; Refill: 0             Follow up if symptoms worsen or fail to improve.

## 2023-05-25 ENCOUNTER — TELEPHONE (OUTPATIENT)
Dept: INTERNAL MEDICINE | Facility: CLINIC | Age: 73
End: 2023-05-25
Payer: MEDICARE

## 2023-05-25 DIAGNOSIS — Z79.4 TYPE 2 DIABETES MELLITUS WITHOUT COMPLICATION, WITH LONG-TERM CURRENT USE OF INSULIN: ICD-10-CM

## 2023-05-25 DIAGNOSIS — E88.819 INSULIN RESISTANCE: ICD-10-CM

## 2023-05-25 DIAGNOSIS — E55.9 VITAMIN D DEFICIENCY: Primary | ICD-10-CM

## 2023-05-25 DIAGNOSIS — E11.9 TYPE 2 DIABETES MELLITUS WITHOUT COMPLICATION, WITH LONG-TERM CURRENT USE OF INSULIN: ICD-10-CM

## 2023-05-25 DIAGNOSIS — E78.5 HYPERLIPIDEMIA, UNSPECIFIED HYPERLIPIDEMIA TYPE: ICD-10-CM

## 2023-05-25 DIAGNOSIS — D50.9 IRON DEFICIENCY ANEMIA, UNSPECIFIED IRON DEFICIENCY ANEMIA TYPE: ICD-10-CM

## 2023-05-25 NOTE — TELEPHONE ENCOUNTER
----- Message from Karin Bales MA sent at 5/25/2023  2:34 PM CDT -----  Regarding: pv. 06/01@10:00   1. Are there any outstanding tasks in the patient's chart? Yes, fasting labs    2. Is there any documentation in the chart? No    3.Has patient been seen in an ER, Urgent care clinic, or been admitted since last visit?  If yes, When, where, and why    4. Has patient seen any other healthcare providers since last visit?  If yes, when, where, and why    5. Has patient had any bloodwork or XR done since last visit?    6. Is patient signed up for patient portal?

## 2023-05-30 ENCOUNTER — INFUSION (OUTPATIENT)
Dept: INFUSION THERAPY | Facility: HOSPITAL | Age: 73
End: 2023-05-30
Attending: NURSE PRACTITIONER
Payer: MEDICARE

## 2023-05-30 VITALS
BODY MASS INDEX: 27.09 KG/M2 | TEMPERATURE: 98 F | WEIGHT: 138 LBS | DIASTOLIC BLOOD PRESSURE: 76 MMHG | HEART RATE: 74 BPM | HEIGHT: 60 IN | SYSTOLIC BLOOD PRESSURE: 189 MMHG | RESPIRATION RATE: 16 BRPM

## 2023-05-30 DIAGNOSIS — G47.00 INSOMNIA, UNSPECIFIED TYPE: ICD-10-CM

## 2023-05-30 DIAGNOSIS — D64.9 ANEMIA, UNSPECIFIED TYPE: Primary | ICD-10-CM

## 2023-05-30 PROCEDURE — 96372 THER/PROPH/DIAG INJ SC/IM: CPT

## 2023-05-30 PROCEDURE — 63600175 PHARM REV CODE 636 W HCPCS: Mod: JG,EC | Performed by: NURSE PRACTITIONER

## 2023-05-30 RX ORDER — TRAZODONE HYDROCHLORIDE 100 MG/1
TABLET ORAL
Qty: 90 TABLET | Refills: 0 | Status: SHIPPED | OUTPATIENT
Start: 2023-05-30 | End: 2023-07-13

## 2023-05-30 RX ADMIN — EPOETIN ALFA 20000 UNITS: 20000 SOLUTION INTRAVENOUS; SUBCUTANEOUS at 01:05

## 2023-05-30 NOTE — PLAN OF CARE
Plan of care reviewed with patient; patient in agreement. Retacrit given for H/H 9.7/31.6; pt tolerated injection. Appts printed and given to patient.

## 2023-06-01 ENCOUNTER — OFFICE VISIT (OUTPATIENT)
Dept: INTERNAL MEDICINE | Facility: CLINIC | Age: 73
End: 2023-06-01
Payer: MEDICARE

## 2023-06-01 VITALS
BODY MASS INDEX: 27.09 KG/M2 | OXYGEN SATURATION: 99 % | DIASTOLIC BLOOD PRESSURE: 70 MMHG | RESPIRATION RATE: 16 BRPM | HEART RATE: 82 BPM | SYSTOLIC BLOOD PRESSURE: 138 MMHG | TEMPERATURE: 98 F | HEIGHT: 60 IN | WEIGHT: 138 LBS

## 2023-06-01 DIAGNOSIS — M62.08 DIASTASIS OF RECTUS ABDOMINIS: ICD-10-CM

## 2023-06-01 DIAGNOSIS — N18.31 CHRONIC KIDNEY DISEASE, STAGE 3A: ICD-10-CM

## 2023-06-01 DIAGNOSIS — E11.9 TYPE 2 DIABETES MELLITUS WITHOUT COMPLICATION, WITH LONG-TERM CURRENT USE OF INSULIN: ICD-10-CM

## 2023-06-01 DIAGNOSIS — Z79.4 TYPE 2 DIABETES MELLITUS WITHOUT COMPLICATION, WITH LONG-TERM CURRENT USE OF INSULIN: ICD-10-CM

## 2023-06-01 DIAGNOSIS — Z79.4 TYPE 2 DIABETES MELLITUS WITH HYPERGLYCEMIA, WITH LONG-TERM CURRENT USE OF INSULIN: ICD-10-CM

## 2023-06-01 DIAGNOSIS — E11.65 TYPE 2 DIABETES MELLITUS WITH HYPERGLYCEMIA, WITH LONG-TERM CURRENT USE OF INSULIN: ICD-10-CM

## 2023-06-01 DIAGNOSIS — Z00.00 MEDICARE ANNUAL WELLNESS VISIT, SUBSEQUENT: Primary | ICD-10-CM

## 2023-06-01 PROBLEM — F32.A DEPRESSION: Status: RESOLVED | Noted: 2023-01-31 | Resolved: 2023-06-01

## 2023-06-01 PROBLEM — J06.9 UPPER RESPIRATORY INFECTION: Status: RESOLVED | Noted: 2023-05-05 | Resolved: 2023-06-01

## 2023-06-01 PROCEDURE — 1160F PR REVIEW ALL MEDS BY PRESCRIBER/CLIN PHARMACIST DOCUMENTED: ICD-10-PCS | Mod: CPTII,,, | Performed by: INTERNAL MEDICINE

## 2023-06-01 PROCEDURE — 3008F BODY MASS INDEX DOCD: CPT | Mod: CPTII,,, | Performed by: INTERNAL MEDICINE

## 2023-06-01 PROCEDURE — 1159F MED LIST DOCD IN RCRD: CPT | Mod: CPTII,,, | Performed by: INTERNAL MEDICINE

## 2023-06-01 PROCEDURE — G0439 PR MEDICARE ANNUAL WELLNESS SUBSEQUENT VISIT: ICD-10-PCS | Mod: ,,, | Performed by: INTERNAL MEDICINE

## 2023-06-01 PROCEDURE — 1160F RVW MEDS BY RX/DR IN RCRD: CPT | Mod: CPTII,,, | Performed by: INTERNAL MEDICINE

## 2023-06-01 PROCEDURE — 4010F ACE/ARB THERAPY RXD/TAKEN: CPT | Mod: CPTII,,, | Performed by: INTERNAL MEDICINE

## 2023-06-01 PROCEDURE — 1101F PT FALLS ASSESS-DOCD LE1/YR: CPT | Mod: CPTII,,, | Performed by: INTERNAL MEDICINE

## 2023-06-01 PROCEDURE — 1101F PR PT FALLS ASSESS DOC 0-1 FALLS W/OUT INJ PAST YR: ICD-10-PCS | Mod: CPTII,,, | Performed by: INTERNAL MEDICINE

## 2023-06-01 PROCEDURE — 3288F PR FALLS RISK ASSESSMENT DOCUMENTED: ICD-10-PCS | Mod: CPTII,,, | Performed by: INTERNAL MEDICINE

## 2023-06-01 PROCEDURE — 1159F PR MEDICATION LIST DOCUMENTED IN MEDICAL RECORD: ICD-10-PCS | Mod: CPTII,,, | Performed by: INTERNAL MEDICINE

## 2023-06-01 PROCEDURE — 4010F PR ACE/ARB THEARPY RXD/TAKEN: ICD-10-PCS | Mod: CPTII,,, | Performed by: INTERNAL MEDICINE

## 2023-06-01 PROCEDURE — 3075F PR MOST RECENT SYSTOLIC BLOOD PRESS GE 130-139MM HG: ICD-10-PCS | Mod: CPTII,,, | Performed by: INTERNAL MEDICINE

## 2023-06-01 PROCEDURE — 99213 PR OFFICE/OUTPT VISIT, EST, LEVL III, 20-29 MIN: ICD-10-PCS | Mod: 25,,, | Performed by: INTERNAL MEDICINE

## 2023-06-01 PROCEDURE — G0439 PPPS, SUBSEQ VISIT: HCPCS | Mod: ,,, | Performed by: INTERNAL MEDICINE

## 2023-06-01 PROCEDURE — 3075F SYST BP GE 130 - 139MM HG: CPT | Mod: CPTII,,, | Performed by: INTERNAL MEDICINE

## 2023-06-01 PROCEDURE — 3288F FALL RISK ASSESSMENT DOCD: CPT | Mod: CPTII,,, | Performed by: INTERNAL MEDICINE

## 2023-06-01 PROCEDURE — 3078F PR MOST RECENT DIASTOLIC BLOOD PRESSURE < 80 MM HG: ICD-10-PCS | Mod: CPTII,,, | Performed by: INTERNAL MEDICINE

## 2023-06-01 PROCEDURE — 3008F PR BODY MASS INDEX (BMI) DOCUMENTED: ICD-10-PCS | Mod: CPTII,,, | Performed by: INTERNAL MEDICINE

## 2023-06-01 PROCEDURE — 3078F DIAST BP <80 MM HG: CPT | Mod: CPTII,,, | Performed by: INTERNAL MEDICINE

## 2023-06-01 PROCEDURE — 99213 OFFICE O/P EST LOW 20 MIN: CPT | Mod: 25,,, | Performed by: INTERNAL MEDICINE

## 2023-06-01 RX ORDER — FLASH GLUCOSE SENSOR
1 KIT MISCELLANEOUS
Qty: 1 KIT | Refills: 6 | Status: SHIPPED | OUTPATIENT
Start: 2023-06-01 | End: 2024-02-27

## 2023-06-01 RX ORDER — GLIPIZIDE 5 MG/1
10 TABLET, FILM COATED, EXTENDED RELEASE ORAL
Qty: 180 TABLET | Refills: 3 | Status: SHIPPED | OUTPATIENT
Start: 2023-06-01 | End: 2023-12-29

## 2023-06-01 NOTE — PROGRESS NOTES
"Subjective:      Patient ID: Alison Langston is a 72 y.o. female.    Chief Complaint: Medicare AWV      HPI:      Past Medical History:  Past Medical History:   Diagnosis Date    Anemia     Anxiety     Cerebrovascular accident     Chronic back pain     Depression     Elevated ferritin     External hemorrhoids     GERD (gastroesophageal reflux disease)     HLD (hyperlipidemia)     Hypertension     Hypothyroidism, unspecified     CRISTINA (iron deficiency anemia)     Incisional hernia     Internal hemorrhoids     Myoclonus     Osteoporosis     Personal history of colonic polyps     Vertigo      Past Surgical History:   Procedure Laterality Date    biopsy of breast      BONE MARROW BIOPSY W/ ASPIRATION Right 2018    CATARACT EXTRACTION Right 04/30/2021    CHOLECYSTECTOMY      COLONOSCOPY  12/01/2021    Dr. Junior Cardenas  Repeat colon 2026    HERNIA REPAIR      HERNIA REPAIR  01/27/2020    HYSTERECTOMY  2013    PATENT DUCTUS ARTERIOUS LIGATION      SHOULDER SURGERY Left     THYROIDECTOMY       Review of patient's allergies indicates:   Allergen Reactions    Baclofen Hallucinations    Donepezil Hallucinations    Erythromycin      Other reaction(s): Upset Stomach    Fluoxetine      Doesn't work    Grass pollen      Other reaction(s): per allergy test    Hydrocodone-acetaminophen      Other reaction(s): "Makes me crazy"    Ivp dye [iodinated contrast media]      Other reaction(s): Rash    Metoclopramide hcl     Rosuvastatin     Iodine Rash    Shrimp Rash     Current Outpatient Medications on File Prior to Visit   Medication Sig Dispense Refill    albuterol (PROVENTIL/VENTOLIN HFA) 90 mcg/actuation inhaler Inhale 2 puffs into the lungs every 6 (six) hours as needed for Wheezing or Shortness of Breath. 6.7 g 2    atorvastatin (LIPITOR) 20 MG tablet Take 1 tablet by mouth once daily 90 tablet 2    blood-glucose meter Misc   true metrix glucose monitor, See Instructions, to check bloodsugars BID, E11.9, # 1 EA, 5 Refill(s), " Pharmacy: [facility], 154, cm, Height/Length Dosing, 04/19/21 10:31:00 CDT, 73.45, kg, Weight Dosing, 04/19/21 10:31:00 CDT      clotrimazole-betamethasone 1-0.05% (LOTRISONE) cream Apply topically 2 (two) times daily. 45 g 1    fluticasone propionate (FLONASE) 50 mcg/actuation nasal spray 1 spray by Each Nostril route 2 (two) times daily.      fluticasone propionate (FLONASE) 50 mcg/actuation nasal spray 1 spray (50 mcg total) by Each Nostril route once daily. 11.1 mL 0    gemfibroziL (LOPID) 600 MG tablet TAKE 1 TABLET BY MOUTH TWICE DAILY ONE AT 9 IN THE MORNING AND ONE AT 5 IN THE EVENING Strength: 600 mg 180 tablet 3    hydroCHLOROthiazide (HYDRODIURIL) 12.5 MG Tab Take 1 tablet (12.5 mg total) by mouth once daily. 90 tablet 3    insulin glargine (LANTUS) 100 unit/mL injection Inject 90 Units into the skin.      levothyroxine (SYNTHROID) 200 MCG tablet Take 1 tablet by mouth once daily 90 tablet 0    losartan (COZAAR) 100 MG tablet Take 1 tablet by mouth once daily 90 tablet 2    meclizine (ANTIVERT) 12.5 mg tablet Take 12.5 mg by mouth 3 (three) times daily as needed.      metFORMIN (GLUCOPHAGE) 1000 MG tablet TAKE 1 TABLET BY MOUTH TWICE DAILY AT  9AM  AND  5PM 180 tablet 0    mupirocin (BACTROBAN) 2 % ointment Apply 1 g topically 2 (two) times a day.      omeprazole (PRILOSEC) 40 MG capsule Take 1 capsule (40 mg total) by mouth every morning. 90 capsule 3    traZODone (DESYREL) 100 MG tablet TAKE 2 TABLETS BY MOUTH ONCE DAILY AT  9PM  IN  THE  EVENING  AT  BEDTIME 90 tablet 0    venlafaxine (EFFEXOR-XR) 150 MG Cp24 Take 1 capsule (150 mg total) by mouth once daily. 90 capsule 3    [DISCONTINUED] glipiZIDE 5 MG TR24 Take 1 tablet (5 mg total) by mouth daily with breakfast. 90 tablet 3     No current facility-administered medications on file prior to visit.     Social History     Socioeconomic History    Marital status:    Tobacco Use    Smoking status: Never    Smokeless tobacco: Never    Substance and Sexual Activity    Alcohol use: Never    Drug use: Never     Family History   Problem Relation Age of Onset    Diabetes Mother     GI problems Mother     Lung cancer Father     Diabetes Father     Alzheimer's disease Father        Review of Systems  A comprehensive review of systems was performed and was negative with exception of what is documented above.     Objective:   /70 (BP Location: Right arm, Patient Position: Sitting, BP Method: Medium (Manual))   Pulse 82   Temp 97.9 °F (36.6 °C) (Temporal)   Resp 16   Ht 5' (1.524 m)   Wt 62.6 kg (138 lb)   SpO2 99%   BMI 26.95 kg/m²   Physical Exam    Assessment/ Plan:   {There are no diagnoses linked to this encounter. (Refresh or delete this SmartLink)}         No follow-ups on file.

## 2023-06-01 NOTE — ASSESSMENT & PLAN NOTE
Increase glipizide to 10mg  RTC 3 months for revisit with BMP and A1c  Unfortunately her insurance does not cover any branded medications.   Continue current dose of basal insulin we may be able to wean some of the off with the higher dose of glipizide will monitor renal function closely  Lab Results   Component Value Date    HGBA1C 8.9 (H) 04/04/2023    HGBA1C 9.7 (H) 12/15/2022    LDL 73.00 12/15/2022    CREATININE 1.10 (H) 03/08/2023        Follow ADA Diet. Avoid soda, simple sweets, and limit rice/pasta/breads/starches (no more than 45-50 grams per meal).  Maintain healthy weight with goal BMI <30.  Exercise 5 times per week for 30 minutes per day.  Stressed importance of daily foot exams.  Stressed importance of annual dilated eye exam.

## 2023-06-01 NOTE — PROGRESS NOTES
Patient ID: [id number]     Chief Complaint: Medicare AWV      HPI:     Alison Langston is a 72 y.o. female here today for a Medicare Wellness.   Osteoporosis does not wish to have treatment  More than calcium and vitamin-D followed by   Dr. Rain GI; chronic anemia. Scopes unrevealing   Dr. Cordova for gyn  Dr. Mick Mohr for Orthopedics  Dr. Smith for her Hematology for anemia of chronic disease as related to CKD started on Procrit   Dr. Valentino for Cardiology  Dr. Gisela Patino for Urology  She is on 70/30 she does 50 units at bedtime. She buys it over the counter.  Occasionally will have to skip doses because it is just too expensive.  Her A1c is currently at 8.9      Problem 1: I think I have a hernia    Opioid Screening: Patient medication list reviewed, patient is not taking prescription opioids. Patient is not using additional opioids than prescribed. Patient is at low risk of substance abuse based on this opioid use history.       ----------------------------  Anemia  Anxiety  Cerebrovascular accident  Chronic back pain  Depression  Elevated ferritin  External hemorrhoids  GERD (gastroesophageal reflux disease)  HLD (hyperlipidemia)  Hypertension  Hypothyroidism, unspecified  CRISTINA (iron deficiency anemia)  Incisional hernia  Internal hemorrhoids  Myoclonus  Osteoporosis  Personal history of colonic polyps  Vertigo     Past Surgical History:   Procedure Laterality Date    biopsy of breast      BONE MARROW BIOPSY W/ ASPIRATION Right 2018    CATARACT EXTRACTION Right 04/30/2021    CHOLECYSTECTOMY      COLONOSCOPY  12/01/2021    Dr. Junior Cardenas  Repeat colon 2026    HERNIA REPAIR      HERNIA REPAIR  01/27/2020    HYSTERECTOMY  2013    PATENT DUCTUS ARTERIOUS LIGATION      SHOULDER SURGERY Left     THYROIDECTOMY         Review of patient's allergies indicates:   Allergen Reactions    Baclofen Hallucinations    Donepezil Hallucinations    Erythromycin      Other reaction(s): Upset Stomach    Fluoxetine   "    Doesn't work    Grass pollen      Other reaction(s): per allergy test    Hydrocodone-acetaminophen      Other reaction(s): "Makes me crazy"    Ivp dye [iodinated contrast media]      Other reaction(s): Rash    Metoclopramide hcl     Rosuvastatin     Iodine Rash    Shrimp Rash       Outpatient Medications Marked as Taking for the 6/1/23 encounter (Office Visit) with Noah Tafoya MD   Medication Sig Dispense Refill    albuterol (PROVENTIL/VENTOLIN HFA) 90 mcg/actuation inhaler Inhale 2 puffs into the lungs every 6 (six) hours as needed for Wheezing or Shortness of Breath. 6.7 g 2    atorvastatin (LIPITOR) 20 MG tablet Take 1 tablet by mouth once daily 90 tablet 2    blood-glucose meter Misc   true metrix glucose monitor, See Instructions, to check bloodsugars BID, E11.9, # 1 EA, 5 Refill(s), Pharmacy: [id number], 154, cm, Height/Length Dosing, 04/19/21 10:31:00 CDT, 73.45, kg, Weight Dosing, 04/19/21 10:31:00 CDT      clotrimazole-betamethasone 1-0.05% (LOTRISONE) cream Apply topically 2 (two) times daily. 45 g 1    fluticasone propionate (FLONASE) 50 mcg/actuation nasal spray 1 spray by Each Nostril route 2 (two) times daily.      fluticasone propionate (FLONASE) 50 mcg/actuation nasal spray 1 spray (50 mcg total) by Each Nostril route once daily. 11.1 mL 0    gemfibroziL (LOPID) 600 MG tablet TAKE 1 TABLET BY MOUTH TWICE DAILY ONE AT 9 IN THE MORNING AND ONE AT 5 IN THE EVENING Strength: 600 mg 180 tablet 3    hydroCHLOROthiazide (HYDRODIURIL) 12.5 MG Tab Take 1 tablet (12.5 mg total) by mouth once daily. 90 tablet 3    insulin glargine (LANTUS) 100 unit/mL injection Inject 90 Units into the skin.      levothyroxine (SYNTHROID) 200 MCG tablet Take 1 tablet by mouth once daily 90 tablet 0    losartan (COZAAR) 100 MG tablet Take 1 tablet by mouth once daily 90 tablet 2    meclizine (ANTIVERT) 12.5 mg tablet Take 12.5 mg by mouth 3 (three) times daily as needed.      metFORMIN (GLUCOPHAGE) 1000 " MG tablet TAKE 1 TABLET BY MOUTH TWICE DAILY AT  9AM  AND  5PM 180 tablet 0    mupirocin (BACTROBAN) 2 % ointment Apply 1 g topically 2 (two) times a day.      omeprazole (PRILOSEC) 40 MG capsule Take 1 capsule (40 mg total) by mouth every morning. 90 capsule 3    traZODone (DESYREL) 100 MG tablet TAKE 2 TABLETS BY MOUTH ONCE DAILY AT  9PM  IN  THE  EVENING  AT  BEDTIME 90 tablet 0    venlafaxine (EFFEXOR-XR) 150 MG Cp24 Take 1 capsule (150 mg total) by mouth once daily. 90 capsule 3    [DISCONTINUED] glipiZIDE 5 MG TR24 Take 1 tablet (5 mg total) by mouth daily with breakfast. 90 tablet 3       Social History     Socioeconomic History    Marital status:    Tobacco Use    Smoking status: Never    Smokeless tobacco: Never   Substance and Sexual Activity    Alcohol use: Never    Drug use: Never        Family History   Problem Relation Age of Onset    Diabetes Mother     GI problems Mother     Lung cancer Father     Diabetes Father     Alzheimer's disease Father         Patient Care Team:  Noah Tafoya MD as PCP - General (Internal Medicine)  Goldy Hand MD as Consulting Physician (Ophthalmology)  Junior Cardenas MD as Consulting Physician (Gastroenterology)  Gallito Cordova MD as Consulting Physician (Obstetrics and Gynecology)  Yonatan Perez MD as Consulting Physician (Orthopedic Surgery)  Mele Smith II, MD as Consulting Physician (Oncology)  Vernon A. Valentino, MD as Consulting Physician (Cardiology)  Gary Galeano MD as Consulting Physician (Urology)       Subjective:     ROS  Constitutional: No fever, no chills, no night sweats, no changes in weight, no changes in appetite.  Eye: No blurring of vision, no double vision, no conjunctival injection, no acute vision loss  ENMT: No trauma, No sore throat, no rhinorrhea, no tinnitus, no headache, no vertigo, no ear pain or discharge  Respiratory: No cough, no sputum production, no shortness of breath, no hemoptysis,  no wheezing.  Cardiovascular: No chest pain, no PASCUAL, no PND, no orthopnea, no edema, no palpitations.  Gastrointestinal: No abdominal pain, nausea, no vomiting, no changes in frequency or consistency of stools, no diarrhea, no constipation, no BRBPR.  Genitourinary: No dysuria, no hematuria, no foul-smelling urine, no straining to urinate, no increase in frequency  Heme/Lymph: No easy bruising/ bleeding, no swollen or painful glands.  Endocrine: No polyuria, no polydipsia, no polyphagia, no heat or cold intolerance.  Musculoskeletal: No muscle pain, no muscle weakness, no joint pain, no difficulties on reference to range of motion.  Integumentary: No rash, no pruritis.  Neurologic: No sensory deficit, no motor deficit, no headache, no neck rigidity, no paresthesias, no syncope.  Psychiatric: no mood changes, no anxiety, no depression, no tension, no memory defecits  All Other ROS: Negative with exception of what is documented in the history of present illness     Patient Reported Health Risk Assessment       Objective:     /70 (BP Location: Right arm, Patient Position: Sitting, BP Method: Medium (Manual))   Pulse 82   Temp 97.9 °F (36.6 °C) (Temporal)   Resp 16   Ht 5' (1.524 m)   Wt 62.6 kg (138 lb)   SpO2 99%   BMI 26.95 kg/m²     Physical Exam  General : Alert and oriented, No acute distress, afebrile.  Eye : PERRLA. EOMI. Normal conjunctiva, Sclerae are nonicteric. No conjunctival injection, no pallor.  HEENT : Normocephalic/ atraumatic, Normal hearing, Oral mucosa is moist.  Respiratory : Respirations are non-labored and clear to auscultation bilaterally. Symmetrical air entry bilaterally, no crackles, no wheezes, no rhonchi. No cyanosis, no clubbing.  Cardiovascular : Normal rate, Regular rhythm. No murmurs, rubs, or gallops. Pulses are 2+ throughout. No JVD. No Edema.  Gastrointestinal : Soft, nontender, non-distended, bowel sounds are present in all quadrants, no organomegaly, no guarding, no  rebound. Large abdomen, incisional hernia  Musculoskeletal : Normal range of motion throughout. No muscle tenderness.  Integumentary : Warm, moist, intact.  Neurologic : Alert, Oriented  Psychiatric : Cooperative, Appropriate mood & affect.     No flowsheet data found.  Fall Risk Assessment - Outpatient 6/1/2023 5/5/2023 3/8/2023 3/8/2023 3/1/2023 1/31/2023 1/18/2023   Mobility Status Ambulatory Ambulatory Ambulatory Ambulatory Ambulatory Ambulatory Ambulatory   Number of falls 0 0 0 0 0 0 2+   Identified as fall risk 0 0 0 0 0 0 1              Assessment/Plan:     1. Medicare annual wellness visit, subsequent  Assessment & Plan:  General health maintenance education given, labs reviewed.  Age-appropriate exams are up-to-date      2. Diastasis of rectus abdominis  Assessment & Plan:  US ordered  Weight loss encouraged  Referral to plastics for eval     Orders:  -     US Abdomen Complete; Future; Expected date: 06/01/2023  -     Ambulatory referral/consult to Plastic Surgery; Future; Expected date: 06/08/2023    3. Type 2 diabetes mellitus without complication, with long-term current use of insulin  -     Hemoglobin A1C; Future; Expected date: 09/01/2023  -     Comprehensive Metabolic Panel; Future; Expected date: 09/01/2023  -     Lipid Panel; Future; Expected date: 09/01/2023  -     Microalbumin/Creatinine Ratio, Urine; Future; Expected date: 09/01/2023    4. Type 2 diabetes mellitus with hyperglycemia, with long-term current use of insulin  Assessment & Plan:  Increase glipizide to 10mg  RTC 3 months for revisit with BMP and A1c  Unfortunately her insurance does not cover any branded medications.   Continue current dose of basal insulin we may be able to wean some of the off with the higher dose of glipizide will monitor renal function closely  Lab Results   Component Value Date    HGBA1C 8.9 (H) 04/04/2023    HGBA1C 9.7 (H) 12/15/2022    LDL 73.00 12/15/2022    CREATININE 1.10 (H) 03/08/2023        Follow ADA  Diet. Avoid soda, simple sweets, and limit rice/pasta/breads/starches (no more than 45-50 grams per meal).  Maintain healthy weight with goal BMI <30.  Exercise 5 times per week for 30 minutes per day.  Stressed importance of daily foot exams.  Stressed importance of annual dilated eye exam.          5. Chronic kidney disease, stage 3a  Assessment & Plan:  Avoid nonsteroidal anti-inflammatory agents I am going to increase her glipizide to 10 mg today will see her back in 3 months for recheck on her kidney function remembering that glipizide is renally excreted.  Her A1c is at 8.9      Other orders  -     glipiZIDE 5 MG TR24; Take 2 tablets (10 mg total) by mouth daily with breakfast.  Dispense: 180 tablet; Refill: 3  -     flash glucose sensor (FREESTYLE APOLONIA 2 SENSOR) Kit; 1 each by Misc.(Non-Drug; Combo Route) route every 14 (fourteen) days.  Dispense: 1 kit; Refill: 6           Medicare Annual Wellness and Personalized Prevention Plan:   Fall Risk + Home Safety + Hearing Impairment + Depression Screen + Opioid and Substance Abuse Screening + Cognitive Impairment Screen + Health Risk Assessment all reviewed.     Health Maintenance Topics with due status: Not Due       Topic Last Completion Date    TETANUS VACCINE 04/13/2020    DEXA Scan 07/06/2020    Colorectal Cancer Screening 12/01/2021    Mammogram 10/27/2022    Diabetes Urine Screening 12/15/2022    Lipid Panel 12/15/2022    Hemoglobin A1c 04/04/2023    High Dose Statin 06/01/2023      The patient's Health Maintenance was reviewed and the following appears to be due at this time:   Health Maintenance Due   Topic Date Due    Hepatitis C Screening  Never done    Foot Exam  Never done    Shingles Vaccine (1 of 2) Never done    COVID-19 Vaccine (3 - Pfizer series) 03/28/2021    Eye Exam  01/12/2023       Advance Care Planning   I attest to discussing Advance Care Planning with patient and/or family member.  Education was provided including the importance of the  Health Care Power of , Advance Directives, and/or [place] documentation.  The patient expressed understanding to the importance of this information and discussion.         Follow up in about 3 months (around 9/1/2023) for NURSE PRACTITIONER, with labs prior to visit, DIABETIC REVISIT. In addition to their scheduled follow up, the patient has also been instructed to follow up on as needed basis.

## 2023-06-01 NOTE — ASSESSMENT & PLAN NOTE
Avoid nonsteroidal anti-inflammatory agents I am going to increase her glipizide to 10 mg today will see her back in 3 months for recheck on her kidney function remembering that glipizide is renally excreted.  Her A1c is at 8.9

## 2023-06-06 ENCOUNTER — HOSPITAL ENCOUNTER (EMERGENCY)
Facility: HOSPITAL | Age: 73
Discharge: HOME | End: 2023-06-07
Attending: SPECIALIST
Payer: MEDICARE

## 2023-06-06 DIAGNOSIS — E86.0 DEHYDRATION: Primary | ICD-10-CM

## 2023-06-06 DIAGNOSIS — R42 DIZZINESS: ICD-10-CM

## 2023-06-06 DIAGNOSIS — R73.9 ELEVATED BLOOD SUGAR: ICD-10-CM

## 2023-06-06 PROCEDURE — 96360 HYDRATION IV INFUSION INIT: CPT

## 2023-06-06 PROCEDURE — 82962 GLUCOSE BLOOD TEST: CPT

## 2023-06-06 PROCEDURE — 99284 EMERGENCY DEPT VISIT MOD MDM: CPT | Mod: 25

## 2023-06-07 VITALS
BODY MASS INDEX: 26.9 KG/M2 | OXYGEN SATURATION: 96 % | TEMPERATURE: 98 F | DIASTOLIC BLOOD PRESSURE: 64 MMHG | RESPIRATION RATE: 15 BRPM | WEIGHT: 137 LBS | SYSTOLIC BLOOD PRESSURE: 176 MMHG | HEIGHT: 60 IN | HEART RATE: 67 BPM

## 2023-06-07 LAB
ALBUMIN SERPL-MCNC: 3.6 G/DL (ref 3.4–4.8)
ALBUMIN/GLOB SERPL: 0.9 RATIO (ref 1.1–2)
ALP SERPL-CCNC: 136 UNIT/L (ref 40–150)
ALT SERPL-CCNC: 11 UNIT/L (ref 0–55)
APPEARANCE UR: CLEAR
AST SERPL-CCNC: 11 UNIT/L (ref 5–34)
BACTERIA #/AREA URNS AUTO: NORMAL /HPF
BASOPHILS # BLD AUTO: 0.02 X10(3)/MCL
BASOPHILS NFR BLD AUTO: 0.3 %
BILIRUB UR QL STRIP.AUTO: NEGATIVE MG/DL
BILIRUBIN DIRECT+TOT PNL SERPL-MCNC: 0.2 MG/DL
BUN SERPL-MCNC: 35.8 MG/DL (ref 9.8–20.1)
CALCIUM SERPL-MCNC: 9.8 MG/DL (ref 8.4–10.2)
CHLORIDE SERPL-SCNC: 103 MMOL/L (ref 98–107)
CO2 SERPL-SCNC: 24 MMOL/L (ref 23–31)
COLOR UR: YELLOW
CREAT SERPL-MCNC: 0.94 MG/DL (ref 0.55–1.02)
EOSINOPHIL # BLD AUTO: 0.17 X10(3)/MCL (ref 0–0.9)
EOSINOPHIL NFR BLD AUTO: 2.7 %
ERYTHROCYTE [DISTWIDTH] IN BLOOD BY AUTOMATED COUNT: 14.5 % (ref 11.5–17)
GFR SERPLBLD CREATININE-BSD FMLA CKD-EPI: >60 MLS/MIN/1.73/M2
GLOBULIN SER-MCNC: 3.8 GM/DL (ref 2.4–3.5)
GLUCOSE SERPL-MCNC: 266 MG/DL (ref 82–115)
GLUCOSE UR QL STRIP.AUTO: 100 MG/DL
HCT VFR BLD AUTO: 33.8 % (ref 37–47)
HGB BLD-MCNC: 10.4 G/DL (ref 12–16)
IMM GRANULOCYTES # BLD AUTO: 0.04 X10(3)/MCL (ref 0–0.04)
IMM GRANULOCYTES NFR BLD AUTO: 0.6 %
KETONES UR QL STRIP.AUTO: NEGATIVE MG/DL
LEUKOCYTE ESTERASE UR QL STRIP.AUTO: NEGATIVE UNIT/L
LYMPHOCYTES # BLD AUTO: 1.5 X10(3)/MCL (ref 0.6–4.6)
LYMPHOCYTES NFR BLD AUTO: 24 %
MCH RBC QN AUTO: 26.7 PG (ref 27–31)
MCHC RBC AUTO-ENTMCNC: 30.8 G/DL (ref 33–36)
MCV RBC AUTO: 86.7 FL (ref 80–94)
MONOCYTES # BLD AUTO: 0.62 X10(3)/MCL (ref 0.1–1.3)
MONOCYTES NFR BLD AUTO: 9.9 %
NEUTROPHILS # BLD AUTO: 3.91 X10(3)/MCL (ref 2.1–9.2)
NEUTROPHILS NFR BLD AUTO: 62.5 %
NITRITE UR QL STRIP.AUTO: NEGATIVE
PH UR STRIP.AUTO: 5 [PH]
PLATELET # BLD AUTO: 236 X10(3)/MCL (ref 130–400)
PMV BLD AUTO: 11 FL (ref 7.4–10.4)
POCT GLUCOSE: 220 MG/DL (ref 70–110)
POTASSIUM SERPL-SCNC: 4.9 MMOL/L (ref 3.5–5.1)
PROT SERPL-MCNC: 7.4 GM/DL (ref 5.8–7.6)
PROT UR QL STRIP.AUTO: >=300 MG/DL
RBC # BLD AUTO: 3.9 X10(6)/MCL (ref 4.2–5.4)
RBC #/AREA URNS AUTO: NORMAL /HPF
RBC UR QL AUTO: NEGATIVE UNIT/L
SODIUM SERPL-SCNC: 138 MMOL/L (ref 136–145)
SP GR UR STRIP.AUTO: >=1.03
SQUAMOUS #/AREA URNS AUTO: NORMAL /HPF
UROBILINOGEN UR STRIP-ACNC: 0.2 MG/DL
WBC # SPEC AUTO: 6.26 X10(3)/MCL (ref 4.5–11.5)
WBC #/AREA URNS AUTO: NORMAL /HPF

## 2023-06-07 PROCEDURE — 81001 URINALYSIS AUTO W/SCOPE: CPT | Performed by: SPECIALIST

## 2023-06-07 PROCEDURE — 85025 COMPLETE CBC W/AUTO DIFF WBC: CPT | Performed by: SPECIALIST

## 2023-06-07 PROCEDURE — 25000003 PHARM REV CODE 250: Performed by: SPECIALIST

## 2023-06-07 PROCEDURE — 80053 COMPREHEN METABOLIC PANEL: CPT | Performed by: SPECIALIST

## 2023-06-07 RX ADMIN — SODIUM CHLORIDE 1000 ML: 9 INJECTION, SOLUTION INTRAVENOUS at 01:06

## 2023-06-07 NOTE — ED PROVIDER NOTES
"Encounter Date: 6/6/2023       History     Chief Complaint   Patient presents with    Dizziness     Pt into ED with complaints of dizziness and lightheadedness. S/S started on tonight. Pt believes her K+ is low.     Patient states she felt dizzy over the last several hours; she reports a history of vertigo but states this feels different, the room is not spinning and she is not nauseated; she denies any chest pain or shortness of breath; she states she has been thirsty and drank a lot of water yesterday      Review of patient's allergies indicates:   Allergen Reactions    Baclofen Hallucinations    Donepezil Hallucinations    Erythromycin      Other reaction(s): Upset Stomach    Fluoxetine      Doesn't work    Grass pollen      Other reaction(s): per allergy test    Hydrocodone-acetaminophen      Other reaction(s): "Makes me crazy"    Ivp dye [iodinated contrast media]      Other reaction(s): Rash    Metoclopramide hcl     Rosuvastatin     Iodine Rash    Shrimp Rash     Past Medical History:   Diagnosis Date    Anemia     Anxiety     Cerebrovascular accident     Chronic back pain     Depression     Elevated ferritin     External hemorrhoids     GERD (gastroesophageal reflux disease)     HLD (hyperlipidemia)     Hypertension     Hypothyroidism, unspecified     CRISTINA (iron deficiency anemia)     Incisional hernia     Internal hemorrhoids     Myoclonus     Osteoporosis     Personal history of colonic polyps     Vertigo      Past Surgical History:   Procedure Laterality Date    biopsy of breast      BONE MARROW BIOPSY W/ ASPIRATION Right 2018    CATARACT EXTRACTION Right 04/30/2021    CHOLECYSTECTOMY      COLONOSCOPY  12/01/2021    Dr. Junior Cardenas  Repeat colon 2026    HERNIA REPAIR      HERNIA REPAIR  01/27/2020    HYSTERECTOMY  2013    PATENT DUCTUS ARTERIOUS LIGATION      SHOULDER SURGERY Left     THYROIDECTOMY       Family History   Problem Relation Age of Onset    Diabetes Mother     GI problems Mother     Lung " cancer Father     Diabetes Father     Alzheimer's disease Father      Social History     Tobacco Use    Smoking status: Never    Smokeless tobacco: Never   Substance Use Topics    Alcohol use: Never    Drug use: Never     Review of Systems   Constitutional: Negative.    HENT: Negative.     Respiratory: Negative.     Cardiovascular: Negative.    Gastrointestinal: Negative.    Musculoskeletal:  Positive for arthralgias and back pain.   Skin: Negative.    Neurological:  Positive for dizziness and light-headedness.   Psychiatric/Behavioral:  The patient is nervous/anxious.    All other systems reviewed and are negative.    Physical Exam     Initial Vitals [06/07/23 0006]   BP Pulse Resp Temp SpO2   (!) 195/76 66 20 97.9 °F (36.6 °C) 99 %      MAP       --         Physical Exam    Nursing note and vitals reviewed.  Constitutional: She appears well-developed and well-nourished.   HENT:   Head: Normocephalic and atraumatic.   Eyes: EOM are normal. Pupils are equal, round, and reactive to light.   Neck: Neck supple.   Normal range of motion.  Cardiovascular:  Normal rate, regular rhythm, normal heart sounds and intact distal pulses.           Pulmonary/Chest: Breath sounds normal.   Abdominal: Abdomen is soft. There is no abdominal tenderness.   Protuberant   Musculoskeletal:         General: Normal range of motion.      Cervical back: Normal range of motion and neck supple.     Neurological: She is alert and oriented to person, place, and time. She has normal strength.   Skin: Skin is warm and dry.       ED Course   Procedures  Labs Reviewed   COMPREHENSIVE METABOLIC PANEL - Abnormal; Notable for the following components:       Result Value    Glucose Level 266 (*)     Blood Urea Nitrogen 35.8 (*)     Globulin 3.8 (*)     Albumin/Globulin Ratio 0.9 (*)     All other components within normal limits   CBC WITH DIFFERENTIAL - Abnormal; Notable for the following components:    RBC 3.90 (*)     Hgb 10.4 (*)     Hct 33.8 (*)      MCH 26.7 (*)     MCHC 30.8 (*)     MPV 11.0 (*)     All other components within normal limits   URINALYSIS, REFLEX TO URINE CULTURE - Abnormal; Notable for the following components:    Protein, UA >=300 (*)     Glucose,  (*)     All other components within normal limits   URINALYSIS, MICROSCOPIC - Normal   CBC W/ AUTO DIFFERENTIAL    Narrative:     The following orders were created for panel order CBC auto differential.  Procedure                               Abnormality         Status                     ---------                               -----------         ------                     CBC with Differential[[id number]]        Abnormal            Final result                 Please view results for these tests on the individual orders.     EKG Readings: (Independently Interpreted)   Rhythm: Normal Sinus Rhythm. Heart Rate: 65. Ectopy: No Ectopy. Conduction: Normal. ST Segments: Normal ST Segments. T Waves: Normal. Axis: Left Axis Deviation. Clinical Impression: Normal Sinus Rhythm   Minimal voltage criteria for LVH     Imaging Results    None          Medications   sodium chloride 0.9% bolus 1,000 mL 1,000 mL (0 mLs Intravenous Stopped 6/7/23 0254)     Medical Decision Making:   History:   Old Medical Records: I decided to obtain old medical records.  Initial Assessment:   Patient is in no acute distress but has been feeling dizzy for several hours with no other symptoms; she states her head is not spinning like it normally does when she has a vertigo attack; she notes drinking a lot of water she felt thirsty yesterday; no chest pain, no shortness of breath, no blurred vision  Differential Diagnosis:   Electrolyte abnormality, elevated blood pressure, anemia, vertigo  Independently Interpreted Test(s):   I have ordered and independently interpreted EKG Reading(s) - see prior notes  Clinical Tests:   Lab Tests: Ordered and Reviewed  The following lab test(s) were unremarkable: CBC, CMP and Urinalysis        <> Summary of Lab: Patient found to have elevated BUN; elevated blood sugar  ED Management:  Patient found to be dehydrated and was given a L of normal saline; she felt much improved following the IV fluids, dizziness essentially resolved; I discussed keeping a blood sugar log book and following up with her primary care physician           ED Course as of 06/07/23 0309 Wed Jun 07, 2023   0152 Glucose(!): 266 [DD]   0153 BUN(!): 35.8 [DD]      ED Course User Index  [DD] Cory Marcum MD        Patient Vitals for the past 24 hrs:   BP Temp Temp src Pulse Resp SpO2 Height Weight   06/07/23 0206 (!) 155/52 -- -- 63 (!) 23 100 % -- --   06/07/23 0106 (!) 152/65 -- -- 62 20 98 % -- --   06/07/23 0008 (!) 187/71 -- -- 65 -- 98 % -- --   06/07/23 0006 (!) 195/76 97.9 °F (36.6 °C) Oral 66 20 99 % 5' (1.524 m) 62.1 kg (137 lb)     The patient is resting comfortably and in no acute distress.  She states that her symptoms have improved after treatment in Emergency Department. I personally discussed her test results and treatment plan.  Gave strict ED precautions.  Specific conditions for return to the emergency department and importance of follow up with her primary care provided or the physician listed on the discharge instructions.  Patient voices understanding and agrees to the plan discussed. All patients' questions have been answered at this time.   She has remained hemodynamically stable throughout entire stay in ED and is stable for discharge home.          Clinical Impression:   Final diagnoses:  [R42] Dizziness  [E86.0] Dehydration (Primary)  [R73.9] Elevated blood sugar        ED Disposition Condition    Discharge Stable          ED Prescriptions    None       Follow-up Information       Follow up With Specialties Details Why Contact Info    Noah Tafoya MD Internal Medicine In 2 days  [address] [place] [id number]  [phone]               Cory Marcum MD  06/07/23 6631

## 2023-06-22 ENCOUNTER — TELEPHONE (OUTPATIENT)
Dept: INTERNAL MEDICINE | Facility: CLINIC | Age: 73
End: 2023-06-22
Payer: MEDICARE

## 2023-06-22 DIAGNOSIS — Z79.4 TYPE 2 DIABETES MELLITUS WITHOUT COMPLICATION, WITH LONG-TERM CURRENT USE OF INSULIN: Primary | ICD-10-CM

## 2023-06-22 DIAGNOSIS — E11.9 TYPE 2 DIABETES MELLITUS WITHOUT COMPLICATION, WITH LONG-TERM CURRENT USE OF INSULIN: Primary | ICD-10-CM

## 2023-06-22 NOTE — TELEPHONE ENCOUNTER
----- Message from Shara Mikal sent at 6/22/2023  3:15 PM CDT -----  Regarding: Refill  Type:  RX Refill Request    Who Called: Alison  Refill or New Rx:  RX Name and Strength:Reli on true metrics air test strips  How is the patient currently taking it? (ex. 1XDay):  Is this a 30 day or 90 day RX:  Preferred Pharmacy with phone number:[facility] in [facility]  Local or Mail Order:local  Ordering Provider:  Would the patient rather a call back or a response via [facility]? Call back  Best Call Back Number:[phone]  Additional Information: Patient is requesting a rx for her test strips.

## 2023-07-05 DIAGNOSIS — E11.65 TYPE 2 DIABETES MELLITUS WITH HYPERGLYCEMIA, WITH LONG-TERM CURRENT USE OF INSULIN: ICD-10-CM

## 2023-07-05 DIAGNOSIS — Z79.4 TYPE 2 DIABETES MELLITUS WITH HYPERGLYCEMIA, WITH LONG-TERM CURRENT USE OF INSULIN: ICD-10-CM

## 2023-07-05 RX ORDER — METFORMIN HYDROCHLORIDE 1000 MG/1
TABLET ORAL
Qty: 180 TABLET | Refills: 0 | Status: SHIPPED | OUTPATIENT
Start: 2023-07-05 | End: 2023-11-02

## 2023-07-13 DIAGNOSIS — G47.00 INSOMNIA, UNSPECIFIED TYPE: ICD-10-CM

## 2023-07-13 RX ORDER — TRAZODONE HYDROCHLORIDE 100 MG/1
TABLET ORAL
Qty: 90 TABLET | Refills: 0 | Status: SHIPPED | OUTPATIENT
Start: 2023-07-13 | End: 2023-08-28

## 2023-07-17 ENCOUNTER — TELEPHONE (OUTPATIENT)
Dept: INTERNAL MEDICINE | Facility: CLINIC | Age: 73
End: 2023-07-17
Payer: MEDICARE

## 2023-07-17 DIAGNOSIS — E89.2 POSTPROCEDURAL HYPOPARATHYROIDISM: Primary | ICD-10-CM

## 2023-07-17 RX ORDER — LEVOTHYROXINE SODIUM 200 UG/1
200 TABLET ORAL DAILY
Qty: 90 TABLET | Refills: 3 | Status: SHIPPED | OUTPATIENT
Start: 2023-07-17

## 2023-07-17 NOTE — TELEPHONE ENCOUNTER
----- Message from Emy Hunter sent at 7/17/2023 10:49 AM CDT -----  .Type:  RX Refill Request    Who Called: pt  Refill or New Rx:refill  RX Name and Strength:levothyroxine (SYNTHROID) 200 MCG tablet  How is the patient currently taking it? (ex. 1XDay):1x day  Is this a 30 day or 90 day RX:90  Preferred Pharmacy with phone number:[facility]  Local or Mail Order:local  Ordering Provider:  Would the patient rather a call back or a response via [facility]?   Best Call Back Number:[phone]  Additional Information: pt needs refill

## 2023-07-21 LAB — STRESS DUKE TREADMILL SCORE: NORMAL

## 2023-07-24 ENCOUNTER — DOCUMENTATION ONLY (OUTPATIENT)
Dept: ADMINISTRATIVE | Facility: HOSPITAL | Age: 73
End: 2023-07-24
Payer: MEDICARE

## 2023-07-26 ENCOUNTER — INFUSION (OUTPATIENT)
Dept: INFUSION THERAPY | Facility: HOSPITAL | Age: 73
End: 2023-07-26
Attending: NURSE PRACTITIONER
Payer: MEDICARE

## 2023-07-26 VITALS
HEART RATE: 72 BPM | SYSTOLIC BLOOD PRESSURE: 158 MMHG | RESPIRATION RATE: 18 BRPM | TEMPERATURE: 98 F | OXYGEN SATURATION: 99 % | DIASTOLIC BLOOD PRESSURE: 62 MMHG

## 2023-07-26 DIAGNOSIS — D64.9 ANEMIA, UNSPECIFIED TYPE: Primary | ICD-10-CM

## 2023-07-26 PROCEDURE — 63600175 PHARM REV CODE 636 W HCPCS: Mod: JG,EC | Performed by: NURSE PRACTITIONER

## 2023-07-26 PROCEDURE — 96372 THER/PROPH/DIAG INJ SC/IM: CPT

## 2023-07-26 RX ADMIN — EPOETIN ALFA 20000 UNITS: 20000 SOLUTION INTRAVENOUS; SUBCUTANEOUS at 09:07

## 2023-08-24 PROBLEM — D63.1 ANEMIA DUE TO CHRONIC KIDNEY DISEASE: Status: ACTIVE | Noted: 2023-08-24

## 2023-08-24 PROBLEM — E78.5 HYPERLIPIDEMIA: Status: ACTIVE | Noted: 2023-08-24

## 2023-08-24 PROBLEM — G25.81 RESTLESS LEGS SYNDROME: Status: ACTIVE | Noted: 2023-08-24

## 2023-08-24 PROBLEM — R25.1 TREMOR: Status: ACTIVE | Noted: 2023-08-24

## 2023-08-24 PROBLEM — G25.0 ESSENTIAL TREMOR: Status: ACTIVE | Noted: 2023-08-24

## 2023-08-24 PROBLEM — Z87.74 HX OF PERCUTANEOUS TRANSCATHETER CLOSURE OF CONGENITAL ASD: Status: ACTIVE | Noted: 2023-08-24

## 2023-08-24 PROBLEM — K85.90 PANCREATITIS: Status: ACTIVE | Noted: 2023-08-24

## 2023-08-24 PROBLEM — K43.2 INCISIONAL HERNIA: Status: ACTIVE | Noted: 2023-08-24

## 2023-08-24 PROBLEM — E03.9 HYPOTHYROIDISM: Status: ACTIVE | Noted: 2023-08-24

## 2023-08-24 PROBLEM — N18.9 ANEMIA DUE TO CHRONIC KIDNEY DISEASE: Status: ACTIVE | Noted: 2023-08-24

## 2023-08-24 PROBLEM — N20.0 KIDNEY STONES: Status: ACTIVE | Noted: 2023-08-24

## 2023-08-24 PROBLEM — F41.9 ANXIETY: Status: ACTIVE | Noted: 2023-08-24

## 2023-08-24 PROBLEM — K21.9 GERD (GASTROESOPHAGEAL REFLUX DISEASE): Status: ACTIVE | Noted: 2023-08-24

## 2023-08-24 PROBLEM — Z97.3 WEARS EYEGLASSES: Status: ACTIVE | Noted: 2023-08-24

## 2023-08-24 PROBLEM — Z86.73 HISTORY OF TRANSIENT ISCHEMIC ATTACK: Status: ACTIVE | Noted: 2023-08-24

## 2023-08-24 PROBLEM — D50.9 IRON DEFICIENCY ANEMIA: Status: ACTIVE | Noted: 2023-08-24

## 2023-08-24 PROBLEM — I63.9 STROKE: Status: ACTIVE | Noted: 2023-08-24

## 2023-08-24 PROBLEM — I10 HYPERTENSION: Status: ACTIVE | Noted: 2023-08-24

## 2023-08-24 NOTE — PROGRESS NOTES
"Subjective:      Patient ID: Alison Langston is a 72 y.o. female.    Chief Complaint: Dizziness (Dizziness when rising out of bed since last Thursday. Took meclazine and did not help. Nausea. Has had vertigo in the past. No heart palpitations or chest pain. Sinus pressure and headaches. Took tylenol for headache. Fatigue. )      HPI: Patient of Dr. Howard's here today for  ER follow up and c/o dizziness. She did go to ER on 6/7 with similar s/s with incidentally elevated BP at 195/76.  Glucose also elevated at 266. Dx with dizziness, dehydration, and elevated BS. D/c home at that time. She did experience bout of dizziness yesterday while at grocery store with nearly falling with leaning forward to  item.  She attempted meclizine previously Rx'd for BPPV and did not help. She reports slight sinus issues with sinus pressure and headaches x 1 week. Denies CP, palpitations or Sob. She has attempted Tylenol for headaches. No fever or sweats.    Review of patient's allergies indicates:   Allergen Reactions    Baclofen Hallucinations    Donepezil Hallucinations    Erythromycin      Other reaction(s): Upset Stomach    Fluoxetine      Doesn't work    Grass pollen      Other reaction(s): per allergy test    Hydrocodone-acetaminophen      Other reaction(s): "Makes me crazy"    Ivp dye [iodinated contrast media]      Other reaction(s): Rash    Metoclopramide hcl     Rosuvastatin     Iodine Rash    Shrimp Rash       Review of Systems  Constitutional: No fever, No chills, No sweats, No fatigue  Ear/Nose/Mouth/Throat: nasal congestion, vertigo.  Respiratory: No shortness of breath, cough with sputum production, No wheezing, No exertional dyspnea.   Cardiovascular: No chest pain, No palpitations  Gastrointestinal: h/o nausea, No vomiting, No diarrhea  Neurologic: No altered mental status, No headaches.    Objective:   Visit Vitals  /64   Pulse 74   Ht 5' (1.524 m)   Wt 64.9 kg (143 lb)   SpO2 98%   BMI 27.93 kg/m² "     The patient's weight trend is below:   Wt Readings from Last 4 Encounters:   08/25/23 64.9 kg (143 lb)   08/21/23 63.5 kg (140 lb)   06/07/23 62.1 kg (137 lb)   06/01/23 62.6 kg (138 lb)        Physical Exam  Vitals and nursing note reviewed.   Constitutional:       General: She is not in acute distress.     Appearance: Normal appearance. She is normal weight. She is not ill-appearing, toxic-appearing or diaphoretic.   HENT:      Head: Normocephalic and atraumatic.      Right Ear: Tympanic membrane, ear canal and external ear normal.      Left Ear: Tympanic membrane, ear canal and external ear normal.      Nose: Congestion and rhinorrhea present.      Mouth/Throat:      Mouth: Mucous membranes are moist.      Pharynx: Oropharynx is clear. No oropharyngeal exudate or posterior oropharyngeal erythema.   Cardiovascular:      Rate and Rhythm: Normal rate and regular rhythm.      Pulses: Normal pulses.      Heart sounds: Murmur heard.   Pulmonary:      Effort: Pulmonary effort is normal. No respiratory distress.      Breath sounds: Normal breath sounds. No stridor. No wheezing, rhonchi or rales.   Abdominal:      General: Abdomen is protuberant. There is distension.   Musculoskeletal:         General: Normal range of motion.      Cervical back: Normal range of motion and neck supple. No rigidity or tenderness.   Lymphadenopathy:      Cervical: No cervical adenopathy.   Skin:     General: Skin is warm and dry.   Neurological:      General: No focal deficit present.      Mental Status: She is alert and oriented to person, place, and time. Mental status is at baseline.      Motor: No weakness.   Psychiatric:         Mood and Affect: Mood normal.         Thought Content: Thought content normal.         Judgment: Judgment normal.         Assessment/Plan:   1. Benign paroxysmal positional vertigo due to bilateral vestibular disorder  Inc water  Meclizine as needed  Pamphlet provided for BPPV exercises  F/u Monday if s/s  persist/worsen-consider CT head/labs    2. Acute non-recurrent sinusitis of other sinus  Tx with Augmentin as directed and to be taken with food  F/u Monday if s/s persist/worsen    - amoxicillin-clavulanate 875-125mg (AUGMENTIN) 875-125 mg per tablet; Take 1 tablet by mouth every 12 (twelve) hours. for 10 days  Dispense: 20 tablet; Refill: 0    3. Murmur, cardiac  Patient denies ever being told she had murmur-will request Echo report from [facility]      Medication List with Changes/Refills   New Medications    AMOXICILLIN-CLAVULANATE 875-125MG (AUGMENTIN) 875-125 MG PER TABLET    Take 1 tablet by mouth every 12 (twelve) hours. for 10 days   Current Medications    ALBUTEROL (PROVENTIL/VENTOLIN HFA) 90 MCG/ACTUATION INHALER    Inhale 2 puffs into the lungs every 6 (six) hours as needed for Wheezing or Shortness of Breath.    ATORVASTATIN (LIPITOR) 20 MG TABLET    Take 1 tablet by mouth once daily    BLOOD SUGAR DIAGNOSTIC (RELION PRIME TEST STRIPS) STRP    1 each by Misc.(Non-Drug; Combo Route) route Daily.    BLOOD-GLUCOSE METER MISC      true metrix glucose monitor, See Instructions, to check bloodsugars BID, E11.9, # 1 EA, 5 Refill(s), Pharmacy: [facility], 154, cm, Height/Length Dosing, 04/19/21 10:31:00 CDT, 73.45, kg, Weight Dosing, 04/19/21 10:31:00 CDT    CLOTRIMAZOLE-BETAMETHASONE 1-0.05% (LOTRISONE) CREAM    Apply topically 2 (two) times daily.    FLASH GLUCOSE SENSOR (FREESTYLE APOLONIA 2 SENSOR) KIT    1 each by Misc.(Non-Drug; Combo Route) route every 14 (fourteen) days.    FLUTICASONE PROPIONATE (FLONASE) 50 MCG/ACTUATION NASAL SPRAY    1 spray by Each Nostril route 2 (two) times daily.    FLUTICASONE PROPIONATE (FLONASE) 50 MCG/ACTUATION NASAL SPRAY    1 spray (50 mcg total) by Each Nostril route once daily.    GEMFIBROZIL (LOPID) 600 MG TABLET    TAKE 1 TABLET BY MOUTH TWICE DAILY ONE AT 9 IN THE MORNING AND ONE AT 5 IN THE EVENING Strength: 600 mg    GLIPIZIDE 5 MG TR24    Take 2 tablets (10 mg  total) by mouth daily with breakfast.    HYDROCHLOROTHIAZIDE (HYDRODIURIL) 12.5 MG TAB    Take 1 tablet (12.5 mg total) by mouth once daily.    INSULIN GLARGINE (LANTUS) 100 UNIT/ML INJECTION    Inject 90 Units into the skin. 70/30    LEVOTHYROXINE (SYNTHROID) 200 MCG TABLET    Take 1 tablet (200 mcg total) by mouth once daily.    LOSARTAN (COZAAR) 100 MG TABLET    Take 1 tablet by mouth once daily    MECLIZINE (ANTIVERT) 12.5 MG TABLET    Take 12.5 mg by mouth 3 (three) times daily as needed.    METFORMIN (GLUCOPHAGE) 1000 MG TABLET    TAKE 1 TABLET BY MOUTH TWICE DAILY AT  9AM  AND  5PM    MUPIROCIN (BACTROBAN) 2 % OINTMENT    Apply 1 g topically 2 (two) times a day.    OMEPRAZOLE (PRILOSEC) 40 MG CAPSULE    Take 1 capsule (40 mg total) by mouth every morning.    TRAZODONE (DESYREL) 100 MG TABLET    TAKE 2 TABLETS BY MOUTH ONCE DAILY AT 9 PM IN THE EVENING AT BEDTIME    VENLAFAXINE (EFFEXOR-XR) 150 MG CP24    Take 1 capsule (150 mg total) by mouth once daily.        No follow-ups on file.    Chemistry:  Lab Results   Component Value Date     07/26/2023    K 4.7 07/26/2023    CHLORIDE 110 (H) 07/26/2023    BUN 28.8 (H) 07/26/2023    CREATININE 0.94 07/26/2023    EGFRNORACEVR >60 07/26/2023    GLUCOSE 137 (H) 07/26/2023    CALCIUM 9.4 07/26/2023    ALKPHOS 94 07/26/2023    LABPROT 6.4 07/26/2023    ALBUMIN 3.4 07/26/2023    BILIDIR 0.1 05/10/2022    IBILI 0.10 05/10/2022    AST 13 07/26/2023    ALT 11 07/26/2023    MG 1.3 (L) 12/21/2019    ESFFAWFP80GH 45.3 12/15/2022        Lab Results   Component Value Date    HGBA1C 8.9 (H) 04/04/2023        Hematology:  Lab Results   Component Value Date    WBC 5.55 07/26/2023    HGB 8.9 (L) 07/26/2023    HCT 29.3 (L) 07/26/2023     07/26/2023       Lipid Panel:  Lab Results   Component Value Date    CHOL 162 12/15/2022    HDL 38 12/15/2022    LDL 73.00 12/15/2022    TRIG 255 (H) 12/15/2022    TOTALCHOLEST 4 12/15/2022        Urine:  Lab Results   Component Value  Date    COLORUA Yellow 06/07/2023    APPEARANCEUA Clear 06/07/2023    SGUA >=1.030 06/07/2023    PHUA 5.0 06/07/2023    PROTEINUA >=300 (A) 06/07/2023    GLUCOSEUA 100 (A) 06/07/2023    KETONESUA Negative 06/07/2023    BLOODUA Negative 06/07/2023    NITRITESUA Negative 06/07/2023    LEUKOCYTESUR Negative 06/07/2023    RBCUA None Seen 06/07/2023    WBCUA 0-2 06/07/2023    BACTERIA None Seen 06/07/2023    CREATRANDUR 185.2 (H) 12/15/2022

## 2023-08-25 ENCOUNTER — TELEPHONE (OUTPATIENT)
Dept: INTERNAL MEDICINE | Facility: CLINIC | Age: 73
End: 2023-08-25

## 2023-08-25 ENCOUNTER — OFFICE VISIT (OUTPATIENT)
Dept: INTERNAL MEDICINE | Facility: CLINIC | Age: 73
End: 2023-08-25
Payer: MEDICARE

## 2023-08-25 ENCOUNTER — TELEPHONE (OUTPATIENT)
Dept: INTERNAL MEDICINE | Facility: CLINIC | Age: 73
End: 2023-08-25
Payer: MEDICARE

## 2023-08-25 VITALS
HEIGHT: 60 IN | SYSTOLIC BLOOD PRESSURE: 138 MMHG | WEIGHT: 143 LBS | HEART RATE: 74 BPM | OXYGEN SATURATION: 98 % | DIASTOLIC BLOOD PRESSURE: 64 MMHG | BODY MASS INDEX: 28.07 KG/M2

## 2023-08-25 DIAGNOSIS — R01.1 MURMUR, CARDIAC: ICD-10-CM

## 2023-08-25 DIAGNOSIS — J01.80 ACUTE NON-RECURRENT SINUSITIS OF OTHER SINUS: ICD-10-CM

## 2023-08-25 DIAGNOSIS — H81.13 BENIGN PAROXYSMAL POSITIONAL VERTIGO DUE TO BILATERAL VESTIBULAR DISORDER: Primary | ICD-10-CM

## 2023-08-25 PROCEDURE — 3008F PR BODY MASS INDEX (BMI) DOCUMENTED: ICD-10-PCS | Mod: CPTII,,, | Performed by: NURSE PRACTITIONER

## 2023-08-25 PROCEDURE — 3288F PR FALLS RISK ASSESSMENT DOCUMENTED: ICD-10-PCS | Mod: CPTII,,, | Performed by: NURSE PRACTITIONER

## 2023-08-25 PROCEDURE — 3075F PR MOST RECENT SYSTOLIC BLOOD PRESS GE 130-139MM HG: ICD-10-PCS | Mod: CPTII,,, | Performed by: NURSE PRACTITIONER

## 2023-08-25 PROCEDURE — 1101F PR PT FALLS ASSESS DOC 0-1 FALLS W/OUT INJ PAST YR: ICD-10-PCS | Mod: CPTII,,, | Performed by: NURSE PRACTITIONER

## 2023-08-25 PROCEDURE — 3052F HG A1C>EQUAL 8.0%<EQUAL 9.0%: CPT | Mod: CPTII,,, | Performed by: NURSE PRACTITIONER

## 2023-08-25 PROCEDURE — 1159F PR MEDICATION LIST DOCUMENTED IN MEDICAL RECORD: ICD-10-PCS | Mod: CPTII,,, | Performed by: NURSE PRACTITIONER

## 2023-08-25 PROCEDURE — 4010F ACE/ARB THERAPY RXD/TAKEN: CPT | Mod: CPTII,,, | Performed by: NURSE PRACTITIONER

## 2023-08-25 PROCEDURE — 3008F BODY MASS INDEX DOCD: CPT | Mod: CPTII,,, | Performed by: NURSE PRACTITIONER

## 2023-08-25 PROCEDURE — 99214 PR OFFICE/OUTPT VISIT, EST, LEVL IV, 30-39 MIN: ICD-10-PCS | Mod: ,,, | Performed by: NURSE PRACTITIONER

## 2023-08-25 PROCEDURE — 3075F SYST BP GE 130 - 139MM HG: CPT | Mod: CPTII,,, | Performed by: NURSE PRACTITIONER

## 2023-08-25 PROCEDURE — 1160F PR REVIEW ALL MEDS BY PRESCRIBER/CLIN PHARMACIST DOCUMENTED: ICD-10-PCS | Mod: CPTII,,, | Performed by: NURSE PRACTITIONER

## 2023-08-25 PROCEDURE — 1160F RVW MEDS BY RX/DR IN RCRD: CPT | Mod: CPTII,,, | Performed by: NURSE PRACTITIONER

## 2023-08-25 PROCEDURE — 3078F PR MOST RECENT DIASTOLIC BLOOD PRESSURE < 80 MM HG: ICD-10-PCS | Mod: CPTII,,, | Performed by: NURSE PRACTITIONER

## 2023-08-25 PROCEDURE — 1101F PT FALLS ASSESS-DOCD LE1/YR: CPT | Mod: CPTII,,, | Performed by: NURSE PRACTITIONER

## 2023-08-25 PROCEDURE — 3078F DIAST BP <80 MM HG: CPT | Mod: CPTII,,, | Performed by: NURSE PRACTITIONER

## 2023-08-25 PROCEDURE — 3052F PR MOST RECENT HEMOGLOBIN A1C LEVEL 8.0 - < 9.0%: ICD-10-PCS | Mod: CPTII,,, | Performed by: NURSE PRACTITIONER

## 2023-08-25 PROCEDURE — 4010F PR ACE/ARB THEARPY RXD/TAKEN: ICD-10-PCS | Mod: CPTII,,, | Performed by: NURSE PRACTITIONER

## 2023-08-25 PROCEDURE — 99214 OFFICE O/P EST MOD 30 MIN: CPT | Mod: ,,, | Performed by: NURSE PRACTITIONER

## 2023-08-25 PROCEDURE — 1159F MED LIST DOCD IN RCRD: CPT | Mod: CPTII,,, | Performed by: NURSE PRACTITIONER

## 2023-08-25 PROCEDURE — 3288F FALL RISK ASSESSMENT DOCD: CPT | Mod: CPTII,,, | Performed by: NURSE PRACTITIONER

## 2023-08-25 RX ORDER — AMOXICILLIN AND CLAVULANATE POTASSIUM 875; 125 MG/1; MG/1
1 TABLET, FILM COATED ORAL EVERY 12 HOURS
Qty: 20 TABLET | Refills: 0 | Status: SHIPPED | OUTPATIENT
Start: 2023-08-25 | End: 2023-09-04

## 2023-08-25 NOTE — TELEPHONE ENCOUNTER
----- Message from Kenny Carlson MA sent at 8/25/2023  7:54 AM CDT -----  Regarding: PV 9/1/23 @ 9:20 JAKOB Espinoza  1. Are there any outstanding tasks in the patient's chart? Yes, fasting labs    2. Is there any documentation in the chart? No    3.Has patient been seen in an ER, Urgent care clinic, or been admitted since last visit?  If yes, When, where, and why    4. Has patient seen any other healthcare providers since last visit?  If yes, when, where, and why    5. Has patient had any bloodwork or XR done since last visit?    6. Is patient signed up for patient portal?

## 2023-08-25 NOTE — LETTER
AUTHORIZATION FOR RELEASE OF   CONFIDENTIAL INFORMATION    Dear Dr Valentino,    We are seeing Alison Langston, date of birth 1950, in the clinic at [place] [address] [facility]. Noah Tafoya MD is the patient's PCP. Alison Langston has an outstanding lab/procedure at the time we reviewed her chart. In order to help keep her health information updated, she has authorized us to request the following medical record(s):        (  )  MAMMOGRAM                                      (  )  COLONOSCOPY      (  )  PAP SMEAR                                          (  )  OUTSIDE LAB RESULTS     (  )  DEXA SCAN                                          (  )  EYE EXAM            (  )  FOOT EXAM                                          (  )  ENTIRE RECORD     (  )  OUTSIDE IMMUNIZATIONS                 (x  )  ECHO REPORT       Please fax records to Noah Tafoya MD, [phone]     If you have any questions, please contact [facility] at [phone].           Patient Name: Alison Langston  : 1950  Patient Phone #: [phone]

## 2023-08-28 ENCOUNTER — DOCUMENTATION ONLY (OUTPATIENT)
Dept: INTERNAL MEDICINE | Facility: CLINIC | Age: 73
End: 2023-08-28
Payer: MEDICARE

## 2023-08-28 DIAGNOSIS — G47.00 INSOMNIA, UNSPECIFIED TYPE: ICD-10-CM

## 2023-08-28 RX ORDER — TRAZODONE HYDROCHLORIDE 100 MG/1
TABLET ORAL
Qty: 90 TABLET | Refills: 0 | Status: SHIPPED | OUTPATIENT
Start: 2023-08-28 | End: 2023-10-11

## 2023-09-01 DIAGNOSIS — K21.9 GASTROESOPHAGEAL REFLUX DISEASE, UNSPECIFIED WHETHER ESOPHAGITIS PRESENT: ICD-10-CM

## 2023-09-01 RX ORDER — OMEPRAZOLE 40 MG/1
40 CAPSULE, DELAYED RELEASE ORAL EVERY MORNING
Qty: 90 CAPSULE | Refills: 0 | Status: SHIPPED | OUTPATIENT
Start: 2023-09-01 | End: 2023-11-02

## 2023-09-04 PROBLEM — Z00.00 MEDICARE ANNUAL WELLNESS VISIT, SUBSEQUENT: Status: RESOLVED | Noted: 2023-06-01 | Resolved: 2023-09-04

## 2023-09-05 ENCOUNTER — TELEPHONE (OUTPATIENT)
Dept: INTERNAL MEDICINE | Facility: CLINIC | Age: 73
End: 2023-09-05
Payer: MEDICARE

## 2023-09-05 NOTE — TELEPHONE ENCOUNTER
----- Message from Fox Myles sent at 9/5/2023  9:51 AM CDT -----  .Type:  Patient Requesting Referral    Who Called:pt  Does the patient already have the specialty appointment scheduled?:  If yes, what is the date of that appointment?:  Referral to What Specialty:neurologist  Reason for Referral:pinched nerve right arm  Does the patient want the referral with a specific physician?:  Is the specialist an [facility]?:  Patient Requesting a Response?:  Would the patient rather a call back or a response via [facility]? call back  Best Call Back Number:[phone]  Additional Information:

## 2023-09-19 DIAGNOSIS — N18.30 TYPE 2 DIABETES MELLITUS WITH STAGE 3 CHRONIC KIDNEY DISEASE, WITH LONG-TERM CURRENT USE OF INSULIN, UNSPECIFIED WHETHER STAGE 3A OR 3B CKD: Primary | ICD-10-CM

## 2023-09-19 DIAGNOSIS — Z79.4 TYPE 2 DIABETES MELLITUS WITH STAGE 3 CHRONIC KIDNEY DISEASE, WITH LONG-TERM CURRENT USE OF INSULIN, UNSPECIFIED WHETHER STAGE 3A OR 3B CKD: Primary | ICD-10-CM

## 2023-09-19 DIAGNOSIS — E11.22 TYPE 2 DIABETES MELLITUS WITH STAGE 3 CHRONIC KIDNEY DISEASE, WITH LONG-TERM CURRENT USE OF INSULIN, UNSPECIFIED WHETHER STAGE 3A OR 3B CKD: Primary | ICD-10-CM

## 2023-09-22 ENCOUNTER — OFFICE VISIT (OUTPATIENT)
Dept: INTERNAL MEDICINE | Facility: CLINIC | Age: 73
End: 2023-09-22
Payer: MEDICARE

## 2023-09-22 VITALS
WEIGHT: 136 LBS | RESPIRATION RATE: 16 BRPM | TEMPERATURE: 97 F | HEART RATE: 70 BPM | OXYGEN SATURATION: 99 % | SYSTOLIC BLOOD PRESSURE: 146 MMHG | HEIGHT: 60 IN | BODY MASS INDEX: 26.7 KG/M2 | DIASTOLIC BLOOD PRESSURE: 74 MMHG

## 2023-09-22 DIAGNOSIS — R29.898 RIGHT ARM WEAKNESS: ICD-10-CM

## 2023-09-22 DIAGNOSIS — J06.9 UPPER RESPIRATORY TRACT INFECTION, UNSPECIFIED TYPE: Primary | ICD-10-CM

## 2023-09-22 DIAGNOSIS — M79.601 RIGHT ARM PAIN: ICD-10-CM

## 2023-09-22 PROCEDURE — 3288F PR FALLS RISK ASSESSMENT DOCUMENTED: ICD-10-PCS | Mod: CPTII,,, | Performed by: INTERNAL MEDICINE

## 2023-09-22 PROCEDURE — 4010F ACE/ARB THERAPY RXD/TAKEN: CPT | Mod: CPTII,,, | Performed by: INTERNAL MEDICINE

## 2023-09-22 PROCEDURE — 3008F BODY MASS INDEX DOCD: CPT | Mod: CPTII,,, | Performed by: INTERNAL MEDICINE

## 2023-09-22 PROCEDURE — 3052F HG A1C>EQUAL 8.0%<EQUAL 9.0%: CPT | Mod: CPTII,,, | Performed by: INTERNAL MEDICINE

## 2023-09-22 PROCEDURE — 99214 PR OFFICE/OUTPT VISIT, EST, LEVL IV, 30-39 MIN: ICD-10-PCS | Mod: ,,, | Performed by: INTERNAL MEDICINE

## 2023-09-22 PROCEDURE — 1159F PR MEDICATION LIST DOCUMENTED IN MEDICAL RECORD: ICD-10-PCS | Mod: CPTII,,, | Performed by: INTERNAL MEDICINE

## 2023-09-22 PROCEDURE — 1159F MED LIST DOCD IN RCRD: CPT | Mod: CPTII,,, | Performed by: INTERNAL MEDICINE

## 2023-09-22 PROCEDURE — 3052F PR MOST RECENT HEMOGLOBIN A1C LEVEL 8.0 - < 9.0%: ICD-10-PCS | Mod: CPTII,,, | Performed by: INTERNAL MEDICINE

## 2023-09-22 PROCEDURE — 3078F PR MOST RECENT DIASTOLIC BLOOD PRESSURE < 80 MM HG: ICD-10-PCS | Mod: CPTII,,, | Performed by: INTERNAL MEDICINE

## 2023-09-22 PROCEDURE — 1101F PR PT FALLS ASSESS DOC 0-1 FALLS W/OUT INJ PAST YR: ICD-10-PCS | Mod: CPTII,,, | Performed by: INTERNAL MEDICINE

## 2023-09-22 PROCEDURE — 1160F RVW MEDS BY RX/DR IN RCRD: CPT | Mod: CPTII,,, | Performed by: INTERNAL MEDICINE

## 2023-09-22 PROCEDURE — 1101F PT FALLS ASSESS-DOCD LE1/YR: CPT | Mod: CPTII,,, | Performed by: INTERNAL MEDICINE

## 2023-09-22 PROCEDURE — 3008F PR BODY MASS INDEX (BMI) DOCUMENTED: ICD-10-PCS | Mod: CPTII,,, | Performed by: INTERNAL MEDICINE

## 2023-09-22 PROCEDURE — 3077F SYST BP >= 140 MM HG: CPT | Mod: CPTII,,, | Performed by: INTERNAL MEDICINE

## 2023-09-22 PROCEDURE — 4010F PR ACE/ARB THEARPY RXD/TAKEN: ICD-10-PCS | Mod: CPTII,,, | Performed by: INTERNAL MEDICINE

## 2023-09-22 PROCEDURE — 99214 OFFICE O/P EST MOD 30 MIN: CPT | Mod: ,,, | Performed by: INTERNAL MEDICINE

## 2023-09-22 PROCEDURE — 1160F PR REVIEW ALL MEDS BY PRESCRIBER/CLIN PHARMACIST DOCUMENTED: ICD-10-PCS | Mod: CPTII,,, | Performed by: INTERNAL MEDICINE

## 2023-09-22 PROCEDURE — 3077F PR MOST RECENT SYSTOLIC BLOOD PRESSURE >= 140 MM HG: ICD-10-PCS | Mod: CPTII,,, | Performed by: INTERNAL MEDICINE

## 2023-09-22 PROCEDURE — 3078F DIAST BP <80 MM HG: CPT | Mod: CPTII,,, | Performed by: INTERNAL MEDICINE

## 2023-09-22 PROCEDURE — 3288F FALL RISK ASSESSMENT DOCD: CPT | Mod: CPTII,,, | Performed by: INTERNAL MEDICINE

## 2023-09-22 RX ORDER — PREGABALIN 25 MG/1
25 CAPSULE ORAL NIGHTLY
Qty: 90 CAPSULE | Refills: 3 | Status: SHIPPED | OUTPATIENT
Start: 2023-09-22 | End: 2024-03-22

## 2023-09-22 NOTE — PROGRESS NOTES
"Subjective:      Patient ID: Alison Langston is a 72 y.o. female.    Chief Complaint: Arm Pain (Right arm pain)      HPI:  72-year-old female presenting today with complaints of right arm pain, numbness.  Pain is mostly isolated above and below the elbow worse at night when she is trying to sleep she is resting in up on a pillow with really no improvement in symptoms she is got normal external range of motion of that shoulder, able to abduct above 90° she does have some mild weakness  She denies any numbness or tingling in her fingers    She also has a sore throat that started this morning, her grandson has COVID    Past Medical History:  Past Medical History:   Diagnosis Date    Anemia     Anxiety     Cerebrovascular accident     Chronic back pain     Depression     Elevated ferritin     External hemorrhoids     GERD (gastroesophageal reflux disease)     HLD (hyperlipidemia)     Hypertension     Hypothyroidism, unspecified     CRISTINA (iron deficiency anemia)     Incisional hernia     Internal hemorrhoids     Myoclonus     Osteoporosis     Personal history of colonic polyps     Vertigo      Past Surgical History:   Procedure Laterality Date    biopsy of breast      BONE MARROW BIOPSY W/ ASPIRATION Right 2018    CATARACT EXTRACTION Right 04/30/2021    CHOLECYSTECTOMY      COLONOSCOPY  12/01/2021    Dr. Junior Cardenas  Repeat colon 2026    HERNIA REPAIR      HERNIA REPAIR  01/27/2020    HYSTERECTOMY  2013    PATENT DUCTUS ARTERIOUS LIGATION      SHOULDER SURGERY Left     THYROIDECTOMY       Review of patient's allergies indicates:   Allergen Reactions    Baclofen Hallucinations    Donepezil Hallucinations    Erythromycin      Other reaction(s): Upset Stomach    Fluoxetine      Doesn't work    Grass pollen      Other reaction(s): per allergy test    Hydrocodone-acetaminophen      Other reaction(s): "Makes me crazy"    Ivp dye [iodinated contrast media]      Other reaction(s): Rash    Metoclopramide hcl     Rosuvastatin "     Iodine Rash    Shrimp Rash     Current Outpatient Medications on File Prior to Visit   Medication Sig Dispense Refill    albuterol (PROVENTIL/VENTOLIN HFA) 90 mcg/actuation inhaler Inhale 2 puffs into the lungs every 6 (six) hours as needed for Wheezing or Shortness of Breath. 6.7 g 2    atorvastatin (LIPITOR) 20 MG tablet Take 1 tablet by mouth once daily 90 tablet 2    blood sugar diagnostic (RELION PRIME TEST STRIPS) Strp 1 each by Misc.(Non-Drug; Combo Route) route Daily. 100 each 6    blood-glucose meter Misc   true metrix glucose monitor, See Instructions, to check bloodsugars BID, E11.9, # 1 EA, 5 Refill(s), Pharmacy: [facility], 154, cm, Height/Length Dosing, 04/19/21 10:31:00 CDT, 73.45, kg, Weight Dosing, 04/19/21 10:31:00 CDT      clotrimazole-betamethasone 1-0.05% (LOTRISONE) cream Apply topically 2 (two) times daily. 45 g 1    flash glucose sensor (FREESTYLE APOLONIA 2 SENSOR) Kit 1 each by Misc.(Non-Drug; Combo Route) route every 14 (fourteen) days. 1 kit 6    fluticasone propionate (FLONASE) 50 mcg/actuation nasal spray 1 spray by Each Nostril route 2 (two) times daily.      fluticasone propionate (FLONASE) 50 mcg/actuation nasal spray 1 spray (50 mcg total) by Each Nostril route once daily. 11.1 mL 0    gemfibroziL (LOPID) 600 MG tablet TAKE 1 TABLET BY MOUTH TWICE DAILY ONE AT 9 IN THE MORNING AND ONE AT 5 IN THE EVENING Strength: 600 mg 180 tablet 3    glipiZIDE 5 MG TR24 Take 2 tablets (10 mg total) by mouth daily with breakfast. 180 tablet 3    hydroCHLOROthiazide (HYDRODIURIL) 12.5 MG Tab Take 1 tablet (12.5 mg total) by mouth once daily. 90 tablet 3    insulin glargine (LANTUS) 100 unit/mL injection Inject 90 Units into the skin. 70/30      levothyroxine (SYNTHROID) 200 MCG tablet Take 1 tablet (200 mcg total) by mouth once daily. 90 tablet 3    losartan (COZAAR) 100 MG tablet Take 1 tablet by mouth once daily 90 tablet 2    meclizine (ANTIVERT) 12.5 mg tablet Take 12.5 mg by mouth 3  (three) times daily as needed.      metFORMIN (GLUCOPHAGE) 1000 MG tablet TAKE 1 TABLET BY MOUTH TWICE DAILY AT  9AM  AND  5PM 180 tablet 0    mupirocin (BACTROBAN) 2 % ointment Apply 1 g topically 2 (two) times a day.      omeprazole (PRILOSEC) 40 MG capsule Take 1 capsule by mouth in the morning 90 capsule 0    traZODone (DESYREL) 100 MG tablet TAKE 2 TABLETS BY MOUTH ONCE DAILY AT  9PM  IN  THE  EVENING  AT  BEDTIME 90 tablet 0    venlafaxine (EFFEXOR-XR) 150 MG Cp24 Take 1 capsule (150 mg total) by mouth once daily. 90 capsule 3     No current facility-administered medications on file prior to visit.     Social History     Socioeconomic History    Marital status:    Tobacco Use    Smoking status: Never    Smokeless tobacco: Never   Substance and Sexual Activity    Alcohol use: Never    Drug use: Never     Social Determinants of Health     Financial Resource Strain: Low Risk  (9/22/2023)    Overall Financial Resource Strain (CARDIA)     Difficulty of Paying Living Expenses: Not hard at all   Food Insecurity: No Food Insecurity (9/22/2023)    Hunger Vital Sign     Worried About Running Out of Food in the Last Year: Never true     Ran Out of Food in the Last Year: Never true   Transportation Needs: Unmet Transportation Needs (9/22/2023)    PRAPARE - Transportation     Lack of Transportation (Medical): Yes     Lack of Transportation (Non-Medical): Yes   Physical Activity: Insufficiently Active (9/22/2023)    Exercise Vital Sign     Days of Exercise per Week: 3 days     Minutes of Exercise per Session: 30 min   Stress: No Stress Concern Present (9/22/2023)    [employer] [place] [employer] - Occupational Stress Questionnaire     Feeling of Stress : Not at all   Social Connections: Moderately Isolated (9/22/2023)    Social Connection and Isolation Panel [NHANES]     Frequency of Communication with Friends and Family: More than three times a week     Frequency of Social Gatherings with Friends and  Family: More than three times a week     Attends Sikh Services: Never     Active Member of Clubs or Organizations: No     Attends Club or Organization Meetings: Never     Marital Status:    Housing Stability: Low Risk  (9/22/2023)    Housing Stability Vital Sign     Unable to Pay for Housing in the Last Year: No     Number of Places Lived in the Last Year: 1     Unstable Housing in the Last Year: No     Family History   Problem Relation Age of Onset    Diabetes Mother     GI problems Mother     Lung cancer Father     Diabetes Father     Alzheimer's disease Father        Review of Systems  A comprehensive review of systems was performed and was negative with exception of what is documented above.     Objective:   BP (!) 146/74 (BP Location: Left arm, Patient Position: Sitting, BP Method: Medium (Manual))   Pulse 70   Temp 97.4 °F (36.3 °C) (Temporal)   Resp 16   Ht 5' (1.524 m)   Wt 61.7 kg (136 lb)   SpO2 99%   BMI 26.56 kg/m²   Physical Exam  General : Alert and oriented, No acute distress, afebrile.  Eye : PERRLA. EOMI. Normal conjunctiva, Sclerae are nonicteric.  Musculoskeletal : Normal range of motion throughout. No muscle tenderness.  Normal external range of motion of the right shoulder/arm normal abduction and adduction  Integumentary : Warm, moist, intact.  Neurologic : Alert, Oriented  Psychiatric : Cooperative, Appropriate mood & affect.   Assessment/ Plan:   1. Upper respiratory tract infection, unspecified type  Assessment & Plan:  Patient was exposed to COVID, COVID PCR and flu PCR orders placed    Orders:  -     COVID/FLU A&B PCR; Future; Expected date: 09/22/2023    2. Right arm pain  -     Ambulatory referral/consult to Neurology; Future; Expected date: 09/29/2023    3. Right arm weakness  Assessment & Plan:  Suspicious for cubital tunnel, nerve conduction order placed.  Will start her on some Lyrica just low-dose 25 at bedtime she was intolerant to high dose of  gabapentin    Orders:  -     Ambulatory referral/consult to Neurology; Future; Expected date: 09/29/2023    Other orders  -     pregabalin (LYRICA) 25 MG capsule; Take 1 capsule (25 mg total) by mouth every evening.  Dispense: 90 capsule; Refill: 3             Follow up if symptoms worsen or fail to improve.

## 2023-09-22 NOTE — ASSESSMENT & PLAN NOTE
Suspicious for cubital tunnel, nerve conduction order placed.  Will start her on some Lyrica just low-dose 25 at bedtime she was intolerant to high dose of gabapentin

## 2023-09-25 DIAGNOSIS — R29.898 RIGHT ARM WEAKNESS: ICD-10-CM

## 2023-09-25 DIAGNOSIS — M79.601 RIGHT ARM PAIN: Primary | ICD-10-CM

## 2023-10-11 DIAGNOSIS — G47.00 INSOMNIA, UNSPECIFIED TYPE: ICD-10-CM

## 2023-10-11 RX ORDER — TRAZODONE HYDROCHLORIDE 100 MG/1
TABLET ORAL
Qty: 90 TABLET | Refills: 0 | Status: SHIPPED | OUTPATIENT
Start: 2023-10-11 | End: 2023-11-27

## 2023-10-24 ENCOUNTER — OFFICE VISIT (OUTPATIENT)
Dept: HEMATOLOGY/ONCOLOGY | Facility: CLINIC | Age: 73
End: 2023-10-24
Payer: MEDICARE

## 2023-10-24 ENCOUNTER — INFUSION (OUTPATIENT)
Dept: INFUSION THERAPY | Facility: HOSPITAL | Age: 73
End: 2023-10-24
Attending: INTERNAL MEDICINE
Payer: MEDICARE

## 2023-10-24 VITALS
DIASTOLIC BLOOD PRESSURE: 70 MMHG | HEART RATE: 78 BPM | BODY MASS INDEX: 27.34 KG/M2 | HEIGHT: 60 IN | OXYGEN SATURATION: 99 % | SYSTOLIC BLOOD PRESSURE: 184 MMHG | WEIGHT: 139.25 LBS | RESPIRATION RATE: 18 BRPM | TEMPERATURE: 98 F

## 2023-10-24 DIAGNOSIS — D64.9 NORMOCYTIC ANEMIA: Primary | ICD-10-CM

## 2023-10-24 DIAGNOSIS — N18.9 ANEMIA DUE TO CHRONIC KIDNEY DISEASE, UNSPECIFIED CKD STAGE: Primary | ICD-10-CM

## 2023-10-24 DIAGNOSIS — D63.1 ANEMIA DUE TO CHRONIC KIDNEY DISEASE, UNSPECIFIED CKD STAGE: Primary | ICD-10-CM

## 2023-10-24 PROCEDURE — 1101F PT FALLS ASSESS-DOCD LE1/YR: CPT | Mod: CPTII,S$GLB,, | Performed by: NURSE PRACTITIONER

## 2023-10-24 PROCEDURE — 99999 PR PBB SHADOW E&M-EST. PATIENT-LVL V: CPT | Mod: PBBFAC,,, | Performed by: NURSE PRACTITIONER

## 2023-10-24 PROCEDURE — 1101F PR PT FALLS ASSESS DOC 0-1 FALLS W/OUT INJ PAST YR: ICD-10-PCS | Mod: CPTII,S$GLB,, | Performed by: NURSE PRACTITIONER

## 2023-10-24 PROCEDURE — 99213 OFFICE O/P EST LOW 20 MIN: CPT | Mod: S$GLB,,, | Performed by: NURSE PRACTITIONER

## 2023-10-24 PROCEDURE — 3008F PR BODY MASS INDEX (BMI) DOCUMENTED: ICD-10-PCS | Mod: CPTII,S$GLB,, | Performed by: NURSE PRACTITIONER

## 2023-10-24 PROCEDURE — 63600175 PHARM REV CODE 636 W HCPCS: Mod: EC,JG | Performed by: NURSE PRACTITIONER

## 2023-10-24 PROCEDURE — 3078F DIAST BP <80 MM HG: CPT | Mod: CPTII,S$GLB,, | Performed by: NURSE PRACTITIONER

## 2023-10-24 PROCEDURE — 3288F FALL RISK ASSESSMENT DOCD: CPT | Mod: CPTII,S$GLB,, | Performed by: NURSE PRACTITIONER

## 2023-10-24 PROCEDURE — 3077F SYST BP >= 140 MM HG: CPT | Mod: CPTII,S$GLB,, | Performed by: NURSE PRACTITIONER

## 2023-10-24 PROCEDURE — 99213 PR OFFICE/OUTPT VISIT, EST, LEVL III, 20-29 MIN: ICD-10-PCS | Mod: S$GLB,,, | Performed by: NURSE PRACTITIONER

## 2023-10-24 PROCEDURE — 99999 PR PBB SHADOW E&M-EST. PATIENT-LVL V: ICD-10-PCS | Mod: PBBFAC,,, | Performed by: NURSE PRACTITIONER

## 2023-10-24 PROCEDURE — 3078F PR MOST RECENT DIASTOLIC BLOOD PRESSURE < 80 MM HG: ICD-10-PCS | Mod: CPTII,S$GLB,, | Performed by: NURSE PRACTITIONER

## 2023-10-24 PROCEDURE — 3052F HG A1C>EQUAL 8.0%<EQUAL 9.0%: CPT | Mod: CPTII,S$GLB,, | Performed by: NURSE PRACTITIONER

## 2023-10-24 PROCEDURE — 3008F BODY MASS INDEX DOCD: CPT | Mod: CPTII,S$GLB,, | Performed by: NURSE PRACTITIONER

## 2023-10-24 PROCEDURE — 3077F PR MOST RECENT SYSTOLIC BLOOD PRESSURE >= 140 MM HG: ICD-10-PCS | Mod: CPTII,S$GLB,, | Performed by: NURSE PRACTITIONER

## 2023-10-24 PROCEDURE — 1159F MED LIST DOCD IN RCRD: CPT | Mod: CPTII,S$GLB,, | Performed by: NURSE PRACTITIONER

## 2023-10-24 PROCEDURE — 96372 THER/PROPH/DIAG INJ SC/IM: CPT

## 2023-10-24 PROCEDURE — 4010F ACE/ARB THERAPY RXD/TAKEN: CPT | Mod: CPTII,S$GLB,, | Performed by: NURSE PRACTITIONER

## 2023-10-24 PROCEDURE — 4010F PR ACE/ARB THEARPY RXD/TAKEN: ICD-10-PCS | Mod: CPTII,S$GLB,, | Performed by: NURSE PRACTITIONER

## 2023-10-24 PROCEDURE — 3052F PR MOST RECENT HEMOGLOBIN A1C LEVEL 8.0 - < 9.0%: ICD-10-PCS | Mod: CPTII,S$GLB,, | Performed by: NURSE PRACTITIONER

## 2023-10-24 PROCEDURE — 1159F PR MEDICATION LIST DOCUMENTED IN MEDICAL RECORD: ICD-10-PCS | Mod: CPTII,S$GLB,, | Performed by: NURSE PRACTITIONER

## 2023-10-24 PROCEDURE — 3288F PR FALLS RISK ASSESSMENT DOCUMENTED: ICD-10-PCS | Mod: CPTII,S$GLB,, | Performed by: NURSE PRACTITIONER

## 2023-10-24 RX ADMIN — EPOETIN ALFA 20000 UNITS: 20000 SOLUTION INTRAVENOUS; SUBCUTANEOUS at 10:10

## 2023-10-24 NOTE — PROGRESS NOTES
Subjective:       Patient ID: Alison Langston is a 72 y.o. female.    Chief Complaint: No chief complaint on file.      Diagnosis:  1. Normocytic/hypochromic anemia with normal bone marrow biopsy done 1/26/18.    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 Iron deficiency  3. Multiple medical comorbidities, including hypothyroidism.     Current Treatment:   Observation       Treatment History:  Injectafter x 2 1/10 and 1/17/18.   PO Iron prior to that  Injectafer x2 treatments 06/15/18 and 06/22/18, and in October, 2018  Desferal 500mg x 3 doses 10/27/20-10/29/20 for iron overload      HPI   Patient kindly referred by Dr. Howard for further evaluation workup of her normocytic anemia, fairly long-standing in nature. The patient states she's been anemic off and on since she was first pregnant many years ago. She denies any history of any GI bleeding, but states she has taken iron pills in the past. She underwent a colonoscopy September 18, 2017 by Dr. Cardenas which showed normal mucosa and the entire colon. Single polyp of benign appearance was noted in the sigmoid colon with additional medium internal hemorrhoids noted. EGD was done the same day with no report available, with biopsies from the EGD revealing unremarkable intestinal mucosa from duodenal biopsy with no evidence of celiac sprue. Biopsy of the stomach reveal mild reactive gastropathy only. No evidence of H. pylori.  The patient went back to see Dr. Howard in December at which time CBC revealed hemoglobin of 9.3, hematocrit 32.4, MCV of 87.3, and normal WBC and platelets. TSH was elevated at 33.3, and her diabetes was noted to be under fairly poor control based on her hemoglobin A1c.    She was then referred to hematology for further evaluation and workup of her normocytic anemia.  Lab work done 12/20/17 showed iron sat of 12.5 and Ferritin of 11.5 (patient had been on PO iron bid).  Siria negative;  SPEP/WILLIAMS negative;  Retic  count normal.  Epo level normal at 11.5.   MJ/DS DNA all normal.  Peripheral smear showed mild normocytic, hypochromic anemia with little variation in red cell size and shape.  Morphologic red cell variants include a few gisele cells, ovalocytes, elliptocytes, and rare acanthocytes.  No schistocytes are seen.  Mild rouleaux formation is seen.  Polychromasia is present.  Leukocytes are normal in number, differential, and morphology.  A few reactive lymphocytes are present.  Platelets are normal in number and morphology.  A few giant platelets are present.  Bone marrow biopsy done 1/26/2018 showed normocellular bone marrow with trilineage hematopoiesis--> no evidence of dysplasia or malignancy.  Normal cytogenetics noted.      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  Patient had persistent elevation of ferritin, therefore a MRI of the abdomen was done on 9/30/2020 and showed hepatomegaly with estimated liver iron concentration of 126 µmol/g, additional iron deposition in the spleen.  There was also a suspected 0.9 cm cystic lesion within the pancreatic body and a 1 year follow-up MRCP is suggested for surveillance.         Interval History:   Patient presents to clinic for scheduled follow up appointment.  She feels well overall other than fatigue lately.  She denies any bleeding or dark stools.  No night sweats, unintentional weight loss, fevers.     Past Medical History:   Diagnosis Date    Anemia     Anxiety     Cerebrovascular accident     Chronic back pain     Depression     Elevated ferritin     External hemorrhoids     GERD (gastroesophageal reflux disease)     HLD (hyperlipidemia)     Hypertension     Hypothyroidism, unspecified     CRISTINA (iron deficiency anemia)     Incisional hernia     Internal hemorrhoids     Myoclonus     Osteoporosis     Personal history of colonic polyps     Vertigo       Past Surgical History:   Procedure Laterality Date    biopsy of breast      BONE MARROW  BIOPSY W/ ASPIRATION Right 2018    CATARACT EXTRACTION Right 04/30/2021    CHOLECYSTECTOMY      COLONOSCOPY  12/01/2021    Dr. Junior Cardenas  Repeat colon 2026    HERNIA REPAIR      HERNIA REPAIR  01/27/2020    HYSTERECTOMY  2013    PATENT DUCTUS ARTERIOUS LIGATION      SHOULDER SURGERY Left     THYROIDECTOMY       Social History     Socioeconomic History    Marital status:    Tobacco Use    Smoking status: Never    Smokeless tobacco: Never   Substance and Sexual Activity    Alcohol use: Never    Drug use: Never     Social Determinants of Health     Financial Resource Strain: Low Risk  (9/22/2023)    Overall Financial Resource Strain (CARDIA)     Difficulty of Paying Living Expenses: Not hard at all   Food Insecurity: No Food Insecurity (9/22/2023)    Hunger Vital Sign     Worried About Running Out of Food in the Last Year: Never true     Ran Out of Food in the Last Year: Never true   Transportation Needs: Unmet Transportation Needs (9/22/2023)    PRAPARE - Transportation     Lack of Transportation (Medical): Yes     Lack of Transportation (Non-Medical): Yes   Physical Activity: Insufficiently Active (9/22/2023)    Exercise Vital Sign     Days of Exercise per Week: 3 days     Minutes of Exercise per Session: 30 min   Stress: No Stress Concern Present (9/22/2023)    [employer] - Occupational Stress Questionnaire     Feeling of Stress : Not at all   Social Connections: Moderately Isolated (9/22/2023)    Social Connection and Isolation Panel [NHANES]     Frequency of Communication with Friends and Family: More than three times a week     Frequency of Social Gatherings with Friends and Family: More than three times a week     Attends [employer] Services: Never     Active Member of Clubs or Organizations: No     Attends Club or Organization Meetings: Never     Marital Status:    Housing Stability: Low Risk  (9/22/2023)    Housing Stability Vital Sign     Unable to Pay for  "Housing in the Last Year: No     Number of Places Lived in the Last Year: 1     Unstable Housing in the Last Year: No      Family History   Problem Relation Age of Onset    Diabetes Mother     GI problems Mother     Lung cancer Father     Diabetes Father     Alzheimer's disease Father       Review of patient's allergies indicates:   Allergen Reactions    Baclofen Hallucinations    Donepezil Hallucinations    Erythromycin      Other reaction(s): Upset Stomach    Fluoxetine      Doesn't work    Grass pollen      Other reaction(s): per allergy test    Hydrocodone-acetaminophen      Other reaction(s): "Makes me crazy"    Ivp dye [iodinated contrast media]      Other reaction(s): Rash    Metoclopramide hcl     Rosuvastatin     Iodine Rash    Shrimp Rash      Review of Systems   Constitutional:  Negative for chills, diaphoresis, fatigue, fever and unexpected weight change.   HENT:  Negative for nasal congestion, mouth sores, sinus pressure/congestion and sore throat.    Eyes:  Negative for pain and visual disturbance.   Respiratory:  Negative for cough, chest tightness and shortness of breath.    Cardiovascular:  Negative for chest pain, palpitations and leg swelling.   Gastrointestinal:  Negative for abdominal distention, abdominal pain, blood in stool, constipation and diarrhea.   Genitourinary:  Negative for dysuria, frequency and hematuria.   Musculoskeletal:  Negative for arthralgias and back pain.   Integumentary:  Negative for rash.   Neurological:  Positive for numbness. Negative for dizziness, weakness and headaches.        Falls from numbness to her foot   Hematological:  Negative for adenopathy.   Psychiatric/Behavioral:  Negative for confusion.          Objective:      Physical Exam  Vitals reviewed.   Constitutional:       General: She is not in acute distress.     Appearance: Normal appearance.   HENT:      Head: Normocephalic and atraumatic.      Nose: Nose normal.      Mouth/Throat:      Mouth: Mucous " membranes are moist.   Eyes:      Extraocular Movements: Extraocular movements intact.      Conjunctiva/sclera: Conjunctivae normal.   Cardiovascular:      Rate and Rhythm: Normal rate and regular rhythm.      Pulses: Normal pulses.      Heart sounds: Murmur heard.   Pulmonary:      Effort: Pulmonary effort is normal.      Breath sounds: Normal breath sounds.   Abdominal:      General: Bowel sounds are normal.      Palpations: Abdomen is soft.   Musculoskeletal:         General: No swelling. Normal range of motion.      Cervical back: Normal range of motion and neck supple.      Right lower leg: No edema.      Left lower leg: No edema.   Skin:     General: Skin is warm and dry.   Neurological:      General: No focal deficit present.      Mental Status: She is alert and oriented to person, place, and time. Mental status is at baseline.   Psychiatric:         Mood and Affect: Mood normal.         Behavior: Behavior normal.         LABS AND IMAGING REVIEWED IN EPIC          Assessment:     1. Normocytic anemia with component of iron deficiency, in addition to anemia chronic disease, requiring IV Iron after poor response to PO iron.   2. Hypothyroidism, thought to be a component contributing to her anemia.    3. Normal Bone marrow biopsy done 1/26/18  4. Multiple medical comorbidities  5. Elevated ferritin -ruled out hemochromatosis. Received Desferal 500mg x 3 doses 10/27/20-10/29/20.  6. Neuropathy        Plan:         Patient doing well overall clinically with no worsening clinical symptoms    Continue Procrit subq as needed, last was 7/26/23. Hgb today 10.2, will give today    CBC Q4w    Return to clinic in 4 months with a CBC, CMP, Iron studies, B12, Folate prior to appt    She did see Dr. Connor for pancreatic cyst, we will request these records.      VINNY Lemus

## 2023-11-02 DIAGNOSIS — E11.65 TYPE 2 DIABETES MELLITUS WITH HYPERGLYCEMIA, WITH LONG-TERM CURRENT USE OF INSULIN: ICD-10-CM

## 2023-11-02 DIAGNOSIS — Z79.4 TYPE 2 DIABETES MELLITUS WITH HYPERGLYCEMIA, WITH LONG-TERM CURRENT USE OF INSULIN: ICD-10-CM

## 2023-11-02 DIAGNOSIS — K21.9 GASTROESOPHAGEAL REFLUX DISEASE, UNSPECIFIED WHETHER ESOPHAGITIS PRESENT: ICD-10-CM

## 2023-11-02 RX ORDER — METFORMIN HYDROCHLORIDE 1000 MG/1
TABLET ORAL
Qty: 180 TABLET | Refills: 0 | Status: SHIPPED | OUTPATIENT
Start: 2023-11-02 | End: 2024-02-01

## 2023-11-02 RX ORDER — OMEPRAZOLE 40 MG/1
40 CAPSULE, DELAYED RELEASE ORAL EVERY MORNING
Qty: 90 CAPSULE | Refills: 0 | Status: SHIPPED | OUTPATIENT
Start: 2023-11-02

## 2023-11-07 NOTE — PROGRESS NOTES
"Subjective:      Patient ID: Alison Langston is a 72 y.o. female.    Chief Complaint: Cough (Cough, congestion, and sinus/)      HPI: Patient of Dr. Howard's here today for c/o cough x 6 days. She reports slight sore throat.  Denies SOB. Slight wheezing. She is not a smoker. Denies fever. Some chills. Exposure to grandson, who was recently dx with adenovirus. Denies NVD.    Review of patient's allergies indicates:   Allergen Reactions    Baclofen Hallucinations    Donepezil Hallucinations    Erythromycin      Other reaction(s): Upset Stomach    Fluoxetine      Doesn't work    Grass pollen      Other reaction(s): per allergy test    Hydrocodone-acetaminophen      Other reaction(s): "Makes me crazy"    Ivp dye [iodinated contrast media]      Other reaction(s): Rash    Metoclopramide hcl     Rosuvastatin     Iodine Rash    Shrimp Rash       Review of Systems  Constitutional: No fever, occ chills, No sweats, No fatigue, Ear/Nose/Mouth/Throat: nasal congestion, No vertigo.  Respiratory: No shortness of breath, cough, No sputum production, occ wheezing, No exertional dyspnea.   Cardiovascular: No chest pain, No palpitations, No peripheral edema.  Gastrointestinal: No nausea, No vomiting, No diarrhea    Objective:   Visit Vitals  /78 (BP Location: Left arm, Patient Position: Sitting, BP Method: Large (Manual))   Pulse 80   Temp 98.1 °F (36.7 °C) (Oral)   Ht 5' (1.524 m)   Wt 63.2 kg (139 lb 4.8 oz)   SpO2 98%   BMI 27.21 kg/m²     The patient's weight trend is below:   Wt Readings from Last 4 Encounters:   11/08/23 63.2 kg (139 lb 4.8 oz)   10/24/23 63.2 kg (139 lb 3.5 oz)   09/22/23 61.7 kg (136 lb)   08/25/23 64.9 kg (143 lb)        Physical Exam  Vitals and nursing note reviewed.   Constitutional:       General: She is not in acute distress.     Appearance: Normal appearance. She is normal weight. She is not ill-appearing, toxic-appearing or diaphoretic.   HENT:      Head: Normocephalic and atraumatic.      Right " Ear: Tympanic membrane, ear canal and external ear normal.      Left Ear: Tympanic membrane, ear canal and external ear normal.      Nose: Congestion and rhinorrhea present.      Mouth/Throat:      Mouth: Mucous membranes are moist.      Pharynx: Oropharynx is clear. No oropharyngeal exudate or posterior oropharyngeal erythema.   Cardiovascular:      Rate and Rhythm: Normal rate and regular rhythm.      Pulses: Normal pulses.      Heart sounds: Murmur heard.   Pulmonary:      Effort: Pulmonary effort is normal. No respiratory distress.      Breath sounds: Normal breath sounds. No stridor. No wheezing, rhonchi or rales.   Musculoskeletal:      Cervical back: Normal range of motion and neck supple. No rigidity or tenderness.   Lymphadenopathy:      Cervical: No cervical adenopathy.   Skin:     General: Skin is warm and dry.   Neurological:      General: No focal deficit present.      Mental Status: She is alert and oriented to person, place, and time. Mental status is at baseline.      Motor: No weakness.   Psychiatric:         Mood and Affect: Mood normal.         Thought Content: Thought content normal.         Judgment: Judgment normal.         Assessment/Plan:   1. Acute non-recurrent sinusitis of other sinus  Eval with viral panel  OTCs as needed/as directed  Inc fluids  Consider tx with abx if warranted    2. Acute cough  See #1  RX albuterol inh as directed and Tessalon as needed/as directed    - COVID/RSV/FLU A&B PCR; Future  - albuterol (PROVENTIL/VENTOLIN HFA) 90 mcg/actuation inhaler; Inhale 2 puffs into the lungs every 6 (six) hours as needed for Wheezing or Shortness of Breath.  Dispense: 6.7 g; Refill: 2  - benzonatate (TESSALON) 200 MG capsule; Take 1 capsule (200 mg total) by mouth 3 (three) times daily as needed for Cough.  Dispense: 30 capsule; Refill: 0    3. Sore throat  Strep neg  Warm salt water gargles    See #1    - POCT Rapid Strep A    4. Moderate aortic stenosis by prior  echocardiogram        Medication List with Changes/Refills   New Medications    BENZONATATE (TESSALON) 200 MG CAPSULE    Take 1 capsule (200 mg total) by mouth 3 (three) times daily as needed for Cough.   Current Medications    ATORVASTATIN (LIPITOR) 20 MG TABLET    Take 1 tablet by mouth once daily    BLOOD SUGAR DIAGNOSTIC (RELION PRIME TEST STRIPS) STRP    1 each by Misc.(Non-Drug; Combo Route) route Daily.    BLOOD-GLUCOSE METER MISC      true metrix glucose monitor, See Instructions, to check bloodsugars BID, E11.9, # 1 EA, 5 Refill(s), Pharmacy: [facility] 415, 154, cm, Height/Length Dosing, 04/19/21 10:31:00 CDT, 73.45, kg, Weight Dosing, 04/19/21 10:31:00 CDT    CLOTRIMAZOLE-BETAMETHASONE 1-0.05% (LOTRISONE) CREAM    Apply topically 2 (two) times daily.    FLASH GLUCOSE SENSOR (FREESTYLE APOLONIA 2 SENSOR) KIT    1 each by Misc.(Non-Drug; Combo Route) route every 14 (fourteen) days.    FLUTICASONE PROPIONATE (FLONASE) 50 MCG/ACTUATION NASAL SPRAY    1 spray by Each Nostril route 2 (two) times daily.    FLUTICASONE PROPIONATE (FLONASE) 50 MCG/ACTUATION NASAL SPRAY    1 spray (50 mcg total) by Each Nostril route once daily.    GEMFIBROZIL (LOPID) 600 MG TABLET    TAKE 1 TABLET BY MOUTH TWICE DAILY ONE AT 9 IN THE MORNING AND ONE AT 5 IN THE EVENING Strength: 600 mg    GLIPIZIDE 5 MG TR24    Take 2 tablets (10 mg total) by mouth daily with breakfast.    HYDROCHLOROTHIAZIDE (HYDRODIURIL) 12.5 MG TAB    Take 1 tablet (12.5 mg total) by mouth once daily.    INSULIN GLARGINE (LANTUS) 100 UNIT/ML INJECTION    Inject 90 Units into the skin. 70/30    LEVOTHYROXINE (SYNTHROID) 200 MCG TABLET    Take 1 tablet (200 mcg total) by mouth once daily.    LOSARTAN (COZAAR) 100 MG TABLET    Take 1 tablet by mouth once daily    MECLIZINE (ANTIVERT) 12.5 MG TABLET    Take 12.5 mg by mouth 3 (three) times daily as needed.    METFORMIN (GLUCOPHAGE) 1000 MG TABLET    TAKE 1 TABLET BY MOUTH TWICE DAILY AT  9 IN THE MORNING AND AT  5 IN THE EVENING    MUPIROCIN (BACTROBAN) 2 % OINTMENT    Apply 1 g topically 2 (two) times a day.    OMEPRAZOLE (PRILOSEC) 40 MG CAPSULE    Take 1 capsule by mouth in the morning    PREGABALIN (LYRICA) 25 MG CAPSULE    Take 1 capsule (25 mg total) by mouth every evening.    TRAZODONE (DESYREL) 100 MG TABLET    TAKE 2 TABLETS BY MOUTH ONCE DAILY AT  9PM  IN  THE  EVENING  AT  BEDTIME    VENLAFAXINE (EFFEXOR-XR) 150 MG CP24    Take 1 capsule (150 mg total) by mouth once daily.   Changed and/or Refilled Medications    Modified Medication Previous Medication    ALBUTEROL (PROVENTIL/VENTOLIN HFA) 90 MCG/ACTUATION INHALER albuterol (PROVENTIL/VENTOLIN HFA) 90 mcg/actuation inhaler       Inhale 2 puffs into the lungs every 6 (six) hours as needed for Wheezing or Shortness of Breath.    Inhale 2 puffs into the lungs every 6 (six) hours as needed for Wheezing or Shortness of Breath.        No follow-ups on file.    Chemistry:  Lab Results   Component Value Date     07/26/2023    K 4.7 07/26/2023    CHLORIDE 110 (H) 07/26/2023    BUN 28.8 (H) 07/26/2023    CREATININE 0.94 07/26/2023    EGFRNORACEVR >60 07/26/2023    GLUCOSE 137 (H) 07/26/2023    CALCIUM 9.4 07/26/2023    ALKPHOS 94 07/26/2023    LABPROT 6.4 07/26/2023    ALBUMIN 3.4 07/26/2023    BILIDIR 0.1 05/10/2022    IBILI 0.10 05/10/2022    AST 13 07/26/2023    ALT 11 07/26/2023    MG 1.3 (L) 12/21/2019    TEAVJTCT38HD 45.3 12/15/2022        Lab Results   Component Value Date    HGBA1C 8.9 (H) 04/04/2023        Hematology:  Lab Results   Component Value Date    WBC 5.62 10/24/2023    HGB 10.2 (L) 10/24/2023    HCT 33.3 (L) 10/24/2023     10/24/2023       Lipid Panel:  Lab Results   Component Value Date    CHOL 162 12/15/2022    HDL 38 12/15/2022    LDL 73.00 12/15/2022    TRIG 255 (H) 12/15/2022    TOTALCHOLEST 4 12/15/2022        Urine:  Lab Results   Component Value Date    COLORUA Yellow 06/07/2023    APPEARANCEUA Clear 06/07/2023    SGUA >=1.030  06/07/2023    PHUA 5.0 06/07/2023    PROTEINUA >=300 (A) 06/07/2023    GLUCOSEUA 100 (A) 06/07/2023    KETONESUA Negative 06/07/2023    BLOODUA Negative 06/07/2023    NITRITESUA Negative 06/07/2023    LEUKOCYTESUR Negative 06/07/2023    RBCUA None Seen 06/07/2023    WBCUA 0-2 06/07/2023    BACTERIA None Seen 06/07/2023    CREATRANDUR 185.2 (H) 12/15/2022

## 2023-11-08 ENCOUNTER — OFFICE VISIT (OUTPATIENT)
Dept: INTERNAL MEDICINE | Facility: CLINIC | Age: 73
End: 2023-11-08
Payer: MEDICARE

## 2023-11-08 VITALS
HEIGHT: 60 IN | OXYGEN SATURATION: 98 % | WEIGHT: 139.31 LBS | DIASTOLIC BLOOD PRESSURE: 78 MMHG | HEART RATE: 80 BPM | TEMPERATURE: 98 F | BODY MASS INDEX: 27.35 KG/M2 | SYSTOLIC BLOOD PRESSURE: 138 MMHG

## 2023-11-08 DIAGNOSIS — J02.9 SORE THROAT: ICD-10-CM

## 2023-11-08 DIAGNOSIS — J01.80 ACUTE NON-RECURRENT SINUSITIS OF OTHER SINUS: Primary | ICD-10-CM

## 2023-11-08 DIAGNOSIS — I35.0 MODERATE AORTIC STENOSIS BY PRIOR ECHOCARDIOGRAM: ICD-10-CM

## 2023-11-08 DIAGNOSIS — R05.1 ACUTE COUGH: ICD-10-CM

## 2023-11-08 LAB
FLUAV AG UPPER RESP QL IA.RAPID: NOT DETECTED
FLUBV AG UPPER RESP QL IA.RAPID: NOT DETECTED
RSV A 5' UTR RNA NPH QL NAA+PROBE: NOT DETECTED
SARS-COV-2 RNA RESP QL NAA+PROBE: NOT DETECTED

## 2023-11-08 PROCEDURE — 1160F RVW MEDS BY RX/DR IN RCRD: CPT | Mod: CPTII,,, | Performed by: NURSE PRACTITIONER

## 2023-11-08 PROCEDURE — 1125F AMNT PAIN NOTED PAIN PRSNT: CPT | Mod: CPTII,,, | Performed by: NURSE PRACTITIONER

## 2023-11-08 PROCEDURE — 3008F BODY MASS INDEX DOCD: CPT | Mod: CPTII,,, | Performed by: NURSE PRACTITIONER

## 2023-11-08 PROCEDURE — 3052F HG A1C>EQUAL 8.0%<EQUAL 9.0%: CPT | Mod: CPTII,,, | Performed by: NURSE PRACTITIONER

## 2023-11-08 PROCEDURE — 1125F PR PAIN SEVERITY QUANTIFIED, PAIN PRESENT: ICD-10-PCS | Mod: CPTII,,, | Performed by: NURSE PRACTITIONER

## 2023-11-08 PROCEDURE — 4010F ACE/ARB THERAPY RXD/TAKEN: CPT | Mod: CPTII,,, | Performed by: NURSE PRACTITIONER

## 2023-11-08 PROCEDURE — 3078F DIAST BP <80 MM HG: CPT | Mod: CPTII,,, | Performed by: NURSE PRACTITIONER

## 2023-11-08 PROCEDURE — 1159F PR MEDICATION LIST DOCUMENTED IN MEDICAL RECORD: ICD-10-PCS | Mod: CPTII,,, | Performed by: NURSE PRACTITIONER

## 2023-11-08 PROCEDURE — 3288F PR FALLS RISK ASSESSMENT DOCUMENTED: ICD-10-PCS | Mod: CPTII,,, | Performed by: NURSE PRACTITIONER

## 2023-11-08 PROCEDURE — 3052F PR MOST RECENT HEMOGLOBIN A1C LEVEL 8.0 - < 9.0%: ICD-10-PCS | Mod: CPTII,,, | Performed by: NURSE PRACTITIONER

## 2023-11-08 PROCEDURE — 1159F MED LIST DOCD IN RCRD: CPT | Mod: CPTII,,, | Performed by: NURSE PRACTITIONER

## 2023-11-08 PROCEDURE — 1101F PT FALLS ASSESS-DOCD LE1/YR: CPT | Mod: CPTII,,, | Performed by: NURSE PRACTITIONER

## 2023-11-08 PROCEDURE — 3288F FALL RISK ASSESSMENT DOCD: CPT | Mod: CPTII,,, | Performed by: NURSE PRACTITIONER

## 2023-11-08 PROCEDURE — 1160F PR REVIEW ALL MEDS BY PRESCRIBER/CLIN PHARMACIST DOCUMENTED: ICD-10-PCS | Mod: CPTII,,, | Performed by: NURSE PRACTITIONER

## 2023-11-08 PROCEDURE — 1101F PR PT FALLS ASSESS DOC 0-1 FALLS W/OUT INJ PAST YR: ICD-10-PCS | Mod: CPTII,,, | Performed by: NURSE PRACTITIONER

## 2023-11-08 PROCEDURE — 4010F PR ACE/ARB THEARPY RXD/TAKEN: ICD-10-PCS | Mod: CPTII,,, | Performed by: NURSE PRACTITIONER

## 2023-11-08 PROCEDURE — 3078F PR MOST RECENT DIASTOLIC BLOOD PRESSURE < 80 MM HG: ICD-10-PCS | Mod: CPTII,,, | Performed by: NURSE PRACTITIONER

## 2023-11-08 PROCEDURE — 3008F PR BODY MASS INDEX (BMI) DOCUMENTED: ICD-10-PCS | Mod: CPTII,,, | Performed by: NURSE PRACTITIONER

## 2023-11-08 PROCEDURE — 0241U COVID/RSV/FLU A&B PCR: CPT | Performed by: NURSE PRACTITIONER

## 2023-11-08 PROCEDURE — 3075F PR MOST RECENT SYSTOLIC BLOOD PRESS GE 130-139MM HG: ICD-10-PCS | Mod: CPTII,,, | Performed by: NURSE PRACTITIONER

## 2023-11-08 PROCEDURE — 99214 OFFICE O/P EST MOD 30 MIN: CPT | Mod: ,,, | Performed by: NURSE PRACTITIONER

## 2023-11-08 PROCEDURE — 99214 PR OFFICE/OUTPT VISIT, EST, LEVL IV, 30-39 MIN: ICD-10-PCS | Mod: ,,, | Performed by: NURSE PRACTITIONER

## 2023-11-08 PROCEDURE — 3075F SYST BP GE 130 - 139MM HG: CPT | Mod: CPTII,,, | Performed by: NURSE PRACTITIONER

## 2023-11-08 RX ORDER — BENZONATATE 200 MG/1
200 CAPSULE ORAL 3 TIMES DAILY PRN
Qty: 30 CAPSULE | Refills: 0 | Status: SHIPPED | OUTPATIENT
Start: 2023-11-08 | End: 2023-11-18

## 2023-11-08 RX ORDER — ALBUTEROL SULFATE 90 UG/1
2 AEROSOL, METERED RESPIRATORY (INHALATION) EVERY 6 HOURS PRN
Qty: 6.7 G | Refills: 2 | Status: SHIPPED | OUTPATIENT
Start: 2023-11-08 | End: 2024-02-27

## 2023-11-09 DIAGNOSIS — J32.9 SINUSITIS, UNSPECIFIED CHRONICITY, UNSPECIFIED LOCATION: Primary | ICD-10-CM

## 2023-11-09 RX ORDER — DOXYCYCLINE 100 MG/1
100 CAPSULE ORAL EVERY 12 HOURS
Qty: 20 CAPSULE | Refills: 0 | Status: SHIPPED | OUTPATIENT
Start: 2023-11-09 | End: 2023-11-19

## 2023-11-22 ENCOUNTER — INFUSION (OUTPATIENT)
Dept: INFUSION THERAPY | Facility: HOSPITAL | Age: 73
End: 2023-11-22
Attending: INTERNAL MEDICINE
Payer: MEDICARE

## 2023-11-22 VITALS
DIASTOLIC BLOOD PRESSURE: 65 MMHG | OXYGEN SATURATION: 97 % | SYSTOLIC BLOOD PRESSURE: 164 MMHG | HEART RATE: 78 BPM | TEMPERATURE: 98 F | RESPIRATION RATE: 18 BRPM

## 2023-11-22 DIAGNOSIS — D64.9 NORMOCYTIC ANEMIA: Primary | ICD-10-CM

## 2023-11-22 PROCEDURE — 63600175 PHARM REV CODE 636 W HCPCS: Mod: EC,JG | Performed by: NURSE PRACTITIONER

## 2023-11-22 PROCEDURE — 96372 THER/PROPH/DIAG INJ SC/IM: CPT

## 2023-11-22 RX ADMIN — EPOETIN ALFA 20000 UNITS: 20000 SOLUTION INTRAVENOUS; SUBCUTANEOUS at 02:11

## 2023-11-27 DIAGNOSIS — G47.00 INSOMNIA, UNSPECIFIED TYPE: ICD-10-CM

## 2023-11-27 PROBLEM — I63.9 STROKE: Status: RESOLVED | Noted: 2023-08-24 | Resolved: 2023-11-27

## 2023-11-27 RX ORDER — TRAZODONE HYDROCHLORIDE 100 MG/1
TABLET ORAL
Qty: 90 TABLET | Refills: 0 | Status: SHIPPED | OUTPATIENT
Start: 2023-11-27 | End: 2024-01-11

## 2023-11-30 ENCOUNTER — TELEPHONE (OUTPATIENT)
Dept: INTERNAL MEDICINE | Facility: CLINIC | Age: 73
End: 2023-11-30
Payer: MEDICARE

## 2023-12-06 ENCOUNTER — TELEPHONE (OUTPATIENT)
Dept: INTERNAL MEDICINE | Facility: CLINIC | Age: 73
End: 2023-12-06
Payer: MEDICARE

## 2023-12-06 DIAGNOSIS — B37.9 YEAST INFECTION: Primary | ICD-10-CM

## 2023-12-06 RX ORDER — FLUCONAZOLE 150 MG/1
150 TABLET ORAL DAILY
Qty: 1 TABLET | Refills: 0 | Status: SHIPPED | OUTPATIENT
Start: 2023-12-06 | End: 2023-12-07

## 2023-12-06 NOTE — TELEPHONE ENCOUNTER
Spoke to pt and she stated that she just came off of doxycycline and she has a yeast infection. Please advise if okay to send diflucan?

## 2023-12-06 NOTE — TELEPHONE ENCOUNTER
----- Message from Fox Bueno sent at 12/6/2023  2:03 PM CST -----  .Type:  Needs Medical Advice    Who Called: pt  Symptoms (please be specific): yeast infection   How long has patient had these symptoms:    Pharmacy name and phone #:    Would the patient rather a call back or a response via [facility]? Call back  Best Call Back Number: [phone]  Additional Information: pt is calling to discuss medication

## 2023-12-19 ENCOUNTER — TELEPHONE (OUTPATIENT)
Dept: INTERNAL MEDICINE | Facility: CLINIC | Age: 73
End: 2023-12-19
Payer: MEDICARE

## 2023-12-19 ENCOUNTER — INFUSION (OUTPATIENT)
Dept: INFUSION THERAPY | Facility: HOSPITAL | Age: 73
End: 2023-12-19
Attending: INTERNAL MEDICINE
Payer: MEDICARE

## 2023-12-19 ENCOUNTER — LAB VISIT (OUTPATIENT)
Dept: LAB | Facility: HOSPITAL | Age: 73
End: 2023-12-19
Attending: INTERNAL MEDICINE
Payer: MEDICARE

## 2023-12-19 VITALS
DIASTOLIC BLOOD PRESSURE: 68 MMHG | OXYGEN SATURATION: 99 % | HEART RATE: 64 BPM | SYSTOLIC BLOOD PRESSURE: 191 MMHG | TEMPERATURE: 98 F

## 2023-12-19 DIAGNOSIS — N18.9 ANEMIA DUE TO CHRONIC KIDNEY DISEASE, UNSPECIFIED CKD STAGE: ICD-10-CM

## 2023-12-19 DIAGNOSIS — D64.9 NORMOCYTIC ANEMIA: Primary | ICD-10-CM

## 2023-12-19 DIAGNOSIS — D63.1 ANEMIA DUE TO CHRONIC KIDNEY DISEASE, UNSPECIFIED CKD STAGE: ICD-10-CM

## 2023-12-19 LAB
BASOPHILS # BLD AUTO: 0.03 X10(3)/MCL
BASOPHILS NFR BLD AUTO: 0.5 %
EOSINOPHIL # BLD AUTO: 0.23 X10(3)/MCL (ref 0–0.9)
EOSINOPHIL NFR BLD AUTO: 3.6 %
ERYTHROCYTE [DISTWIDTH] IN BLOOD BY AUTOMATED COUNT: 14 % (ref 11.5–17)
HCT VFR BLD AUTO: 33.1 % (ref 37–47)
HGB BLD-MCNC: 10.2 G/DL (ref 12–16)
IMM GRANULOCYTES # BLD AUTO: 0.07 X10(3)/MCL (ref 0–0.04)
IMM GRANULOCYTES NFR BLD AUTO: 1.1 %
LYMPHOCYTES # BLD AUTO: 1.51 X10(3)/MCL (ref 0.6–4.6)
LYMPHOCYTES NFR BLD AUTO: 23.8 %
MCH RBC QN AUTO: 26.8 PG (ref 27–31)
MCHC RBC AUTO-ENTMCNC: 30.8 G/DL (ref 33–36)
MCV RBC AUTO: 87.1 FL (ref 80–94)
MONOCYTES # BLD AUTO: 0.59 X10(3)/MCL (ref 0.1–1.3)
MONOCYTES NFR BLD AUTO: 9.3 %
NEUTROPHILS # BLD AUTO: 3.92 X10(3)/MCL (ref 2.1–9.2)
NEUTROPHILS NFR BLD AUTO: 61.7 %
PLATELET # BLD AUTO: 246 X10(3)/MCL (ref 130–400)
PMV BLD AUTO: 11.1 FL (ref 7.4–10.4)
RBC # BLD AUTO: 3.8 X10(6)/MCL (ref 4.2–5.4)
WBC # SPEC AUTO: 6.35 X10(3)/MCL (ref 4.5–11.5)

## 2023-12-19 PROCEDURE — 85025 COMPLETE CBC W/AUTO DIFF WBC: CPT

## 2023-12-19 PROCEDURE — 36415 COLL VENOUS BLD VENIPUNCTURE: CPT

## 2023-12-19 NOTE — TELEPHONE ENCOUNTER
Spoke to Feliciano and he stated that pt's BP was 190/58 - auto and 191/68 - kevon. Per the orders on the Retacrit infusion it states if Systolic is 160 or greater or if dystolic is 90 or greater to call the doctor. I let Dr. Howard know that pt's BP, and she stated that if the pt has a low dose lisinopril or amlodipine she can take that to help with her BP. I called Feliciano back and he stated that he called Dr. De Luna the ordering physician, and they are holding off for now. Pt is r/s for Thursday for her infusion. I did let him know Dr. Howard's suggestion as well.

## 2023-12-19 NOTE — NURSING
Call to Dr Howard    reported to Lana  b/p 191/68 who will relay to the nurse  [facility]  Retacrit ordered per  P Luis Denton states to hold retacrit

## 2023-12-19 NOTE — TELEPHONE ENCOUNTER
----- Message from Nickolas Lucas sent at 12/19/2023 10:39 AM CST -----  Type:  Needs Medical Advice    Who Called: Feliciano in infusion  Would the patient rather a call back or a response via [facility]? Call back  Best Call Back Number: [phone] ext [phone]    Additional Information: Feliciano in infusion called to report pt BP os 191/68, asked for a call back to discuss

## 2023-12-29 DIAGNOSIS — E78.1 HYPERTRIGLYCERIDEMIA: ICD-10-CM

## 2023-12-29 RX ORDER — GLIPIZIDE 5 MG/1
5 TABLET, FILM COATED, EXTENDED RELEASE ORAL
Qty: 90 TABLET | Refills: 0 | Status: SHIPPED | OUTPATIENT
Start: 2023-12-29 | End: 2024-02-01

## 2023-12-29 RX ORDER — HYDROCHLOROTHIAZIDE 12.5 MG/1
12.5 TABLET ORAL
Qty: 90 TABLET | Refills: 0 | Status: SHIPPED | OUTPATIENT
Start: 2023-12-29 | End: 2024-02-01

## 2024-01-11 DIAGNOSIS — G47.00 INSOMNIA, UNSPECIFIED TYPE: ICD-10-CM

## 2024-01-11 RX ORDER — TRAZODONE HYDROCHLORIDE 100 MG/1
TABLET ORAL
Qty: 90 TABLET | Refills: 0 | Status: SHIPPED | OUTPATIENT
Start: 2024-01-11

## 2024-01-18 ENCOUNTER — INFUSION (OUTPATIENT)
Dept: INFUSION THERAPY | Facility: HOSPITAL | Age: 74
End: 2024-01-18
Attending: INTERNAL MEDICINE
Payer: MEDICARE

## 2024-01-18 ENCOUNTER — TELEPHONE (OUTPATIENT)
Dept: INTERNAL MEDICINE | Facility: CLINIC | Age: 74
End: 2024-01-18
Payer: MEDICARE

## 2024-01-18 VITALS
TEMPERATURE: 98 F | OXYGEN SATURATION: 98 % | DIASTOLIC BLOOD PRESSURE: 65 MMHG | RESPIRATION RATE: 18 BRPM | HEART RATE: 82 BPM | SYSTOLIC BLOOD PRESSURE: 184 MMHG

## 2024-01-18 DIAGNOSIS — Z79.4 TYPE 2 DIABETES MELLITUS WITH STAGE 3 CHRONIC KIDNEY DISEASE, WITH LONG-TERM CURRENT USE OF INSULIN, UNSPECIFIED WHETHER STAGE 3A OR 3B CKD: Primary | ICD-10-CM

## 2024-01-18 DIAGNOSIS — E11.22 TYPE 2 DIABETES MELLITUS WITH STAGE 3 CHRONIC KIDNEY DISEASE, WITH LONG-TERM CURRENT USE OF INSULIN, UNSPECIFIED WHETHER STAGE 3A OR 3B CKD: Primary | ICD-10-CM

## 2024-01-18 DIAGNOSIS — D64.9 NORMOCYTIC ANEMIA: Primary | ICD-10-CM

## 2024-01-18 DIAGNOSIS — N18.30 TYPE 2 DIABETES MELLITUS WITH STAGE 3 CHRONIC KIDNEY DISEASE, WITH LONG-TERM CURRENT USE OF INSULIN, UNSPECIFIED WHETHER STAGE 3A OR 3B CKD: Primary | ICD-10-CM

## 2024-01-18 PROCEDURE — 63600175 PHARM REV CODE 636 W HCPCS: Mod: EC,JG | Performed by: NURSE PRACTITIONER

## 2024-01-18 PROCEDURE — 96372 THER/PROPH/DIAG INJ SC/IM: CPT

## 2024-01-18 RX ADMIN — EPOETIN ALFA 20000 UNITS: 20000 SOLUTION INTRAVENOUS; SUBCUTANEOUS at 09:01

## 2024-01-18 NOTE — TELEPHONE ENCOUNTER
----- Message from Kenny Carlson MA sent at 1/18/2024 10:20 AM CST -----  Regarding: PV 2/1/24 @ 3:00 Dr. Howard  1. Are there any outstanding tasks in the patient's chart? Yes, fasting labs    2. Is there any documentation in the chart? No    3.Has patient been seen in an ER, Urgent care clinic, or been admitted since last visit?  If yes, When, where, and why    4. Has patient seen any other healthcare providers since last visit?  If yes, when, where, and why    5. Has patient had any bloodwork or XR done since last visit?    6. Is patient signed up for patient portal?

## 2024-01-31 ENCOUNTER — LAB VISIT (OUTPATIENT)
Dept: LAB | Facility: HOSPITAL | Age: 74
End: 2024-01-31
Attending: INTERNAL MEDICINE
Payer: MEDICARE

## 2024-01-31 DIAGNOSIS — Z79.4 TYPE 2 DIABETES MELLITUS WITH STAGE 3 CHRONIC KIDNEY DISEASE, WITH LONG-TERM CURRENT USE OF INSULIN, UNSPECIFIED WHETHER STAGE 3A OR 3B CKD: ICD-10-CM

## 2024-01-31 DIAGNOSIS — N18.30 TYPE 2 DIABETES MELLITUS WITH STAGE 3 CHRONIC KIDNEY DISEASE, WITH LONG-TERM CURRENT USE OF INSULIN, UNSPECIFIED WHETHER STAGE 3A OR 3B CKD: ICD-10-CM

## 2024-01-31 DIAGNOSIS — E11.22 TYPE 2 DIABETES MELLITUS WITH STAGE 3 CHRONIC KIDNEY DISEASE, WITH LONG-TERM CURRENT USE OF INSULIN, UNSPECIFIED WHETHER STAGE 3A OR 3B CKD: ICD-10-CM

## 2024-01-31 LAB
ALBUMIN SERPL-MCNC: 3.6 G/DL (ref 3.4–4.8)
ALBUMIN/GLOB SERPL: 1 RATIO (ref 1.1–2)
ALP SERPL-CCNC: 114 UNIT/L (ref 40–150)
ALT SERPL-CCNC: 11 UNIT/L (ref 0–55)
APPEARANCE UR: ABNORMAL
AST SERPL-CCNC: 13 UNIT/L (ref 5–34)
BACTERIA #/AREA URNS AUTO: ABNORMAL /HPF
BILIRUB SERPL-MCNC: 0.2 MG/DL
BILIRUB UR QL STRIP.AUTO: NEGATIVE
BUN SERPL-MCNC: 36.6 MG/DL (ref 9.8–20.1)
CALCIUM SERPL-MCNC: 10.1 MG/DL (ref 8.4–10.2)
CHLORIDE SERPL-SCNC: 114 MMOL/L (ref 98–107)
CHOLEST SERPL-MCNC: 148 MG/DL
CHOLEST/HDLC SERPL: 4 {RATIO} (ref 0–5)
CO2 SERPL-SCNC: 20 MMOL/L (ref 23–31)
COLOR UR AUTO: YELLOW
CREAT SERPL-MCNC: 1.09 MG/DL (ref 0.55–1.02)
CREAT UR-MCNC: 221.9 MG/DL (ref 45–106)
EST. AVERAGE GLUCOSE BLD GHB EST-MCNC: 237.4 MG/DL
GFR SERPLBLD CREATININE-BSD FMLA CKD-EPI: 54 MLS/MIN/1.73/M2
GLOBULIN SER-MCNC: 3.7 GM/DL (ref 2.4–3.5)
GLUCOSE SERPL-MCNC: 150 MG/DL (ref 82–115)
GLUCOSE UR QL STRIP.AUTO: ABNORMAL
GRAN CASTS #/AREA URNS LPF: ABNORMAL /LPF
HBA1C MFR BLD: 9.9 %
HDLC SERPL-MCNC: 37 MG/DL (ref 35–60)
HYALINE CASTS #/AREA URNS LPF: >20 /LPF
KETONES UR QL STRIP.AUTO: NEGATIVE
LDLC SERPL CALC-MCNC: 53 MG/DL (ref 50–140)
LEUKOCYTE ESTERASE UR QL STRIP.AUTO: 500
MICROALBUMIN UR-MCNC: >2000 UG/ML
MICROALBUMIN/CREAT RATIO PNL UR: ABNORMAL
MUCOUS THREADS URNS QL MICRO: ABNORMAL /LPF
NITRITE UR QL STRIP.AUTO: NEGATIVE
PH UR STRIP.AUTO: 5.5 [PH]
POTASSIUM SERPL-SCNC: 5.7 MMOL/L (ref 3.5–5.1)
PROT SERPL-MCNC: 7.3 GM/DL (ref 5.8–7.6)
PROT UR QL STRIP.AUTO: ABNORMAL
RBC #/AREA URNS AUTO: ABNORMAL /HPF
RBC UR QL AUTO: ABNORMAL
SODIUM SERPL-SCNC: 145 MMOL/L (ref 136–145)
SP GR UR STRIP.AUTO: 1.03 (ref 1–1.03)
SQUAMOUS #/AREA URNS LPF: ABNORMAL /HPF
TRIGL SERPL-MCNC: 289 MG/DL (ref 37–140)
UROBILINOGEN UR STRIP-ACNC: NORMAL
VLDLC SERPL CALC-MCNC: 58 MG/DL
WBC #/AREA URNS AUTO: ABNORMAL /HPF

## 2024-01-31 PROCEDURE — 83036 HEMOGLOBIN GLYCOSYLATED A1C: CPT

## 2024-01-31 PROCEDURE — 87077 CULTURE AEROBIC IDENTIFY: CPT

## 2024-01-31 PROCEDURE — 82043 UR ALBUMIN QUANTITATIVE: CPT

## 2024-01-31 PROCEDURE — 36415 COLL VENOUS BLD VENIPUNCTURE: CPT

## 2024-01-31 PROCEDURE — 80061 LIPID PANEL: CPT

## 2024-01-31 PROCEDURE — 81001 URINALYSIS AUTO W/SCOPE: CPT

## 2024-01-31 PROCEDURE — 80053 COMPREHEN METABOLIC PANEL: CPT

## 2024-01-31 PROCEDURE — 87086 URINE CULTURE/COLONY COUNT: CPT

## 2024-02-01 ENCOUNTER — OFFICE VISIT (OUTPATIENT)
Dept: INTERNAL MEDICINE | Facility: CLINIC | Age: 74
End: 2024-02-01
Payer: MEDICARE

## 2024-02-01 VITALS
WEIGHT: 141 LBS | BODY MASS INDEX: 27.68 KG/M2 | RESPIRATION RATE: 16 BRPM | SYSTOLIC BLOOD PRESSURE: 194 MMHG | HEART RATE: 75 BPM | DIASTOLIC BLOOD PRESSURE: 86 MMHG | OXYGEN SATURATION: 99 % | HEIGHT: 60 IN | TEMPERATURE: 98 F

## 2024-02-01 DIAGNOSIS — I10 PRIMARY HYPERTENSION: ICD-10-CM

## 2024-02-01 DIAGNOSIS — E11.22 TYPE 2 DIABETES MELLITUS WITH STAGE 3B CHRONIC KIDNEY DISEASE, WITH LONG-TERM CURRENT USE OF INSULIN: ICD-10-CM

## 2024-02-01 DIAGNOSIS — Z79.4 TYPE 2 DIABETES MELLITUS WITH STAGE 3B CHRONIC KIDNEY DISEASE, WITH LONG-TERM CURRENT USE OF INSULIN: ICD-10-CM

## 2024-02-01 DIAGNOSIS — E11.3213 TYPE 2 DIABETES MELLITUS WITH BOTH EYES AFFECTED BY MILD NONPROLIFERATIVE RETINOPATHY AND MACULAR EDEMA, WITH LONG-TERM CURRENT USE OF INSULIN: ICD-10-CM

## 2024-02-01 DIAGNOSIS — N18.31 CHRONIC KIDNEY DISEASE, STAGE 3A: ICD-10-CM

## 2024-02-01 DIAGNOSIS — N18.32 TYPE 2 DIABETES MELLITUS WITH STAGE 3B CHRONIC KIDNEY DISEASE, WITH LONG-TERM CURRENT USE OF INSULIN: ICD-10-CM

## 2024-02-01 DIAGNOSIS — E11.65 TYPE 2 DIABETES MELLITUS WITH HYPERGLYCEMIA, WITHOUT LONG-TERM CURRENT USE OF INSULIN: ICD-10-CM

## 2024-02-01 DIAGNOSIS — I20.9 ANGINA PECTORIS, UNSPECIFIED: ICD-10-CM

## 2024-02-01 DIAGNOSIS — N04.9 NEPHROTIC SYNDROME: Primary | ICD-10-CM

## 2024-02-01 DIAGNOSIS — Z79.4 TYPE 2 DIABETES MELLITUS WITH BOTH EYES AFFECTED BY MILD NONPROLIFERATIVE RETINOPATHY AND MACULAR EDEMA, WITH LONG-TERM CURRENT USE OF INSULIN: ICD-10-CM

## 2024-02-01 PROCEDURE — 1159F MED LIST DOCD IN RCRD: CPT | Mod: CPTII,,, | Performed by: INTERNAL MEDICINE

## 2024-02-01 PROCEDURE — 3046F HEMOGLOBIN A1C LEVEL >9.0%: CPT | Mod: CPTII,,, | Performed by: INTERNAL MEDICINE

## 2024-02-01 PROCEDURE — 1101F PT FALLS ASSESS-DOCD LE1/YR: CPT | Mod: CPTII,,, | Performed by: INTERNAL MEDICINE

## 2024-02-01 PROCEDURE — 3079F DIAST BP 80-89 MM HG: CPT | Mod: CPTII,,, | Performed by: INTERNAL MEDICINE

## 2024-02-01 PROCEDURE — 99215 OFFICE O/P EST HI 40 MIN: CPT | Mod: ,,, | Performed by: INTERNAL MEDICINE

## 2024-02-01 PROCEDURE — 1160F RVW MEDS BY RX/DR IN RCRD: CPT | Mod: CPTII,,, | Performed by: INTERNAL MEDICINE

## 2024-02-01 PROCEDURE — 3008F BODY MASS INDEX DOCD: CPT | Mod: CPTII,,, | Performed by: INTERNAL MEDICINE

## 2024-02-01 PROCEDURE — 3288F FALL RISK ASSESSMENT DOCD: CPT | Mod: CPTII,,, | Performed by: INTERNAL MEDICINE

## 2024-02-01 PROCEDURE — 3077F SYST BP >= 140 MM HG: CPT | Mod: CPTII,,, | Performed by: INTERNAL MEDICINE

## 2024-02-01 PROCEDURE — 3061F NEG MICROALBUMINURIA REV: CPT | Mod: CPTII,,, | Performed by: INTERNAL MEDICINE

## 2024-02-01 PROCEDURE — 3066F NEPHROPATHY DOC TX: CPT | Mod: CPTII,,, | Performed by: INTERNAL MEDICINE

## 2024-02-01 RX ORDER — NIFEDIPINE 30 MG/1
30 TABLET, EXTENDED RELEASE ORAL NIGHTLY
Qty: 90 TABLET | Refills: 3 | Status: SHIPPED | OUTPATIENT
Start: 2024-02-01 | End: 2024-04-03

## 2024-02-01 RX ORDER — INSULIN GLARGINE 300 U/ML
40 INJECTION, SOLUTION SUBCUTANEOUS NIGHTLY
Qty: 4 ML | Refills: 11 | Status: SHIPPED | OUTPATIENT
Start: 2024-02-01 | End: 2024-04-17 | Stop reason: SDUPTHER

## 2024-02-01 NOTE — ASSESSMENT & PLAN NOTE
Pressure is poorly controlled on HCTZ and Cozaar plan to add Procardia XL 30 at bedtime also the addition of Jardiance should also be beneficial for her blood pressure in that circumstance will be discontinuing HCTZ.    Hypertension Medications               losartan (COZAAR) 100 MG tablet Take 1 tablet by mouth once daily    NIFEdipine (PROCARDIA-XL) 30 MG (OSM) 24 hr tablet Take 1 tablet (30 mg total) by mouth every evening.

## 2024-02-01 NOTE — PROGRESS NOTES
Internal Medicine    Patient ID: [id number]     Chief Complaint: Diabetes and Hypertension      HPI:     Alison Langston is a 73 y.o. female here today for a follow up.   Osteoporosis does not wish to have treatment  Dr. Rain GI; chronic anemia. Scopes unrevealing   Dr. Cordova for gyn  Dr. Mick Mohr for Orthopedics  Dr. Smith for her Hematology for anemia of chronic disease as related to CKD started on Procrit   Dr. Valentino for Cardiology  Dr. Gisela Patino for Urology  She is on 70/30 she does 50 units at bedtime. She buys it over the counter.  Occasionally will have to skip doses because it is just too expensive.  Her A1c is currently at 9.9  She is profoundly hypertensive 194/86 she reports that over the past several weeks to month her blood pressure has been high currently on losartan 100 and HCTZ 25.  She has more than 2000 mcg of microalbumin  Potassium 5.7 chloride 114 CO2 of 20 glucose of 150 BUN of 36 and creatinine of 1.09  Her urinalysis is without bacteria there is blood however 0-5 RBCs there are granular cast present as well as hyaline cast present indicative of nephrotic syndrome  Urine culture with no growth at 24 hours         Past Medical History:   Diagnosis Date    Anemia     Anxiety     Cerebrovascular accident     Chronic back pain     Depression     Elevated ferritin     External hemorrhoids     GERD (gastroesophageal reflux disease)     HLD (hyperlipidemia)     Hypertension     Hypothyroidism, unspecified     CRISTINA (iron deficiency anemia)     Incisional hernia     Internal hemorrhoids     Myoclonus     Osteoporosis     Personal history of colonic polyps     Vertigo         Past Surgical History:   Procedure Laterality Date    biopsy of breast      BONE MARROW BIOPSY W/ ASPIRATION Right 2018    CATARACT EXTRACTION Right 04/30/2021    CHOLECYSTECTOMY      COLONOSCOPY  12/01/2021    Dr. Junior Cardenas  Repeat colon 2026    HERNIA REPAIR      HERNIA REPAIR  01/27/2020    HYSTERECTOMY   2013    PATENT DUCTUS ARTERIOUS LIGATION      SHOULDER SURGERY Left     THYROIDECTOMY          Social History     Tobacco Use    Smoking status: Never    Smokeless tobacco: Never   Substance and Sexual Activity    Alcohol use: Never    Drug use: Never    Sexual activity: Not on file        Current Outpatient Medications   Medication Instructions    albuterol (PROVENTIL/VENTOLIN HFA) 90 mcg/actuation inhaler 2 puffs, Inhalation, Every 6 hours PRN    atorvastatin (LIPITOR) 20 MG tablet Take 1 tablet by mouth once daily    blood sugar diagnostic (RELION PRIME TEST STRIPS) Strp 1 each, Misc.(Non-Drug; Combo Route), Daily    blood-glucose meter Misc   true metrix glucose monitor, See Instructions, to check bloodsugars BID, E11.9, # 1 EA, 5 Refill(s), Pharmacy: [id number], 154, cm, Height/Length Dosing, 04/19/21 10:31:00 CDT, 73.45, kg, Weight Dosing, 04/19/21 10:31:00 CDT    clotrimazole-betamethasone 1-0.05% (LOTRISONE) cream Topical (Top), 2 times daily    empagliflozin (JARDIANCE) 25 mg, Oral, Daily    flash glucose sensor (FREESTYLE APOLONIA 2 SENSOR) Kit 1 each, Misc.(Non-Drug; Combo Route), Every 14 days    fluticasone propionate (FLONASE) 50 mcg/actuation nasal spray 1 spray, Each Nostril, 2 times daily    levothyroxine (SYNTHROID) 200 mcg, Oral, Daily    losartan (COZAAR) 100 MG tablet Take 1 tablet by mouth once daily    meclizine (ANTIVERT) 12.5 mg, Oral, 3 times daily PRN    NIFEdipine (PROCARDIA-XL) 30 mg, Oral, Nightly    omeprazole (PRILOSEC) 40 mg, Oral, Every morning    pregabalin (LYRICA) 25 mg, Oral, Nightly    TOUJEO SOLOSTAR U-300 INSULIN 40 Units, Subcutaneous, Nightly    traZODone (DESYREL) 100 MG tablet TAKE 1 TABLET BY MOUTH ONCE DAILY AT BEDTIME AT  9PM    venlafaxine (EFFEXOR-XR) 150 mg, Oral, Daily       Review of patient's allergies indicates:   Allergen Reactions    Baclofen Hallucinations    Donepezil Hallucinations    Erythromycin      Other reaction(s): Upset Stomach    Fluoxetine   "    Doesn't work    Grass pollen      Other reaction(s): per allergy test    Hydrocodone-acetaminophen      Other reaction(s): "Makes me crazy"    Ivp dye [iodinated contrast media]      Other reaction(s): Rash    Metoclopramide hcl     Rosuvastatin     Iodine Rash    Shrimp Rash        Patient Care Team:  Noah Tafoya MD as PCP - General (Internal Medicine)  oGldy Hand MD as Consulting Physician (Ophthalmology)  Junior Cardenas MD as Consulting Physician (Gastroenterology)  Gallito Cordova MD as Consulting Physician (Obstetrics and Gynecology)  Yonatan Perez MD as Consulting Physician (Orthopedic Surgery)  Mele Smith II, MD as Consulting Physician (Oncology)  Valentino, Vernon A., MD as Consulting Physician (Cardiology)  Gary Galeano MD as Consulting Physician (Urology)  Valentino, Vernon A., MD as Consulting Physician (Cardiology)     Subjective:     Review of Systems    12 point review of systems conducted, negative except as stated in the history of present illness. See HPI for details.    Objective:     Visit Vitals  BP (!) 194/86 (BP Location: Left arm, Patient Position: Sitting, BP Method: Medium (Manual))   Pulse 75   Temp 97.6 °F (36.4 °C) (Temporal)   Resp 16   Ht 5' (1.524 m)   Wt 64 kg (141 lb)   SpO2 99%   BMI 27.54 kg/m²       Physical Exam  General : Alert and oriented, No acute distress, afebrile.  Eye : PERRLA. EOMI. Normal conjunctiva, Sclerae are nonicteric.   Respiratory : Respirations are non-labored and clear to auscultation bilaterally. Symmetrical air entry bilaterally, no crackles, no wheezes, no rhonchi. No cyanosis, no clubbing.  Cardiovascular : Normal rate, Regular rhythm. No murmurs, rubs, or gallops. Pulses are 2+ throughout. No JVD. No Edema.  Gastrointestinal : Soft, nontender, non-distended, bowel sounds are present in all quadrants, no organomegaly, no guarding, no rebound.  Musculoskeletal : Normal range of motion throughout. " No muscle tenderness.  Integumentary : Warm, moist, intact.  Neurologic : Alert, Oriented, no FND  Psychiatric : Cooperative, Appropriate mood & affect.   Labs Reviewed:     Chemistry:  Lab Results   Component Value Date     01/31/2024    K 5.7 (H) 01/31/2024    CHLORIDE 114 (H) 01/31/2024    BUN 36.6 (H) 01/31/2024    CREATININE 1.09 (H) 01/31/2024    EGFRNORACEVR 54 01/31/2024    GLUCOSE 150 (H) 01/31/2024    CALCIUM 10.1 01/31/2024    ALKPHOS 114 01/31/2024    LABPROT 7.3 01/31/2024    ALBUMIN 3.6 01/31/2024    BILIDIR 0.1 05/10/2022    IBILI 0.10 05/10/2022    AST 13 01/31/2024    ALT 11 01/31/2024    MG 1.3 (L) 12/21/2019    DRWCTQOQ54XK 45.3 12/15/2022    TSH 0.020 (L) 12/15/2022    GWPKNC2THIO 1.07 04/08/2021        Lab Results   Component Value Date    HGBA1C 9.9 (H) 01/31/2024        Hematology:  Lab Results   Component Value Date    WBC 7.12 01/18/2024    HGB 10.3 (L) 01/18/2024    HCT 32.7 (L) 01/18/2024     01/18/2024       Lipid Panel:  Lab Results   Component Value Date    CHOL 148 01/31/2024    HDL 37 01/31/2024    LDL 53.00 01/31/2024    TRIG 289 (H) 01/31/2024    TOTALCHOLEST 4 01/31/2024        Urine:  Lab Results   Component Value Date    COLORUA Yellow 01/31/2024    APPEARANCEUA Turbid (A) 01/31/2024    SGUA 1.032 (H) 01/31/2024    PHUA 5.5 01/31/2024    PROTEINUA 3+ (A) 01/31/2024    GLUCOSEUA Trace (A) 01/31/2024    KETONESUA Negative 01/31/2024    BLOODUA Trace (A) 01/31/2024    NITRITESUA Negative 01/31/2024    LEUKOCYTESUR 500 (A) 01/31/2024    RBCUA 0-5 01/31/2024    WBCUA 21-50 (A) 01/31/2024    BACTERIA None Seen 01/31/2024    SQEPUA Trace 01/31/2024    HYALINECASTS >20 (A) 01/31/2024    CREATRANDUR 221.9 (H) 01/31/2024        Assessment:       ICD-10-CM ICD-9-CM   1. Nephrotic syndrome  N04.9 581.9   2. Type 2 diabetes mellitus with hyperglycemia, without long-term current use of insulin  E11.65 250.00     790.29   3. Primary hypertension  I10 401.9   4. Angina pectoris,  unspecified  I20.9 413.9   5. Type 2 diabetes mellitus with stage 3b chronic kidney disease, with long-term current use of insulin  E11.22 250.40    N18.32 585.3    Z79.4 V58.67   6. Chronic kidney disease, stage 3a  N18.31 585.3   7. Type 2 diabetes mellitus with both eyes affected by mild nonproliferative retinopathy and macular edema, with long-term current use of insulin  E11.3213 250.50    Z79.4 362.04     362.07     V58.67        Plan:     1. Nephrotic syndrome  Assessment & Plan:  Patient appears to have abruptly developed nephrotic range proteinuria likely as a consequence of hypertension.  Discontinue HCTZ, add Jardiance 25 and Procardia XL 30 at bedtime, continue losartan 100.  Will obtain 24 hour urine collection, protein electrophoresis with immunofixation and new retroperitoneal ultrasound.  Discontinue gemfibrozil    Orders:  -     Ambulatory referral/consult to Nephrology; Future; Expected date: 02/08/2024  -     Immunofixation & Protein Electrophoresis & Immunoglobulins; Future; Expected date: 02/01/2024  -     Protein, 24 Hr Urine; Future  -     Immunofixation ([facility])& Protein Electrophoresis, Urine, 24Hr; Future  -     US Retroperitoneal Complete; Future; Expected date: 02/01/2024  -     Ambulatory referral/consult to Diabetes Education; Future; Expected date: 02/08/2024    2. Type 2 diabetes mellitus with hyperglycemia, without long-term current use of insulin  Assessment & Plan:    Referral to diabetic relief clinic, start Jardiance, DC metformin and glipizide.  Continuous glucose monitor given x2, samples of Toujeo 40 units daily patient to update us on Monday.    Orders:  -     Ambulatory referral/consult to Diabetes Education; Future; Expected date: 02/08/2024  -     Immunofixation & Protein Electrophoresis & Immunoglobulins; Future; Expected date: 02/01/2024  -     Protein, 24 Hr Urine; Future  -     Immunofixation ([facility]IAMS)& Protein Electrophoresis, Urine, 24Hr; Future  -     US Retroperitoneal  Complete; Future; Expected date: 02/01/2024  -     Ambulatory referral/consult to Diabetes Education; Future; Expected date: 02/08/2024    3. Primary hypertension  Assessment & Plan:  Pressure is poorly controlled on HCTZ and Cozaar plan to add Procardia XL 30 at bedtime also the addition of Jardiance should also be beneficial for her blood pressure in that circumstance will be discontinuing HCTZ.    Hypertension Medications               losartan (COZAAR) 100 MG tablet Take 1 tablet by mouth once daily    NIFEdipine (PROCARDIA-XL) 30 MG (OSM) 24 hr tablet Take 1 tablet (30 mg total) by mouth every evening.              4. Angina pectoris, unspecified    5. Type 2 diabetes mellitus with stage 3b chronic kidney disease, with long-term current use of insulin    6. Chronic kidney disease, stage 3a    7. Type 2 diabetes mellitus with both eyes affected by mild nonproliferative retinopathy and macular edema, with long-term current use of insulin  Assessment & Plan:    Referral to diabetic relief clinic, start Jardiance, DC metformin and glipizide.  Continuous glucose monitor given x2, samples of Toujeo 40 units daily patient to update us on Monday.      Other orders  -     empagliflozin (JARDIANCE) 25 mg tablet; Take 1 tablet (25 mg total) by mouth once daily.  Dispense: 90 tablet; Refill: 3  -     NIFEdipine (PROCARDIA-XL) 30 MG (OSM) 24 hr tablet; Take 1 tablet (30 mg total) by mouth every evening.  Dispense: 90 tablet; Refill: 3  -     insulin glargine, TOUJEO, (TOUJEO SOLOSTAR U-300 INSULIN) 300 unit/mL (1.5 mL) InPn pen; Inject 40 Units into the skin every evening.  Dispense: 4 mL; Refill: 11         Follow up in about 8 weeks (around 3/28/2024) for with labs prior to visit, DIABETIC REVISIT. In addition to their scheduled follow up, the patient has also been instructed to follow up on as needed basis.     Future Appointments   Date Time Provider Department Center   2/14/2024  8:50 AM LAB, [facility]    2/14/2024  9:30 AM [facility]   2/14/2024 10:00 AM INJECTION CHAIR 01, [facility] CHEMOTHERAPY INFUSION [facility] CHEMO [facility]   2/26/2024  3:00 PM Feliciano Villanueva MD [facility] [address]   4/3/2024  2:20 PM Noah Tafoya MD [facility] [address] [id number]        Noah Tafoya MD

## 2024-02-01 NOTE — ASSESSMENT & PLAN NOTE
Patient appears to have abruptly developed nephrotic range proteinuria likely as a consequence of hypertension.  Discontinue HCTZ, add Jardiance 25 and Procardia XL 30 at bedtime, continue losartan 100.  Will obtain 24 hour urine collection, protein electrophoresis with immunofixation and new retroperitoneal ultrasound.  Discontinue gemfibrozil

## 2024-02-01 NOTE — ASSESSMENT & PLAN NOTE
Referral to diabetic relief clinic, start Jardiance, DC metformin and glipizide.  Continuous glucose monitor given x2, samples of Toujeo 40 units daily patient to update us on Monday.

## 2024-02-02 ENCOUNTER — TELEPHONE (OUTPATIENT)
Dept: DIABETES | Facility: CLINIC | Age: 74
End: 2024-02-02
Payer: MEDICARE

## 2024-02-02 ENCOUNTER — TELEPHONE (OUTPATIENT)
Dept: INTERNAL MEDICINE | Facility: CLINIC | Age: 74
End: 2024-02-02
Payer: MEDICARE

## 2024-02-02 NOTE — TELEPHONE ENCOUNTER
Spoke to pt and let her know that the samples in office do not come with the reader. The reader will be with her prescription from the pharmacy. Pt also verified that she is to take the Jardiance 25 mg once daily, and I let her know that this is correct.

## 2024-02-02 NOTE — TELEPHONE ENCOUNTER
----- Message from Lilly Luis sent at 2/2/2024  8:59 AM CST -----  .Type:  Patient Returning Call    Who Called:Alison  Who Left Message for Patient:pt  Does the patient know what this is regarding?:Meter  Would the patient rather a call back or a response via [facility]? RUSSELL  Best Call Back Number:[phone]  Additional Information: Please call back about the free style kit.  gave her 1 but the meter is not in there

## 2024-02-03 DIAGNOSIS — F32.A DEPRESSION, UNSPECIFIED DEPRESSION TYPE: Primary | ICD-10-CM

## 2024-02-04 LAB
BACTERIA UR CULT: ABNORMAL
BACTERIA UR CULT: ABNORMAL

## 2024-02-05 DIAGNOSIS — E78.1 HYPERTRIGLYCERIDEMIA: ICD-10-CM

## 2024-02-05 DIAGNOSIS — Z79.4 TYPE 2 DIABETES MELLITUS WITH HYPERGLYCEMIA, WITH LONG-TERM CURRENT USE OF INSULIN: ICD-10-CM

## 2024-02-05 DIAGNOSIS — E11.65 TYPE 2 DIABETES MELLITUS WITH HYPERGLYCEMIA, WITH LONG-TERM CURRENT USE OF INSULIN: ICD-10-CM

## 2024-02-05 RX ORDER — LOSARTAN POTASSIUM 100 MG/1
100 TABLET ORAL DAILY
Qty: 90 TABLET | Refills: 3 | Status: SHIPPED | OUTPATIENT
Start: 2024-02-05

## 2024-02-05 RX ORDER — ATORVASTATIN CALCIUM 20 MG/1
20 TABLET, FILM COATED ORAL NIGHTLY
Qty: 90 TABLET | Refills: 3 | Status: SHIPPED | OUTPATIENT
Start: 2024-02-05

## 2024-02-05 RX ORDER — VENLAFAXINE HYDROCHLORIDE 150 MG/1
150 CAPSULE, EXTENDED RELEASE ORAL
Qty: 90 CAPSULE | Refills: 0 | Status: SHIPPED | OUTPATIENT
Start: 2024-02-05 | End: 2024-05-03

## 2024-02-05 RX ORDER — SULFAMETHOXAZOLE AND TRIMETHOPRIM 800; 160 MG/1; MG/1
1 TABLET ORAL 2 TIMES DAILY
Qty: 10 TABLET | Refills: 0 | Status: SHIPPED | OUTPATIENT
Start: 2024-02-05 | End: 2024-02-27

## 2024-02-05 NOTE — PROGRESS NOTES
Patient with Staphylococcus aureus urinary tract infection, Rx Bactrim sent to pharmacy.  Allergies reviewed

## 2024-02-06 ENCOUNTER — TELEPHONE (OUTPATIENT)
Dept: INTERNAL MEDICINE | Facility: CLINIC | Age: 74
End: 2024-02-06
Payer: MEDICARE

## 2024-02-06 ENCOUNTER — LAB VISIT (OUTPATIENT)
Dept: LAB | Facility: HOSPITAL | Age: 74
End: 2024-02-06
Attending: INTERNAL MEDICINE
Payer: MEDICARE

## 2024-02-06 DIAGNOSIS — N04.9 NEPHROTIC SYNDROME: ICD-10-CM

## 2024-02-06 DIAGNOSIS — E11.65 TYPE 2 DIABETES MELLITUS WITH HYPERGLYCEMIA, WITHOUT LONG-TERM CURRENT USE OF INSULIN: ICD-10-CM

## 2024-02-06 LAB
IGA SERPL-MCNC: 140 MG/DL (ref 69–517)
IGG SERPL-MCNC: 818 MG/DL (ref 522–1631)
IGM SERPL-MCNC: 56 MG/DL (ref 33–293)

## 2024-02-06 PROCEDURE — 84165 PROTEIN E-PHORESIS SERUM: CPT

## 2024-02-06 PROCEDURE — 82784 ASSAY IGA/IGD/IGG/IGM EACH: CPT

## 2024-02-06 PROCEDURE — 36415 COLL VENOUS BLD VENIPUNCTURE: CPT

## 2024-02-06 PROCEDURE — 83521 IG LIGHT CHAINS FREE EACH: CPT

## 2024-02-06 NOTE — TELEPHONE ENCOUNTER
----- Message from Noah Tafoya MD sent at 2/6/2024  9:30 AM CST -----  I gave her samples the other day; can she apply for financial assistance?  Has she heard from the diabetic relief clinic? I sent a referral  We need to get her over there ASAP      [email or web address]  Diabetes Relief [facility]    [address], [place] [address] · ~[address]  [phone]  Open · Closes 5 PM        ----- Message -----  From: Gelacio Prasad  Sent: 2/6/2024   9:22 AM CST  To: Noah Tafoya MD    Pt had an appt 02/01 .. Was prescribed toujeo , pt stated she can't afford to pay 70$ monthly for meds.. No samples in office . please advise if there's an alternate option ?

## 2024-02-07 LAB
ALBUMIN % SPEP (OHS): 49.7
ALBUMIN SERPL-MCNC: 3.3 G/DL (ref 3.4–4.8)
ALBUMIN/GLOB SERPL: 1 RATIO (ref 1.1–2)
ALPHA 1 GLOB (OHS): 0.25 GM/DL
ALPHA 1 GLOB% (OHS): 3.79
ALPHA 2 GLOB % (OHS): 15.43
ALPHA 2 GLOB (OHS): 1.02 GM/DL
BETA GLOB (OHS): 1.1 GM/DL
BETA GLOB% (OHS): 16.63
GAMMA GLOBULIN % (OHS): 14.44
GAMMA GLOBULIN (OHS): 0.95 GM/DL
GLOBULIN SER-MCNC: 3.3 GM/DL (ref 2.4–3.5)
KAPPA LC FREE SER-MCNC: 3.94 MG/DL (ref 0.33–1.94)
KAPPA LC FREE/LAMBDA FREE SER: 1.11 {RATIO} (ref 0.26–1.65)
LAMBDA LC FREE SERPL-MCNC: 3.56 MG/DL (ref 0.57–2.63)
M SPIKE % (OHS): ABNORMAL
M SPIKE (OHS): ABNORMAL
PATH REV: NORMAL
PROT 24H UR-MCNC: 1022.5 MG/24 H (ref 0–165)
PROT SERPL-MCNC: 6.6 GM/DL (ref 5.8–7.6)
PROT UR STRIP-MCNC: 40.9 MG/DL
TOTAL VOLUME  (OHS): 2500 ML

## 2024-02-08 ENCOUNTER — TELEPHONE (OUTPATIENT)
Dept: INTERNAL MEDICINE | Facility: CLINIC | Age: 74
End: 2024-02-08
Payer: MEDICARE

## 2024-02-08 DIAGNOSIS — N04.9 NEPHROTIC SYNDROME: Primary | ICD-10-CM

## 2024-02-08 NOTE — PROGRESS NOTES
24 hour urine protein identify is 1 g of protein in the urine.  Please inquire with patient how her blood pressure and her blood sugar is doing

## 2024-02-08 NOTE — TELEPHONE ENCOUNTER
----- Message from Fabiola Baca sent at 2/8/2024  9:27 AM CST -----  Regarding: return call  Type:  Patient Returning Call    Who Called: pt    Who Left Message for Patient: agnieszka    Does the patient know what this is regarding?: pt unsure    Would the patient rather a call back or a response via [facility]?  Yes    Best Call Back Number: [phone]    Additional Information:  pt called for return call back, please advise, thanks

## 2024-02-08 NOTE — TELEPHONE ENCOUNTER
Spoke to pt and she stated that she is using samples, and she has an appointment with the Diabetic relief clinic on 2/15/24. She stated that she has tried for assistance, but her and her  make to much money on Social Security to be eligible for assistance.

## 2024-02-12 LAB
ALBUMIN 24H UR ELPH-MRATE: 808.5 MG/24 H
ALBUMIN/GLOB 24H UR ELPH: 3.35 {RATIO}
ALPHA1 GLOB 24H UR ELPH-MRATE: 31.5 MG/24 H
ALPHA2 GLOB 24H UR ELPH-MRATE: 52.5 MG/24 H
B-GLOBULIN 24H UR ELPH-MRATE: 84 MG/24 H
COLLECT DURATION TIME UR: 24 H
GAMMA GLOB 24H UR ELPH-MRATE: 73.5 MG/24 H
M FLAG M-PROTEIN ISOTYPE MS, 24 HR, U: NEGATIVE
M M-PROTEIN ISOTYPE MS, 24 HR, U: ABNORMAL
PROT 24H UR-MRATE: 1050 MG/24 H
PROT PATTERN 24H UR ELPH-IMP: ABNORMAL
SPECIMEN VOL 24H UR: 2500 ML

## 2024-02-15 ENCOUNTER — HOSPITAL ENCOUNTER (OUTPATIENT)
Dept: RADIOLOGY | Facility: HOSPITAL | Age: 74
Discharge: HOME | End: 2024-02-15
Attending: INTERNAL MEDICINE
Payer: MEDICARE

## 2024-02-15 ENCOUNTER — TELEPHONE (OUTPATIENT)
Dept: INTERNAL MEDICINE | Facility: CLINIC | Age: 74
End: 2024-02-15
Payer: MEDICARE

## 2024-02-15 ENCOUNTER — INFUSION (OUTPATIENT)
Dept: INFUSION THERAPY | Facility: HOSPITAL | Age: 74
End: 2024-02-15
Attending: INTERNAL MEDICINE
Payer: MEDICARE

## 2024-02-15 VITALS
OXYGEN SATURATION: 97 % | HEART RATE: 71 BPM | TEMPERATURE: 98 F | SYSTOLIC BLOOD PRESSURE: 180 MMHG | DIASTOLIC BLOOD PRESSURE: 71 MMHG

## 2024-02-15 DIAGNOSIS — E11.65 TYPE 2 DIABETES MELLITUS WITH HYPERGLYCEMIA, WITHOUT LONG-TERM CURRENT USE OF INSULIN: ICD-10-CM

## 2024-02-15 DIAGNOSIS — N04.9 NEPHROTIC SYNDROME: ICD-10-CM

## 2024-02-15 DIAGNOSIS — D64.9 NORMOCYTIC ANEMIA: Primary | ICD-10-CM

## 2024-02-15 DIAGNOSIS — B37.9 YEAST INFECTION: Primary | ICD-10-CM

## 2024-02-15 PROCEDURE — 63600175 PHARM REV CODE 636 W HCPCS: Mod: EC,JG | Performed by: NURSE PRACTITIONER

## 2024-02-15 PROCEDURE — 96372 THER/PROPH/DIAG INJ SC/IM: CPT

## 2024-02-15 PROCEDURE — 76770 US EXAM ABDO BACK WALL COMP: CPT | Mod: TC

## 2024-02-15 RX ORDER — FLUCONAZOLE 150 MG/1
150 TABLET ORAL DAILY
Qty: 1 TABLET | Refills: 0 | Status: SHIPPED | OUTPATIENT
Start: 2024-02-15 | End: 2024-02-16

## 2024-02-15 RX ADMIN — EPOETIN ALFA 20000 UNITS: 20000 SOLUTION INTRAVENOUS; SUBCUTANEOUS at 01:02

## 2024-02-15 NOTE — TELEPHONE ENCOUNTER
----- Message from Noah Tafoya MD sent at 2/15/2024  9:37 AM CST -----  Ultrasound of the kidneys and bladder are without any acute focal abnormality.

## 2024-02-15 NOTE — TELEPHONE ENCOUNTER
----- Message from Noah Tafoya MD sent at 2/14/2024  7:16 PM CST -----  Regarding: RE: medical advice  Ok for flucanzole 150mg po x 1  ----- Message -----  From: Kenny Carlson MA  Sent: 2/14/2024   4:09 PM CST  To: Noah Tafoya MD  Subject: FW: medical advice                               Pt did just finish a course of antibiotics for a UTI  ----- Message -----  From: Fabiola Baca  Sent: 2/14/2024   4:02 PM CST  To: Rosalio Zamora Staff  Subject: medical advice                                   Type:  Needs Medical Advice    Who Called:  pt    Symptoms (please be specific):  yeast infection, itching burning    How long has patient had these symptoms:   two days    Pharmacy name and phone #:   [facility]    Would the patient rather a call back or a response via [facility]?  Yes    Best Call Back Number:  [phone]    Additional Information:  pt called for medication for symptoms listed, please advise, thanks

## 2024-02-15 NOTE — TELEPHONE ENCOUNTER
Africa Hollins Staff  Caller: Unspecified (Today,  3:30 PM)  Type:  Patient Returning Call    Who Called:pt  Who Left Message for Patient: aziza  Does the patient know what this is regarding?:results    Best Call Back Number:[phone]  Additional Information: stated that she received a call from the office about her results. Please advise

## 2024-02-19 ENCOUNTER — TELEPHONE (OUTPATIENT)
Dept: INTERNAL MEDICINE | Facility: CLINIC | Age: 74
End: 2024-02-19
Payer: MEDICARE

## 2024-02-19 NOTE — TELEPHONE ENCOUNTER
Spoke to pt and she is going to call her insurance to see what is on her formulary close to Toujeo

## 2024-02-19 NOTE — TELEPHONE ENCOUNTER
----- Message from Shani Park sent at 2/19/2024  1:59 PM CST -----  Regarding: return call / medication  Type:  Patient Returning Call    Who Called:pt  Who Left Message for Patient:aziza  Does the patient know what this is regarding?:  Would the patient rather a call back or a response via [facility]? C/b  Best Call Back Number:[phone]    Additional Information: pt rtnd call and also wants to know which medication she should take with the jardiance pt stated the toujeo is too expensive

## 2024-02-22 PROBLEM — E11.22 TYPE 2 DIABETES MELLITUS WITH STAGE 3B CHRONIC KIDNEY DISEASE, WITH LONG-TERM CURRENT USE OF INSULIN: Status: ACTIVE | Noted: 2024-02-22

## 2024-02-22 PROBLEM — N18.32 TYPE 2 DIABETES MELLITUS WITH STAGE 3B CHRONIC KIDNEY DISEASE, WITH LONG-TERM CURRENT USE OF INSULIN: Status: ACTIVE | Noted: 2024-02-22

## 2024-02-22 PROBLEM — Z79.4 TYPE 2 DIABETES MELLITUS WITH STAGE 3B CHRONIC KIDNEY DISEASE, WITH LONG-TERM CURRENT USE OF INSULIN: Status: ACTIVE | Noted: 2024-02-22

## 2024-02-26 ENCOUNTER — PROCEDURE VISIT (OUTPATIENT)
Dept: NEUROLOGY | Facility: CLINIC | Age: 74
End: 2024-02-26
Payer: MEDICARE

## 2024-02-26 DIAGNOSIS — G56.11 RIGHT MEDIAN NERVE NEUROPATHY: Primary | ICD-10-CM

## 2024-02-26 DIAGNOSIS — R29.898 RIGHT ARM WEAKNESS: ICD-10-CM

## 2024-02-26 DIAGNOSIS — M79.601 RIGHT ARM PAIN: ICD-10-CM

## 2024-02-26 PROCEDURE — 95908 NRV CNDJ TST 3-4 STUDIES: CPT | Mod: S$GLB,,, | Performed by: SPECIALIST

## 2024-02-26 PROCEDURE — 95885 MUSC TST DONE W/NERV TST LIM: CPT | Mod: S$GLB,,, | Performed by: SPECIALIST

## 2024-02-26 NOTE — PROCEDURES
Nerve conductions / EMG performed and full report scanned in chart. 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

## 2024-02-27 ENCOUNTER — OFFICE VISIT (OUTPATIENT)
Dept: NEPHROLOGY | Facility: CLINIC | Age: 74
End: 2024-02-27
Payer: MEDICARE

## 2024-02-27 VITALS
HEIGHT: 60 IN | RESPIRATION RATE: 20 BRPM | BODY MASS INDEX: 27.8 KG/M2 | WEIGHT: 141.63 LBS | DIASTOLIC BLOOD PRESSURE: 70 MMHG | TEMPERATURE: 98 F | OXYGEN SATURATION: 96 % | HEART RATE: 68 BPM | SYSTOLIC BLOOD PRESSURE: 150 MMHG

## 2024-02-27 DIAGNOSIS — N18.31 STAGE 3A CHRONIC KIDNEY DISEASE: ICD-10-CM

## 2024-02-27 DIAGNOSIS — I10 PRIMARY HYPERTENSION: ICD-10-CM

## 2024-02-27 DIAGNOSIS — N18.31 ANEMIA DUE TO STAGE 3A CHRONIC KIDNEY DISEASE: ICD-10-CM

## 2024-02-27 DIAGNOSIS — R80.9 TYPE 2 DIABETES MELLITUS WITH MICROALBUMINURIA, WITHOUT LONG-TERM CURRENT USE OF INSULIN: ICD-10-CM

## 2024-02-27 DIAGNOSIS — D63.1 ANEMIA DUE TO STAGE 3A CHRONIC KIDNEY DISEASE: ICD-10-CM

## 2024-02-27 DIAGNOSIS — E11.29 TYPE 2 DIABETES MELLITUS WITH MICROALBUMINURIA, WITHOUT LONG-TERM CURRENT USE OF INSULIN: ICD-10-CM

## 2024-02-27 DIAGNOSIS — N04.9 NEPHROTIC SYNDROME: Primary | ICD-10-CM

## 2024-02-27 DIAGNOSIS — R80.1 PERSISTENT PROTEINURIA: ICD-10-CM

## 2024-02-27 PROCEDURE — 4010F ACE/ARB THERAPY RXD/TAKEN: CPT | Mod: CPTII,S$GLB,,

## 2024-02-27 PROCEDURE — 1101F PT FALLS ASSESS-DOCD LE1/YR: CPT | Mod: CPTII,S$GLB,,

## 2024-02-27 PROCEDURE — 1125F AMNT PAIN NOTED PAIN PRSNT: CPT | Mod: CPTII,S$GLB,,

## 2024-02-27 PROCEDURE — 3008F BODY MASS INDEX DOCD: CPT | Mod: CPTII,S$GLB,,

## 2024-02-27 PROCEDURE — 1159F MED LIST DOCD IN RCRD: CPT | Mod: CPTII,S$GLB,,

## 2024-02-27 PROCEDURE — 3078F DIAST BP <80 MM HG: CPT | Mod: CPTII,S$GLB,,

## 2024-02-27 PROCEDURE — 99203 OFFICE O/P NEW LOW 30 MIN: CPT | Mod: S$GLB,,,

## 2024-02-27 PROCEDURE — 3061F NEG MICROALBUMINURIA REV: CPT | Mod: CPTII,S$GLB,,

## 2024-02-27 PROCEDURE — 3046F HEMOGLOBIN A1C LEVEL >9.0%: CPT | Mod: CPTII,S$GLB,,

## 2024-02-27 PROCEDURE — 3077F SYST BP >= 140 MM HG: CPT | Mod: CPTII,S$GLB,,

## 2024-02-27 PROCEDURE — 99999 PR PBB SHADOW E&M-EST. PATIENT-LVL IV: CPT | Mod: PBBFAC,,,

## 2024-02-27 PROCEDURE — 3066F NEPHROPATHY DOC TX: CPT | Mod: CPTII,S$GLB,,

## 2024-02-27 PROCEDURE — 3288F FALL RISK ASSESSMENT DOCD: CPT | Mod: CPTII,S$GLB,,
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
## 2024-02-27 NOTE — PATIENT INSTRUCTIONS
Start taking over-the-counter iron with vitamin-C supplementation     Stay well hydrated with at least 64 oz of water daily     Monitor blood pressure and let PCP know if greater than 150/90 consistently

## 2024-02-29 ENCOUNTER — TELEPHONE (OUTPATIENT)
Dept: INTERNAL MEDICINE | Facility: CLINIC | Age: 74
End: 2024-02-29
Payer: MEDICARE

## 2024-02-29 DIAGNOSIS — G56.11 RIGHT MEDIAN NERVE NEUROPATHY: ICD-10-CM

## 2024-02-29 DIAGNOSIS — G56.21 ENTRAPMENT OF RIGHT ULNAR NERVE: Primary | ICD-10-CM

## 2024-02-29 NOTE — TELEPHONE ENCOUNTER
----- Message from Noah Tafoya MD sent at 2/29/2024  8:28 AM CST -----  Nerve conduction testing reviewed patient with evidence of carpal tunnel on the right as well as features of right ulnar nerve entrapment and potentially entrapment at the neck as well   please send referral to Dr. Leta Sanderson for further delineation--- needs MRI c spine without contrast in the mean time   thanks

## 2024-02-29 NOTE — PROGRESS NOTES
Nerve conduction testing reviewed patient with evidence of carpal tunnel on the right as well as features of right ulnar nerve entrapment and potentially entrapment at the neck as well   please send referral to Dr. Leta Sanderson for further delineation--- needs MRI c spine without contrast in the mean time   thanks

## 2024-03-04 ENCOUNTER — TELEPHONE (OUTPATIENT)
Dept: NEUROSURGERY | Facility: CLINIC | Age: 74
End: 2024-03-04
Payer: MEDICARE

## 2024-03-04 NOTE — TELEPHONE ENCOUNTER
Patient is being ref to Dr Sanderson by Dr Tafoya on 2/29 for entrapment of right ulnar nerve and right median nerve neuropathy. 2/26/24 EMG in chart. Please review and advise re scheduling. (MRI was ordered but not done yet)

## 2024-03-14 ENCOUNTER — INFUSION (OUTPATIENT)
Dept: INFUSION THERAPY | Facility: HOSPITAL | Age: 74
End: 2024-03-14
Attending: INTERNAL MEDICINE
Payer: MEDICARE

## 2024-03-14 VITALS
WEIGHT: 144.88 LBS | RESPIRATION RATE: 18 BRPM | DIASTOLIC BLOOD PRESSURE: 54 MMHG | HEIGHT: 60 IN | BODY MASS INDEX: 28.45 KG/M2 | TEMPERATURE: 98 F | OXYGEN SATURATION: 97 % | HEART RATE: 68 BPM | SYSTOLIC BLOOD PRESSURE: 146 MMHG

## 2024-03-14 DIAGNOSIS — D64.9 NORMOCYTIC ANEMIA: Primary | ICD-10-CM

## 2024-03-14 PROCEDURE — 63600175 PHARM REV CODE 636 W HCPCS: Mod: EC,JG | Performed by: NURSE PRACTITIONER

## 2024-03-14 PROCEDURE — 96372 THER/PROPH/DIAG INJ SC/IM: CPT

## 2024-03-14 RX ADMIN — EPOETIN ALFA 20000 UNITS: 20000 SOLUTION INTRAVENOUS; SUBCUTANEOUS at 01:03

## 2024-03-14 NOTE — PLAN OF CARE
Retacrit qMonth given for Hgb 10.2. Tolerated well. Next appts given to pt. Dc'd home in stable condition.

## 2024-03-18 ENCOUNTER — OFFICE VISIT (OUTPATIENT)
Dept: NEUROSURGERY | Facility: CLINIC | Age: 74
End: 2024-03-18
Payer: MEDICARE

## 2024-03-18 VITALS
DIASTOLIC BLOOD PRESSURE: 72 MMHG | WEIGHT: 143.38 LBS | RESPIRATION RATE: 16 BRPM | HEIGHT: 60 IN | BODY MASS INDEX: 28.15 KG/M2 | SYSTOLIC BLOOD PRESSURE: 158 MMHG | HEART RATE: 63 BPM

## 2024-03-18 DIAGNOSIS — G56.21 ENTRAPMENT OF RIGHT ULNAR NERVE: ICD-10-CM

## 2024-03-18 DIAGNOSIS — G56.11 RIGHT MEDIAN NERVE NEUROPATHY: ICD-10-CM

## 2024-03-18 DIAGNOSIS — T85.192A MALFUNCTION OF SPINAL CORD STIMULATOR, INITIAL ENCOUNTER: ICD-10-CM

## 2024-03-18 DIAGNOSIS — M47.22 CERVICAL SPONDYLOSIS WITH RADICULOPATHY: Primary | ICD-10-CM

## 2024-03-18 PROCEDURE — 3046F HEMOGLOBIN A1C LEVEL >9.0%: CPT | Mod: CPTII,,, | Performed by: PHYSICIAN ASSISTANT

## 2024-03-18 PROCEDURE — 3288F FALL RISK ASSESSMENT DOCD: CPT | Mod: CPTII,,, | Performed by: PHYSICIAN ASSISTANT

## 2024-03-18 PROCEDURE — 3077F SYST BP >= 140 MM HG: CPT | Mod: CPTII,,, | Performed by: PHYSICIAN ASSISTANT

## 2024-03-18 PROCEDURE — 3008F BODY MASS INDEX DOCD: CPT | Mod: CPTII,,, | Performed by: PHYSICIAN ASSISTANT

## 2024-03-18 PROCEDURE — 99204 OFFICE O/P NEW MOD 45 MIN: CPT | Mod: ,,, | Performed by: PHYSICIAN ASSISTANT

## 2024-03-18 PROCEDURE — 4010F ACE/ARB THERAPY RXD/TAKEN: CPT | Mod: CPTII,,, | Performed by: PHYSICIAN ASSISTANT

## 2024-03-18 PROCEDURE — 3066F NEPHROPATHY DOC TX: CPT | Mod: CPTII,,, | Performed by: PHYSICIAN ASSISTANT

## 2024-03-18 PROCEDURE — 3078F DIAST BP <80 MM HG: CPT | Mod: CPTII,,, | Performed by: PHYSICIAN ASSISTANT

## 2024-03-18 PROCEDURE — 3061F NEG MICROALBUMINURIA REV: CPT | Mod: CPTII,,, | Performed by: PHYSICIAN ASSISTANT

## 2024-03-18 PROCEDURE — 1101F PT FALLS ASSESS-DOCD LE1/YR: CPT | Mod: CPTII,,, | Performed by: PHYSICIAN ASSISTANT
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
## 2024-03-19 ENCOUNTER — TELEPHONE (OUTPATIENT)
Dept: INTERNAL MEDICINE | Facility: CLINIC | Age: 74
End: 2024-03-19
Payer: MEDICARE

## 2024-03-19 DIAGNOSIS — G47.00 INSOMNIA, UNSPECIFIED TYPE: ICD-10-CM

## 2024-03-19 RX ORDER — TRAZODONE HYDROCHLORIDE 100 MG/1
TABLET ORAL
Qty: 90 TABLET | Refills: 0 | Status: SHIPPED | OUTPATIENT
Start: 2024-03-19 | End: 2024-03-25

## 2024-03-19 NOTE — TELEPHONE ENCOUNTER
See dictated H and P: [id number]  Discussed goals of care with family: They are agreeable to DNR.   Plan reassessment in 4 hours with labs.   Discussed with Dr Esteban  1 hour at bedside      GB   Spoke to Estephania who asked if we had any information on pt's spinal cord stimulator. I let her know that we do not.

## 2024-03-19 NOTE — TELEPHONE ENCOUNTER
----- Message from Ary Bass sent at 3/19/2024  1:53 PM CDT -----  .Type:  Needs Medical Advice    Who Called: Scheduling Estephania   Symptoms (please be specific):    How long has patient had these symptoms:    Pharmacy name and phone #:    Would the patient rather a call back or a response via [employer]? Call back   Best Call Back Number: [phone] # [phone]   Additional Information: Fax [phone]  any info on the spinal stimulator please fax

## 2024-03-20 ENCOUNTER — OFFICE VISIT (OUTPATIENT)
Dept: INTERNAL MEDICINE | Facility: CLINIC | Age: 74
End: 2024-03-20
Payer: MEDICARE

## 2024-03-20 ENCOUNTER — TELEPHONE (OUTPATIENT)
Dept: INTERNAL MEDICINE | Facility: CLINIC | Age: 74
End: 2024-03-20

## 2024-03-20 ENCOUNTER — PATIENT OUTREACH (OUTPATIENT)
Dept: ADMINISTRATIVE | Facility: HOSPITAL | Age: 74
End: 2024-03-20
Payer: MEDICARE

## 2024-03-20 VITALS
TEMPERATURE: 99 F | OXYGEN SATURATION: 98 % | BODY MASS INDEX: 28.03 KG/M2 | HEART RATE: 69 BPM | RESPIRATION RATE: 18 BRPM | WEIGHT: 143.5 LBS | SYSTOLIC BLOOD PRESSURE: 172 MMHG | DIASTOLIC BLOOD PRESSURE: 70 MMHG

## 2024-03-20 DIAGNOSIS — R10.9 RIGHT FLANK PAIN: Primary | ICD-10-CM

## 2024-03-20 DIAGNOSIS — R10.11 RIGHT UPPER QUADRANT ABDOMINAL PAIN: ICD-10-CM

## 2024-03-20 LAB
BILIRUB SERPL-MCNC: NORMAL MG/DL
BLOOD URINE, POC: NORMAL
CLARITY, POC UA: CLEAR
COLOR, POC UA: YELLOW
GLUCOSE UR QL STRIP: >2000
KETONES UR QL STRIP: NEGATIVE
LEUKOCYTE ESTERASE URINE, POC: NEGATIVE
NITRITE, POC UA: NEGATIVE
PH, POC UA: 5
PROTEIN, POC: NORMAL
SPECIFIC GRAVITY, POC UA: 1.01
UROBILINOGEN, POC UA: 0.2

## 2024-03-20 PROCEDURE — 3078F DIAST BP <80 MM HG: CPT | Mod: CPTII,,, | Performed by: NURSE PRACTITIONER

## 2024-03-20 PROCEDURE — 1125F AMNT PAIN NOTED PAIN PRSNT: CPT | Mod: CPTII,,, | Performed by: NURSE PRACTITIONER

## 2024-03-20 PROCEDURE — 3077F SYST BP >= 140 MM HG: CPT | Mod: CPTII,,, | Performed by: NURSE PRACTITIONER

## 2024-03-20 PROCEDURE — 3066F NEPHROPATHY DOC TX: CPT | Mod: CPTII,,, | Performed by: NURSE PRACTITIONER

## 2024-03-20 PROCEDURE — 99214 OFFICE O/P EST MOD 30 MIN: CPT | Mod: ,,, | Performed by: NURSE PRACTITIONER

## 2024-03-20 PROCEDURE — 1159F MED LIST DOCD IN RCRD: CPT | Mod: CPTII,,, | Performed by: NURSE PRACTITIONER

## 2024-03-20 PROCEDURE — 4010F ACE/ARB THERAPY RXD/TAKEN: CPT | Mod: CPTII,,, | Performed by: NURSE PRACTITIONER

## 2024-03-20 PROCEDURE — 3061F NEG MICROALBUMINURIA REV: CPT | Mod: CPTII,,, | Performed by: NURSE PRACTITIONER

## 2024-03-20 PROCEDURE — 3046F HEMOGLOBIN A1C LEVEL >9.0%: CPT | Mod: CPTII,,, | Performed by: NURSE PRACTITIONER

## 2024-03-20 PROCEDURE — 1101F PT FALLS ASSESS-DOCD LE1/YR: CPT | Mod: CPTII,,, | Performed by: NURSE PRACTITIONER

## 2024-03-20 PROCEDURE — 3008F BODY MASS INDEX DOCD: CPT | Mod: CPTII,,, | Performed by: NURSE PRACTITIONER

## 2024-03-20 PROCEDURE — 1160F RVW MEDS BY RX/DR IN RCRD: CPT | Mod: CPTII,,, | Performed by: NURSE PRACTITIONER

## 2024-03-20 PROCEDURE — 81002 URINALYSIS NONAUTO W/O SCOPE: CPT | Mod: ,,, | Performed by: NURSE PRACTITIONER

## 2024-03-20 PROCEDURE — 3288F FALL RISK ASSESSMENT DOCD: CPT | Mod: CPTII,,, | Performed by: NURSE PRACTITIONER

## 2024-03-20 NOTE — PROGRESS NOTES
Internal Medicine    Patient ID: [id number]     Chief Complaint: Flank Pain (Right side ; a few days )    HPI:     Alison Langston is a 73 y.o. female here today for a problem visit. Complaining of right sided flank pain x 2 weeks or so. Denies any inciting injury or trauma. Denies fever, body aches, nausea, vomiting, bal hematuria.  Had normal renal US last month. History of kidney stones and nephrotic syndrome.   No other complaints today.     Past Medical History:   Diagnosis Date    Anemia     Anxiety     Cerebrovascular accident     Chronic back pain     Depression     DM (diabetes mellitus)     Elevated ferritin     External hemorrhoids     GERD (gastroesophageal reflux disease)     HLD (hyperlipidemia)     Hypertension     Hypothyroidism, unspecified     CRISTINA (iron deficiency anemia)     Incisional hernia     Internal hemorrhoids     Myoclonus     Osteoporosis     Personal history of colonic polyps     Renal insufficiency     Right arm pain     Vertigo         Past Surgical History:   Procedure Laterality Date    biopsy of breast      BONE MARROW BIOPSY W/ ASPIRATION Right 2018    CATARACT EXTRACTION Right 04/30/2021    CHOLECYSTECTOMY      COLONOSCOPY  12/01/2021    Dr. Junior Cardenas  Repeat colon 2026    HERNIA REPAIR      HERNIA REPAIR  01/27/2020    HYSTERECTOMY  2013    LAMINECTOMY AND MICRODISCECTOMY LUMBAR SPINE  2018    3 levels.  Dr. Krause    ORIF WRIST FRACTURE      distal.    PATENT DUCTUS ARTERIOUS LIGATION      SHOULDER SURGERY Left     SPINAL CORD STIMULATOR IMPLANT  2018    Dr. Jerry    THYROIDECTOMY          Social History     Tobacco Use    Smoking status: Never    Smokeless tobacco: Never   Substance and Sexual Activity    Alcohol use: Never    Drug use: Never    Sexual activity: Not on file        Current Outpatient Medications   Medication Instructions    atorvastatin (LIPITOR) 20 mg, Oral, Nightly    blood sugar diagnostic (RELION PRIME TEST STRIPS) Strp 1 each, Misc.(Non-Drug;  "Combo Route), Daily    blood-glucose meter Misc   true metrix glucose monitor, See Instructions, to check bloodsugars BID, E11.9, # 1 EA, 5 Refill(s), Pharmacy: [id number], 154, cm, Height/Length Dosing, 04/19/21 10:31:00 CDT, 73.45, kg, Weight Dosing, 04/19/21 10:31:00 CDT    clotrimazole-betamethasone 1-0.05% (LOTRISONE) cream Topical (Top), 2 times daily    empagliflozin (JARDIANCE) 25 mg, Oral, Daily    fluticasone propionate (FLONASE) 50 mcg/actuation nasal spray 1 spray, Each Nostril, 2 times daily    levothyroxine (SYNTHROID) 200 mcg, Oral, Daily    losartan (COZAAR) 100 mg, Oral, Daily    meclizine (ANTIVERT) 12.5 mg, Oral, 3 times daily PRN    NIFEdipine (PROCARDIA-XL) 30 mg, Oral, Nightly    omeprazole (PRILOSEC) 40 mg, Oral, Every morning    pregabalin (LYRICA) 25 mg, Oral, Nightly    TOUJEO SOLOSTAR U-300 INSULIN 40 Units, Subcutaneous, Nightly    traZODone (DESYREL) 100 MG tablet TAKE 1 TABLET BY MOUTH ONCE DAILY AT BEDTIME AT  9PM    venlafaxine (EFFEXOR-XR) 150 mg, Oral       Review of patient's allergies indicates:   Allergen Reactions    Baclofen Hallucinations    Donepezil Hallucinations    Erythromycin      Other reaction(s): Upset Stomach    Fluoxetine      Doesn't work    Grass pollen      Other reaction(s): per allergy test    Hydrocodone-acetaminophen      Other reaction(s): "Makes me crazy"    Ivp dye [iodinated contrast media]      Other reaction(s): Rash    Metoclopramide hcl     Rosuvastatin     Iodine Rash    Shrimp Rash        Patient Care Team:  Noah Tafoya MD as PCP - General (Internal Medicine)  Goldy Hand MD as Consulting Physician (Ophthalmology)  Junior Cardenas MD as Consulting Physician (Gastroenterology)  Gallito Cordova MD as Consulting Physician (Obstetrics and Gynecology)  Yonatan Perez MD as Consulting Physician (Orthopedic Surgery)  Mele Smith II, MD as Consulting Physician (Oncology)  Valentino, Vernon A., MD as " Consulting Physician (Cardiology)  Gary Galeano MD as Consulting Physician (Urology)  Valentino, Vernon A., MD as Consulting Physician (Cardiology)     Subjective:     Review of Systems    12 point review of systems conducted, negative except as stated in the history of present illness. See HPI for details.    Objective:     Visit Vitals  BP (!) 172/70 (BP Location: Left arm, Patient Position: Sitting)   Pulse 69   Temp 98.5 °F (36.9 °C)   Resp 18   Wt 65.1 kg (143 lb 8 oz)   SpO2 98%   BMI 28.03 kg/m²       Physical Exam  Vitals and nursing note reviewed.   Constitutional:       General: She is not in acute distress.     Appearance: She is not ill-appearing.   HENT:      Head: Normocephalic and atraumatic.      Mouth/Throat:      Mouth: Mucous membranes are moist.      Pharynx: Oropharynx is clear.   Eyes:      General: No scleral icterus.     Extraocular Movements: Extraocular movements intact.      Conjunctiva/sclera: Conjunctivae normal.      Pupils: Pupils are equal, round, and reactive to light.   Neck:      Vascular: No carotid bruit.   Cardiovascular:      Rate and Rhythm: Normal rate and regular rhythm.      Heart sounds: Murmur heard.      No friction rub. No gallop.   Pulmonary:      Effort: Pulmonary effort is normal. No respiratory distress.      Breath sounds: Normal breath sounds. No wheezing, rhonchi or rales.   Abdominal:      General: Abdomen is flat. Bowel sounds are normal. There is no distension.      Palpations: Abdomen is soft. There is no mass.      Tenderness: There is no abdominal tenderness. There is right CVA tenderness.   Musculoskeletal:         General: Normal range of motion.      Cervical back: Normal range of motion and neck supple.   Skin:     General: Skin is warm and dry.   Neurological:      General: No focal deficit present.      Mental Status: She is alert.   Psychiatric:         Mood and Affect: Mood normal.       Assessment:       ICD-10-CM ICD-9-CM   1. Right flank  pain  R10.9 789.09   2. Right upper quadrant abdominal pain  R10.11 789.01        Plan:     1. Right flank pain  Assessment & Plan:  CT Abd/Pelvis Renal Stone Protocol   UA with reflex C/S today    Orders:  -     Urinalysis, Reflex to Urine Culture; Future; Expected date: 03/20/2024  -     POCT URINE DIPSTICK WITHOUT MICROSCOPE  -     CT Abdomen Pelvis  Without Contrast; Future; Expected date: 03/20/2024  -     CBC Auto Differential; Future; Expected date: 03/20/2024  -     Comprehensive Metabolic Panel; Future; Expected date: 03/20/2024  -     Urine culture    2. Right upper quadrant abdominal pain  -     CT Abdomen Pelvis  Without Contrast; Future; Expected date: 03/20/2024       In addition to their scheduled follow up, the patient has also been instructed to follow up on as needed basis.     Future Appointments   Date Time Provider Department Center   4/1/2024  9:00 AM Patel Yao MD [facility] NEUSGY [place]   4/3/2024  2:20 PM Alexis Otero [facility] [id number] [id number]   4/11/2024  8:40 [facility]   4/11/2024  9:00 AM INJECTION CHAIR 01, [facility] CHEMOTHERAPY INFUSION [facility] CHEMO [facility]   4/11/2024 11:30 AM Fox Nieto, [facility] NEPH [place]   5/9/2024 10:00 AM LAB, [facility] OLB LAB [facility]   5/9/2024 10:30 AM [facility]   5/9/2024 11:30 AM INJECTION CHAIR 02, [facility] CHEMOTHERAPY INFUSION [facility] CHEMO [facility]        JAKOB Santana

## 2024-03-20 NOTE — PROGRESS NOTES
Population Health Outreach.   The following record(s)  below were uploaded for Health Maintenance .     DEXA SCREENING       07/02/2020 1.22.2024 Per Goldy Hand's office-last eye exam was 01/12/2022 - POSITIVE RETINOPATHY     Health Maintenance Topic(s) Outreach Outcomes & Actions Taken:    Eye Exam - Outreach Outcomes & Actions Taken  : External Records Requested & Care Team Updated if Applicable  Record request sent to [facility] to obtain record if available    Breast Cancer Screening - Outreach Outcomes & Actions Taken  : Unable to offer to order  Osteoporosis Screening - Outreach Outcomes & Actions Taken  : Unable to offer to order        Health Maintenance Topic(s) Outreach Outcomes & Actions Taken:  Phoned patient to discuss health maintenance items due.   Patient voiced she will see NP this afternoon and does not have time to talk.          Additional Notes:  Next PCP appointment: Wed 04/03/24 @ 2:20  Unable to obtain home BP log     Will see NP this afternoon. Did not have time to talk.

## 2024-03-20 NOTE — LETTER
AUTHORIZATION FOR RELEASE OF   CONFIDENTIAL INFORMATION    Dear [facility],    We are seeing Alison Langston, date of birth 1950, in the clinic at [place] [address] INTERNAL MEDICINE. Noah Tafoya MD is the patient's PCP. Alison Langston has an outstanding lab/procedure at the time we reviewed her chart. In order to help keep her health information updated, she has authorized us to request the following medical record(s):        (  )  MAMMOGRAM                                      (  )  COLONOSCOPY      (  )  PAP SMEAR                                          (  )  OUTSIDE LAB RESULTS     (  )  DEXA SCAN                                          ( X )  DM EYE EXAM            (  )  FOOT EXAM                                          (  )  ENTIRE RECORD     (  )  OUTSIDE IMMUNIZATIONS                 (  )  _______________         Please fax records to [facility], [place], Tyler M, MD, [phone]       If you have any questions, please contact Lynda at [phone]            Patient Name: Alison Langston  : 1950  Patient Phone #: [phone]

## 2024-03-20 NOTE — TELEPHONE ENCOUNTER
----- Message from Fabiola Baca sent at 3/20/2024 10:04 AM CDT -----  Regarding: medical advice  Type:  Needs Medical Advice    Who Called:  pt    Symptoms (please be specific):  rt side of back pain (kidney infection)    How long has patient had these symptoms:   two weeks    Pharmacy name and phone #:   [email or web address]    Would the patient rather a call back or a response via [facility]?  Call back    Best Call Back Number:  [phone]    Additional Information:  please advise, thanks

## 2024-03-21 ENCOUNTER — TELEPHONE (OUTPATIENT)
Dept: INTERNAL MEDICINE | Facility: CLINIC | Age: 74
End: 2024-03-21
Payer: MEDICARE

## 2024-03-21 DIAGNOSIS — G47.00 INSOMNIA, UNSPECIFIED TYPE: ICD-10-CM

## 2024-03-21 PROBLEM — R10.9 RIGHT FLANK PAIN: Status: ACTIVE | Noted: 2024-03-21

## 2024-03-21 NOTE — TELEPHONE ENCOUNTER
----- Message from Shara Ren sent at 3/21/2024 10:25 AM CDT -----  Regarding: refill  Type:  RX Refill Request    Who Called: alison    Refill or New Rx:refill    RX Name and Strength:traZODone (DESYREL) 100 MG tablet    How is the patient currently taking it? (ex. 1XDay):2x at night    Is this a 30 day or 90 day RX:    Preferred Pharmacy with phone number:[facility]    Local or Mail Order:    Ordering Provider:    Would the patient rather a call back or a response via [facility]?     Best Call Back Number:    Additional Information: Alison need a new script(she takes 2 at night)

## 2024-03-22 LAB — BACTERIA UR CULT: NO GROWTH

## 2024-03-25 RX ORDER — TRAZODONE HYDROCHLORIDE 100 MG/1
100 TABLET ORAL 2 TIMES DAILY PRN
Qty: 30 TABLET | Refills: 11 | Status: SHIPPED | OUTPATIENT
Start: 2024-03-25 | End: 2025-03-25

## 2024-03-26 ENCOUNTER — TELEPHONE (OUTPATIENT)
Dept: INTERNAL MEDICINE | Facility: CLINIC | Age: 74
End: 2024-03-26
Payer: MEDICARE

## 2024-03-26 NOTE — TELEPHONE ENCOUNTER
----- Message from Estephania Castellanos sent at 3/26/2024  1:57 PM CDT -----  Regarding: MRI Lumbar Spine Without Contrast  Per [employer] this pt can not have an MRI due to pt not having the remore to her device.      Thank You,Estephania Castellanos  Centralized Scheduling Dept

## 2024-03-26 NOTE — TELEPHONE ENCOUNTER
----- Message from Gelacio Prasad MA sent at 3/26/2024  2:14 PM CDT -----  Regarding: Alexis 04/03 @ 2:20  1. Are there any outstanding tasks in the patient's chart? Yes, fasting labs    2. Is there any documentation in the chart? No    3.Has patient been seen in an ER, Urgent care clinic, or been admitted since last visit?  If yes, When, where, and why    4. Has patient seen any other healthcare providers since last visit?  If yes, when, where, and why    5. Has patient had any bloodwork or XR done since last visit?    6. Is patient signed up for patient portal?

## 2024-03-26 NOTE — TELEPHONE ENCOUNTER
Estephania Castellanos Staff  Caller: Unspecified (Today,  1:57 PM)  Per [employer] this pt can not have an MRI due to pt not having the remore to her device.      Thank You,Estephania Castellanos  Centralized Scheduling Dept

## 2024-04-03 ENCOUNTER — OFFICE VISIT (OUTPATIENT)
Dept: NEUROSURGERY | Facility: CLINIC | Age: 74
End: 2024-04-03
Payer: MEDICARE

## 2024-04-03 ENCOUNTER — OFFICE VISIT (OUTPATIENT)
Dept: INTERNAL MEDICINE | Facility: CLINIC | Age: 74
End: 2024-04-03
Payer: MEDICARE

## 2024-04-03 VITALS
WEIGHT: 142 LBS | HEIGHT: 60 IN | BODY MASS INDEX: 27.88 KG/M2 | RESPIRATION RATE: 16 BRPM | DIASTOLIC BLOOD PRESSURE: 84 MMHG | HEART RATE: 79 BPM | SYSTOLIC BLOOD PRESSURE: 124 MMHG

## 2024-04-03 VITALS
WEIGHT: 140 LBS | HEART RATE: 82 BPM | TEMPERATURE: 97 F | HEIGHT: 60 IN | SYSTOLIC BLOOD PRESSURE: 136 MMHG | DIASTOLIC BLOOD PRESSURE: 74 MMHG | OXYGEN SATURATION: 98 % | BODY MASS INDEX: 27.48 KG/M2 | RESPIRATION RATE: 16 BRPM

## 2024-04-03 DIAGNOSIS — T85.192A MALFUNCTION OF SPINAL CORD STIMULATOR, INITIAL ENCOUNTER: Primary | ICD-10-CM

## 2024-04-03 DIAGNOSIS — E11.22 TYPE 2 DIABETES MELLITUS WITH STAGE 3B CHRONIC KIDNEY DISEASE, WITH LONG-TERM CURRENT USE OF INSULIN: ICD-10-CM

## 2024-04-03 DIAGNOSIS — N18.32 TYPE 2 DIABETES MELLITUS WITH STAGE 3B CHRONIC KIDNEY DISEASE, WITH LONG-TERM CURRENT USE OF INSULIN: ICD-10-CM

## 2024-04-03 DIAGNOSIS — I10 PRIMARY HYPERTENSION: Primary | ICD-10-CM

## 2024-04-03 DIAGNOSIS — G89.29 CHRONIC MIDLINE LOW BACK PAIN WITHOUT SCIATICA: ICD-10-CM

## 2024-04-03 DIAGNOSIS — M54.50 CHRONIC MIDLINE LOW BACK PAIN WITHOUT SCIATICA: ICD-10-CM

## 2024-04-03 DIAGNOSIS — Z79.4 TYPE 2 DIABETES MELLITUS WITH STAGE 3B CHRONIC KIDNEY DISEASE, WITH LONG-TERM CURRENT USE OF INSULIN: ICD-10-CM

## 2024-04-03 PROCEDURE — 1159F MED LIST DOCD IN RCRD: CPT | Mod: CPTII,,, | Performed by: NURSE PRACTITIONER

## 2024-04-03 PROCEDURE — 1160F RVW MEDS BY RX/DR IN RCRD: CPT | Mod: CPTII,,, | Performed by: STUDENT IN AN ORGANIZED HEALTH CARE EDUCATION/TRAINING PROGRAM

## 2024-04-03 PROCEDURE — 3288F FALL RISK ASSESSMENT DOCD: CPT | Mod: CPTII,,, | Performed by: STUDENT IN AN ORGANIZED HEALTH CARE EDUCATION/TRAINING PROGRAM

## 2024-04-03 PROCEDURE — 3008F BODY MASS INDEX DOCD: CPT | Mod: CPTII,,, | Performed by: STUDENT IN AN ORGANIZED HEALTH CARE EDUCATION/TRAINING PROGRAM

## 2024-04-03 PROCEDURE — 3074F SYST BP LT 130 MM HG: CPT | Mod: CPTII,,, | Performed by: STUDENT IN AN ORGANIZED HEALTH CARE EDUCATION/TRAINING PROGRAM

## 2024-04-03 PROCEDURE — 3075F SYST BP GE 130 - 139MM HG: CPT | Mod: CPTII,,, | Performed by: NURSE PRACTITIONER

## 2024-04-03 PROCEDURE — 3061F NEG MICROALBUMINURIA REV: CPT | Mod: CPTII,,, | Performed by: STUDENT IN AN ORGANIZED HEALTH CARE EDUCATION/TRAINING PROGRAM

## 2024-04-03 PROCEDURE — 4010F ACE/ARB THERAPY RXD/TAKEN: CPT | Mod: CPTII,,, | Performed by: STUDENT IN AN ORGANIZED HEALTH CARE EDUCATION/TRAINING PROGRAM

## 2024-04-03 PROCEDURE — 1101F PT FALLS ASSESS-DOCD LE1/YR: CPT | Mod: CPTII,,, | Performed by: NURSE PRACTITIONER

## 2024-04-03 PROCEDURE — 99214 OFFICE O/P EST MOD 30 MIN: CPT | Mod: ,,, | Performed by: NURSE PRACTITIONER

## 2024-04-03 PROCEDURE — 3288F FALL RISK ASSESSMENT DOCD: CPT | Mod: CPTII,,, | Performed by: NURSE PRACTITIONER

## 2024-04-03 PROCEDURE — 3061F NEG MICROALBUMINURIA REV: CPT | Mod: CPTII,,, | Performed by: NURSE PRACTITIONER

## 2024-04-03 PROCEDURE — 1160F RVW MEDS BY RX/DR IN RCRD: CPT | Mod: CPTII,,, | Performed by: NURSE PRACTITIONER

## 2024-04-03 PROCEDURE — 3066F NEPHROPATHY DOC TX: CPT | Mod: CPTII,,, | Performed by: STUDENT IN AN ORGANIZED HEALTH CARE EDUCATION/TRAINING PROGRAM

## 2024-04-03 PROCEDURE — 3008F BODY MASS INDEX DOCD: CPT | Mod: CPTII,,, | Performed by: NURSE PRACTITIONER

## 2024-04-03 PROCEDURE — 4010F ACE/ARB THERAPY RXD/TAKEN: CPT | Mod: CPTII,,, | Performed by: NURSE PRACTITIONER

## 2024-04-03 PROCEDURE — 3046F HEMOGLOBIN A1C LEVEL >9.0%: CPT | Mod: CPTII,,, | Performed by: NURSE PRACTITIONER

## 2024-04-03 PROCEDURE — 3079F DIAST BP 80-89 MM HG: CPT | Mod: CPTII,,, | Performed by: STUDENT IN AN ORGANIZED HEALTH CARE EDUCATION/TRAINING PROGRAM

## 2024-04-03 PROCEDURE — 99203 OFFICE O/P NEW LOW 30 MIN: CPT | Mod: ,,, | Performed by: STUDENT IN AN ORGANIZED HEALTH CARE EDUCATION/TRAINING PROGRAM

## 2024-04-03 PROCEDURE — 3046F HEMOGLOBIN A1C LEVEL >9.0%: CPT | Mod: CPTII,,, | Performed by: STUDENT IN AN ORGANIZED HEALTH CARE EDUCATION/TRAINING PROGRAM

## 2024-04-03 PROCEDURE — 1101F PT FALLS ASSESS-DOCD LE1/YR: CPT | Mod: CPTII,,, | Performed by: STUDENT IN AN ORGANIZED HEALTH CARE EDUCATION/TRAINING PROGRAM

## 2024-04-03 PROCEDURE — 3078F DIAST BP <80 MM HG: CPT | Mod: CPTII,,, | Performed by: NURSE PRACTITIONER

## 2024-04-03 PROCEDURE — 1159F MED LIST DOCD IN RCRD: CPT | Mod: CPTII,,, | Performed by: STUDENT IN AN ORGANIZED HEALTH CARE EDUCATION/TRAINING PROGRAM

## 2024-04-03 PROCEDURE — 3066F NEPHROPATHY DOC TX: CPT | Mod: CPTII,,, | Performed by: NURSE PRACTITIONER

## 2024-04-03 RX ORDER — NIFEDIPINE 60 MG/1
120 TABLET, EXTENDED RELEASE ORAL NIGHTLY
COMMUNITY
Start: 2024-03-21 | End: 2024-05-13 | Stop reason: SDUPTHER

## 2024-04-03 NOTE — ASSESSMENT & PLAN NOTE
Well controlled.   Hypertension Medications               losartan (COZAAR) 100 MG tablet Take 1 tablet (100 mg total) by mouth once daily.    NIFEdipine (PROCARDIA-XL) 60 MG (OSM) 24 hr tablet Take 60 mg by mouth every evening.     Low Sodium Diet (DASH Diet - Less than 2 grams of sodium per day).  Monitor blood pressure daily and log. Report consistent numbers greater than 140/90.  Maintain healthy weight with goal BMI <30. Exercise 30 minutes per day, 5 days per week.

## 2024-04-03 NOTE — ASSESSMENT & PLAN NOTE
Lab Results   Component Value Date    HGBA1C 9.9 (H) 01/31/2024    HGBA1C 8.9 (H) 04/04/2023    LDL 53.00 01/31/2024    CREATININE 1.26 (H) 02/15/2024      Diabetes Medications               empagliflozin (JARDIANCE) 25 mg tablet Take 1 tablet (25 mg total) by mouth once daily.    insulin glargine, TOUJEO, (TOUJEO SOLOSTAR U-300 INSULIN) 300 unit/mL (1.5 mL) InPn pen Inject 40 Units into the skin every evening.   Further recommendations to follow. She is drawing up her insulin - would like to see if pen is covered to ensure accurate dosing given the couple of episodes of hypoglycemia.   On ACE and Statin according to guidelines.  Discussed caution with SGLT2s + diuretics as concomitant use can cause volume depletion.  Follow ADA Diet. Avoid soda, simple sweets, and limit rice/pasta/breads/starches (no more than 45-50 grams per meal).  Maintain healthy weight with goal BMI <30.  Exercise 5 times per week for 30 minutes per day.  Stressed importance of daily foot exams.  Stressed importance of annual dilated eye exam.

## 2024-04-03 NOTE — PROGRESS NOTES
Internal Medicine    Patient ID: [id number]     Chief Complaint: Diabetes (8 week f/u)    HPI:     Alison Langston is a 73 y.o. female here today for a follow up. Jardiance and Toujeo were added at last visit. She did not have labwork completed prior to visit as ordered. Reports a couple of episodes of hypoglycemia in the 40-50s but skipping meals at night. Cardiology recently increased Procardia to 60mg nightly. Saw Dr. Griffin with Neurosurgery - plans to removal battery to spinal cord stimulator in the near future. No other complaints today.     Past Medical History:   Diagnosis Date    Anemia     Anxiety     Cerebrovascular accident     Chronic back pain     Depression     DM (diabetes mellitus)     Elevated ferritin     External hemorrhoids     GERD (gastroesophageal reflux disease)     HLD (hyperlipidemia)     Hypertension     Hypothyroidism, unspecified     CRISTINA (iron deficiency anemia)     Incisional hernia     Internal hemorrhoids     Myoclonus     Osteoporosis     Personal history of colonic polyps     Renal insufficiency     Right arm pain     Vertigo         Past Surgical History:   Procedure Laterality Date    biopsy of breast      BONE MARROW BIOPSY W/ ASPIRATION Right 2018    CATARACT EXTRACTION Right 04/30/2021    CHOLECYSTECTOMY      COLONOSCOPY  12/01/2021    Dr. Junior Cardenas  Repeat colon 2026    HERNIA REPAIR      HERNIA REPAIR  01/27/2020    HYSTERECTOMY  2013    LAMINECTOMY AND MICRODISCECTOMY LUMBAR SPINE  2018    3 levels.  Dr. Krause    ORIF WRIST FRACTURE      distal.    PATENT DUCTUS ARTERIOUS LIGATION      SHOULDER SURGERY Left     SPINAL CORD STIMULATOR IMPLANT  2018    Dr. Jerry    THYROIDECTOMY          Social History     Tobacco Use    Smoking status: Never    Smokeless tobacco: Never   Substance and Sexual Activity    Alcohol use: Never    Drug use: Never    Sexual activity: Not on file        Current Outpatient Medications   Medication Instructions    atorvastatin (LIPITOR) 20  "mg, Oral, Nightly    blood sugar diagnostic (RELION PRIME TEST STRIPS) Strp 1 each, Misc.(Non-Drug; Combo Route), Daily    blood-glucose meter Misc   true metrix glucose monitor, See Instructions, to check bloodsugars BID, E11.9, # 1 EA, 5 Refill(s), Pharmacy: [facility], 154, cm, Height/Length Dosing, 04/19/21 10:31:00 CDT, 73.45, kg, Weight Dosing, 04/19/21 10:31:00 CDT    clotrimazole-betamethasone 1-0.05% (LOTRISONE) cream Topical (Top), 2 times daily    empagliflozin (JARDIANCE) 25 mg, Oral, Daily    fluticasone propionate (FLONASE) 50 mcg/actuation nasal spray 1 spray, Each Nostril, 2 times daily    levothyroxine (SYNTHROID) 200 mcg, Oral, Daily    losartan (COZAAR) 100 mg, Oral, Daily    meclizine (ANTIVERT) 12.5 mg, Oral, 3 times daily PRN    NIFEdipine (PROCARDIA-XL) 60 mg, Oral, Nightly    omeprazole (PRILOSEC) 40 mg, Oral, Every morning    pregabalin (LYRICA) 25 mg, Oral, Nightly    TOUJEO SOLOSTAR U-300 INSULIN 40 Units, Subcutaneous, Nightly    traZODone (DESYREL) 100 mg, Oral, 2 times daily PRN    venlafaxine (EFFEXOR-XR) 150 mg, Oral       Review of patient's allergies indicates:   Allergen Reactions    Baclofen Hallucinations    Donepezil Hallucinations    Erythromycin      Other reaction(s): Upset Stomach    Fluoxetine      Doesn't work    Grass pollen      Other reaction(s): per allergy test    Hydrocodone-acetaminophen      Other reaction(s): "Makes me crazy"    Ivp dye [iodinated contrast media]      Other reaction(s): Rash    Metoclopramide hcl     Rosuvastatin     Iodine Rash    Shrimp Rash        Patient Care Team:  Noah Tafoya MD as PCP - General (Internal Medicine)  Goldy Hand MD as Consulting Physician (Ophthalmology)  Junior Cardenas MD as Consulting Physician (Gastroenterology)  Gallito Cordova MD as Consulting Physician (Obstetrics and Gynecology)  Yonatan Perez MD as Consulting Physician (Orthopedic Surgery)  Mele Smith II, MD " as Consulting Physician (Oncology)  Valentino, Vernon A., MD as Consulting Physician (Cardiology)  Gary Galeano MD as Consulting Physician (Urology)  Valentino, Vernon A., MD as Consulting Physician (Cardiology)  Lynda Swanson LPN as Care Coordinator     Subjective:     Review of Systems    12 point review of systems conducted, negative except as stated in the history of present illness. See HPI for details.    Objective:     Visit Vitals  /74 (BP Location: Right arm, Patient Position: Sitting, BP Method: Medium (Manual))   Pulse 82   Temp 97.4 °F (36.3 °C) (Temporal)   Resp 16   Ht 5' (1.524 m)   Wt 63.5 kg (140 lb)   SpO2 98%   BMI 27.34 kg/m²       Physical Exam  Vitals and nursing note reviewed.   Constitutional:       General: She is not in acute distress.     Appearance: She is not ill-appearing.   HENT:      Head: Normocephalic and atraumatic.      Mouth/Throat:      Mouth: Mucous membranes are moist.      Pharynx: Oropharynx is clear.   Eyes:      General: No scleral icterus.     Extraocular Movements: Extraocular movements intact.      Conjunctiva/sclera: Conjunctivae normal.      Pupils: Pupils are equal, round, and reactive to light.   Neck:      Vascular: No carotid bruit.   Cardiovascular:      Rate and Rhythm: Normal rate and regular rhythm.      Heart sounds: Murmur heard.      No friction rub. No gallop.   Pulmonary:      Effort: Pulmonary effort is normal. No respiratory distress.      Breath sounds: Normal breath sounds. No wheezing, rhonchi or rales.   Abdominal:      General: Abdomen is flat. Bowel sounds are normal. There is no distension.      Palpations: Abdomen is soft. There is no mass.      Tenderness: There is no abdominal tenderness. There is no right CVA tenderness.   Musculoskeletal:         General: Normal range of motion.      Cervical back: Normal range of motion and neck supple.   Skin:     General: Skin is warm and dry.   Neurological:      General: No focal  deficit present.      Mental Status: She is alert.   Psychiatric:         Mood and Affect: Mood normal.         Labs Reviewed:     Chemistry:  Lab Results   Component Value Date     02/15/2024    K 4.4 02/15/2024    CHLORIDE 108 (H) 02/15/2024    BUN 44.2 (H) 02/15/2024    CREATININE 1.26 (H) 02/15/2024    EGFRNORACEVR 45 02/15/2024    GLUCOSE 366 (H) 02/15/2024    CALCIUM 9.5 02/15/2024    ALKPHOS 126 02/15/2024    LABPROT 7.1 02/15/2024    ALBUMIN 4.0 02/15/2024    BILIDIR 0.1 05/10/2022    IBILI 0.10 05/10/2022    AST 22 02/15/2024    ALT 39 02/15/2024    MG 1.3 (L) 12/21/2019    JUGFSGHD52UG 45.3 12/15/2022    TSH 0.020 (L) 12/15/2022    JRLLTC1MGEL 1.07 04/08/2021        Lab Results   Component Value Date    HGBA1C 9.9 (H) 01/31/2024        Hematology:  Lab Results   Component Value Date    WBC 6.95 03/14/2024    HGB 10.2 (L) 03/14/2024    HCT 32.8 (L) 03/14/2024     03/14/2024       Lipid Panel:  Lab Results   Component Value Date    CHOL 148 01/31/2024    HDL 37 01/31/2024    LDL 53.00 01/31/2024    TRIG 289 (H) 01/31/2024    TOTALCHOLEST 4 01/31/2024        Urine:  Lab Results   Component Value Date    COLORUA Colorless 03/20/2024    APPEARANCEUA Clear 03/20/2024    SGUA 1.028 03/20/2024    PHUA 5.0 03/20/2024    PROTEINUA Negative 03/20/2024    GLUCOSEUA 4+ (A) 03/20/2024    KETONESUA Negative 03/20/2024    BLOODUA Negative 03/20/2024    NITRITESUA Negative 03/20/2024    LEUKOCYTESUR Negative 03/20/2024    RBCUA 0-5 03/20/2024    WBCUA 0-5 03/20/2024    BACTERIA None Seen 03/20/2024    SQEPUA Trace 03/20/2024    HYALINECASTS >20 (A) 01/31/2024    CREATRANDUR 221.9 (H) 01/31/2024    PROTEINURINE 40.9 02/06/2024        Assessment:       ICD-10-CM ICD-9-CM   1. Primary hypertension  I10 401.9   2. Type 2 diabetes mellitus with stage 3b chronic kidney disease, with long-term current use of insulin  E11.22 250.40    N18.32 585.3    Z79.4 V58.67        Plan:     1. Primary hypertension  Assessment &  Plan:  Well controlled.   Hypertension Medications               losartan (COZAAR) 100 MG tablet Take 1 tablet (100 mg total) by mouth once daily.    NIFEdipine (PROCARDIA-XL) 60 MG (OSM) 24 hr tablet Take 60 mg by mouth every evening.     Low Sodium Diet (DASH Diet - Less than 2 grams of sodium per day).  Monitor blood pressure daily and log. Report consistent numbers greater than 140/90.  Maintain healthy weight with goal BMI <30. Exercise 30 minutes per day, 5 days per week.      2. Type 2 diabetes mellitus with stage 3b chronic kidney disease, with long-term current use of insulin  Assessment & Plan:  Lab Results   Component Value Date    HGBA1C 9.9 (H) 01/31/2024    HGBA1C 8.9 (H) 04/04/2023    LDL 53.00 01/31/2024    CREATININE 1.26 (H) 02/15/2024      Diabetes Medications               empagliflozin (JARDIANCE) 25 mg tablet Take 1 tablet (25 mg total) by mouth once daily.    insulin glargine, TOUJEO, (TOUJEO SOLOSTAR U-300 INSULIN) 300 unit/mL (1.5 mL) InPn pen Inject 40 Units into the skin every evening.   Further recommendations to follow. She is drawing up her insulin - would like to see if pen is covered to ensure accurate dosing given the couple of episodes of hypoglycemia.   On ACE and Statin according to guidelines.  Discussed caution with SGLT2s + diuretics as concomitant use can cause volume depletion.  Follow ADA Diet. Avoid soda, simple sweets, and limit rice/pasta/breads/starches (no more than 45-50 grams per meal).  Maintain healthy weight with goal BMI <30.  Exercise 5 times per week for 30 minutes per day.  Stressed importance of daily foot exams.  Stressed importance of annual dilated eye exam.    Orders:  -     Hemoglobin A1C; Future; Expected date: 04/03/2024         Follow up in about 3 months (around 7/3/2024) for Medicare Wellness. In addition to their scheduled follow up, the patient has also been instructed to follow up on as needed basis.     Future Appointments   Date Time Provider  Department Center   4/11/2024  8:40 AM LAB, [facility] OLB LAB [facility]   4/11/2024  9:00 AM INJECTION CHAIR 01, [facility] CHEMOTHERAPY INFUSION [facility] CHEMO [facility]   4/11/2024 11:30 AM Fox Nieto, [facility] CASSY [place] Np   5/1/2024 10:30 AM Kady Jim PA-C [facility] KELTON [place]on   5/9/2024 10:00 [facility]   5/9/2024 10:30 [facility]   5/9/2024 11:30 AM INJECTION CHAIR 02, [facility] CHEMOTHERAPY INFUSION [facility] CHEMO [facility]        JAKOB Santana
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
## 2024-04-08 ENCOUNTER — TELEPHONE (OUTPATIENT)
Dept: INTERNAL MEDICINE | Facility: CLINIC | Age: 74
End: 2024-04-08
Payer: MEDICARE

## 2024-04-08 DIAGNOSIS — K21.9 GASTROESOPHAGEAL REFLUX DISEASE, UNSPECIFIED WHETHER ESOPHAGITIS PRESENT: ICD-10-CM

## 2024-04-08 RX ORDER — OMEPRAZOLE 40 MG/1
40 CAPSULE, DELAYED RELEASE ORAL EVERY MORNING
Qty: 90 CAPSULE | Refills: 0 | Status: SHIPPED | OUTPATIENT
Start: 2024-04-08

## 2024-04-08 NOTE — TELEPHONE ENCOUNTER
----- Message from Nickolas Lucas sent at 4/8/2024 11:09 AM CDT -----  Type:  RX Refill Request    Who Called: pt  Refill or New Rx:refill  RX Name and Strength:omeprazole (PRILOSEC) 40 MG capsule    Preferred Pharmacy with phone number:[facility] - [place], [place] - [address]       Best Call Back Number: [phone]

## 2024-04-10 DIAGNOSIS — N18.9 ANEMIA DUE TO CHRONIC KIDNEY DISEASE, UNSPECIFIED CKD STAGE: Primary | ICD-10-CM

## 2024-04-10 DIAGNOSIS — D63.1 ANEMIA DUE TO CHRONIC KIDNEY DISEASE, UNSPECIFIED CKD STAGE: Primary | ICD-10-CM

## 2024-04-10 RX ORDER — PREGABALIN 25 MG/1
25 CAPSULE ORAL
Qty: 90 CAPSULE | Refills: 0 | Status: SHIPPED | OUTPATIENT
Start: 2024-04-10

## 2024-04-11 ENCOUNTER — TELEPHONE (OUTPATIENT)
Dept: INFUSION THERAPY | Facility: HOSPITAL | Age: 74
End: 2024-04-11
Payer: MEDICARE

## 2024-04-15 ENCOUNTER — LAB VISIT (OUTPATIENT)
Dept: LAB | Facility: HOSPITAL | Age: 74
End: 2024-04-15
Payer: MEDICARE

## 2024-04-15 DIAGNOSIS — E11.29 TYPE 2 DIABETES MELLITUS WITH MICROALBUMINURIA, WITHOUT LONG-TERM CURRENT USE OF INSULIN: ICD-10-CM

## 2024-04-15 DIAGNOSIS — N18.31 STAGE 3A CHRONIC KIDNEY DISEASE: ICD-10-CM

## 2024-04-15 DIAGNOSIS — N18.31 ANEMIA DUE TO STAGE 3A CHRONIC KIDNEY DISEASE: ICD-10-CM

## 2024-04-15 DIAGNOSIS — D63.1 ANEMIA DUE TO STAGE 3A CHRONIC KIDNEY DISEASE: ICD-10-CM

## 2024-04-15 DIAGNOSIS — R80.9 TYPE 2 DIABETES MELLITUS WITH MICROALBUMINURIA, WITHOUT LONG-TERM CURRENT USE OF INSULIN: ICD-10-CM

## 2024-04-15 DIAGNOSIS — N04.9 NEPHROTIC SYNDROME: ICD-10-CM

## 2024-04-15 DIAGNOSIS — R80.1 PERSISTENT PROTEINURIA: ICD-10-CM

## 2024-04-15 DIAGNOSIS — I10 PRIMARY HYPERTENSION: ICD-10-CM

## 2024-04-15 LAB
ALBUMIN SERPL-MCNC: 3.8 G/DL (ref 3.4–4.8)
ALBUMIN/GLOB SERPL: 1.4 RATIO (ref 1.1–2)
ALP SERPL-CCNC: 99 UNIT/L (ref 40–150)
ALT SERPL-CCNC: 52 UNIT/L (ref 0–55)
AST SERPL-CCNC: 48 UNIT/L (ref 5–34)
BASOPHILS # BLD AUTO: 0.03 X10(3)/MCL
BASOPHILS NFR BLD AUTO: 0.3 %
BILIRUB SERPL-MCNC: 0.3 MG/DL
BUN SERPL-MCNC: 34.3 MG/DL (ref 9.8–20.1)
CALCIUM SERPL-MCNC: 9 MG/DL (ref 8.4–10.2)
CHLORIDE SERPL-SCNC: 108 MMOL/L (ref 98–107)
CO2 SERPL-SCNC: 26 MMOL/L (ref 23–31)
CREAT SERPL-MCNC: 0.97 MG/DL (ref 0.55–1.02)
CREAT UR-MCNC: 20 MG/DL (ref 45–106)
EOSINOPHIL # BLD AUTO: 0.1 X10(3)/MCL (ref 0–0.9)
EOSINOPHIL NFR BLD AUTO: 1.1 %
ERYTHROCYTE [DISTWIDTH] IN BLOOD BY AUTOMATED COUNT: 14.4 % (ref 11.5–17)
GFR SERPLBLD CREATININE-BSD FMLA CKD-EPI: >60 MLS/MIN/1.73/M2
GLOBULIN SER-MCNC: 2.8 GM/DL (ref 2.4–3.5)
GLUCOSE SERPL-MCNC: 75 MG/DL (ref 82–115)
HCT VFR BLD AUTO: 36.5 % (ref 37–47)
HGB BLD-MCNC: 10.8 G/DL (ref 12–16)
IMM GRANULOCYTES # BLD AUTO: 0.03 X10(3)/MCL (ref 0–0.04)
IMM GRANULOCYTES NFR BLD AUTO: 0.3 %
LYMPHOCYTES # BLD AUTO: 1.23 X10(3)/MCL (ref 0.6–4.6)
LYMPHOCYTES NFR BLD AUTO: 13.6 %
MCH RBC QN AUTO: 26.1 PG (ref 27–31)
MCHC RBC AUTO-ENTMCNC: 29.6 G/DL (ref 33–36)
MCV RBC AUTO: 88.2 FL (ref 80–94)
MONOCYTES # BLD AUTO: 0.69 X10(3)/MCL (ref 0.1–1.3)
MONOCYTES NFR BLD AUTO: 7.6 %
NEUTROPHILS # BLD AUTO: 6.98 X10(3)/MCL (ref 2.1–9.2)
NEUTROPHILS NFR BLD AUTO: 77.1 %
NRBC BLD AUTO-RTO: 0 %
PLATELET # BLD AUTO: 210 X10(3)/MCL (ref 130–400)
PMV BLD AUTO: 10.5 FL (ref 7.4–10.4)
POTASSIUM SERPL-SCNC: 4.8 MMOL/L (ref 3.5–5.1)
PROT SERPL-MCNC: 6.6 GM/DL (ref 5.8–7.6)
PROT UR STRIP-MCNC: 26.9 MG/DL
PTH-INTACT SERPL-MCNC: 77.5 PG/ML (ref 8.7–77)
RBC # BLD AUTO: 4.14 X10(6)/MCL (ref 4.2–5.4)
SODIUM SERPL-SCNC: 142 MMOL/L (ref 136–145)
URINE PROTEIN/CREATININE RATIO (OLG): 1.3
WBC # SPEC AUTO: 9.06 X10(3)/MCL (ref 4.5–11.5)

## 2024-04-15 PROCEDURE — 82570 ASSAY OF URINE CREATININE: CPT

## 2024-04-15 PROCEDURE — 83970 ASSAY OF PARATHORMONE: CPT

## 2024-04-15 PROCEDURE — 80053 COMPREHEN METABOLIC PANEL: CPT

## 2024-04-15 PROCEDURE — 36415 COLL VENOUS BLD VENIPUNCTURE: CPT

## 2024-04-15 PROCEDURE — 85025 COMPLETE CBC W/AUTO DIFF WBC: CPT

## 2024-04-16 ENCOUNTER — TELEPHONE (OUTPATIENT)
Dept: INTERNAL MEDICINE | Facility: CLINIC | Age: 74
End: 2024-04-16
Payer: MEDICARE

## 2024-04-16 NOTE — TELEPHONE ENCOUNTER
----- Message from Africa Hollins sent at 4/16/2024  9:05 AM CDT -----  Regarding: advice  Type:  Needs Medical Advice    Who Called: pt  Symptoms (please be specific): cough, sore throat, fever.    How long has patient had these symptoms:  yesterday  Pharmacy name and phone #:  [facility] Call Back Number: [phone]  Additional Information: stated that she has been having the symptoms since yesterday and wanted to know if pcp can call something in for her. Please advise

## 2024-04-17 ENCOUNTER — OFFICE VISIT (OUTPATIENT)
Dept: INTERNAL MEDICINE | Facility: CLINIC | Age: 74
End: 2024-04-17
Payer: MEDICARE

## 2024-04-17 ENCOUNTER — HOSPITAL ENCOUNTER (OUTPATIENT)
Dept: RADIOLOGY | Facility: HOSPITAL | Age: 74
Discharge: HOME | End: 2024-04-17
Attending: NURSE PRACTITIONER
Payer: MEDICARE

## 2024-04-17 VITALS
RESPIRATION RATE: 16 BRPM | TEMPERATURE: 98 F | OXYGEN SATURATION: 97 % | WEIGHT: 144 LBS | BODY MASS INDEX: 28.27 KG/M2 | HEART RATE: 78 BPM | HEIGHT: 60 IN | SYSTOLIC BLOOD PRESSURE: 144 MMHG | DIASTOLIC BLOOD PRESSURE: 82 MMHG

## 2024-04-17 DIAGNOSIS — Z79.4 TYPE 2 DIABETES MELLITUS WITH STAGE 3B CHRONIC KIDNEY DISEASE, WITH LONG-TERM CURRENT USE OF INSULIN: ICD-10-CM

## 2024-04-17 DIAGNOSIS — J06.9 UPPER RESPIRATORY TRACT INFECTION, UNSPECIFIED TYPE: Primary | ICD-10-CM

## 2024-04-17 DIAGNOSIS — N18.32 TYPE 2 DIABETES MELLITUS WITH STAGE 3B CHRONIC KIDNEY DISEASE, WITH LONG-TERM CURRENT USE OF INSULIN: ICD-10-CM

## 2024-04-17 DIAGNOSIS — J06.9 UPPER RESPIRATORY TRACT INFECTION, UNSPECIFIED TYPE: ICD-10-CM

## 2024-04-17 DIAGNOSIS — E11.22 TYPE 2 DIABETES MELLITUS WITH STAGE 3B CHRONIC KIDNEY DISEASE, WITH LONG-TERM CURRENT USE OF INSULIN: ICD-10-CM

## 2024-04-17 LAB
CTP QC/QA: YES
FLUAV AG UPPER RESP QL IA.RAPID: NOT DETECTED
FLUBV AG UPPER RESP QL IA.RAPID: NOT DETECTED
RSV A 5' UTR RNA NPH QL NAA+PROBE: NOT DETECTED
S PYO RRNA THROAT QL PROBE: NEGATIVE
SARS-COV-2 RNA RESP QL NAA+PROBE: NOT DETECTED

## 2024-04-17 PROCEDURE — 71046 X-RAY EXAM CHEST 2 VIEWS: CPT | Mod: TC

## 2024-04-17 PROCEDURE — 3077F SYST BP >= 140 MM HG: CPT | Mod: CPTII,,, | Performed by: NURSE PRACTITIONER

## 2024-04-17 PROCEDURE — 1159F MED LIST DOCD IN RCRD: CPT | Mod: CPTII,,, | Performed by: NURSE PRACTITIONER

## 2024-04-17 PROCEDURE — 87880 STREP A ASSAY W/OPTIC: CPT | Mod: QW,,, | Performed by: NURSE PRACTITIONER

## 2024-04-17 PROCEDURE — 3061F NEG MICROALBUMINURIA REV: CPT | Mod: CPTII,,, | Performed by: NURSE PRACTITIONER

## 2024-04-17 PROCEDURE — 3046F HEMOGLOBIN A1C LEVEL >9.0%: CPT | Mod: CPTII,,, | Performed by: NURSE PRACTITIONER

## 2024-04-17 PROCEDURE — 3079F DIAST BP 80-89 MM HG: CPT | Mod: CPTII,,, | Performed by: NURSE PRACTITIONER

## 2024-04-17 PROCEDURE — 1101F PT FALLS ASSESS-DOCD LE1/YR: CPT | Mod: CPTII,,, | Performed by: NURSE PRACTITIONER

## 2024-04-17 PROCEDURE — 1160F RVW MEDS BY RX/DR IN RCRD: CPT | Mod: CPTII,,, | Performed by: NURSE PRACTITIONER

## 2024-04-17 PROCEDURE — 4010F ACE/ARB THERAPY RXD/TAKEN: CPT | Mod: CPTII,,, | Performed by: NURSE PRACTITIONER

## 2024-04-17 PROCEDURE — 3288F FALL RISK ASSESSMENT DOCD: CPT | Mod: CPTII,,, | Performed by: NURSE PRACTITIONER

## 2024-04-17 PROCEDURE — 3066F NEPHROPATHY DOC TX: CPT | Mod: CPTII,,, | Performed by: NURSE PRACTITIONER

## 2024-04-17 PROCEDURE — 3008F BODY MASS INDEX DOCD: CPT | Mod: CPTII,,, | Performed by: NURSE PRACTITIONER

## 2024-04-17 PROCEDURE — 99214 OFFICE O/P EST MOD 30 MIN: CPT | Mod: ,,, | Performed by: NURSE PRACTITIONER

## 2024-04-17 RX ORDER — INSULIN GLARGINE 300 [IU]/ML
40 INJECTION, SOLUTION SUBCUTANEOUS NIGHTLY
Qty: 3 PEN | Refills: 5 | Status: SHIPPED | OUTPATIENT
Start: 2024-04-17 | End: 2026-07-06

## 2024-04-17 RX ORDER — BENZONATATE 100 MG/1
100 CAPSULE ORAL 3 TIMES DAILY PRN
Qty: 30 CAPSULE | Refills: 0 | Status: SHIPPED | OUTPATIENT
Start: 2024-04-17 | End: 2024-04-27

## 2024-04-17 NOTE — PROGRESS NOTES
Please inform patient of results.    1. Covid/Flu/RSV + CXR both normal for she and her .    2. I'm going to send them out tessalon perles for the cough - need to stay well hydrated. This is likely viral given in etiology and no antibiotics necessary at this time. OTC meds as follows.  Use OTC cold meds for symptoms (avoid Sudafed or pseudoephedrine products).  Use OTC Tylenol or Advil for fever or body aches.  Get plenty of rest, eat a healthy diet, avoid alcohol and smoking.  Sore Throat: Use OTC lozenges or throat sprays, gargle with warm salt and water, warm tea with honey.  Nasal Congestion: Use OTC Mucinex D, humidifier or steamy shower.  Cough: Use Dextromethorphan/Doxylamine Cough Syrup at night.  Report fever greater than 101 F, breathing problems, painful or worsening cough, or changes in mucous (green, yellow, bloody).  Cover your mouth with coughing, avoid sharing cups or lip balm, wash your hand before eating or touching your face.      Thanks for all you do,    Alexis

## 2024-04-17 NOTE — ASSESSMENT & PLAN NOTE
Start Tessalon Perles 100mg TID PRN  CXR Clear + Negative Flu/Covid/RSV  Use OTC cold meds for symptoms (HTN and DM pt to avoid Sudafed or pseudoephedrine products).  Use OTC Tylenol or Advil for fever or body aches.  Get plenty of rest, eat a healthy diet, avoid alcohol and smoking.  Sore Throat: Use OTC lozenges or throat sprays, gargle with warm salt and water, warm tea with honey.  Nasal Congestion: Use OTC Mucinex D, humidifier or steamy shower.  Cough: Use Dextromethorphan/Doxylamine Cough Syrup at night.  Report fever greater than 101 F, breathing problems, painful or worsening cough, or changes in mucous (green, yellow, bloody).  Cover your mouth with coughing, avoid sharing cups or lip balm, wash your hand before eating or touching your face.

## 2024-04-17 NOTE — PROGRESS NOTES
Internal Medicine  Patient ID: [id number]     Chief Complaint: Cough, Sinus Problem, and Sore Throat      HPI:     Alison Langston is a 73 y.o. female here today for a problem visit. Complaining of fevers up to 102 degrees, dry cough, nasal blockage, post nasal drip, sinus and nasal congestion, and sore throat  x 2 days. T max of 102 last night.  Denies SOB, difficulty breathing, or CP. Admits to family members with similar symptoms.  No other complaints today.     Past Medical History:   Diagnosis Date    Anemia     Anxiety     Cerebrovascular accident     Chronic back pain     Depression     DM (diabetes mellitus)     Elevated ferritin     External hemorrhoids     GERD (gastroesophageal reflux disease)     HLD (hyperlipidemia)     Hypertension     Hypothyroidism, unspecified     CRISTINA (iron deficiency anemia)     Incisional hernia     Internal hemorrhoids     Myoclonus     Osteoporosis     Personal history of colonic polyps     Renal insufficiency     Right arm pain     Vertigo         Past Surgical History:   Procedure Laterality Date    biopsy of breast      BONE MARROW BIOPSY W/ ASPIRATION Right 2018    CATARACT EXTRACTION Right 04/30/2021    CHOLECYSTECTOMY      COLONOSCOPY  12/01/2021    Dr. Junior Cardenas  Repeat colon 2026    HERNIA REPAIR      HERNIA REPAIR  01/27/2020    HYSTERECTOMY  2013    LAMINECTOMY AND MICRODISCECTOMY LUMBAR SPINE  2018    3 levels.  Dr. Krause    ORIF WRIST FRACTURE      distal.    PATENT DUCTUS ARTERIOUS LIGATION      SHOULDER SURGERY Left     SPINAL CORD STIMULATOR IMPLANT  2018    Dr. Jerry    THYROIDECTOMY          Social History     Tobacco Use    Smoking status: Never    Smokeless tobacco: Never   Substance and Sexual Activity    Alcohol use: Never    Drug use: Never    Sexual activity: Not on file        Current Outpatient Medications   Medication Instructions    atorvastatin (LIPITOR) 20 mg, Oral, Nightly    benzonatate (TESSALON) 100 mg, Oral, 3 times daily PRN    blood  "sugar diagnostic (RELION PRIME TEST STRIPS) Strp 1 each, Misc.(Non-Drug; Combo Route), Daily    blood-glucose meter Misc   true metrix glucose monitor, See Instructions, to check bloodsugars BID, E11.9, # 1 EA, 5 Refill(s), Pharmacy: [facility], 154, cm, Height/Length Dosing, 04/19/21 10:31:00 CDT, 73.45, kg, Weight Dosing, 04/19/21 10:31:00 CDT    clotrimazole-betamethasone 1-0.05% (LOTRISONE) cream Topical (Top), 2 times daily    empagliflozin (JARDIANCE) 25 mg, Oral, Daily    fluticasone propionate (FLONASE) 50 mcg/actuation nasal spray 1 spray, Each Nostril, 2 times daily    insulin glargine U-300 conc (TOUJEO MAX U-300 SOLOSTAR) 40 Units, Subcutaneous, Nightly    levothyroxine (SYNTHROID) 200 mcg, Oral, Daily    losartan (COZAAR) 100 mg, Oral, Daily    meclizine (ANTIVERT) 12.5 mg, Oral, 3 times daily PRN    NIFEdipine (PROCARDIA-XL) 60 mg, Oral, Nightly    omeprazole (PRILOSEC) 40 mg, Oral, Every morning    pregabalin (LYRICA) 25 mg, Oral    traZODone (DESYREL) 100 mg, Oral, 2 times daily PRN    venlafaxine (EFFEXOR-XR) 150 mg, Oral       Review of patient's allergies indicates:   Allergen Reactions    Baclofen Hallucinations    Donepezil Hallucinations    Erythromycin      Other reaction(s): Upset Stomach    Fluoxetine      Doesn't work    Grass pollen      Other reaction(s): per allergy test    Hydrocodone-acetaminophen      Other reaction(s): "Makes me crazy"    Ivp dye [iodinated contrast media]      Other reaction(s): Rash    Metoclopramide hcl     Rosuvastatin     Iodine Rash    Shrimp Rash        Patient Care Team:  Noah Tafoya MD as PCP - General (Internal Medicine)  Goldy Hand MD as Consulting Physician (Ophthalmology)  Junior Cardenas MD as Consulting Physician (Gastroenterology)  Gallito Cordova MD as Consulting Physician (Obstetrics and Gynecology)  Yonatan Perez MD as Consulting Physician (Orthopedic Surgery)  Mele Smith II, MD as " Consulting Physician (Oncology)  Valentino, Vernon A., MD as Consulting Physician (Cardiology)  Gary Galeano MD as Consulting Physician (Urology)  Valentino, Vernon A., MD as Consulting Physician (Cardiology)  Lynda Swanson LPN as Care Coordinator     Subjective:     Review of Systems    12 point review of systems conducted, negative except as stated in the history of present illness. See HPI for details.    Objective:     Visit Vitals  BP (!) 144/82 (BP Location: Right arm, Patient Position: Sitting, BP Method: Medium (Manual))   Pulse 78   Temp 98.2 °F (36.8 °C) (Temporal)   Resp 16   Ht 5' (1.524 m)   Wt 65.3 kg (144 lb)   SpO2 97%   BMI 28.12 kg/m²       Physical Exam  Vitals and nursing note reviewed.   Constitutional:       General: She is not in acute distress.     Appearance: She is not ill-appearing.   HENT:      Head: Normocephalic and atraumatic.      Mouth/Throat:      Mouth: Mucous membranes are moist.      Pharynx: Oropharynx is clear.   Eyes:      General: No scleral icterus.     Extraocular Movements: Extraocular movements intact.      Conjunctiva/sclera: Conjunctivae normal.      Pupils: Pupils are equal, round, and reactive to light.   Cardiovascular:      Rate and Rhythm: Normal rate and regular rhythm.      Heart sounds: No murmur heard.     No friction rub. No gallop.   Pulmonary:      Effort: Pulmonary effort is normal. No respiratory distress.      Breath sounds: Normal breath sounds. No wheezing, rhonchi or rales.   Abdominal:      General: Abdomen is flat. Bowel sounds are normal. There is no distension.      Palpations: Abdomen is soft. There is no mass.      Tenderness: There is no abdominal tenderness.   Musculoskeletal:         General: Normal range of motion.      Cervical back: Normal range of motion and neck supple.   Skin:     General: Skin is warm and dry.   Neurological:      General: No focal deficit present.      Mental Status: She is alert.   Psychiatric:          Mood and Affect: Mood normal.         Assessment:       ICD-10-CM ICD-9-CM   1. Upper respiratory tract infection, unspecified type  J06.9 465.9   2. Type 2 diabetes mellitus with stage 3b chronic kidney disease, with long-term current use of insulin  E11.22 250.40    N18.32 585.3    Z79.4 V58.67        Plan:     1. Upper respiratory tract infection, unspecified type  Assessment & Plan:  Start Tessalon Perles 100mg TID PRN  CXR Clear + Negative Flu/Covid/RSV  Use OTC cold meds for symptoms (HTN and DM pt to avoid Sudafed or pseudoephedrine products).  Use OTC Tylenol or Advil for fever or body aches.  Get plenty of rest, eat a healthy diet, avoid alcohol and smoking.  Sore Throat: Use OTC lozenges or throat sprays, gargle with warm salt and water, warm tea with honey.  Nasal Congestion: Use OTC Mucinex D, humidifier or steamy shower.  Cough: Use Dextromethorphan/Doxylamine Cough Syrup at night.  Report fever greater than 101 F, breathing problems, painful or worsening cough, or changes in mucous (green, yellow, bloody).  Cover your mouth with coughing, avoid sharing cups or lip balm, wash your hand before eating or touching your face.      Orders:  -     COVID/RSV/FLU A&B PCR; Future; Expected date: 04/17/2024  -     POCT Rapid Strep A  -     X-Ray Chest PA And Lateral; Future; Expected date: 04/17/2024    2. Type 2 diabetes mellitus with stage 3b chronic kidney disease, with long-term current use of insulin  -     insulin glargine U-300 conc (TOUJEO MAX U-300 SOLOSTAR) 300 unit/mL (3 mL) insulin pen; Inject 40 Units into the skin every evening.  Dispense: 3 Pen; Refill: 5    Other orders  -     benzonatate (TESSALON) 100 MG capsule; Take 1 capsule (100 mg total) by mouth 3 (three) times daily as needed for Cough.  Dispense: 30 capsule; Refill: 0         In addition to their scheduled follow up, the patient has also been instructed to follow up on as needed basis.     Future Appointments   Date Time Provider  Department Center   5/1/2024 10:30 AM Kady Jim PA-C [facility] KELTON [place]   5/9/2024 10:00 AM LAB, [facility]   5/9/2024 10:30 AM [facility]   5/9/2024 11:30 AM INJECTION CHAIR [facility] CHEMOTHERAPY INFUSION [facility] CHEMO [facility]        JAKOB Santana

## 2024-04-19 ENCOUNTER — PATIENT OUTREACH (OUTPATIENT)
Dept: ADMINISTRATIVE | Facility: HOSPITAL | Age: 74
End: 2024-04-19
Payer: MEDICARE

## 2024-04-19 DIAGNOSIS — Z78.0 POST-MENOPAUSAL: ICD-10-CM

## 2024-04-19 DIAGNOSIS — Z12.31 BREAST CANCER SCREENING BY MAMMOGRAM: Primary | ICD-10-CM

## 2024-04-19 NOTE — LETTER
AUTHORIZATION FOR RELEASE OF   CONFIDENTIAL INFORMATION    Dear Dr. Molina,    We are seeing Alison Langston, date of birth 1950, in the clinic at [place] [address] INTERNAL MEDICINE. Noah Tafoya MD is the patient's PCP. Alison Langston has an outstanding lab/procedure at the time we reviewed her chart. In order to help keep her health information updated, she has authorized us to request the following medical record(s):        (  )  MAMMOGRAM                                      (  )  COLONOSCOPY      (  )  PAP SMEAR                                          (  )  OUTSIDE LAB RESULTS     (  )  DEXA SCAN                                          ( X )  DM EYE EXAM            (  )  FOOT EXAM                                          (  )  ENTIRE RECORD     (  )  OUTSIDE IMMUNIZATIONS                 (  )  _______________         Please fax records to [facility], [place], Tyler M, MD, [phone]       If you have any questions, please contact Lynda at [phone]           Patient Name: Alison Langston  : 1950  Patient Phone #: [phone]

## 2024-04-19 NOTE — PROGRESS NOTES
Population Health Outreach.    Health Maintenance Topic(s) Outreach Outcomes & Actions Taken:    Eye Exam - Outreach Outcomes & Actions Taken  : External Records Requested & Care Team Updated if Applicable and Record request sent to Dr. Molina    Osteoporosis Screening - Outreach Outcomes & Actions Taken  : Dexa Order Placed      Breast Cancer Screening - Outreach Outcomes & Actions Taken  : Mammogram Order Placed

## 2024-04-24 ENCOUNTER — OFFICE VISIT (OUTPATIENT)
Dept: NEPHROLOGY | Facility: CLINIC | Age: 74
End: 2024-04-24
Payer: MEDICARE

## 2024-04-24 VITALS
OXYGEN SATURATION: 98 % | BODY MASS INDEX: 27.76 KG/M2 | RESPIRATION RATE: 20 BRPM | HEIGHT: 60 IN | HEART RATE: 73 BPM | TEMPERATURE: 99 F | WEIGHT: 141.38 LBS | SYSTOLIC BLOOD PRESSURE: 171 MMHG | DIASTOLIC BLOOD PRESSURE: 67 MMHG

## 2024-04-24 DIAGNOSIS — E11.29 TYPE 2 DIABETES MELLITUS WITH MICROALBUMINURIA, WITHOUT LONG-TERM CURRENT USE OF INSULIN: ICD-10-CM

## 2024-04-24 DIAGNOSIS — D63.1 ANEMIA DUE TO STAGE 3A CHRONIC KIDNEY DISEASE: ICD-10-CM

## 2024-04-24 DIAGNOSIS — R80.9 TYPE 2 DIABETES MELLITUS WITH MICROALBUMINURIA, WITHOUT LONG-TERM CURRENT USE OF INSULIN: ICD-10-CM

## 2024-04-24 DIAGNOSIS — R80.1 PERSISTENT PROTEINURIA: ICD-10-CM

## 2024-04-24 DIAGNOSIS — N18.31 ANEMIA DUE TO STAGE 3A CHRONIC KIDNEY DISEASE: ICD-10-CM

## 2024-04-24 DIAGNOSIS — I10 PRIMARY HYPERTENSION: ICD-10-CM

## 2024-04-24 DIAGNOSIS — N18.2 STAGE 2 CHRONIC KIDNEY DISEASE: Primary | ICD-10-CM

## 2024-04-24 PROCEDURE — 99999 PR PBB SHADOW E&M-EST. PATIENT-LVL IV: CPT | Mod: PBBFAC,,, | Performed by: NURSE PRACTITIONER

## 2024-04-24 PROCEDURE — 3061F NEG MICROALBUMINURIA REV: CPT | Mod: CPTII,S$GLB,, | Performed by: NURSE PRACTITIONER

## 2024-04-24 PROCEDURE — 3077F SYST BP >= 140 MM HG: CPT | Mod: CPTII,S$GLB,, | Performed by: NURSE PRACTITIONER

## 2024-04-24 PROCEDURE — 3066F NEPHROPATHY DOC TX: CPT | Mod: CPTII,S$GLB,, | Performed by: NURSE PRACTITIONER

## 2024-04-24 PROCEDURE — 1101F PT FALLS ASSESS-DOCD LE1/YR: CPT | Mod: CPTII,S$GLB,, | Performed by: NURSE PRACTITIONER

## 2024-04-24 PROCEDURE — 3288F FALL RISK ASSESSMENT DOCD: CPT | Mod: CPTII,S$GLB,, | Performed by: NURSE PRACTITIONER

## 2024-04-24 PROCEDURE — 1125F AMNT PAIN NOTED PAIN PRSNT: CPT | Mod: CPTII,S$GLB,, | Performed by: NURSE PRACTITIONER

## 2024-04-24 PROCEDURE — 1159F MED LIST DOCD IN RCRD: CPT | Mod: CPTII,S$GLB,, | Performed by: NURSE PRACTITIONER

## 2024-04-24 PROCEDURE — 3008F BODY MASS INDEX DOCD: CPT | Mod: CPTII,S$GLB,, | Performed by: NURSE PRACTITIONER

## 2024-04-24 PROCEDURE — 99214 OFFICE O/P EST MOD 30 MIN: CPT | Mod: S$GLB,,, | Performed by: NURSE PRACTITIONER

## 2024-04-24 PROCEDURE — 4010F ACE/ARB THERAPY RXD/TAKEN: CPT | Mod: CPTII,S$GLB,, | Performed by: NURSE PRACTITIONER

## 2024-04-24 PROCEDURE — 3078F DIAST BP <80 MM HG: CPT | Mod: CPTII,S$GLB,, | Performed by: NURSE PRACTITIONER

## 2024-04-24 PROCEDURE — 3046F HEMOGLOBIN A1C LEVEL >9.0%: CPT | Mod: CPTII,S$GLB,, | Performed by: NURSE PRACTITIONER

## 2024-04-24 RX ORDER — NIFEDIPINE 90 MG/1
90 TABLET, EXTENDED RELEASE ORAL 2 TIMES DAILY
Qty: 28 TABLET | Refills: 0 | Status: SHIPPED | OUTPATIENT
Start: 2024-04-24 | End: 2024-05-08

## 2024-04-24 NOTE — PROGRESS NOTES
[facility] Nephrology New Referral Office Note    HPI  Alison Langston, 73 y.o. female, presents to office for follow-up of CKD 3A and proteinuria.  Patient has longstanding history of diabetes with retinopathy and hypertension. She is maintained on ARB and SGLT2 inhibitor.  Noted to have some renal function deterioration after initiation of SGLT2 inhibitor.  She has also had issues with recurrent LUISA in the past.  She complains of nonproductive cough.  She was tested for flu, RSV, COVID all negative.  Reports she is taking allergy medicine and cough medicine.    Patient denies taking NSAIDs or new antibiotics. Also denies recent episode of dehydration, diarrhea, vomiting, acute illness, hospitalization, recent angiograms or exposure to IV radiocontrast.         Medical Diagnoses:   Past Medical History:   Diagnosis Date    Anemia     Anxiety     Cerebrovascular accident     Chronic back pain     Depression     DM (diabetes mellitus)     Elevated ferritin     External hemorrhoids     GERD (gastroesophageal reflux disease)     HLD (hyperlipidemia)     Hypertension     Hypothyroidism, unspecified     CRISTINA (iron deficiency anemia)     Incisional hernia     Internal hemorrhoids     Myoclonus     Osteoporosis     Personal history of colonic polyps     Renal insufficiency     Right arm pain     Vertigo      Patient Active Problem List   Diagnosis    Normocytic anemia    Chronic back pain    Great toe pain, left    Depression    Personal history of colonic polyps    Osteoporosis    Upper respiratory tract infection    Diastasis of rectus abdominis    Stage 3a chronic kidney disease    Hx of percutaneous transcatheter closure of congenital ASD    History of transient ischemic attack    Anemia due to chronic kidney disease    Anxiety    Essential tremor    GERD (gastroesophageal reflux disease)    Hyperlipidemia    Hypertension    Hypothyroidism    Iron deficiency anemia    Kidney stones    Pancreatitis    Incisional hernia     "Restless legs syndrome    Tremor    Wears eyeglasses    Right arm pain    Right arm weakness    Moderate aortic stenosis by prior echocardiogram    Angina pectoris, unspecified    Nephrotic syndrome    Type 2 diabetes mellitus with stage 3b chronic kidney disease, with long-term current use of insulin    Right median nerve neuropathy    Right flank pain       Surgical History:   Past Surgical History:   Procedure Laterality Date    biopsy of breast      BONE MARROW BIOPSY W/ ASPIRATION Right 2018    CATARACT EXTRACTION Right 04/30/2021    CHOLECYSTECTOMY      COLONOSCOPY  12/01/2021    Dr. Junior Cardenas  Repeat colon 2026    HERNIA REPAIR      HERNIA REPAIR  01/27/2020    HYSTERECTOMY  2013    LAMINECTOMY AND MICRODISCECTOMY LUMBAR SPINE  2018    3 levels.  Dr. Krause    ORIF WRIST FRACTURE      distal.    PATENT DUCTUS ARTERIOUS LIGATION      SHOULDER SURGERY Left     SPINAL CORD STIMULATOR IMPLANT  2018    Dr. Jerry    THYROIDECTOMY         Family History:   Family History   Problem Relation Name Age of Onset    Diabetes Mother      GI problems Mother      Lung cancer Father      Diabetes Father      Alzheimer's disease Father         Social History:   Social History     Tobacco Use    Smoking status: Never     Passive exposure: Never    Smokeless tobacco: Never   Substance Use Topics    Alcohol use: Never       Allergies:  Review of patient's allergies indicates:   Allergen Reactions    Baclofen Hallucinations    Donepezil Hallucinations    Erythromycin      Other reaction(s): Upset Stomach    Fluoxetine      Doesn't work    Grass pollen      Other reaction(s): per allergy test    Hydrocodone-acetaminophen      Other reaction(s): "Makes me crazy"    Ivp dye [iodinated contrast media]      Other reaction(s): Rash    Metoclopramide hcl     Rosuvastatin     Iodine Rash    Shrimp Rash       Medications:  Current Outpatient Medications   Medication Sig Dispense Refill    atorvastatin (LIPITOR) 20 MG tablet Take " 1 tablet (20 mg total) by mouth every evening. 90 tablet 3    benzonatate (TESSALON) 100 MG capsule Take 1 capsule (100 mg total) by mouth 3 (three) times daily as needed for Cough. 30 capsule 0    blood sugar diagnostic (RELION PRIME TEST STRIPS) Strp 1 each by Misc.(Non-Drug; Combo Route) route Daily. 100 each 6    blood-glucose meter Misc   true metrix glucose monitor, See Instructions, to check bloodsugars BID, E11.9, # 1 EA, 5 Refill(s), Pharmacy: [facility], 154, cm, Height/Length Dosing, 04/19/21 10:31:00 CDT, 73.45, kg, Weight Dosing, 04/19/21 10:31:00 CDT      clotrimazole-betamethasone 1-0.05% (LOTRISONE) cream Apply topically 2 (two) times daily. 45 g 1    empagliflozin (JARDIANCE) 25 mg tablet Take 1 tablet (25 mg total) by mouth once daily. 90 tablet 3    fluticasone propionate (FLONASE) 50 mcg/actuation nasal spray 1 spray by Each Nostril route 2 (two) times daily.      levothyroxine (SYNTHROID) 200 MCG tablet Take 1 tablet (200 mcg total) by mouth once daily. 90 tablet 3    losartan (COZAAR) 100 MG tablet Take 1 tablet (100 mg total) by mouth once daily. 90 tablet 3    meclizine (ANTIVERT) 12.5 mg tablet Take 12.5 mg by mouth 3 (three) times daily as needed.      NIFEdipine (PROCARDIA-XL) 60 MG (OSM) 24 hr tablet Take 120 mg by mouth every evening.      omeprazole (PRILOSEC) 40 MG capsule Take 1 capsule (40 mg total) by mouth every morning. 90 capsule 0    pregabalin (LYRICA) 25 MG capsule TAKE 1 CAPSULE BY MOUTH IN THE EVENING 90 capsule 0    traZODone (DESYREL) 100 MG tablet Take 1 tablet (100 mg total) by mouth 2 (two) times daily as needed for Insomnia. 30 tablet 11    venlafaxine (EFFEXOR-XR) 150 MG Cp24 Take 1 capsule by mouth once daily 90 capsule 0    insulin glargine U-300 conc (TOUJEO MAX U-300 SOLOSTAR) 300 unit/mL (3 mL) insulin pen Inject 40 Units into the skin every evening. (Patient not taking: Reported on 4/24/2024) 3 Pen 5     No current facility-administered medications for  this visit.         Review of Systems:    Constitutional: Denies fever, fatigue, generalized weakness  Skin: Denies wounds, no rashes, no itching, no new skin lesions  Respiratory:  Denies cough, shortness of breath, or wheezing  Cardiovascular: Denies chest pain, palpitations, or swelling  Gastrointestional: Denies abdominal pain, nausea, vomiting, diarrhea, or constipation  Genitourinary: Denies dysuria, hematuria, foamy urine, or incontinence; reports able to empty bladder  Musculoskeletal: Denies back or flank pain  Neurological: Denies headaches, dizziness, paresthesias, tremors or focal weakness      Vital Signs:  BP (!) 171/67 (BP Location: Right arm, Patient Position: Sitting)   Pulse 73   Temp 98.5 °F (36.9 °C) (Oral)   Resp 20   Ht 5' (1.524 m)   Wt 64.1 kg (141 lb 6.4 oz)   SpO2 98%   BMI 27.62 kg/m²   Body mass index is 27.62 kg/m².      Physical Exam:    General: no acute distress, awake, alert  Eyes: conjunctiva clear, eyelids without swelling  HENT: atraumatic, oropharynx and nasal mucosa patent  Neck: supple, trache midline, full ROM, no JVD  Respiratory: equal, unlabored, clear to auscultation A/P  Cardiovascular: RRR; +3/6   Systolic ejection murmur left sternal border  Edema: none  Gastrointestinal: soft, non-tender, non-distended; positive bowel sounds; no masses to palpation; no ascites  Genitourinary: no CVA tenderness upon palpation  Musculoskeletal: ROM without new limitation or discomfort  Integumentary: warm, dry; no rashes, wounds, or skin lesions  Neurological: oriented x4, appropriate, no acute deficits      Labs:        Component Value Date/Time     04/15/2024 1312     02/15/2024 1254    K 4.8 04/15/2024 1312    K 4.4 02/15/2024 1254    CO2 26 04/15/2024 1312    CO2 26 02/15/2024 1254    BUN 34.3 (H) 04/15/2024 1312    BUN 44.2 (H) 02/15/2024 1254    CREATININE 0.97 04/15/2024 1312    CREATININE 1.26 (H) 02/15/2024 1254    CREATININE 1.09 (H) 01/31/2024 0882     CREATININE 0.94 07/26/2023 0838    CALCIUM 9.0 04/15/2024 1312    CALCIUM 9.5 02/15/2024 1254    PTH 77.5 (H) 04/15/2024 1312           Component Value Date/Time    WBC 9.06 04/15/2024 1312    WBC 6.95 03/14/2024 1257    HGB 10.8 (L) 04/15/2024 1312    HGB 10.2 (L) 03/14/2024 1257    HCT 36.5 (L) 04/15/2024 1312    HCT 32.8 (L) 03/14/2024 1257    HCT 35.0 (L) 07/27/2020 1046     04/15/2024 1312     03/14/2024 1257         Imaging:  Retroperitoneal US:   No acute abnormalities 2/2024    Impression:  1. Stage 2 chronic kidney disease    2. Type 2 diabetes mellitus with microalbuminuria, without long-term current use of insulin    3. Anemia due to stage 3a chronic kidney disease    4. Persistent proteinuria    5. Primary hypertension           -CKD 3A most likely related to diabetic nephropathy and chronic ischemic nephrosclerosis.  Patient has also had multiple episodes of LUISA in the past. Renal function at baseline.    -Proteinuria stable on ARB and SGLT2 inhibitor at 1.3 g/g creatinine. SPEP/WILLIAMS negative for monoclonal gammopathy in the past.     -Iron deficiency/chronic disease-  Managed per heme Onc on Retacrit and p.o. iron.    -BP elevated today.       Plan:  Renal function at baseline. Complete serological evaluation.   Increase Nifedipine to 90 mg BID x 14 days. Pt to complete home bp monitoring and if bp <150/90 she can decrease to 60 mg BID.   .   Concern for cough r/t to ARB. Will f/u at next visit. If it persists will d/c ARB.   Main line of management is control of blood pressure and diabetes   Make sure to drink at least 64 oz of water daily  Discussed risk of UTI/yeast infection on Jardiance along with s/s.     Follow up in 3 weeks. Does not need labs  Follow up in 3 months with labs 1 week before appointment.       Avoid NSAIDs (Aleve, Mobic, Celebrex, Ibuprofen, Advil, Toradol and Diclofenac).  Only take tylenol (acetaminophen) occasionally if needed for aches/pains.    Recommend low  "sodium diet:  Avoid high salt foods (olives, pickles, smoked meats, deli meats, salted potato chips, fast food, etc.).   Do not add salt to your food at the table.   Use only small amounts of salt when cooking.    Avoid excessive intake of the high potassium foods:  Bananas, oranges, watermelon, tomatoes, potatoes, or salt substitutes ("low sodium seasonings")    Patient to call our office with any concerns prior to next appointment.      [facility]        This note was created with the assistance of [employer] voice recognition software or phone dictation. There may be transcription errors as a result of using this technology however minimal. Effort has been made to assure accuracy of transcription but any obvious errors or omissions should be clarified with the author of the document.      "

## 2024-04-24 NOTE — PATIENT INSTRUCTIONS
Continue home BP monitoring.  Notify office for blood pressure greater than 160/80 sustained.  If any signs or symptoms including dizziness, lightheadedness, headache go to ER.    Start nifedipine/Procardia 90 mg twice daily for up to 2 weeks.  If blood pressure begins trending down less than 150/90 can resume 60 mg twice daily.      Please call office if any questions.    Please follow up with PCP if any signs or symptoms of UTI or vaginal yeast infection.

## 2024-05-01 ENCOUNTER — TELEPHONE (OUTPATIENT)
Dept: NEUROSURGERY | Facility: CLINIC | Age: 74
End: 2024-05-01

## 2024-05-01 NOTE — TELEPHONE ENCOUNTER
I spoke with patient. She missed her preop appt with Kady at 1030 today. She woke up with a spotty rash to her body, took benadryl and fell asleep. She said she did not have a number to call and cancel appt. She also has decided to cancel surgery, as she has a large hernia that she would like to take care of first. She will call when ready to reschedule.

## 2024-05-02 ENCOUNTER — TELEPHONE (OUTPATIENT)
Dept: NEUROSURGERY | Facility: CLINIC | Age: 74
End: 2024-05-02
Payer: MEDICARE

## 2024-05-02 NOTE — TELEPHONE ENCOUNTER
----- Message from Adrienne Langston LPN sent at 4/30/2024 10:03 AM CDT -----  Regarding: MEDICAL/ CARDIAC CLEARANCE-   Faxed clearance requests.  ----- Message -----  From: Adrienne Langston LPN  Sent: 4/3/2024  11:10 AM CDT  To: Adrienne Langston LPN  Subject: needs cardiac/ medical clearance-              Dr. Howard - appt 4/3/24  Dr. Valentino - saw last week

## 2024-05-03 DIAGNOSIS — F32.A DEPRESSION, UNSPECIFIED DEPRESSION TYPE: ICD-10-CM

## 2024-05-03 RX ORDER — VENLAFAXINE HYDROCHLORIDE 150 MG/1
150 CAPSULE, EXTENDED RELEASE ORAL
Qty: 90 CAPSULE | Refills: 0 | Status: SHIPPED | OUTPATIENT
Start: 2024-05-03

## 2024-05-09 ENCOUNTER — TELEPHONE (OUTPATIENT)
Dept: INFUSION THERAPY | Facility: HOSPITAL | Age: 74
End: 2024-05-09
Payer: MEDICARE

## 2024-05-13 RX ORDER — NIFEDIPINE 90 MG/1
90 TABLET, EXTENDED RELEASE ORAL 2 TIMES DAILY
Qty: 60 TABLET | Refills: 11 | Status: SHIPPED | OUTPATIENT
Start: 2024-05-13 | End: 2025-05-13

## 2024-05-13 NOTE — TELEPHONE ENCOUNTER
Received faxed refill request for procardia 90 mg BID. Called pt to see how bp was ranging. She reports it has not been low and it is elevated on some days. She wants to continue the 90 mg BID dosing. She was only given a short course to try. Will resend to pharmacy. Advised her to keep monitoring home bp. Notify office for low bp with or without symptoms or sustatined elevated bp > 160/80.

## 2024-05-15 ENCOUNTER — OFFICE VISIT (OUTPATIENT)
Dept: HEMATOLOGY/ONCOLOGY | Facility: CLINIC | Age: 74
End: 2024-05-15
Payer: MEDICARE

## 2024-05-15 ENCOUNTER — INFUSION (OUTPATIENT)
Dept: INFUSION THERAPY | Facility: HOSPITAL | Age: 74
End: 2024-05-15
Attending: INTERNAL MEDICINE
Payer: MEDICARE

## 2024-05-15 VITALS
DIASTOLIC BLOOD PRESSURE: 65 MMHG | HEART RATE: 70 BPM | WEIGHT: 145 LBS | HEIGHT: 60 IN | TEMPERATURE: 98 F | SYSTOLIC BLOOD PRESSURE: 149 MMHG | BODY MASS INDEX: 28.47 KG/M2 | RESPIRATION RATE: 18 BRPM | OXYGEN SATURATION: 98 %

## 2024-05-15 DIAGNOSIS — D64.9 NORMOCYTIC ANEMIA: Primary | ICD-10-CM

## 2024-05-15 DIAGNOSIS — D50.9 IRON DEFICIENCY ANEMIA, UNSPECIFIED IRON DEFICIENCY ANEMIA TYPE: ICD-10-CM

## 2024-05-15 DIAGNOSIS — D63.1 ANEMIA DUE TO STAGE 3A CHRONIC KIDNEY DISEASE: ICD-10-CM

## 2024-05-15 DIAGNOSIS — N18.31 ANEMIA DUE TO STAGE 3A CHRONIC KIDNEY DISEASE: ICD-10-CM

## 2024-05-15 DIAGNOSIS — N18.31 STAGE 3A CHRONIC KIDNEY DISEASE: Primary | ICD-10-CM

## 2024-05-15 PROCEDURE — 3288F FALL RISK ASSESSMENT DOCD: CPT | Mod: CPTII,S$GLB,, | Performed by: NURSE PRACTITIONER

## 2024-05-15 PROCEDURE — 4010F ACE/ARB THERAPY RXD/TAKEN: CPT | Mod: CPTII,S$GLB,, | Performed by: NURSE PRACTITIONER

## 2024-05-15 PROCEDURE — 99999 PR PBB SHADOW E&M-EST. PATIENT-LVL IV: CPT | Mod: PBBFAC,,, | Performed by: NURSE PRACTITIONER

## 2024-05-15 PROCEDURE — 1160F RVW MEDS BY RX/DR IN RCRD: CPT | Mod: CPTII,S$GLB,, | Performed by: NURSE PRACTITIONER

## 2024-05-15 PROCEDURE — 3046F HEMOGLOBIN A1C LEVEL >9.0%: CPT | Mod: CPTII,S$GLB,, | Performed by: NURSE PRACTITIONER

## 2024-05-15 PROCEDURE — 3078F DIAST BP <80 MM HG: CPT | Mod: CPTII,S$GLB,, | Performed by: NURSE PRACTITIONER

## 2024-05-15 PROCEDURE — 96372 THER/PROPH/DIAG INJ SC/IM: CPT

## 2024-05-15 PROCEDURE — 3077F SYST BP >= 140 MM HG: CPT | Mod: CPTII,S$GLB,, | Performed by: NURSE PRACTITIONER

## 2024-05-15 PROCEDURE — 3008F BODY MASS INDEX DOCD: CPT | Mod: CPTII,S$GLB,, | Performed by: NURSE PRACTITIONER

## 2024-05-15 PROCEDURE — 3061F NEG MICROALBUMINURIA REV: CPT | Mod: CPTII,S$GLB,, | Performed by: NURSE PRACTITIONER

## 2024-05-15 PROCEDURE — 63600175 PHARM REV CODE 636 W HCPCS: Mod: EC,JG | Performed by: NURSE PRACTITIONER

## 2024-05-15 PROCEDURE — 1101F PT FALLS ASSESS-DOCD LE1/YR: CPT | Mod: CPTII,S$GLB,, | Performed by: NURSE PRACTITIONER

## 2024-05-15 PROCEDURE — 1159F MED LIST DOCD IN RCRD: CPT | Mod: CPTII,S$GLB,, | Performed by: NURSE PRACTITIONER

## 2024-05-15 PROCEDURE — 99214 OFFICE O/P EST MOD 30 MIN: CPT | Mod: S$GLB,,, | Performed by: NURSE PRACTITIONER

## 2024-05-15 PROCEDURE — 3066F NEPHROPATHY DOC TX: CPT | Mod: CPTII,S$GLB,, | Performed by: NURSE PRACTITIONER

## 2024-05-15 RX ADMIN — EPOETIN ALFA 20000 UNITS: 20000 SOLUTION INTRAVENOUS; SUBCUTANEOUS at 01:05

## 2024-05-15 NOTE — PLAN OF CARE
Retacrit given.  Tolerated well.  Discharged home.  No appointments made at this time.  Pt is aware that they will call her with new appointments.

## 2024-05-15 NOTE — PROGRESS NOTES
Subjective:       Patient ID: Alison Langston is a 73 y.o. female.    Chief Complaint: Follow-up (Patient has no concerns today)      Diagnosis:  1. Normocytic/hypochromic anemia with normal bone marrow biopsy done 1/26/18.    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 Iron deficiency  3. Multiple medical comorbidities, including hypothyroidism.     Current Treatment:   Observation       Treatment History:  Injectafter x 2 1/10 and 1/17/18.   PO Iron prior to that  Injectafer x2 treatments 06/15/18 and 06/22/18, and in October, 2018  Desferal 500mg x 3 doses 10/27/20-10/29/20 for iron overload      HPI   Patient kindly referred by Dr. Howard for further evaluation workup of her normocytic anemia, fairly long-standing in nature. The patient states she's been anemic off and on since she was first pregnant many years ago. She denies any history of any GI bleeding, but states she has taken iron pills in the past. She underwent a colonoscopy September 18, 2017 by Dr. Cardenas which showed normal mucosa and the entire colon. Single polyp of benign appearance was noted in the sigmoid colon with additional medium internal hemorrhoids noted. EGD was done the same day with no report available, with biopsies from the EGD revealing unremarkable intestinal mucosa from duodenal biopsy with no evidence of celiac sprue. Biopsy of the stomach reveal mild reactive gastropathy only. No evidence of H. pylori.  The patient went back to see Dr. Howard in December at which time CBC revealed hemoglobin of 9.3, hematocrit 32.4, MCV of 87.3, and normal WBC and platelets. TSH was elevated at 33.3, and her diabetes was noted to be under fairly poor control based on her hemoglobin A1c.    She was then referred to hematology for further evaluation and workup of her normocytic anemia.  Lab work done 12/20/17 showed iron sat of 12.5 and Ferritin of 11.5 (patient had been on PO iron bid).  Siria negative;  SPEP/WILLIAMS  negative;  Retic count normal.  Epo level normal at 11.5.   MJ/DS DNA all normal.  Peripheral smear showed mild normocytic, hypochromic anemia with little variation in red cell size and shape.  Morphologic red cell variants include a few gisele cells, ovalocytes, elliptocytes, and rare acanthocytes.  No schistocytes are seen.  Mild rouleaux formation is seen.  Polychromasia is present.  Leukocytes are normal in number, differential, and morphology.  A few reactive lymphocytes are present.  Platelets are normal in number and morphology.  A few giant platelets are present.  Bone marrow biopsy done 1/26/2018 showed normocellular bone marrow with trilineage hematopoiesis--> no evidence of dysplasia or malignancy.  Normal cytogenetics noted.      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  Patient had persistent elevation of ferritin, therefore a MRI of the abdomen was done on 9/30/2020 and showed hepatomegaly with estimated liver iron concentration of 126 µmol/g, additional iron deposition in the spleen.  There was also a suspected 0.9 cm cystic lesion within the pancreatic body and a 1 year follow-up MRCP is suggested for surveillance.      Interval History:   Patient presents to clinic for scheduled follow up appointment for anemia.  She feels well overall.  She denies any bleeding or dark stools.  No night sweats, unintentional weight loss, fevers.     Past Medical History:   Diagnosis Date    Anemia     Anxiety     Cerebrovascular accident     Chronic back pain     Depression     DM (diabetes mellitus)     Elevated ferritin     External hemorrhoids     GERD (gastroesophageal reflux disease)     HLD (hyperlipidemia)     Hypertension     Hypothyroidism, unspecified     CRISTINA (iron deficiency anemia)     Incisional hernia     Internal hemorrhoids     Myoclonus     Osteoporosis     Personal history of colonic polyps     Renal insufficiency     Right arm pain     Vertigo       Past Surgical History:    Procedure Laterality Date    biopsy of breast      BONE MARROW BIOPSY W/ ASPIRATION Right 2018    CATARACT EXTRACTION Right 04/30/2021    CHOLECYSTECTOMY      COLONOSCOPY  12/01/2021    Dr. Junior Cardenas  Repeat colon 2026    HERNIA REPAIR      HERNIA REPAIR  01/27/2020    HYSTERECTOMY  2013    LAMINECTOMY AND MICRODISCECTOMY LUMBAR SPINE  2018    3 levels.  Dr. Krause    ORIF WRIST FRACTURE      distal.    PATENT DUCTUS ARTERIOUS LIGATION      SHOULDER SURGERY Left     SPINAL CORD STIMULATOR IMPLANT  2018    Dr. Jerry    THYROIDECTOMY       Social History     Socioeconomic History    Marital status:    Tobacco Use    Smoking status: Never     Passive exposure: Never    Smokeless tobacco: Never   Substance and Sexual Activity    Alcohol use: Never    Drug use: Never     Social Determinants of Health     Financial Resource Strain: Low Risk  (9/22/2023)    Overall Financial Resource Strain (CARDIA)     Difficulty of Paying Living Expenses: Not hard at all   Food Insecurity: No Food Insecurity (9/22/2023)    Hunger Vital Sign     Worried About Running Out of Food in the Last Year: Never true     Ran Out of Food in the Last Year: Never true   Transportation Needs: Unmet Transportation Needs (9/22/2023)    PRAPARE - Transportation     Lack of Transportation (Medical): Yes     Lack of Transportation (Non-Medical): Yes   Physical Activity: Insufficiently Active (9/22/2023)    Exercise Vital Sign     Days of Exercise per Week: 3 days     Minutes of Exercise per Session: 30 min   Stress: No Stress Concern Present (9/22/2023)    [employer] - Occupational Stress Questionnaire     Feeling of Stress : Not at all   Housing Stability: Low Risk  (9/22/2023)    Housing Stability Vital Sign     Unable to Pay for Housing in the Last Year: No     Number of Places Lived in the Last Year: 1     Unstable Housing in the Last Year: No      Family History   Problem Relation Name Age of Onset     "Diabetes Mother      GI problems Mother      Lung cancer Father      Diabetes Father      Alzheimer's disease Father        Review of patient's allergies indicates:   Allergen Reactions    Baclofen Hallucinations    Donepezil Hallucinations    Erythromycin      Other reaction(s): Upset Stomach    Fluoxetine      Doesn't work    Grass pollen      Other reaction(s): per allergy test    Hydrocodone-acetaminophen      Other reaction(s): "Makes me crazy"    Ivp dye [iodinated contrast media]      Other reaction(s): Rash    Metoclopramide hcl     Rosuvastatin     Iodine Rash    Shrimp Rash      Review of Systems   Constitutional:  Negative for chills, diaphoresis, fatigue, fever and unexpected weight change.   HENT:  Negative for nasal congestion, mouth sores, sinus pressure/congestion and sore throat.    Eyes:  Negative for pain and visual disturbance.   Respiratory:  Negative for cough, chest tightness and shortness of breath.    Cardiovascular:  Negative for chest pain, palpitations and leg swelling.   Gastrointestinal:  Negative for abdominal distention, abdominal pain, blood in stool, constipation and diarrhea.   Genitourinary:  Negative for dysuria, frequency and hematuria.   Musculoskeletal:  Negative for arthralgias and back pain.   Integumentary:  Negative for rash.   Neurological:  Positive for numbness. Negative for dizziness, weakness and headaches.        Falls from numbness to her foot   Hematological:  Negative for adenopathy.   Psychiatric/Behavioral:  Negative for confusion.          Objective:      Physical Exam  Vitals reviewed.   Constitutional:       General: She is not in acute distress.     Appearance: Normal appearance.   HENT:      Head: Normocephalic and atraumatic.      Nose: Nose normal.      Mouth/Throat:      Mouth: Mucous membranes are moist.   Eyes:      Extraocular Movements: Extraocular movements intact.      Conjunctiva/sclera: Conjunctivae normal.   Cardiovascular:      Rate and Rhythm: " Normal rate and regular rhythm.      Pulses: Normal pulses.      Heart sounds: Murmur heard.   Pulmonary:      Effort: Pulmonary effort is normal.      Breath sounds: Normal breath sounds.   Abdominal:      General: Bowel sounds are normal.      Palpations: Abdomen is soft.   Musculoskeletal:         General: No swelling. Normal range of motion.      Cervical back: Normal range of motion and neck supple.      Right lower leg: No edema.      Left lower leg: No edema.   Skin:     General: Skin is warm and dry.   Neurological:      General: No focal deficit present.      Mental Status: She is alert and oriented to person, place, and time. Mental status is at baseline.   Psychiatric:         Mood and Affect: Mood normal.         Behavior: Behavior normal.         LABS AND IMAGING REVIEWED IN EPIC          Assessment:     1. Normocytic anemia with component of iron deficiency, in addition to anemia chronic disease, requiring IV Iron after poor response to PO iron.   2. Hypothyroidism, thought to be a component contributing to her anemia.    3. Normal Bone marrow biopsy done 1/26/18  4. Multiple medical comorbidities  5. Elevated ferritin -ruled out hemochromatosis. Received Desferal 500mg x 3 doses 10/27/20-10/29/20.  6. Neuropathy        Plan:         Patient doing well overall clinically with no worsening clinical symptoms    Continue Retacrit subq as needed. Hgb today 10.7, will give today    CBC Q4w    Return to clinic in 4 months with a CBC, CMP, Iron studies, B12, Folate prior to appt    She did see Dr. Connor for pancreatic cyst.      VINNY Lemus

## 2024-06-07 ENCOUNTER — TELEPHONE (OUTPATIENT)
Dept: INTERNAL MEDICINE | Facility: CLINIC | Age: 74
End: 2024-06-07
Payer: MEDICARE

## 2024-06-07 ENCOUNTER — HOSPITAL ENCOUNTER (OUTPATIENT)
Dept: RADIOLOGY | Facility: HOSPITAL | Age: 74
Discharge: HOME | End: 2024-06-07
Attending: INTERNAL MEDICINE
Payer: MEDICARE

## 2024-06-07 DIAGNOSIS — Z12.31 BREAST CANCER SCREENING BY MAMMOGRAM: ICD-10-CM

## 2024-06-07 DIAGNOSIS — Z78.0 POST-MENOPAUSAL: ICD-10-CM

## 2024-06-07 PROCEDURE — 77080 DXA BONE DENSITY AXIAL: CPT | Mod: XU,TC

## 2024-06-07 PROCEDURE — 77067 SCR MAMMO BI INCL CAD: CPT | Mod: 26,,, | Performed by: STUDENT IN AN ORGANIZED HEALTH CARE EDUCATION/TRAINING PROGRAM

## 2024-06-07 PROCEDURE — 77063 BREAST TOMOSYNTHESIS BI: CPT | Mod: TC

## 2024-06-07 PROCEDURE — 77080 DXA BONE DENSITY AXIAL: CPT | Mod: 26,XU,, | Performed by: STUDENT IN AN ORGANIZED HEALTH CARE EDUCATION/TRAINING PROGRAM

## 2024-06-07 PROCEDURE — 77081 DXA BONE DENSITY APPENDICULR: CPT | Mod: 26,,, | Performed by: STUDENT IN AN ORGANIZED HEALTH CARE EDUCATION/TRAINING PROGRAM

## 2024-06-07 PROCEDURE — 77063 BREAST TOMOSYNTHESIS BI: CPT | Mod: 26,,, | Performed by: STUDENT IN AN ORGANIZED HEALTH CARE EDUCATION/TRAINING PROGRAM

## 2024-06-07 PROCEDURE — 77081 DXA BONE DENSITY APPENDICULR: CPT | Mod: TC

## 2024-06-07 NOTE — TELEPHONE ENCOUNTER
----- Message from Fabiola Baca sent at 6/7/2024 10:51 AM CDT -----  Who Called: Tyrone Langston    Pharmacy is calling to request assistance with Rx    Pharmacy name and phone number:  [facility] [phone] or fax [phone]  Pharmacy contact: regino  Patient Name: tyrone  Prescription Name: (toujeo)   What do they need to clarify?: dosage         Preferred Method of Contact: Phone Call  Patient's Preferred Phone Number on File: [phone]   Best Call Back Number, if different:  Additional Information: pharmacy call , adela rolle, thanks

## 2024-06-10 ENCOUNTER — TELEPHONE (OUTPATIENT)
Dept: INTERNAL MEDICINE | Facility: CLINIC | Age: 74
End: 2024-06-10
Payer: MEDICARE

## 2024-06-10 NOTE — PROGRESS NOTES
DEXA Results: Please inform patient of DEXA results, which show OSTEOPOROSIS. This is a severe thinning of the bone that places her at a high risk for fractures. If she is willing to begin treatment for this, I would like to initiate Prolia (Denosumab). This is an intramuscular injection that she would receive with the [facility] every 6 months. Repeat DEXA in 1 year to evaluate treatment.   Please ensure that patient is taking daily vitamin-D and calcium supplementation.

## 2024-06-10 NOTE — TELEPHONE ENCOUNTER
----- Message from Noah Tafoya MD sent at 6/10/2024  4:28 PM CDT -----  Normal MMG. Repeat in 1 year.

## 2024-06-10 NOTE — TELEPHONE ENCOUNTER
----- Message from Noah Tafoya MD sent at 6/10/2024  8:38 AM CDT -----  DEXA Results: Please inform patient of DEXA results, which show OSTEOPOROSIS. This is a severe thinning of the bone that places her at a high risk for fractures. If she is willing to begin treatment for this, I would like to initiate Prolia (Denosumab). This is an intramuscular injection that she would receive with the [facility] every 6 months. Repeat DEXA in 1 year to evaluate treatment.   Please ensure that patient is taking daily vitamin-D and calcium supplementation.

## 2024-06-11 NOTE — TELEPHONE ENCOUNTER
Spoke to pt. Pt Verbally confirmed understanding.  Prolia ordered and sent to Dr. Howard to sign.   Pt stated that she needs to get more Calcium+vit D supplements because she an out.

## 2024-06-12 ENCOUNTER — INFUSION (OUTPATIENT)
Dept: INFUSION THERAPY | Facility: HOSPITAL | Age: 74
End: 2024-06-12
Attending: INTERNAL MEDICINE
Payer: MEDICARE

## 2024-06-12 ENCOUNTER — LAB VISIT (OUTPATIENT)
Dept: LAB | Facility: HOSPITAL | Age: 74
End: 2024-06-12
Attending: INTERNAL MEDICINE
Payer: MEDICARE

## 2024-06-12 DIAGNOSIS — D50.9 IRON DEFICIENCY ANEMIA, UNSPECIFIED IRON DEFICIENCY ANEMIA TYPE: ICD-10-CM

## 2024-06-12 DIAGNOSIS — D63.1 ANEMIA DUE TO STAGE 3A CHRONIC KIDNEY DISEASE: ICD-10-CM

## 2024-06-12 DIAGNOSIS — N18.31 STAGE 3A CHRONIC KIDNEY DISEASE: ICD-10-CM

## 2024-06-12 DIAGNOSIS — D64.9 NORMOCYTIC ANEMIA: Primary | ICD-10-CM

## 2024-06-12 DIAGNOSIS — N18.31 ANEMIA DUE TO STAGE 3A CHRONIC KIDNEY DISEASE: ICD-10-CM

## 2024-06-12 LAB
ALBUMIN SERPL-MCNC: 3.7 G/DL (ref 3.4–4.8)
ALBUMIN/GLOB SERPL: 1.2 RATIO (ref 1.1–2)
ALP SERPL-CCNC: 103 UNIT/L (ref 40–150)
ALT SERPL-CCNC: 20 UNIT/L (ref 0–55)
ANION GAP SERPL CALC-SCNC: 10 MEQ/L
AST SERPL-CCNC: 24 UNIT/L (ref 5–34)
BASOPHILS # BLD AUTO: 0.03 X10(3)/MCL
BASOPHILS NFR BLD AUTO: 0.3 %
BILIRUB SERPL-MCNC: 0.2 MG/DL
BUN SERPL-MCNC: 46 MG/DL (ref 9.8–20.1)
CALCIUM SERPL-MCNC: 8.6 MG/DL (ref 8.4–10.2)
CHLORIDE SERPL-SCNC: 109 MMOL/L (ref 98–107)
CO2 SERPL-SCNC: 24 MMOL/L (ref 23–31)
CREAT SERPL-MCNC: 1.78 MG/DL (ref 0.55–1.02)
CREAT/UREA NIT SERPL: 26
EOSINOPHIL # BLD AUTO: 0.18 X10(3)/MCL (ref 0–0.9)
EOSINOPHIL NFR BLD AUTO: 1.8 %
ERYTHROCYTE [DISTWIDTH] IN BLOOD BY AUTOMATED COUNT: 14.5 % (ref 11.5–17)
FERRITIN SERPL-MCNC: 678.49 NG/ML (ref 4.63–204)
FOLATE SERPL-MCNC: 6.7 NG/ML (ref 7–31.4)
GFR SERPLBLD CREATININE-BSD FMLA CKD-EPI: 30 ML/MIN/1.73/M2
GLOBULIN SER-MCNC: 3 GM/DL (ref 2.4–3.5)
GLUCOSE SERPL-MCNC: 209 MG/DL (ref 82–115)
HCT VFR BLD AUTO: 34.7 % (ref 37–47)
HGB BLD-MCNC: 11 G/DL (ref 12–16)
IMM GRANULOCYTES # BLD AUTO: 0.03 X10(3)/MCL (ref 0–0.04)
IMM GRANULOCYTES NFR BLD AUTO: 0.3 %
IRON SATN MFR SERPL: 19 % (ref 20–50)
IRON SERPL-MCNC: 40 UG/DL (ref 50–170)
LYMPHOCYTES # BLD AUTO: 1.21 X10(3)/MCL (ref 0.6–4.6)
LYMPHOCYTES NFR BLD AUTO: 12.1 %
MCH RBC QN AUTO: 26.8 PG (ref 27–31)
MCHC RBC AUTO-ENTMCNC: 31.7 G/DL (ref 33–36)
MCV RBC AUTO: 84.4 FL (ref 80–94)
MONOCYTES # BLD AUTO: 0.69 X10(3)/MCL (ref 0.1–1.3)
MONOCYTES NFR BLD AUTO: 6.9 %
NEUTROPHILS # BLD AUTO: 7.88 X10(3)/MCL (ref 2.1–9.2)
NEUTROPHILS NFR BLD AUTO: 78.6 %
PLATELET # BLD AUTO: 238 X10(3)/MCL (ref 130–400)
PMV BLD AUTO: 9.9 FL (ref 7.4–10.4)
POTASSIUM SERPL-SCNC: 5.4 MMOL/L (ref 3.5–5.1)
PROT SERPL-MCNC: 6.7 GM/DL (ref 5.8–7.6)
RBC # BLD AUTO: 4.11 X10(6)/MCL (ref 4.2–5.4)
SODIUM SERPL-SCNC: 143 MMOL/L (ref 136–145)
TIBC SERPL-MCNC: 174 UG/DL (ref 70–310)
TIBC SERPL-MCNC: 214 UG/DL (ref 250–450)
TRANSFERRIN SERPL-MCNC: 193 MG/DL (ref 173–360)
VIT B12 SERPL-MCNC: 327 PG/ML (ref 213–816)
WBC # SPEC AUTO: 10.02 X10(3)/MCL (ref 4.5–11.5)

## 2024-06-12 PROCEDURE — 82728 ASSAY OF FERRITIN: CPT

## 2024-06-12 PROCEDURE — 82607 VITAMIN B-12: CPT

## 2024-06-12 PROCEDURE — 83540 ASSAY OF IRON: CPT

## 2024-06-12 PROCEDURE — 80053 COMPREHEN METABOLIC PANEL: CPT

## 2024-06-12 PROCEDURE — 85025 COMPLETE CBC W/AUTO DIFF WBC: CPT

## 2024-06-12 PROCEDURE — 82746 ASSAY OF FOLIC ACID SERUM: CPT

## 2024-06-12 PROCEDURE — 36415 COLL VENOUS BLD VENIPUNCTURE: CPT

## 2024-06-17 DIAGNOSIS — E53.8 FOLIC ACID DEFICIENCY: Primary | ICD-10-CM

## 2024-06-17 RX ORDER — FOLIC ACID 1 MG/1
1 TABLET ORAL DAILY
Qty: 30 TABLET | Refills: 3 | Status: SHIPPED | OUTPATIENT
Start: 2024-06-17 | End: 2025-06-17

## 2024-07-02 ENCOUNTER — TELEPHONE (OUTPATIENT)
Dept: INTERNAL MEDICINE | Facility: CLINIC | Age: 74
End: 2024-07-02
Payer: MEDICARE

## 2024-07-02 NOTE — TELEPHONE ENCOUNTER
----- Message from Fox Bueno sent at 7/2/2024  2:34 PM CDT -----  .Who Called: Alison Langston    Caller is requesting assistance/information from provider's office.    Symptoms (please be specific): n/a   How long has patient had these symptoms:  n/a  List of preferred pharmacies on file (remove unneeded): [unfilled]  If different, enter pharmacy into here including location and phone number: n/a      Preferred Method of Contact: Phone Call  Patient's Preferred Phone Number on File: [phone]   Best Call Back Number, if different:  Additional Information:  pt called to check to see if med services dropped of some medication for her elizabeth?

## 2024-07-03 RX ORDER — PREGABALIN 25 MG/1
25 CAPSULE ORAL
Qty: 90 CAPSULE | Refills: 0 | Status: SHIPPED | OUTPATIENT
Start: 2024-07-03

## 2024-07-08 ENCOUNTER — TELEPHONE (OUTPATIENT)
Dept: INTERNAL MEDICINE | Facility: CLINIC | Age: 74
End: 2024-07-08
Payer: MEDICARE

## 2024-07-08 NOTE — TELEPHONE ENCOUNTER
----- Message from Rodri Shepard sent at 7/8/2024  4:42 PM CDT -----  .Type:  Needs Medical Advice    Who Called: Alison   Symptoms (please be specific):    How long has patient had these symptoms:    Pharmacy name and phone #:    Would the patient rather a call back or a response via [facility]?   Best Call Back Number: [phone]  Additional Information: Patient requested a call back from the office re: her medication that she is suppose to get. Please call her back.

## 2024-07-08 NOTE — TELEPHONE ENCOUNTER
Spoke to pt to let her know her Sil was delivered to the office and she will be picking it up tomorrow.

## 2024-07-08 NOTE — TELEPHONE ENCOUNTER
----- Message from Fox Bueno sent at 7/8/2024  9:35 AM CDT -----  .Who Called: Alison Langston    Caller is requesting assistance/information from provider's office.    Symptoms (please be specific): n/a   How long has patient had these symptoms:  n/a  List of preferred pharmacies on file (remove unneeded): [unfilled]  If different, enter pharmacy into here including location and phone number: n/a        Preferred Method of Contact: Phone Call  Patient's Preferred Phone Number on File: [phone]   Best Call Back Number, if different:  Additional Information:  pt called to check to see if med services dropped of some medication for her tajao insulin?

## 2024-07-09 DIAGNOSIS — K21.9 GASTROESOPHAGEAL REFLUX DISEASE, UNSPECIFIED WHETHER ESOPHAGITIS PRESENT: ICD-10-CM

## 2024-07-09 RX ORDER — OMEPRAZOLE 40 MG/1
40 CAPSULE, DELAYED RELEASE ORAL EVERY MORNING
Qty: 90 CAPSULE | Refills: 0 | Status: SHIPPED | OUTPATIENT
Start: 2024-07-09

## 2024-07-18 ENCOUNTER — TELEPHONE (OUTPATIENT)
Dept: NEPHROLOGY | Facility: CLINIC | Age: 74
End: 2024-07-18
Payer: MEDICARE

## 2024-07-18 ENCOUNTER — LAB VISIT (OUTPATIENT)
Dept: LAB | Facility: HOSPITAL | Age: 74
End: 2024-07-18
Attending: NURSE PRACTITIONER
Payer: MEDICARE

## 2024-07-18 ENCOUNTER — TELEPHONE (OUTPATIENT)
Dept: INTERNAL MEDICINE | Facility: CLINIC | Age: 74
End: 2024-07-18
Payer: MEDICARE

## 2024-07-18 DIAGNOSIS — D63.1 ANEMIA DUE TO STAGE 3A CHRONIC KIDNEY DISEASE: ICD-10-CM

## 2024-07-18 DIAGNOSIS — N18.2 STAGE 2 CHRONIC KIDNEY DISEASE: ICD-10-CM

## 2024-07-18 DIAGNOSIS — R80.9 TYPE 2 DIABETES MELLITUS WITH MICROALBUMINURIA, WITHOUT LONG-TERM CURRENT USE OF INSULIN: ICD-10-CM

## 2024-07-18 DIAGNOSIS — N18.2 STAGE 2 CHRONIC KIDNEY DISEASE: Primary | ICD-10-CM

## 2024-07-18 DIAGNOSIS — I10 PRIMARY HYPERTENSION: ICD-10-CM

## 2024-07-18 DIAGNOSIS — E11.29 TYPE 2 DIABETES MELLITUS WITH MICROALBUMINURIA, WITHOUT LONG-TERM CURRENT USE OF INSULIN: ICD-10-CM

## 2024-07-18 DIAGNOSIS — N18.31 ANEMIA DUE TO STAGE 3A CHRONIC KIDNEY DISEASE: ICD-10-CM

## 2024-07-18 DIAGNOSIS — R80.1 PERSISTENT PROTEINURIA: ICD-10-CM

## 2024-07-18 LAB
ALBUMIN SERPL-MCNC: 3.8 G/DL (ref 3.4–4.8)
ALBUMIN/GLOB SERPL: 1 RATIO (ref 1.1–2)
ALP SERPL-CCNC: 139 UNIT/L (ref 40–150)
ALT SERPL-CCNC: 19 UNIT/L (ref 0–55)
ANION GAP SERPL CALC-SCNC: 11 MEQ/L
AST SERPL-CCNC: 17 UNIT/L (ref 5–34)
BACTERIA #/AREA URNS AUTO: ABNORMAL /HPF
BASOPHILS # BLD AUTO: 0.02 X10(3)/MCL
BASOPHILS NFR BLD AUTO: 0.3 %
BILIRUB SERPL-MCNC: 0.3 MG/DL
BILIRUB UR QL STRIP.AUTO: NEGATIVE
BUN SERPL-MCNC: 37.2 MG/DL (ref 9.8–20.1)
CALCIUM SERPL-MCNC: 9.3 MG/DL (ref 8.4–10.2)
CHLORIDE SERPL-SCNC: 106 MMOL/L (ref 98–107)
CLARITY UR: CLEAR
CO2 SERPL-SCNC: 16 MMOL/L (ref 23–31)
COLOR UR AUTO: COLORLESS
CREAT SERPL-MCNC: 1.36 MG/DL (ref 0.55–1.02)
CREAT UR-MCNC: 26.6 MG/DL (ref 45–106)
CREAT/UREA NIT SERPL: 27
EOSINOPHIL # BLD AUTO: 0.17 X10(3)/MCL (ref 0–0.9)
EOSINOPHIL NFR BLD AUTO: 2.4 %
ERYTHROCYTE [DISTWIDTH] IN BLOOD BY AUTOMATED COUNT: 14.2 % (ref 11.5–17)
GFR SERPLBLD CREATININE-BSD FMLA CKD-EPI: 41 ML/MIN/1.73/M2
GLOBULIN SER-MCNC: 3.7 GM/DL (ref 2.4–3.5)
GLUCOSE SERPL-MCNC: 489 MG/DL (ref 82–115)
GLUCOSE UR QL STRIP: ABNORMAL
HBV SURFACE AG SERPL QL IA: NONREACTIVE
HCT VFR BLD AUTO: 36.4 % (ref 37–47)
HCV AB SERPL QL IA: NONREACTIVE
HGB BLD-MCNC: 11.3 G/DL (ref 12–16)
HGB UR QL STRIP: NEGATIVE
IMM GRANULOCYTES # BLD AUTO: 0.02 X10(3)/MCL (ref 0–0.04)
IMM GRANULOCYTES NFR BLD AUTO: 0.3 %
KETONES UR QL STRIP: NEGATIVE
LEUKOCYTE ESTERASE UR QL STRIP: NEGATIVE
LYMPHOCYTES # BLD AUTO: 1.01 X10(3)/MCL (ref 0.6–4.6)
LYMPHOCYTES NFR BLD AUTO: 14.3 %
MCH RBC QN AUTO: 26.3 PG (ref 27–31)
MCHC RBC AUTO-ENTMCNC: 31 G/DL (ref 33–36)
MCV RBC AUTO: 84.7 FL (ref 80–94)
MONOCYTES # BLD AUTO: 0.51 X10(3)/MCL (ref 0.1–1.3)
MONOCYTES NFR BLD AUTO: 7.2 %
NEUTROPHILS # BLD AUTO: 5.33 X10(3)/MCL (ref 2.1–9.2)
NEUTROPHILS NFR BLD AUTO: 75.5 %
NITRITE UR QL STRIP: NEGATIVE
NRBC BLD AUTO-RTO: 0 %
PH UR STRIP: 5 [PH]
PLATELET # BLD AUTO: 179 X10(3)/MCL (ref 130–400)
PMV BLD AUTO: 10.9 FL (ref 7.4–10.4)
POTASSIUM SERPL-SCNC: 4.6 MMOL/L (ref 3.5–5.1)
PROT SERPL-MCNC: 7.5 GM/DL (ref 5.8–7.6)
PROT UR QL STRIP: ABNORMAL
PROT UR STRIP-MCNC: 21 MG/DL
PTH-INTACT SERPL-MCNC: 65.9 PG/ML (ref 8.7–77)
RBC # BLD AUTO: 4.3 X10(6)/MCL (ref 4.2–5.4)
RBC #/AREA URNS AUTO: ABNORMAL /HPF
SODIUM SERPL-SCNC: 133 MMOL/L (ref 136–145)
SP GR UR STRIP.AUTO: 1.02 (ref 1–1.03)
SQUAMOUS #/AREA URNS LPF: ABNORMAL /HPF
URINE PROTEIN/CREATININE RATIO (OLG): 0.8
UROBILINOGEN UR STRIP-ACNC: NORMAL
WBC # BLD AUTO: 7.06 X10(3)/MCL (ref 4.5–11.5)
WBC #/AREA URNS AUTO: ABNORMAL /HPF

## 2024-07-18 PROCEDURE — 82570 ASSAY OF URINE CREATININE: CPT

## 2024-07-18 PROCEDURE — 81001 URINALYSIS AUTO W/SCOPE: CPT

## 2024-07-18 PROCEDURE — 87340 HEPATITIS B SURFACE AG IA: CPT

## 2024-07-18 PROCEDURE — 84156 ASSAY OF PROTEIN URINE: CPT

## 2024-07-18 PROCEDURE — 86225 DNA ANTIBODY NATIVE: CPT

## 2024-07-18 PROCEDURE — 36415 COLL VENOUS BLD VENIPUNCTURE: CPT

## 2024-07-18 PROCEDURE — 86235 NUCLEAR ANTIGEN ANTIBODY: CPT | Mod: 59

## 2024-07-18 PROCEDURE — 83970 ASSAY OF PARATHORMONE: CPT

## 2024-07-18 PROCEDURE — 86160 COMPLEMENT ANTIGEN: CPT | Mod: 59

## 2024-07-18 PROCEDURE — 86036 ANCA SCREEN EACH ANTIBODY: CPT

## 2024-07-18 PROCEDURE — 86803 HEPATITIS C AB TEST: CPT

## 2024-07-18 PROCEDURE — 86431 RHEUMATOID FACTOR QUANT: CPT

## 2024-07-18 PROCEDURE — 83516 IMMUNOASSAY NONANTIBODY: CPT

## 2024-07-18 PROCEDURE — 86038 ANTINUCLEAR ANTIBODIES: CPT

## 2024-07-18 PROCEDURE — 85025 COMPLETE CBC W/AUTO DIFF WBC: CPT

## 2024-07-18 PROCEDURE — 80053 COMPREHEN METABOLIC PANEL: CPT

## 2024-07-18 RX ORDER — SODIUM BICARBONATE 650 MG/1
650 TABLET ORAL 2 TIMES DAILY
Qty: 60 TABLET | Refills: 11 | Status: SHIPPED | OUTPATIENT
Start: 2024-07-18 | End: 2025-07-18

## 2024-07-18 NOTE — TELEPHONE ENCOUNTER
Called patient with critical lab result Glucose 489, patient states she drank coffee and will check with her machine and has insulin to take if needed, sent results to PCP for review. Verbalized understanding.

## 2024-07-18 NOTE — TELEPHONE ENCOUNTER
----- Message from DONOVAN Powell sent at 7/18/2024  3:41 PM CDT -----  Please send sodium bicarbonate 1300 mg BID to pharmacy.   Let patient know she had abnormal labs.    Repeat BMP next week before appointment.     Informed patient of above.  Send sodium bicarbonate to pharmacy.  BMP ordered for next week

## 2024-07-18 NOTE — TELEPHONE ENCOUNTER
----- Message from Noah Tafoya MD sent at 7/18/2024 11:14 AM CDT -----  Patient with critically high glucose of 489 this morning  She needs to admin her insulin please  ----- Message -----  From: Reny Vargas LPN  Sent: 7/18/2024  10:52 AM CDT  To: Noah Tafoya MD    For your review, critical glucose. Thanks

## 2024-07-19 LAB — BM IGG SER-ACNC: <0.2 U

## 2024-07-20 LAB
C-ANCA TITR SER IF: NEGATIVE {TITER}
C3 SERPL-MCNC: 130 MG/DL
C4 SERPL-MCNC: 39 MG/DL
NUCLEAR IGG SER QL IA: NORMAL
P-ANCA SER QL IF: NEGATIVE
RHEUMATOID FACT SERPL-ACNC: <10 IU/ML

## 2024-07-21 LAB
ANTINUCLEAR ANTIBODY SCREEN (OHS): NEGATIVE
CENTROMERE QUANT (OHS): <0.4 U/ML
DSDNA AB QUANT (OHS): 0.7 IU/ML
JO-1 AB QUANT (OHS): <0.3 U/ML
RNP70 AB QUANT (OHS): 0.6 U/ML
SCL-70S AB QUANT (OHS): <0.6 U/ML
SMITH AB QUANT (OHS): <0.7 U/ML
SSA(RO) AB QUANT (OHS): <0.4 U/ML
SSB(LA) AB QUANT (OHS): <0.4 U/ML
U1RNP AB QUANT (OHS): 0.5 U/ML

## 2024-07-23 ENCOUNTER — TELEPHONE (OUTPATIENT)
Dept: NEPHROLOGY | Facility: CLINIC | Age: 74
End: 2024-07-23
Payer: MEDICARE

## 2024-07-23 NOTE — TELEPHONE ENCOUNTER
Called patient regarding new medication of Sodium Bicarb 1300 mg bid, explained to patient her CO2 is low. Verbalized understanding.

## 2024-07-24 ENCOUNTER — TELEPHONE (OUTPATIENT)
Dept: INTERNAL MEDICINE | Facility: CLINIC | Age: 74
End: 2024-07-24
Payer: MEDICARE

## 2024-07-24 DIAGNOSIS — Z79.4 TYPE 2 DIABETES MELLITUS WITH STAGE 3B CHRONIC KIDNEY DISEASE, WITH LONG-TERM CURRENT USE OF INSULIN: ICD-10-CM

## 2024-07-24 DIAGNOSIS — N18.32 TYPE 2 DIABETES MELLITUS WITH STAGE 3B CHRONIC KIDNEY DISEASE, WITH LONG-TERM CURRENT USE OF INSULIN: ICD-10-CM

## 2024-07-24 DIAGNOSIS — N18.31 STAGE 3A CHRONIC KIDNEY DISEASE: Primary | ICD-10-CM

## 2024-07-24 DIAGNOSIS — E11.22 TYPE 2 DIABETES MELLITUS WITH STAGE 3B CHRONIC KIDNEY DISEASE, WITH LONG-TERM CURRENT USE OF INSULIN: ICD-10-CM

## 2024-07-24 NOTE — TELEPHONE ENCOUNTER
----- Message from Taryn Johnson sent at 7/24/2024 10:40 AM CDT -----  Regarding: diet  .Type:  Needs Medical Advice    Who Called: pt  Symptoms (please be specific):    How long has patient had these symptoms:    Pharmacy name and phone #:    Would the patient rather a call back or a response via [facility]?   Best Call Back Number:  [phone]  Additional Information: nutrition diet for kidneys

## 2024-07-27 DIAGNOSIS — F32.A DEPRESSION, UNSPECIFIED DEPRESSION TYPE: ICD-10-CM

## 2024-07-29 RX ORDER — VENLAFAXINE HYDROCHLORIDE 150 MG/1
150 CAPSULE, EXTENDED RELEASE ORAL
Qty: 90 CAPSULE | Refills: 0 | Status: SHIPPED | OUTPATIENT
Start: 2024-07-29

## 2024-07-30 DIAGNOSIS — E89.2 POSTPROCEDURAL HYPOPARATHYROIDISM: ICD-10-CM

## 2024-07-30 RX ORDER — LEVOTHYROXINE SODIUM 200 UG/1
200 TABLET ORAL
Qty: 90 TABLET | Refills: 0 | Status: SHIPPED | OUTPATIENT
Start: 2024-07-30

## 2024-07-31 ENCOUNTER — TELEPHONE (OUTPATIENT)
Dept: INTERNAL MEDICINE | Facility: CLINIC | Age: 74
End: 2024-07-31
Payer: MEDICARE

## 2024-07-31 NOTE — TELEPHONE ENCOUNTER
----- Message from Jaja Ashby RN sent at 7/30/2024  1:17 PM CDT -----  Thank you for this referral. Called pt.  She declined appt. States has daughter that is a nurse and she will help her.  ----- Message -----  From: Jackelin Bales MA  Sent: 7/30/2024  10:51 AM CDT  To: Jaja Ashby RN

## 2024-08-01 ENCOUNTER — PATIENT OUTREACH (OUTPATIENT)
Facility: CLINIC | Age: 74
End: 2024-08-01
Payer: MEDICARE

## 2024-08-01 ENCOUNTER — TELEPHONE (OUTPATIENT)
Dept: INTERNAL MEDICINE | Facility: CLINIC | Age: 74
End: 2024-08-01
Payer: MEDICARE

## 2024-08-01 DIAGNOSIS — E11.22 TYPE 2 DIABETES MELLITUS WITH STAGE 3B CHRONIC KIDNEY DISEASE, WITH LONG-TERM CURRENT USE OF INSULIN: Primary | ICD-10-CM

## 2024-08-01 DIAGNOSIS — N18.32 TYPE 2 DIABETES MELLITUS WITH STAGE 3B CHRONIC KIDNEY DISEASE, WITH LONG-TERM CURRENT USE OF INSULIN: Primary | ICD-10-CM

## 2024-08-01 DIAGNOSIS — Z79.4 TYPE 2 DIABETES MELLITUS WITH STAGE 3B CHRONIC KIDNEY DISEASE, WITH LONG-TERM CURRENT USE OF INSULIN: Primary | ICD-10-CM

## 2024-08-01 NOTE — TELEPHONE ENCOUNTER
----- Message from Jackelin Bales MA sent at 8/1/2024 10:06 AM CDT -----  Please schedule patient wellness appointment  ----- Message -----  From: Lynda Swanson LPN  Sent: 8/1/2024   9:57 AM CDT  To: Rosalio Zamora Staff    Patient lost to follow up and requesting an appointment (Prefers Dr. Howard) . Can you please schedule her. Looks like she may be due for wellness. Annual Wellness Visits: 06/01/2023

## 2024-08-01 NOTE — Clinical Note
Patient lost to follow up and requesting an appointment (Prefers Dr. Howard) . Can you please schedule her. Looks like she may be due for wellness. Annual Wellness Visits: 06/01/2023

## 2024-08-01 NOTE — PROGRESS NOTES
Population Health Outreach.    Health Maintenance Topic(s) Outreach Outcomes & Actions Taken:    Eye Exam - Outreach Outcomes & Actions Taken  : DUE  Confirmed-last eye exam was 01/12/2022 @  Goldy Hand's office POSITIVE RETINOPATHY. Her insurance changed and she will need to find a new Provider.          BP Readings from Last 1 Encounters:   05/15/24 (!) 149/65   Confirmed she does have a monitor at home but she could not give me an updated reading.    Scheduled Cardiology f/u this month per patient       Lab Results   Component Value Date    HGBA1C 9.9 (H) 01/31/2024   Lab(s) - Outreach Outcomes & Actions Taken  : Overdue Lab(s) Ordered (A1C)  She declined diabetic education appointment.   07/30/24 telephone encounter -daughter is a nurse and she will help her      Per noted she was skipping insulin doses due to cost.   Offered OPCM/financial assistance but patient denied need.   She is now on an affordable plan and receives 3 month supply.     Declines need for Home Health, states daughter is a nurse and takes care of her.     Pending patient portal status. Unable to discuss this call.   Higher needs addressed at this time.       Updated Care Coordination Note, Care Everywhere, , Care Team Updated, and Immunizations Reconciliation Completed or Queried: [place]             
21

## 2024-08-09 ENCOUNTER — HOSPITAL ENCOUNTER (OUTPATIENT)
Dept: RADIOLOGY | Facility: HOSPITAL | Age: 74
Discharge: HOME | End: 2024-08-09
Attending: NURSE PRACTITIONER
Payer: MEDICARE

## 2024-08-09 ENCOUNTER — E-VISIT (OUTPATIENT)
Dept: INTERNAL MEDICINE | Facility: CLINIC | Age: 74
End: 2024-08-09
Payer: MEDICARE

## 2024-08-09 DIAGNOSIS — R05.8 PRODUCTIVE COUGH: ICD-10-CM

## 2024-08-09 DIAGNOSIS — R05.8 PRODUCTIVE COUGH: Primary | ICD-10-CM

## 2024-08-09 PROCEDURE — 71046 X-RAY EXAM CHEST 2 VIEWS: CPT | Mod: TC

## 2024-08-09 RX ORDER — PROMETHAZINE HYDROCHLORIDE AND DEXTROMETHORPHAN HYDROBROMIDE 6.25; 15 MG/5ML; MG/5ML
5 SYRUP ORAL EVERY 4 HOURS PRN
Qty: 120 ML | Refills: 0 | Status: SHIPPED | OUTPATIENT
Start: 2024-08-09 | End: 2024-08-19

## 2024-08-09 RX ORDER — BENZONATATE 100 MG/1
100 CAPSULE ORAL 3 TIMES DAILY PRN
Qty: 30 CAPSULE | Refills: 0 | Status: SHIPPED | OUTPATIENT
Start: 2024-08-09 | End: 2024-08-19

## 2024-08-09 RX ORDER — ALBUTEROL SULFATE 90 UG/1
2 INHALANT RESPIRATORY (INHALATION) EVERY 6 HOURS PRN
Qty: 18 G | Refills: 0 | Status: SHIPPED | OUTPATIENT
Start: 2024-08-09

## 2024-08-09 NOTE — PROGRESS NOTES
Please inform patient of results.    1. CXR clear - no signs of pneumonia. Last glucose was very high - needs updated labwork prior to visit next week. We will not prescribe steroids as this will further raise her glucose. I will send out tessalon perles + proair HFA to use PRN + cough syrup.     All other labwork within acceptable limits.    Thanks for all you do,    Alexis

## 2024-08-14 ENCOUNTER — OFFICE VISIT (OUTPATIENT)
Dept: INTERNAL MEDICINE | Facility: CLINIC | Age: 74
End: 2024-08-14
Payer: MEDICARE

## 2024-08-14 VITALS
BODY MASS INDEX: 26.7 KG/M2 | DIASTOLIC BLOOD PRESSURE: 64 MMHG | TEMPERATURE: 97 F | OXYGEN SATURATION: 97 % | SYSTOLIC BLOOD PRESSURE: 156 MMHG | RESPIRATION RATE: 16 BRPM | WEIGHT: 136 LBS | HEART RATE: 73 BPM | HEIGHT: 60 IN

## 2024-08-14 DIAGNOSIS — Z00.00 MEDICARE ANNUAL WELLNESS VISIT, SUBSEQUENT: Primary | ICD-10-CM

## 2024-08-14 DIAGNOSIS — N18.31 ANEMIA DUE TO STAGE 3A CHRONIC KIDNEY DISEASE: ICD-10-CM

## 2024-08-14 DIAGNOSIS — E11.22 TYPE 2 DIABETES MELLITUS WITH STAGE 3B CHRONIC KIDNEY DISEASE, WITH LONG-TERM CURRENT USE OF INSULIN: ICD-10-CM

## 2024-08-14 DIAGNOSIS — Z79.4 TYPE 2 DIABETES MELLITUS WITH STAGE 3B CHRONIC KIDNEY DISEASE, WITH LONG-TERM CURRENT USE OF INSULIN: ICD-10-CM

## 2024-08-14 DIAGNOSIS — N04.9 NEPHROTIC SYNDROME: ICD-10-CM

## 2024-08-14 DIAGNOSIS — D63.1 ANEMIA DUE TO STAGE 3A CHRONIC KIDNEY DISEASE: ICD-10-CM

## 2024-08-14 DIAGNOSIS — N18.32 TYPE 2 DIABETES MELLITUS WITH STAGE 3B CHRONIC KIDNEY DISEASE, WITH LONG-TERM CURRENT USE OF INSULIN: ICD-10-CM

## 2024-08-14 DIAGNOSIS — E78.2 MIXED HYPERLIPIDEMIA: ICD-10-CM

## 2024-08-14 DIAGNOSIS — M81.0 OSTEOPOROSIS, UNSPECIFIED OSTEOPOROSIS TYPE, UNSPECIFIED PATHOLOGICAL FRACTURE PRESENCE: ICD-10-CM

## 2024-08-14 PROBLEM — I20.9 ANGINA PECTORIS, UNSPECIFIED: Status: RESOLVED | Noted: 2024-02-01 | Resolved: 2024-08-14

## 2024-08-14 PROBLEM — M62.08 DIASTASIS OF RECTUS ABDOMINIS: Status: RESOLVED | Noted: 2023-06-01 | Resolved: 2024-08-14

## 2024-08-14 PROBLEM — J06.9 UPPER RESPIRATORY TRACT INFECTION: Status: RESOLVED | Noted: 2023-05-05 | Resolved: 2024-08-14

## 2024-08-14 PROBLEM — M79.601 RIGHT ARM PAIN: Status: RESOLVED | Noted: 2023-09-22 | Resolved: 2024-08-14

## 2024-08-14 PROBLEM — R29.898 RIGHT ARM WEAKNESS: Status: RESOLVED | Noted: 2023-09-22 | Resolved: 2024-08-14

## 2024-08-14 PROBLEM — K85.90 PANCREATITIS: Status: RESOLVED | Noted: 2023-08-24 | Resolved: 2024-08-14

## 2024-08-14 PROBLEM — R10.9 RIGHT FLANK PAIN: Status: RESOLVED | Noted: 2024-03-21 | Resolved: 2024-08-14

## 2024-08-14 PROBLEM — M79.675 GREAT TOE PAIN, LEFT: Status: RESOLVED | Noted: 2022-05-31 | Resolved: 2024-08-14

## 2024-08-14 PROBLEM — G25.0 ESSENTIAL TREMOR: Status: RESOLVED | Noted: 2023-08-24 | Resolved: 2024-08-14

## 2024-08-14 PROCEDURE — 1124F ACP DISCUSS-NO DSCNMKR DOCD: CPT | Mod: CPTII,,, | Performed by: INTERNAL MEDICINE

## 2024-08-14 PROCEDURE — 1101F PT FALLS ASSESS-DOCD LE1/YR: CPT | Mod: CPTII,,, | Performed by: INTERNAL MEDICINE

## 2024-08-14 PROCEDURE — 4010F ACE/ARB THERAPY RXD/TAKEN: CPT | Mod: CPTII,,, | Performed by: INTERNAL MEDICINE

## 2024-08-14 PROCEDURE — 1159F MED LIST DOCD IN RCRD: CPT | Mod: CPTII,,, | Performed by: INTERNAL MEDICINE

## 2024-08-14 PROCEDURE — 3066F NEPHROPATHY DOC TX: CPT | Mod: CPTII,,, | Performed by: INTERNAL MEDICINE

## 2024-08-14 PROCEDURE — 3288F FALL RISK ASSESSMENT DOCD: CPT | Mod: CPTII,,, | Performed by: INTERNAL MEDICINE

## 2024-08-14 PROCEDURE — 1160F RVW MEDS BY RX/DR IN RCRD: CPT | Mod: CPTII,,, | Performed by: INTERNAL MEDICINE

## 2024-08-14 PROCEDURE — 3061F NEG MICROALBUMINURIA REV: CPT | Mod: CPTII,,, | Performed by: INTERNAL MEDICINE

## 2024-08-14 PROCEDURE — 3046F HEMOGLOBIN A1C LEVEL >9.0%: CPT | Mod: CPTII,,, | Performed by: INTERNAL MEDICINE

## 2024-08-14 PROCEDURE — G0439 PPPS, SUBSEQ VISIT: HCPCS | Mod: ,,, | Performed by: INTERNAL MEDICINE

## 2024-08-14 PROCEDURE — 1126F AMNT PAIN NOTED NONE PRSNT: CPT | Mod: CPTII,,, | Performed by: INTERNAL MEDICINE

## 2024-08-14 PROCEDURE — 3078F DIAST BP <80 MM HG: CPT | Mod: CPTII,,, | Performed by: INTERNAL MEDICINE

## 2024-08-14 PROCEDURE — 3077F SYST BP >= 140 MM HG: CPT | Mod: CPTII,,, | Performed by: INTERNAL MEDICINE

## 2024-08-14 NOTE — ASSESSMENT & PLAN NOTE
Lab Results   Component Value Date    LDL 53.00 01/31/2024    TRIG 289 (H) 01/31/2024    HDL 37 01/31/2024    TOTALCHOLEST 4 01/31/2024     Continue current med management  Stressed importance of dietary modifications. Follow a low cholesterol, low saturated fat diet with less that 200mg of cholesterol a day.  Avoid fried foods and high saturated fats (high saturated fats less than 7% of calories).  Add Flax Seed/Fish Oil supplements to diet. Increase dietary fiber.  Regular exercise can reduce LDL and raise HDL. Stressed importance of physical activity 5 times per week for 30 minutes per day.

## 2024-08-14 NOTE — ASSESSMENT & PLAN NOTE
Based on current GFR, CKD stage is stage 3 - GFR 30-59. Renally dose meds. Avoid nephrotoxic medications and procedures.  Continue Togerri, patient able to get her medication at current we are going to recheck her in 3 months. A1c was not done, and she skipped several days of medication prior to her test?

## 2024-08-14 NOTE — PROGRESS NOTES
Internal Medicine      Patient ID: [id number]     Chief Complaint: Medicare Annual Wellness     HPI:     Alison Langston is a 73 y.o. female here today for a Medicare Annual Wellness visit and comprehensive Health Risk Assessment.   She also has lumbar stenosis as noted from MRI back in 2018 in the area of the lumbar spine  Osteoporosis on Prolia  Dr. Rain for GI   Dr. Cordova for gyn  Dr. Mauricio for Orthopedics  Dr. De Luna for her Hematology for anemia of chronic disease as related to CKD started on Procrit every other week  Dr. Valentino for Cardiology  Dr. Gisela Patino for Urology  She gets her insulin from a clinic in [place].     Health Maintenance         Date Due Completion Date    Foot Exam Never done ---    Shingles Vaccine (1 of 2) Never done ---    RSV Vaccine (Age 60+ and Pregnant patients) (1 - 1-dose 60+ series) Never done ---    Eye Exam 01/12/2023 1/12/2022    COVID-19 Vaccine (3 - 2023-24 season) 09/01/2023 1/31/2021    Hemoglobin A1c 07/31/2024 1/31/2024    Influenza Vaccine (1) 09/01/2024 10/3/2023    Diabetes Urine Screening 01/31/2025 1/31/2024    Lipid Panel 01/31/2025 1/31/2024    High Dose Statin 04/24/2025 4/24/2024    DEXA Scan 06/07/2025 6/7/2024    Mammogram 06/10/2025 6/10/2024    Colorectal Cancer Screening 12/01/2026 12/1/2021    TETANUS VACCINE 04/13/2030 4/13/2020             Past Medical History:   Diagnosis Date    Anemia     Anxiety     Cerebrovascular accident     Chronic back pain     Depression     DM (diabetes mellitus)     Elevated ferritin     External hemorrhoids     GERD (gastroesophageal reflux disease)     HLD (hyperlipidemia)     Hypertension     Hypothyroidism, unspecified     CRISTINA (iron deficiency anemia)     Incisional hernia     Internal hemorrhoids     Myoclonus     Osteoporosis     Personal history of colonic polyps     Renal insufficiency     Right arm pain     Vertigo         Past Surgical History:   Procedure Laterality Date    biopsy of breast       BONE MARROW BIOPSY W/ ASPIRATION Right     CATARACT EXTRACTION Right 2021     SECTION  1977    CHOLECYSTECTOMY      COLONOSCOPY  2021    Dr. Junior Cardenas  Repeat colon     HERNIA REPAIR      HERNIA REPAIR  2020    HYSTERECTOMY  2013    LAMINECTOMY AND MICRODISCECTOMY LUMBAR SPINE  2018    3 levels.  Dr. Krause    ORIF WRIST FRACTURE      distal.    PATENT DUCTUS ARTERIOUS LIGATION      SHOULDER SURGERY Left     SPINAL CORD STIMULATOR IMPLANT  2018    Dr. Jerry    THYROIDECTOMY          Social History     Socioeconomic History    Marital status:    Tobacco Use    Smoking status: Never     Passive exposure: Never    Smokeless tobacco: Never   Substance and Sexual Activity    Alcohol use: Never    Drug use: Never     Social Determinants of Health     Financial Resource Strain: Medium Risk (2024)    Overall Financial Resource Strain (CARDIA)     Difficulty of Paying Living Expenses: Somewhat hard   Food Insecurity: No Food Insecurity (2024)    Hunger Vital Sign     Worried About Running Out of Food in the Last Year: Never true     Ran Out of Food in the Last Year: Never true   Transportation Needs: Unmet Transportation Needs (2023)    PRAPARE - Transportation     Lack of Transportation (Medical): Yes     Lack of Transportation (Non-Medical): Yes   Physical Activity: Inactive (2024)    Exercise Vital Sign     Days of Exercise per Week: 0 days     Minutes of Exercise per Session: 30 min   Stress: No Stress Concern Present (2024)    [employer] - Occupational Stress Questionnaire     Feeling of Stress : Only a little   Housing Stability: Low Risk  (2023)    Housing Stability Vital Sign     Unable to Pay for Housing in the Last Year: No     Number of Places Lived in the Last Year: 1     Unstable Housing in the Last Year: No        Family History   Problem Relation Name Age of Onset    Diabetes Mother      GI  problems Mother      Lung cancer Father Ftio March     Diabetes Father Fito March     Alzheimer's disease Father Fito March         Current Outpatient Medications   Medication Instructions    albuterol (PROAIR HFA) 90 mcg/actuation inhaler 2 puffs, Inhalation, Every 6 hours PRN, Rescue    atorvastatin (LIPITOR) 20 mg, Oral, Nightly    benzonatate (TESSALON PERLES) 100 mg, Oral, 3 times daily PRN    blood sugar diagnostic (RELION PRIME TEST STRIPS) Strp 1 each, Misc.(Non-Drug; Combo Route), Daily    blood-glucose meter Misc   true metrix glucose monitor, See Instructions, to check bloodsugars BID, E11.9, # 1 EA, 5 Refill(s), Pharmacy: [id number], 154, cm, Height/Length Dosing, 04/19/21 10:31:00 CDT, 73.45, kg, Weight Dosing, 04/19/21 10:31:00 CDT    clotrimazole-betamethasone 1-0.05% (LOTRISONE) cream Topical (Top), 2 times daily    empagliflozin (JARDIANCE) 25 mg, Oral, Daily    fluticasone propionate (FLONASE) 50 mcg/actuation nasal spray 1 spray, Each Nostril, 2 times daily    folic acid (FOLVITE) 1 mg, Oral, Daily    insulin glargine U-300 conc (TOUJEO MAX U-300 SOLOSTAR) 40 Units, Subcutaneous, Nightly    levothyroxine (SYNTHROID) 200 mcg, Oral    losartan (COZAAR) 100 mg, Oral, Daily    meclizine (ANTIVERT) 12.5 mg, Oral, 3 times daily PRN    NIFEdipine (PROCARDIA-XL) 90 mg, Oral, 2 times daily    omeprazole (PRILOSEC) 40 mg, Oral, Every morning    pregabalin (LYRICA) 25 mg, Oral    promethazine-dextromethorphan (PROMETHAZINE-DM) 6.25-15 mg/5 mL Syrp 5 mLs, Oral, Every 4 hours PRN    sodium bicarbonate 650 mg, Oral, 2 times daily    traZODone (DESYREL) 100 mg, Oral, 2 times daily PRN    venlafaxine (EFFEXOR-XR) 150 mg, Oral       Review of patient's allergies indicates:   Allergen Reactions    Baclofen Hallucinations    Donepezil Hallucinations    Erythromycin      Other reaction(s): Upset Stomach    Fluoxetine      Doesn't work    Grass pollen      Other reaction(s): per allergy  "test    Hydrocodone-acetaminophen      Other reaction(s): "Makes me crazy"    Ivp dye [iodinated contrast media]      Other reaction(s): Rash    Metoclopramide hcl     Rosuvastatin     Iodine Rash    Shrimp Rash        Immunization History   Administered Date(s) Administered    COVID-19, MRNA, LN-S, PF (Pfizer) (Purple Cap) 01/10/2021, 01/31/2021    Influenza - High Dose - PF (65 years and older) 10/04/2017, 01/10/2019, 10/29/2019    Influenza - Quadrivalent - High Dose - PF (65 years and older) 10/20/2020, 10/04/2021, 10/27/2022, 10/03/2023    Influenza - Trivalent (ADULT) 12/21/2004, 11/21/2005, 09/21/2009    Influenza - Trivalent - PF (ADULT) 12/21/2004, 11/21/2005, 09/21/2009, 08/29/2015, 10/17/2016, 01/10/2019    Pneumococcal Conjugate - 13 Valent 02/23/2016    Pneumococcal Conjugate - 20 Valent 11/04/2022    Pneumococcal Polysaccharide - 23 Valent 01/01/2019, 01/10/2019, 01/10/2019    Tdap 01/01/2005, 04/13/2020        Patient Care Team:  Noah Tafoya MD as PCP - General (Internal Medicine)  Goldy Hand MD as Consulting Physician (Ophthalmology)  Junior Cardenas MD as Consulting Physician (Gastroenterology)  Gallito Cordova MD as Consulting Physician (Obstetrics and Gynecology)  Yonatan Perez MD as Consulting Physician (Orthopedic Surgery)  Mele Smith II, MD as Consulting Physician (Oncology)  Gary Galeano MD as Consulting Physician (Urology)  Valentino, Vernon A., MD as Consulting Physician (Cardiology)  Lynda Swanson LPN as Care Coordinator  Barbara Man MD as Consulting Physician (Nephrology)    Subjective:     Review of Systems    12 point review of systems conducted, negative except as stated in the history of present illness. See HPI for details.    Objective:     Visit Vitals  BP (!) 156/64 (BP Location: Left arm, Patient Position: Sitting, BP Method: Medium (Manual))   Pulse 73   Temp 97.3 °F (36.3 °C) (Temporal)   Resp 16   Ht 5' (1.524 " m)   Wt 61.7 kg (136 lb)   SpO2 97%   BMI 26.56 kg/m²       Physical Exam  Constitutional:       Appearance: Normal appearance.   HENT:      Head: Normocephalic and atraumatic.   Eyes:      Extraocular Movements: Extraocular movements intact.      Pupils: Pupils are equal, round, and reactive to light.   Cardiovascular:      Rate and Rhythm: Normal rate and regular rhythm.   Pulmonary:      Effort: Pulmonary effort is normal.      Breath sounds: Normal breath sounds.   Skin:     General: Skin is warm and dry.   Neurological:      General: No focal deficit present.      Mental Status: She is alert.   Psychiatric:         Mood and Affect: Mood normal.           Labs Reviewed:     Chemistry:  Lab Results   Component Value Date     (L) 07/18/2024    K 4.6 07/18/2024    BUN 37.2 (H) 07/18/2024    CREATININE 1.36 (H) 07/18/2024    EGFRNORACEVR 41 07/18/2024    GLUCOSE 489 (HH) 07/18/2024    CALCIUM 9.3 07/18/2024    ALKPHOS 139 07/18/2024    LABPROT 7.5 07/18/2024    ALBUMIN 3.8 07/18/2024    BILIDIR 0.1 05/10/2022    IBILI 0.10 05/10/2022    AST 17 07/18/2024    ALT 19 07/18/2024    MG 1.3 (L) 12/21/2019    ZRGUBQHX15GA 45.3 12/15/2022    TSH 0.020 (L) 12/15/2022    ORBHIC8XVLZ 1.07 04/08/2021        Lab Results   Component Value Date    HGBA1C 9.9 (H) 01/31/2024        Hematology:  Lab Results   Component Value Date    WBC 7.06 07/18/2024    HGB 11.3 (L) 07/18/2024    HCT 36.4 (L) 07/18/2024     07/18/2024       Lipid Panel:  Lab Results   Component Value Date    CHOL 148 01/31/2024    HDL 37 01/31/2024    LDL 53.00 01/31/2024    TRIG 289 (H) 01/31/2024    TOTALCHOLEST 4 01/31/2024        Urine:  Lab Results   Component Value Date    APPEARANCEUA Clear 07/18/2024    SGUA 1.024 07/18/2024    PROTEINUA Trace (A) 07/18/2024    KETONESUA Negative 07/18/2024    LEUKOCYTESUR Negative 07/18/2024    RBCUA 0-5 07/18/2024    WBCUA 0-5 07/18/2024    BACTERIA None Seen 07/18/2024    SQEPUA Trace 07/18/2024     HYALINECASTS >20 (A) 01/31/2024    CREATRANDUR 26.6 (L) 07/18/2024    PROTEINURINE 21.0 07/18/2024    UPROTCREA 0.8 07/18/2024        Assessment:       ICD-10-CM ICD-9-CM   1. Medicare annual wellness visit, subsequent  Z00.00 V70.0   2. Type 2 diabetes mellitus with stage 3b chronic kidney disease, with long-term current use of insulin  E11.22 250.40    N18.32 585.3    Z79.4 V58.67   3. Anemia due to stage 3a chronic kidney disease  N18.31 285.21    D63.1 585.3   4. Mixed hyperlipidemia  E78.2 272.2   5. Osteoporosis, unspecified osteoporosis type, unspecified pathological fracture presence  M81.0 733.00        Plan:     1. Medicare annual wellness visit, subsequent  Assessment & Plan:  General health maintenance education given, labs reviewed.  Age-appropriate exams are up-to-date      2. Type 2 diabetes mellitus with stage 3b chronic kidney disease, with long-term current use of insulin  Assessment & Plan:  Based on current GFR, CKD stage is stage 3 - GFR 30-59. Renally dose meds. Avoid nephrotoxic medications and procedures.  Continue Toujeo, patient able to get her medication at current we are going to recheck her in 3 months. A1c was not done, and she skipped several days of medication prior to her test?      3. Anemia due to stage 3a chronic kidney disease  Assessment & Plan:  On Procrit q.4 weeks as ordered by Hematology      4. Mixed hyperlipidemia  Assessment & Plan:    Lab Results   Component Value Date    LDL 53.00 01/31/2024    TRIG 289 (H) 01/31/2024    HDL 37 01/31/2024    TOTALCHOLEST 4 01/31/2024     Continue current med management  Stressed importance of dietary modifications. Follow a low cholesterol, low saturated fat diet with less that 200mg of cholesterol a day.  Avoid fried foods and high saturated fats (high saturated fats less than 7% of calories).  Add Flax Seed/Fish Oil supplements to diet. Increase dietary fiber.  Regular exercise can reduce LDL and raise HDL. Stressed importance of  physical activity 5 times per week for 30 minutes per day.        5. Osteoporosis, unspecified osteoporosis type, unspecified pathological fracture presence  Assessment & Plan:    [place]           The following assessments were completed and reviewed. See completed screening forms and assessments within the Encounter Summary.   [x] Health Risk Assessment   [x] CVD Risk Factors   [x] Obesity/Physical Activity -  Encouraged daily 30 minute physical activity x 5 days per week.   [x] Home Safety/Living Situation   [x] Alcohol Screen  [x] Depression (PHQ) Screen   [x] Timed Get Up and Go   [x] Whisper Test  [x] Cognitive Function/Impairment Screen   [x] Nutrition Screening  [x] ADL Screen   [x] Opioid Screen:  [x] Patient does not have a prescription for opioids.   [] Patient has a prescription for opioids but is at low risk for abuse.   [x] Substance Abuse Screen:   [x] Patient does not use illicit substances.   [] Patient screens positive for substance use disorder.   Advance Care Planning   Advance Care Planning     Date: 08/14/2024  Patient did not wish or was not able to name a surrogate decision maker or provide an Advance Care Plan.       I attest to discussing Advance Care Planning with patient and/or family member.  Education was provided including the importance of the Health Care Power of , Advance Directives, and/or [place] documentation.  The patient expressed understanding to the importance of this information and discussion.       Provided patient with a 5-10 year written screening schedule and personal prevention plan. Recommendations were developed using the USPSTF age appropriate recommendations. Education, counseling, and referrals were provided as needed. After Visit Summary printed and given to patient, which includes a list of additional screenings\tests needed.    No follow-ups on file. In addition to their scheduled follow up, the patient has also been instructed to follow up on as needed  basis.     Future Appointments   Date Time Provider Department Center   9/4/2024  2:00 PM LAB, [facility] LAB [facility]   9/4/2024  2:30 PM INJECTION CHAIR 01, [facility] CHEMOTHERAPY INFUSION [facility] CHEMO [facility]   9/16/2024  1:00 PM [facility]   10[facility]2/2024  [facility]   10/2/2024  1:30 PM INJECTION CHAIR 01, [facility] CHEMOTHERAPY INFUSION [facility] CHEMO [facility]   12/26/2024  2:30 PM INJECTION CHAIR 01, [facility] CHEMOTHERAPY INFUSION [facility] CHEMO [facility]   1/14/2025 10:00 AM Noah Tafoya MD [id number]        Noah Tafoya MD

## 2024-09-06 ENCOUNTER — TELEPHONE (OUTPATIENT)
Dept: INTERNAL MEDICINE | Facility: CLINIC | Age: 74
End: 2024-09-06
Payer: MEDICARE

## 2024-09-06 NOTE — TELEPHONE ENCOUNTER
----- Message from Noah Tafoya MD sent at 9/6/2024 10:18 AM CDT -----  Can she get a price on Farxiga 10mg  ----- Message -----  From: Daisy Gutierrez CMA  Sent: 9/6/2024  10:08 AM CDT  To: Noah Tafoya MD    Pt is asking for something that is cost affective, stated the jardiance is too expensive  ----- Message -----  From: Angelito Caballero  Sent: 9/6/2024   9:31 AM CDT  To: Rosalio Zamora Staff    .Type:  Needs Medical Advice    Who Called: pt   Symptoms (please be specific): states that the medication  empagliflozin (JARDIANCE) 25 mg tablet has gone up   How long has patient had these symptoms:    Pharmacy name and phone #:  [id number] - [place], [place] - [address]  Would the patient rather a call back or a response via [facility]? Call back   Best Call Back Number: [phone]  Additional Information: wants to get a different version that is cost affective

## 2024-09-16 ENCOUNTER — TELEPHONE (OUTPATIENT)
Dept: HEMATOLOGY/ONCOLOGY | Facility: CLINIC | Age: 74
End: 2024-09-16

## 2024-09-16 ENCOUNTER — TELEPHONE (OUTPATIENT)
Dept: NEPHROLOGY | Facility: CLINIC | Age: 74
End: 2024-09-16
Payer: MEDICARE

## 2024-09-16 ENCOUNTER — LAB VISIT (OUTPATIENT)
Dept: LAB | Facility: HOSPITAL | Age: 74
End: 2024-09-16
Attending: NURSE PRACTITIONER
Payer: MEDICARE

## 2024-09-16 ENCOUNTER — OFFICE VISIT (OUTPATIENT)
Dept: HEMATOLOGY/ONCOLOGY | Facility: CLINIC | Age: 74
End: 2024-09-16
Payer: MEDICARE

## 2024-09-16 ENCOUNTER — CLINICAL SUPPORT (OUTPATIENT)
Dept: INTERNAL MEDICINE | Facility: CLINIC | Age: 74
End: 2024-09-16
Attending: INTERNAL MEDICINE
Payer: MEDICARE

## 2024-09-16 VITALS
HEART RATE: 81 BPM | SYSTOLIC BLOOD PRESSURE: 146 MMHG | OXYGEN SATURATION: 97 % | BODY MASS INDEX: 27.29 KG/M2 | HEIGHT: 60 IN | RESPIRATION RATE: 18 BRPM | DIASTOLIC BLOOD PRESSURE: 62 MMHG | WEIGHT: 139 LBS

## 2024-09-16 DIAGNOSIS — E53.8 FOLIC ACID DEFICIENCY: ICD-10-CM

## 2024-09-16 DIAGNOSIS — Z79.4 TYPE 2 DIABETES MELLITUS WITH STAGE 3B CHRONIC KIDNEY DISEASE, WITH LONG-TERM CURRENT USE OF INSULIN: ICD-10-CM

## 2024-09-16 DIAGNOSIS — D50.9 IRON DEFICIENCY ANEMIA, UNSPECIFIED IRON DEFICIENCY ANEMIA TYPE: ICD-10-CM

## 2024-09-16 DIAGNOSIS — E11.22 TYPE 2 DIABETES MELLITUS WITH STAGE 3B CHRONIC KIDNEY DISEASE, WITH LONG-TERM CURRENT USE OF INSULIN: Primary | ICD-10-CM

## 2024-09-16 DIAGNOSIS — E53.8 FOLIC ACID DEFICIENCY: Primary | ICD-10-CM

## 2024-09-16 DIAGNOSIS — N18.31 ANEMIA DUE TO STAGE 3A CHRONIC KIDNEY DISEASE: ICD-10-CM

## 2024-09-16 DIAGNOSIS — N18.31 STAGE 3A CHRONIC KIDNEY DISEASE: Primary | ICD-10-CM

## 2024-09-16 DIAGNOSIS — D63.1 ANEMIA DUE TO STAGE 3A CHRONIC KIDNEY DISEASE: ICD-10-CM

## 2024-09-16 DIAGNOSIS — N18.32 TYPE 2 DIABETES MELLITUS WITH STAGE 3B CHRONIC KIDNEY DISEASE, WITH LONG-TERM CURRENT USE OF INSULIN: ICD-10-CM

## 2024-09-16 DIAGNOSIS — N18.32 TYPE 2 DIABETES MELLITUS WITH STAGE 3B CHRONIC KIDNEY DISEASE, WITH LONG-TERM CURRENT USE OF INSULIN: Primary | ICD-10-CM

## 2024-09-16 DIAGNOSIS — Z79.4 TYPE 2 DIABETES MELLITUS WITH STAGE 3B CHRONIC KIDNEY DISEASE, WITH LONG-TERM CURRENT USE OF INSULIN: Primary | ICD-10-CM

## 2024-09-16 DIAGNOSIS — E11.22 TYPE 2 DIABETES MELLITUS WITH STAGE 3B CHRONIC KIDNEY DISEASE, WITH LONG-TERM CURRENT USE OF INSULIN: ICD-10-CM

## 2024-09-16 DIAGNOSIS — Z23 NEED FOR VACCINATION: Primary | ICD-10-CM

## 2024-09-16 DIAGNOSIS — E87.5 HYPERKALEMIA: Primary | ICD-10-CM

## 2024-09-16 LAB
ALBUMIN SERPL-MCNC: 3.5 G/DL (ref 3.4–4.8)
ALBUMIN/GLOB SERPL: 1.1 RATIO (ref 1.1–2)
ALP SERPL-CCNC: 104 UNIT/L (ref 40–150)
ALT SERPL-CCNC: 23 UNIT/L (ref 0–55)
ANION GAP SERPL CALC-SCNC: 7 MEQ/L
AST SERPL-CCNC: 20 UNIT/L (ref 5–34)
BASOPHILS # BLD AUTO: 0.04 X10(3)/MCL
BASOPHILS NFR BLD AUTO: 0.5 %
BILIRUB SERPL-MCNC: 0.2 MG/DL
BUN SERPL-MCNC: 33.5 MG/DL (ref 9.8–20.1)
CALCIUM SERPL-MCNC: 8.7 MG/DL (ref 8.4–10.2)
CHLORIDE SERPL-SCNC: 108 MMOL/L (ref 98–107)
CO2 SERPL-SCNC: 23 MMOL/L (ref 23–31)
CREAT SERPL-MCNC: 1.43 MG/DL (ref 0.55–1.02)
CREAT/UREA NIT SERPL: 23
EOSINOPHIL # BLD AUTO: 0.14 X10(3)/MCL (ref 0–0.9)
EOSINOPHIL NFR BLD AUTO: 1.9 %
ERYTHROCYTE [DISTWIDTH] IN BLOOD BY AUTOMATED COUNT: 13.8 % (ref 11.5–17)
FERRITIN SERPL-MCNC: 609.55 NG/ML (ref 4.63–204)
FOLATE SERPL-MCNC: 8.6 NG/ML (ref 7–31.4)
GFR SERPLBLD CREATININE-BSD FMLA CKD-EPI: 39 ML/MIN/1.73/M2
GLOBULIN SER-MCNC: 3.1 GM/DL (ref 2.4–3.5)
GLUCOSE SERPL-MCNC: 520 MG/DL (ref 82–115)
HCT VFR BLD AUTO: 35.7 % (ref 37–47)
HGB BLD-MCNC: 11.3 G/DL (ref 12–16)
IMM GRANULOCYTES # BLD AUTO: 0.02 X10(3)/MCL (ref 0–0.04)
IMM GRANULOCYTES NFR BLD AUTO: 0.3 %
IRON SATN MFR SERPL: 24 % (ref 20–50)
IRON SERPL-MCNC: 49 UG/DL (ref 50–170)
LYMPHOCYTES # BLD AUTO: 0.99 X10(3)/MCL (ref 0.6–4.6)
LYMPHOCYTES NFR BLD AUTO: 13.2 %
MCH RBC QN AUTO: 26.6 PG (ref 27–31)
MCHC RBC AUTO-ENTMCNC: 31.7 G/DL (ref 33–36)
MCV RBC AUTO: 84 FL (ref 80–94)
MONOCYTES # BLD AUTO: 0.48 X10(3)/MCL (ref 0.1–1.3)
MONOCYTES NFR BLD AUTO: 6.4 %
NEUTROPHILS # BLD AUTO: 5.83 X10(3)/MCL (ref 2.1–9.2)
NEUTROPHILS NFR BLD AUTO: 77.7 %
PLATELET # BLD AUTO: 219 X10(3)/MCL (ref 130–400)
PMV BLD AUTO: 10.3 FL (ref 7.4–10.4)
POTASSIUM SERPL-SCNC: 5.9 MMOL/L (ref 3.5–5.1)
PROT SERPL-MCNC: 6.6 GM/DL (ref 5.8–7.6)
RBC # BLD AUTO: 4.25 X10(6)/MCL (ref 4.2–5.4)
SODIUM SERPL-SCNC: 138 MMOL/L (ref 136–145)
TIBC SERPL-MCNC: 159 UG/DL (ref 70–310)
TIBC SERPL-MCNC: 208 UG/DL (ref 250–450)
TRANSFERRIN SERPL-MCNC: 190 MG/DL (ref 173–360)
VIT B12 SERPL-MCNC: 403 PG/ML (ref 213–816)
WBC # BLD AUTO: 7.5 X10(3)/MCL (ref 4.5–11.5)

## 2024-09-16 PROCEDURE — 85025 COMPLETE CBC W/AUTO DIFF WBC: CPT

## 2024-09-16 PROCEDURE — 4010F ACE/ARB THERAPY RXD/TAKEN: CPT | Mod: CPTII,S$GLB,, | Performed by: NURSE PRACTITIONER

## 2024-09-16 PROCEDURE — 3078F DIAST BP <80 MM HG: CPT | Mod: CPTII,S$GLB,, | Performed by: NURSE PRACTITIONER

## 2024-09-16 PROCEDURE — G0008 ADMIN INFLUENZA VIRUS VAC: HCPCS | Mod: ,,, | Performed by: INTERNAL MEDICINE

## 2024-09-16 PROCEDURE — 82607 VITAMIN B-12: CPT

## 2024-09-16 PROCEDURE — 3061F NEG MICROALBUMINURIA REV: CPT | Mod: CPTII,S$GLB,, | Performed by: NURSE PRACTITIONER

## 2024-09-16 PROCEDURE — 92228 IMG RTA DETC/MNTR DS PHY/QHP: CPT | Mod: TC,,, | Performed by: INTERNAL MEDICINE

## 2024-09-16 PROCEDURE — 99214 OFFICE O/P EST MOD 30 MIN: CPT | Mod: S$GLB,,, | Performed by: NURSE PRACTITIONER

## 2024-09-16 PROCEDURE — 3077F SYST BP >= 140 MM HG: CPT | Mod: CPTII,S$GLB,, | Performed by: NURSE PRACTITIONER

## 2024-09-16 PROCEDURE — 1101F PT FALLS ASSESS-DOCD LE1/YR: CPT | Mod: CPTII,S$GLB,, | Performed by: NURSE PRACTITIONER

## 2024-09-16 PROCEDURE — 1160F RVW MEDS BY RX/DR IN RCRD: CPT | Mod: CPTII,S$GLB,, | Performed by: NURSE PRACTITIONER

## 2024-09-16 PROCEDURE — 82728 ASSAY OF FERRITIN: CPT

## 2024-09-16 PROCEDURE — 80053 COMPREHEN METABOLIC PANEL: CPT

## 2024-09-16 PROCEDURE — 36415 COLL VENOUS BLD VENIPUNCTURE: CPT

## 2024-09-16 PROCEDURE — 3066F NEPHROPATHY DOC TX: CPT | Mod: CPTII,S$GLB,, | Performed by: NURSE PRACTITIONER

## 2024-09-16 PROCEDURE — 90653 IIV ADJUVANT VACCINE IM: CPT | Mod: ,,, | Performed by: INTERNAL MEDICINE

## 2024-09-16 PROCEDURE — 83550 IRON BINDING TEST: CPT

## 2024-09-16 PROCEDURE — 83540 ASSAY OF IRON: CPT

## 2024-09-16 PROCEDURE — 92228 IMG RTA DETC/MNTR DS PHY/QHP: CPT | Mod: 26,,, | Performed by: OPTOMETRIST

## 2024-09-16 PROCEDURE — 3288F FALL RISK ASSESSMENT DOCD: CPT | Mod: CPTII,S$GLB,, | Performed by: NURSE PRACTITIONER

## 2024-09-16 PROCEDURE — 99999 PR PBB SHADOW E&M-EST. PATIENT-LVL IV: CPT | Mod: PBBFAC,,, | Performed by: NURSE PRACTITIONER

## 2024-09-16 PROCEDURE — 3046F HEMOGLOBIN A1C LEVEL >9.0%: CPT | Mod: CPTII,S$GLB,, | Performed by: NURSE PRACTITIONER

## 2024-09-16 PROCEDURE — 1159F MED LIST DOCD IN RCRD: CPT | Mod: CPTII,S$GLB,, | Performed by: NURSE PRACTITIONER

## 2024-09-16 PROCEDURE — 82746 ASSAY OF FOLIC ACID SERUM: CPT

## 2024-09-16 PROCEDURE — 3008F BODY MASS INDEX DOCD: CPT | Mod: CPTII,S$GLB,, | Performed by: NURSE PRACTITIONER

## 2024-09-16 NOTE — TELEPHONE ENCOUNTER
Received lab results from Oncology, Potassium elevated at 5.9, spoke with Inocencia Walton NP, order to decrease Losartan to 50 mg daily, take Kayexalate 15 gm daily for 3 days, sent to pharmacy on file, repeat BMP on 9-19-24 and appt made for next week. Verbalized understanding.

## 2024-09-16 NOTE — PROGRESS NOTES
Alison Langston is a 73 y.o. female here for a diabetic eye screening with non-dilated fundus photos per Jackelin paulino.    Patient cooperative?: Yes  Small pupils?: No  Last eye exam: unknown    For exam results, see Encounter Report.

## 2024-09-16 NOTE — PROGRESS NOTES
Subjective:       Patient ID: Alison Langston is a 73 y.o. female.    Chief Complaint: Follow-up (Pt has no concerns today)      Diagnosis:  1. Normocytic/hypochromic anemia with normal bone marrow biopsy done 1/26/18.    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 Iron deficiency  3. Multiple medical comorbidities, including hypothyroidism.     Current Treatment:   Observation       Treatment History:  Injectafter x 2 1/10 and 1/17/18.   PO Iron prior to that  Injectafer x2 treatments 06/15/18 and 06/22/18, and in October, 2018  Desferal 500mg x 3 doses 10/27/20-10/29/20 for iron overload      HPI   Patient kindly referred by Dr. Howard for further evaluation workup of her normocytic anemia, fairly long-standing in nature. The patient states she's been anemic off and on since she was first pregnant many years ago. She denies any history of any GI bleeding, but states she has taken iron pills in the past. She underwent a colonoscopy September 18, 2017 by Dr. Cardenas which showed normal mucosa and the entire colon. Single polyp of benign appearance was noted in the sigmoid colon with additional medium internal hemorrhoids noted. EGD was done the same day with no report available, with biopsies from the EGD revealing unremarkable intestinal mucosa from duodenal biopsy with no evidence of celiac sprue. Biopsy of the stomach reveal mild reactive gastropathy only. No evidence of H. pylori.  The patient went back to see Dr. Howard in December at which time CBC revealed hemoglobin of 9.3, hematocrit 32.4, MCV of 87.3, and normal WBC and platelets. TSH was elevated at 33.3, and her diabetes was noted to be under fairly poor control based on her hemoglobin A1c.    She was then referred to hematology for further evaluation and workup of her normocytic anemia.  Lab work done 12/20/17 showed iron sat of 12.5 and Ferritin of 11.5 (patient had been on PO iron bid).  Siria negative;  SPEP/WILLIAMS negative;   Retic count normal.  Epo level normal at 11.5.   MJ/DS DNA all normal.  Peripheral smear showed mild normocytic, hypochromic anemia with little variation in red cell size and shape.  Morphologic red cell variants include a few gisele cells, ovalocytes, elliptocytes, and rare acanthocytes.  No schistocytes are seen.  Mild rouleaux formation is seen.  Polychromasia is present.  Leukocytes are normal in number, differential, and morphology.  A few reactive lymphocytes are present.  Platelets are normal in number and morphology.  A few giant platelets are present.  Bone marrow biopsy done 1/26/2018 showed normocellular bone marrow with trilineage hematopoiesis--> no evidence of dysplasia or malignancy.  Normal cytogenetics noted.      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  Patient had persistent elevation of ferritin, therefore a MRI of the abdomen was done on 9/30/2020 and showed hepatomegaly with estimated liver iron concentration of 126 µmol/g, additional iron deposition in the spleen.  There was also a suspected 0.9 cm cystic lesion within the pancreatic body and a 1 year follow-up MRCP is suggested for surveillance.      Interval History:   Patient presents to clinic for scheduled follow up appointment for anemia.  She feels well overall.  She denies any bleeding or dark stools.  No night sweats, unintentional weight loss, fevers.     Past Medical History:   Diagnosis Date    Anemia     Anxiety     Cerebrovascular accident     Chronic back pain     Depression     DM (diabetes mellitus)     Elevated ferritin     External hemorrhoids     GERD (gastroesophageal reflux disease)     HLD (hyperlipidemia)     Hypertension     Hypothyroidism, unspecified     CRISTINA (iron deficiency anemia)     Incisional hernia     Internal hemorrhoids     Myoclonus     Osteoporosis     Personal history of colonic polyps     Renal insufficiency     Right arm pain     Vertigo       Past Surgical History:    Procedure Laterality Date    biopsy of breast      BONE MARROW BIOPSY W/ ASPIRATION Right 2018    CATARACT EXTRACTION Right 2021     SECTION  1977    CHOLECYSTECTOMY      COLONOSCOPY  2021    Dr. Junior Cardenas  Repeat colon     HERNIA REPAIR      HERNIA REPAIR  2020    HYSTERECTOMY  2013    LAMINECTOMY AND MICRODISCECTOMY LUMBAR SPINE  2018    3 levels.  Dr. Krause    ORIF WRIST FRACTURE      distal.    PATENT DUCTUS ARTERIOUS LIGATION      SHOULDER SURGERY Left     SPINAL CORD STIMULATOR IMPLANT  2018    Dr. Jerry    THYROIDECTOMY       Social History     Socioeconomic History    Marital status:    Tobacco Use    Smoking status: Never     Passive exposure: Never    Smokeless tobacco: Never   Substance and Sexual Activity    Alcohol use: Never    Drug use: Never     Social Determinants of Health     Financial Resource Strain: Medium Risk (2024)    Overall Financial Resource Strain (CARDIA)     Difficulty of Paying Living Expenses: Somewhat hard   Food Insecurity: No Food Insecurity (2024)    Hunger Vital Sign     Worried About Running Out of Food in the Last Year: Never true     Ran Out of Food in the Last Year: Never true   Transportation Needs: Unmet Transportation Needs (2023)    PRAPARE - Transportation     Lack of Transportation (Medical): Yes     Lack of Transportation (Non-Medical): Yes   Physical Activity: Inactive (2024)    Exercise Vital Sign     Days of Exercise per Week: 0 days     Minutes of Exercise per Session: 30 min   Stress: No Stress Concern Present (2024)    [employer] - Occupational Stress Questionnaire     Feeling of Stress : Only a little   Housing Stability: Low Risk  (2023)    Housing Stability Vital Sign     Unable to Pay for Housing in the Last Year: No     Number of Places Lived in the Last Year: 1     Unstable Housing in the Last Year: No      Family History   Problem Relation  "Name Age of Onset    Diabetes Mother      GI problems Mother      Lung cancer Father Fito March     Diabetes Father Fito March     Alzheimer's disease Father Ftio March       Review of patient's allergies indicates:   Allergen Reactions    Baclofen Hallucinations    Donepezil Hallucinations    Erythromycin      Other reaction(s): Upset Stomach    Fluoxetine      Doesn't work    Grass pollen      Other reaction(s): per allergy test    Hydrocodone-acetaminophen      Other reaction(s): "Makes me crazy"    Ivp dye [iodinated contrast media]      Other reaction(s): Rash    Metoclopramide hcl     Rosuvastatin     Iodine Rash    Shrimp Rash      Review of Systems   Constitutional:  Negative for chills, diaphoresis, fatigue, fever and unexpected weight change.   HENT:  Negative for nasal congestion, mouth sores, sinus pressure/congestion and sore throat.    Eyes:  Negative for pain and visual disturbance.   Respiratory:  Negative for cough, chest tightness and shortness of breath.    Cardiovascular:  Negative for chest pain, palpitations and leg swelling.   Gastrointestinal:  Negative for abdominal distention, abdominal pain, blood in stool, constipation and diarrhea.   Genitourinary:  Negative for dysuria, frequency and hematuria.   Musculoskeletal:  Negative for arthralgias and back pain.   Integumentary:  Negative for rash.   Neurological:  Positive for numbness. Negative for dizziness, weakness and headaches.        Falls from numbness to her foot   Hematological:  Negative for adenopathy.   Psychiatric/Behavioral:  Negative for confusion.          Objective:      Physical Exam  Vitals reviewed.   Constitutional:       General: She is not in acute distress.     Appearance: Normal appearance.   HENT:      Head: Normocephalic and atraumatic.      Nose: Nose normal.      Mouth/Throat:      Mouth: Mucous membranes are moist.   Eyes:      Extraocular Movements: Extraocular movements intact.      " Conjunctiva/sclera: Conjunctivae normal.   Cardiovascular:      Rate and Rhythm: Normal rate and regular rhythm.      Pulses: Normal pulses.      Heart sounds: Murmur heard.   Pulmonary:      Effort: Pulmonary effort is normal.      Breath sounds: Normal breath sounds.   Abdominal:      General: Bowel sounds are normal.      Palpations: Abdomen is soft.   Musculoskeletal:         General: No swelling. Normal range of motion.      Cervical back: Normal range of motion and neck supple.      Right lower leg: No edema.      Left lower leg: No edema.   Skin:     General: Skin is warm and dry.   Neurological:      General: No focal deficit present.      Mental Status: She is alert and oriented to person, place, and time. Mental status is at baseline.   Psychiatric:         Mood and Affect: Mood normal.         Behavior: Behavior normal.         LABS AND IMAGING REVIEWED IN EPIC          Assessment:     1. Normocytic anemia with component of iron deficiency, in addition to anemia chronic disease, requiring IV Iron after poor response to PO iron.   2. Hypothyroidism, thought to be a component contributing to her anemia.    3. Normal Bone marrow biopsy done 1/26/18  4. Multiple medical comorbidities  5. Elevated ferritin -ruled out hemochromatosis. Received Desferal 500mg x 3 doses 10/27/20-10/29/20.  6. Neuropathy        Plan:         Patient doing well overall clinically with no worsening clinical symptoms    Continue Retacrit subq as needed. Hgb today 11.3, hold retacrit    CBC Q4w    Return to clinic in 4 months with a CBC, CMP, Iron studies, B12, Folate prior to appt    She did see Dr. Connor for pancreatic cyst.      VINNY Lemus

## 2024-09-17 DIAGNOSIS — N18.31 STAGE 3A CHRONIC KIDNEY DISEASE: Primary | ICD-10-CM

## 2024-09-19 ENCOUNTER — LAB VISIT (OUTPATIENT)
Dept: LAB | Facility: HOSPITAL | Age: 74
End: 2024-09-19
Attending: NURSE PRACTITIONER
Payer: MEDICARE

## 2024-09-19 DIAGNOSIS — E11.22 TYPE 2 DIABETES MELLITUS WITH STAGE 3B CHRONIC KIDNEY DISEASE, WITH LONG-TERM CURRENT USE OF INSULIN: ICD-10-CM

## 2024-09-19 DIAGNOSIS — E87.5 HYPERKALEMIA: ICD-10-CM

## 2024-09-19 DIAGNOSIS — Z79.4 TYPE 2 DIABETES MELLITUS WITH STAGE 3B CHRONIC KIDNEY DISEASE, WITH LONG-TERM CURRENT USE OF INSULIN: ICD-10-CM

## 2024-09-19 DIAGNOSIS — N18.32 TYPE 2 DIABETES MELLITUS WITH STAGE 3B CHRONIC KIDNEY DISEASE, WITH LONG-TERM CURRENT USE OF INSULIN: ICD-10-CM

## 2024-09-19 LAB
ANION GAP SERPL CALC-SCNC: 9 MEQ/L
BUN SERPL-MCNC: 24.4 MG/DL (ref 9.8–20.1)
CALCIUM SERPL-MCNC: 8.8 MG/DL (ref 8.4–10.2)
CHLORIDE SERPL-SCNC: 107 MMOL/L (ref 98–107)
CO2 SERPL-SCNC: 25 MMOL/L (ref 23–31)
CREAT SERPL-MCNC: 1.24 MG/DL (ref 0.55–1.02)
CREAT/UREA NIT SERPL: 20
EST. AVERAGE GLUCOSE BLD GHB EST-MCNC: 254.7 MG/DL
GFR SERPLBLD CREATININE-BSD FMLA CKD-EPI: 46 ML/MIN/1.73/M2
GLUCOSE SERPL-MCNC: 410 MG/DL (ref 82–115)
HBA1C MFR BLD: 10.5 %
POTASSIUM SERPL-SCNC: 4.9 MMOL/L (ref 3.5–5.1)
SODIUM SERPL-SCNC: 141 MMOL/L (ref 136–145)

## 2024-09-19 PROCEDURE — 80048 BASIC METABOLIC PNL TOTAL CA: CPT

## 2024-09-19 PROCEDURE — 36415 COLL VENOUS BLD VENIPUNCTURE: CPT

## 2024-09-19 PROCEDURE — 83036 HEMOGLOBIN GLYCOSYLATED A1C: CPT

## 2024-09-19 NOTE — PROGRESS NOTES
Patient is clearly not taking her insulin daily, her A1c is 10.5  She has to take her insulin every day; and uptitrate by 2 units every 3 days till fasting at 140

## 2024-09-25 ENCOUNTER — PATIENT OUTREACH (OUTPATIENT)
Facility: CLINIC | Age: 74
End: 2024-09-25
Payer: MEDICARE

## 2024-09-25 ENCOUNTER — OFFICE VISIT (OUTPATIENT)
Dept: NEPHROLOGY | Facility: CLINIC | Age: 74
End: 2024-09-25
Payer: MEDICARE

## 2024-09-25 VITALS
HEIGHT: 60 IN | RESPIRATION RATE: 20 BRPM | DIASTOLIC BLOOD PRESSURE: 58 MMHG | OXYGEN SATURATION: 96 % | WEIGHT: 147.38 LBS | HEART RATE: 74 BPM | BODY MASS INDEX: 28.93 KG/M2 | TEMPERATURE: 99 F | SYSTOLIC BLOOD PRESSURE: 120 MMHG

## 2024-09-25 DIAGNOSIS — N18.31 ANEMIA DUE TO STAGE 3A CHRONIC KIDNEY DISEASE: ICD-10-CM

## 2024-09-25 DIAGNOSIS — E11.29 TYPE 2 DIABETES MELLITUS WITH MICROALBUMINURIA, WITHOUT LONG-TERM CURRENT USE OF INSULIN: ICD-10-CM

## 2024-09-25 DIAGNOSIS — I10 PRIMARY HYPERTENSION: ICD-10-CM

## 2024-09-25 DIAGNOSIS — D63.1 ANEMIA DUE TO STAGE 3A CHRONIC KIDNEY DISEASE: ICD-10-CM

## 2024-09-25 DIAGNOSIS — R80.9 TYPE 2 DIABETES MELLITUS WITH MICROALBUMINURIA, WITHOUT LONG-TERM CURRENT USE OF INSULIN: ICD-10-CM

## 2024-09-25 DIAGNOSIS — R80.1 PERSISTENT PROTEINURIA: ICD-10-CM

## 2024-09-25 DIAGNOSIS — N18.2 STAGE 2 CHRONIC KIDNEY DISEASE: Primary | ICD-10-CM

## 2024-09-25 PROCEDURE — 1126F AMNT PAIN NOTED NONE PRSNT: CPT | Mod: CPTII,S$GLB,, | Performed by: INTERNAL MEDICINE

## 2024-09-25 PROCEDURE — 1159F MED LIST DOCD IN RCRD: CPT | Mod: CPTII,S$GLB,, | Performed by: INTERNAL MEDICINE

## 2024-09-25 PROCEDURE — 3074F SYST BP LT 130 MM HG: CPT | Mod: CPTII,S$GLB,, | Performed by: INTERNAL MEDICINE

## 2024-09-25 PROCEDURE — 99999 PR PBB SHADOW E&M-EST. PATIENT-LVL IV: CPT | Mod: PBBFAC,,, | Performed by: INTERNAL MEDICINE

## 2024-09-25 PROCEDURE — 3008F BODY MASS INDEX DOCD: CPT | Mod: CPTII,S$GLB,, | Performed by: INTERNAL MEDICINE

## 2024-09-25 PROCEDURE — 4010F ACE/ARB THERAPY RXD/TAKEN: CPT | Mod: CPTII,S$GLB,, | Performed by: INTERNAL MEDICINE

## 2024-09-25 PROCEDURE — 3066F NEPHROPATHY DOC TX: CPT | Mod: CPTII,S$GLB,, | Performed by: INTERNAL MEDICINE

## 2024-09-25 PROCEDURE — 1101F PT FALLS ASSESS-DOCD LE1/YR: CPT | Mod: CPTII,S$GLB,, | Performed by: INTERNAL MEDICINE

## 2024-09-25 PROCEDURE — 3078F DIAST BP <80 MM HG: CPT | Mod: CPTII,S$GLB,, | Performed by: INTERNAL MEDICINE

## 2024-09-25 PROCEDURE — 3061F NEG MICROALBUMINURIA REV: CPT | Mod: CPTII,S$GLB,, | Performed by: INTERNAL MEDICINE

## 2024-09-25 PROCEDURE — 99213 OFFICE O/P EST LOW 20 MIN: CPT | Mod: S$GLB,,, | Performed by: INTERNAL MEDICINE

## 2024-09-25 PROCEDURE — 3288F FALL RISK ASSESSMENT DOCD: CPT | Mod: CPTII,S$GLB,, | Performed by: INTERNAL MEDICINE

## 2024-09-25 PROCEDURE — 3046F HEMOGLOBIN A1C LEVEL >9.0%: CPT | Mod: CPTII,S$GLB,, | Performed by: INTERNAL MEDICINE

## 2024-09-25 NOTE — PROGRESS NOTES
[facility] Ambulatory Progress Note      HPI  Alison Langston, 73 y.o. female, presents to office for a follow up visit .  Patient with CKD 3A related to diabetic nephropathy ischemic nephrosclerosis  She recently had issues with the hyperkalemia and severe hyperglycemia  Since her sugars controlled and her losartan cut in half repeat potassium down to 4.9  Patient feels okay except for occasional lower extremity edema related to increasing dose of nifedipine per her primary physician  No dyspnea no nausea vomiting    Patient denies taking NSAIDs or new antibiotics. Also denies recent episode of dehydration, diarrhea, vomiting, acute illness, hospitalization, recent angiograms or exposure to IV radiocontrast.        Medical Diagnoses:   Past Medical History:   Diagnosis Date    Anemia     Anxiety     Cerebrovascular accident     Chronic back pain     Depression     DM (diabetes mellitus)     Elevated ferritin     External hemorrhoids     GERD (gastroesophageal reflux disease)     HLD (hyperlipidemia)     Hypertension     Hypothyroidism, unspecified     CRISTINA (iron deficiency anemia)     Incisional hernia     Internal hemorrhoids     Myoclonus     Osteoporosis     Personal history of colonic polyps     Renal insufficiency     Right arm pain     Vertigo      Patient Active Problem List   Diagnosis    Normocytic anemia    Chronic back pain    Depression    Personal history of colonic polyps    Osteoporosis    Medicare annual wellness visit, subsequent    Stage 3a chronic kidney disease    Hx of percutaneous transcatheter closure of congenital ASD    History of transient ischemic attack    Anemia due to chronic kidney disease    Anxiety    GERD (gastroesophageal reflux disease)    Hyperlipidemia    Hypertension    Hypothyroidism    Iron deficiency anemia    Kidney stones    Incisional hernia    Restless legs syndrome    Tremor    Wears eyeglasses    Moderate aortic stenosis by prior echocardiogram    Nephrotic  "syndrome    Type 2 diabetes mellitus with stage 3b chronic kidney disease, with long-term current use of insulin    Right median nerve neuropathy       Surgical History:   Past Surgical History:   Procedure Laterality Date    biopsy of breast      BONE MARROW BIOPSY W/ ASPIRATION Right 2018    CATARACT EXTRACTION Right 2021     SECTION  1977    CHOLECYSTECTOMY      COLONOSCOPY  2021    Dr. Junior Cardenas  Repeat colon     HERNIA REPAIR      HERNIA REPAIR  2020    HYSTERECTOMY  2013    LAMINECTOMY AND MICRODISCECTOMY LUMBAR SPINE  2018    3 levels.  Dr. Krause    ORIF WRIST FRACTURE      distal.    PATENT DUCTUS ARTERIOUS LIGATION      SHOULDER SURGERY Left     SPINAL CORD STIMULATOR IMPLANT  2018    Dr. Jerry    THYROIDECTOMY         Family History:   Family History   Problem Relation Name Age of Onset    Diabetes Mother      GI problems Mother      Lung cancer Father Fito March     Diabetes Father Fito March     Alzheimer's disease Father Fito March        Social History:   Social History     Tobacco Use    Smoking status: Never     Passive exposure: Never    Smokeless tobacco: Never   Substance Use Topics    Alcohol use: Never       Allergies:  Review of patient's allergies indicates:   Allergen Reactions    Baclofen Hallucinations    Donepezil Hallucinations    Erythromycin      Other reaction(s): Upset Stomach    Fluoxetine      Doesn't work    Grass pollen      Other reaction(s): per allergy test    Hydrocodone-acetaminophen      Other reaction(s): "Makes me crazy"    Ivp dye [iodinated contrast media]      Other reaction(s): Rash    Metoclopramide hcl     Rosuvastatin     Iodine Rash    Shrimp Rash       Medications:  Outpatient Medications Marked as Taking for the 24 encounter (Office Visit) with Barbara Man MD   Medication Sig Dispense Refill    atorvastatin (LIPITOR) 20 MG tablet Take 1 tablet (20 mg total) by mouth every evening. 90 tablet " 3    blood sugar diagnostic (RELION PRIME TEST STRIPS) Strp 1 each by Misc.(Non-Drug; Combo Route) route Daily. 100 each 6    blood-glucose meter Misc   true metrix glucose monitor, See Instructions, to check bloodsugars BID, E11.9, # 1 EA, 5 Refill(s), Pharmacy: [facility] 415, 154, cm, Height/Length Dosing, 04/19/21 10:31:00 CDT, 73.45, kg, Weight Dosing, 04/19/21 10:31:00 CDT      clotrimazole-betamethasone 1-0.05% (LOTRISONE) cream Apply topically 2 (two) times daily. 45 g 1    fluticasone propionate (FLONASE) 50 mcg/actuation nasal spray 1 spray by Each Nostril route 2 (two) times daily.      insulin glargine U-300 conc (TOUJEO MAX U-300 SOLOSTAR) 300 unit/mL (3 mL) insulin pen Inject 40 Units into the skin every evening. 3 Pen 5    levothyroxine (SYNTHROID) 200 MCG tablet Take 1 tablet by mouth once daily 90 tablet 0    losartan (COZAAR) 100 MG tablet Take 1 tablet (100 mg total) by mouth once daily. (Patient taking differently: Take 100 mg by mouth once daily. Taking 1/2 tablet daily) 90 tablet 3    meclizine (ANTIVERT) 12.5 mg tablet Take 12.5 mg by mouth 3 (three) times daily as needed.      NIFEdipine (PROCARDIA-XL) 90 MG (OSM) 24 hr tablet Take 1 tablet (90 mg total) by mouth 2 (two) times a day. 60 tablet 11    omeprazole (PRILOSEC) 40 MG capsule Take 1 capsule by mouth in the morning 90 capsule 0    pregabalin (LYRICA) 25 MG capsule TAKE 1 CAPSULE BY MOUTH IN THE EVENING 90 capsule 0    traZODone (DESYREL) 100 MG tablet Take 1 tablet (100 mg total) by mouth 2 (two) times daily as needed for Insomnia. 30 tablet 11    venlafaxine (EFFEXOR-XR) 150 MG Cp24 Take 1 capsule by mouth once daily 90 capsule 0          Review of Systems:    Constitutional: Denies fever, fatigue, generalized weakness  Skin: Denies wounds, no rashes, no itching, no new skin lesions  Respiratory:  Denies cough, shortness of breath, or wheezing  Cardiovascular: Denies chest pain, palpitations, occasional lower extremity  edema  Gastrointestional: Denies abdominal pain, nausea, vomiting, diarrhea, or constipation  Genitourinary: Denies dysuria, hematuria, foamy urine, or incontinence; reports able to empty bladder  Musculoskeletal: Denies back or flank pain  Neurological: Denies headaches, dizziness, paresthesias, tremors or focal weakness      Vital Signs:  BP (!) 120/58 (BP Location: Left arm, Patient Position: Sitting, BP Method: Medium (Manual))   Pulse 74   Temp 98.5 °F (36.9 °C) (Temporal)   Resp 20   Ht 5' (1.524 m)   Wt 66.9 kg (147 lb 6.4 oz)   SpO2 96%   BMI 28.79 kg/m²   Body mass index is 28.79 kg/m².      Physical Exam:    General: no acute distress, awake, alert  Eyes: conjunctiva clear, eyelids without swelling  HENT: atraumatic, oropharynx and nasal mucosa patent  Neck: supple, trache midline, no JVD  Respiratory: equal, unlabored, clear to auscultation A/P  Cardiovascular: RRR systolic ejection murmur 3/ 6 at left sternal border  Edema:  Trace lower extremity  Gastrointestinal: soft, non-tender, non-distended; positive bowel sounds; no masses to palpation; no ascites  Genitourinary: no CVA tenderness upon palpation  Musculoskeletal: ROM without new limitation or discomfort  Integumentary: warm, dry; no rashes, wounds, or skin lesions  Neurological: oriented x4, appropriate, no acute deficits; no asterixis      Labs:        Component Value Date/Time     09/19/2024 1259     09/16/2024 1312    K 4.9 09/19/2024 1259    K 5.9 (H) 09/16/2024 1312     09/19/2024 1259     (H) 09/16/2024 1312    CO2 25 09/19/2024 1259    CO2 23 09/16/2024 1312    BUN 24.4 (H) 09/19/2024 1259    BUN 33.5 (H) 09/16/2024 1312    CREATININE 1.24 (H) 09/19/2024 1259    CREATININE 1.43 (H) 09/16/2024 1312    CREATININE 1.36 (H) 07/18/2024 0952    CREATININE 1.78 (H) 06/12/2024 1354    CALCIUM 8.8 09/19/2024 1259    CALCIUM 8.7 09/16/2024 1312    PTH 65.9 07/18/2024 0952    PTH 77.5 (H) 04/15/2024 1312            Component Value Date/Time    WBC 7.50 09/16/2024 1312    WBC 7.06 07/18/2024 0952    HGB 11.3 (L) 09/16/2024 1312    HGB 11.3 (L) 07/18/2024 0952    HCT 35.7 (L) 09/16/2024 1312    HCT 36.4 (L) 07/18/2024 0952    HCT 35.0 (L) 07/27/2020 1046     09/16/2024 1312     07/18/2024 0952       Urine Creatinine   Date Value Ref Range Status   07/18/2024 26.6 (L) 45.0 - 106.0 mg/dL Final   04/15/2024 20.0 (L) 45.0 - 106.0 mg/dL Final       Urine Protein Level   Date Value Ref Range Status   07/18/2024 21.0 mg/dL Final   04/15/2024 26.9 mg/dL Final           Imaging:  Retroperitoneal Ultrasound:      Impression:    1. Stage 2 chronic kidney disease        2. Type 2 diabetes mellitus with microalbuminuria, without long-term current use of insulin        3. Anemia due to stage 3a chronic kidney disease        4. Persistent proteinuria        5. Primary hypertension        CKD 3A related to diabetic nephropathy ischemic nephrosclerosis  Hypertension  Uncontrolled hyperglycemia and hyperkalemia better    Plan:    Labs next week  If stable labs and follow-up visit in 2 months    Barbara Man      This note was created with the assistance of [employer] voice recognition software or phone dictation. There may be transcription errors as a result of using this technology however minimal. Effort has been made to assure accuracy of transcription but any obvious errors or omissions should be clarified with the author of the document.

## 2024-09-30 ENCOUNTER — TELEPHONE (OUTPATIENT)
Dept: INTERNAL MEDICINE | Facility: CLINIC | Age: 74
End: 2024-09-30
Payer: MEDICARE

## 2024-10-02 ENCOUNTER — INFUSION (OUTPATIENT)
Dept: INFUSION THERAPY | Facility: HOSPITAL | Age: 74
End: 2024-10-02
Attending: INTERNAL MEDICINE
Payer: MEDICARE

## 2024-10-02 ENCOUNTER — TELEPHONE (OUTPATIENT)
Dept: INTERNAL MEDICINE | Facility: CLINIC | Age: 74
End: 2024-10-02
Payer: MEDICARE

## 2024-10-02 ENCOUNTER — LAB VISIT (OUTPATIENT)
Dept: LAB | Facility: HOSPITAL | Age: 74
End: 2024-10-02
Attending: INTERNAL MEDICINE
Payer: MEDICARE

## 2024-10-02 VITALS
DIASTOLIC BLOOD PRESSURE: 58 MMHG | RESPIRATION RATE: 18 BRPM | HEART RATE: 76 BPM | SYSTOLIC BLOOD PRESSURE: 157 MMHG | OXYGEN SATURATION: 96 %

## 2024-10-02 DIAGNOSIS — D63.1 ANEMIA DUE TO STAGE 3A CHRONIC KIDNEY DISEASE: ICD-10-CM

## 2024-10-02 DIAGNOSIS — N18.31 ANEMIA DUE TO STAGE 3A CHRONIC KIDNEY DISEASE: ICD-10-CM

## 2024-10-02 DIAGNOSIS — G47.00 INSOMNIA, UNSPECIFIED TYPE: ICD-10-CM

## 2024-10-02 DIAGNOSIS — E53.8 FOLIC ACID DEFICIENCY: ICD-10-CM

## 2024-10-02 DIAGNOSIS — N18.31 STAGE 3A CHRONIC KIDNEY DISEASE: ICD-10-CM

## 2024-10-02 DIAGNOSIS — D64.9 NORMOCYTIC ANEMIA: Primary | ICD-10-CM

## 2024-10-02 DIAGNOSIS — D50.9 IRON DEFICIENCY ANEMIA, UNSPECIFIED IRON DEFICIENCY ANEMIA TYPE: ICD-10-CM

## 2024-10-02 LAB
ALBUMIN SERPL-MCNC: 3.2 G/DL (ref 3.4–4.8)
ALBUMIN/GLOB SERPL: 1.2 RATIO (ref 1.1–2)
ALP SERPL-CCNC: 133 UNIT/L (ref 40–150)
ALT SERPL-CCNC: 32 UNIT/L (ref 0–55)
ANION GAP SERPL CALC-SCNC: 6 MEQ/L
AST SERPL-CCNC: 18 UNIT/L (ref 5–34)
BASOPHILS # BLD AUTO: 0.03 X10(3)/MCL
BASOPHILS NFR BLD AUTO: 0.4 %
BILIRUB SERPL-MCNC: 0.2 MG/DL
BUN SERPL-MCNC: 28.3 MG/DL (ref 9.8–20.1)
CALCIUM SERPL-MCNC: 8.1 MG/DL (ref 8.4–10.2)
CHLORIDE SERPL-SCNC: 107 MMOL/L (ref 98–107)
CO2 SERPL-SCNC: 26 MMOL/L (ref 23–31)
CREAT SERPL-MCNC: 1.18 MG/DL (ref 0.55–1.02)
CREAT/UREA NIT SERPL: 24
EOSINOPHIL # BLD AUTO: 0.18 X10(3)/MCL (ref 0–0.9)
EOSINOPHIL NFR BLD AUTO: 2.6 %
ERYTHROCYTE [DISTWIDTH] IN BLOOD BY AUTOMATED COUNT: 13.7 % (ref 11.5–17)
GFR SERPLBLD CREATININE-BSD FMLA CKD-EPI: 49 ML/MIN/1.73/M2
GLOBULIN SER-MCNC: 2.7 GM/DL (ref 2.4–3.5)
GLUCOSE SERPL-MCNC: 283 MG/DL (ref 82–115)
HCT VFR BLD AUTO: 31.9 % (ref 37–47)
HGB BLD-MCNC: 10.2 G/DL (ref 12–16)
IMM GRANULOCYTES # BLD AUTO: 0.02 X10(3)/MCL (ref 0–0.04)
IMM GRANULOCYTES NFR BLD AUTO: 0.3 %
LYMPHOCYTES # BLD AUTO: 1.19 X10(3)/MCL (ref 0.6–4.6)
LYMPHOCYTES NFR BLD AUTO: 17 %
MCH RBC QN AUTO: 26.2 PG (ref 27–31)
MCHC RBC AUTO-ENTMCNC: 32 G/DL (ref 33–36)
MCV RBC AUTO: 82 FL (ref 80–94)
MONOCYTES # BLD AUTO: 0.58 X10(3)/MCL (ref 0.1–1.3)
MONOCYTES NFR BLD AUTO: 8.3 %
NEUTROPHILS # BLD AUTO: 5.01 X10(3)/MCL (ref 2.1–9.2)
NEUTROPHILS NFR BLD AUTO: 71.4 %
PLATELET # BLD AUTO: 210 X10(3)/MCL (ref 130–400)
PMV BLD AUTO: 9.8 FL (ref 7.4–10.4)
POTASSIUM SERPL-SCNC: 4.8 MMOL/L (ref 3.5–5.1)
PROT SERPL-MCNC: 5.9 GM/DL (ref 5.8–7.6)
RBC # BLD AUTO: 3.89 X10(6)/MCL (ref 4.2–5.4)
SODIUM SERPL-SCNC: 139 MMOL/L (ref 136–145)
WBC # BLD AUTO: 7.01 X10(3)/MCL (ref 4.5–11.5)

## 2024-10-02 PROCEDURE — 96372 THER/PROPH/DIAG INJ SC/IM: CPT

## 2024-10-02 PROCEDURE — 36415 COLL VENOUS BLD VENIPUNCTURE: CPT

## 2024-10-02 PROCEDURE — 80053 COMPREHEN METABOLIC PANEL: CPT

## 2024-10-02 PROCEDURE — 85025 COMPLETE CBC W/AUTO DIFF WBC: CPT

## 2024-10-02 PROCEDURE — 63600175 PHARM REV CODE 636 W HCPCS: Mod: EC,JG | Performed by: INTERNAL MEDICINE

## 2024-10-02 RX ORDER — TRAZODONE HYDROCHLORIDE 100 MG/1
100 TABLET ORAL 2 TIMES DAILY PRN
Qty: 30 TABLET | Refills: 11 | Status: SHIPPED | OUTPATIENT
Start: 2024-10-02 | End: 2025-10-02

## 2024-10-02 RX ADMIN — ERYTHROPOIETIN 20000 UNITS: 20000 INJECTION, SOLUTION INTRAVENOUS; SUBCUTANEOUS at 01:10

## 2024-10-02 NOTE — TELEPHONE ENCOUNTER
----- Message from Rodri sent at 10/2/2024  3:20 PM CDT -----  .Type:  RX Refill Request    Who Called: Alison  Refill or New Rx: Refill   RX Name and Strength: traZODone (DESYREL) 100 MG tablet  How is the patient currently taking it? (ex. 1XDay): 1 x day   Is this a 30 day or 90 day RX: 30 days supply requested please   Preferred Pharmacy with phone number: [facility] in [place]  Local or Mail Order: Local   Ordering Provider: Rosalio  Would the patient rather a call back or a response via [facility]?   Best Call Back Number: [phone]  Additional Information: Please call her back with any questions. Thanks.

## 2024-10-03 ENCOUNTER — LAB VISIT (OUTPATIENT)
Dept: LAB | Facility: HOSPITAL | Age: 74
End: 2024-10-03
Attending: INTERNAL MEDICINE
Payer: MEDICARE

## 2024-10-03 DIAGNOSIS — N18.2 STAGE 2 CHRONIC KIDNEY DISEASE: ICD-10-CM

## 2024-10-03 DIAGNOSIS — E11.9 TYPE 2 DIABETES MELLITUS WITHOUT COMPLICATION, WITH LONG-TERM CURRENT USE OF INSULIN: ICD-10-CM

## 2024-10-03 DIAGNOSIS — Z79.4 TYPE 2 DIABETES MELLITUS WITHOUT COMPLICATION, WITH LONG-TERM CURRENT USE OF INSULIN: ICD-10-CM

## 2024-10-03 LAB
ALBUMIN SERPL-MCNC: 3.3 G/DL (ref 3.4–4.8)
ALBUMIN/GLOB SERPL: 1.2 RATIO (ref 1.1–2)
ALP SERPL-CCNC: 124 UNIT/L (ref 40–150)
ALT SERPL-CCNC: 29 UNIT/L (ref 0–55)
ANION GAP SERPL CALC-SCNC: 6 MEQ/L
AST SERPL-CCNC: 19 UNIT/L (ref 5–34)
BILIRUB SERPL-MCNC: 0.2 MG/DL
BUN SERPL-MCNC: 27.4 MG/DL (ref 9.8–20.1)
CALCIUM SERPL-MCNC: 8.1 MG/DL (ref 8.4–10.2)
CHLORIDE SERPL-SCNC: 110 MMOL/L (ref 98–107)
CO2 SERPL-SCNC: 26 MMOL/L (ref 23–31)
CREAT SERPL-MCNC: 1.2 MG/DL (ref 0.55–1.02)
CREAT/UREA NIT SERPL: 23
GFR SERPLBLD CREATININE-BSD FMLA CKD-EPI: 48 ML/MIN/1.73/M2
GLOBULIN SER-MCNC: 2.8 GM/DL (ref 2.4–3.5)
GLUCOSE SERPL-MCNC: 328 MG/DL (ref 82–115)
POTASSIUM SERPL-SCNC: 4.8 MMOL/L (ref 3.5–5.1)
PROT SERPL-MCNC: 6.1 GM/DL (ref 5.8–7.6)
SODIUM SERPL-SCNC: 142 MMOL/L (ref 136–145)

## 2024-10-03 PROCEDURE — 80053 COMPREHEN METABOLIC PANEL: CPT

## 2024-10-03 PROCEDURE — 36415 COLL VENOUS BLD VENIPUNCTURE: CPT

## 2024-10-03 RX ORDER — PREGABALIN 25 MG/1
25 CAPSULE ORAL
Qty: 90 CAPSULE | Refills: 0 | Status: SHIPPED | OUTPATIENT
Start: 2024-10-03

## 2024-10-03 RX ORDER — CALCIUM CITRATE/VITAMIN D3 200MG-6.25
TABLET ORAL
Qty: 50 EACH | Refills: 0 | Status: SHIPPED | OUTPATIENT
Start: 2024-10-03

## 2024-10-06 ENCOUNTER — HOSPITAL ENCOUNTER (EMERGENCY)
Facility: HOSPITAL | Age: 74
Discharge: HOME | End: 2024-10-06
Payer: MEDICARE

## 2024-10-06 VITALS
TEMPERATURE: 98 F | BODY MASS INDEX: 27.48 KG/M2 | OXYGEN SATURATION: 92 % | RESPIRATION RATE: 17 BRPM | HEART RATE: 78 BPM | HEIGHT: 60 IN | DIASTOLIC BLOOD PRESSURE: 82 MMHG | WEIGHT: 140 LBS | SYSTOLIC BLOOD PRESSURE: 156 MMHG

## 2024-10-06 DIAGNOSIS — N18.9 CHRONIC KIDNEY DISEASE, UNSPECIFIED CKD STAGE: Primary | ICD-10-CM

## 2024-10-06 DIAGNOSIS — R06.02 SOB (SHORTNESS OF BREATH): ICD-10-CM

## 2024-10-06 LAB
ALBUMIN SERPL-MCNC: 3.1 G/DL (ref 3.4–4.8)
ALBUMIN/GLOB SERPL: 0.9 RATIO (ref 1.1–2)
ALP SERPL-CCNC: 224 UNIT/L (ref 40–150)
ALT SERPL-CCNC: 60 UNIT/L (ref 0–55)
ANION GAP SERPL CALC-SCNC: 12 MEQ/L
AST SERPL-CCNC: 32 UNIT/L (ref 5–34)
BACTERIA #/AREA URNS AUTO: NORMAL /HPF
BASOPHILS # BLD AUTO: 0.05 X10(3)/MCL
BASOPHILS NFR BLD AUTO: 0.4 %
BILIRUB SERPL-MCNC: 0.2 MG/DL
BILIRUB UR QL STRIP.AUTO: NEGATIVE
BNP BLD-MCNC: 143.8 PG/ML
BUN SERPL-MCNC: 32.5 MG/DL (ref 9.8–20.1)
CALCIUM SERPL-MCNC: 8.2 MG/DL (ref 8.4–10.2)
CHLORIDE SERPL-SCNC: 102 MMOL/L (ref 98–107)
CLARITY UR: CLEAR
CO2 SERPL-SCNC: 19 MMOL/L (ref 23–31)
COLOR UR AUTO: ABNORMAL
CREAT SERPL-MCNC: 1.62 MG/DL (ref 0.55–1.02)
CREAT/UREA NIT SERPL: 20
EOSINOPHIL # BLD AUTO: 0.22 X10(3)/MCL (ref 0–0.9)
EOSINOPHIL NFR BLD AUTO: 1.8 %
ERYTHROCYTE [DISTWIDTH] IN BLOOD BY AUTOMATED COUNT: 14.3 % (ref 11.5–17)
FIO2: 21
FLUAV AG UPPER RESP QL IA.RAPID: NOT DETECTED
FLUBV AG UPPER RESP QL IA.RAPID: NOT DETECTED
GFR SERPLBLD CREATININE-BSD FMLA CKD-EPI: 33 ML/MIN/1.73/M2
GLOBULIN SER-MCNC: 3.3 GM/DL (ref 2.4–3.5)
GLUCOSE SERPL-MCNC: 444 MG/DL (ref 82–115)
GLUCOSE UR QL STRIP: >=1000
HCO3 UR-SCNC: 22.5 MMOL/L (ref 24–28)
HCT VFR BLD AUTO: 31.4 % (ref 37–47)
HGB BLD-MCNC: 9.7 G/DL (ref 12–16)
HGB UR QL STRIP: NEGATIVE
IMM GRANULOCYTES # BLD AUTO: 0.06 X10(3)/MCL (ref 0–0.04)
IMM GRANULOCYTES NFR BLD AUTO: 0.5 %
KETONES UR QL STRIP: NEGATIVE
LEUKOCYTE ESTERASE UR QL STRIP: NEGATIVE
LYMPHOCYTES # BLD AUTO: 1.44 X10(3)/MCL (ref 0.6–4.6)
LYMPHOCYTES NFR BLD AUTO: 11.9 %
MCH RBC QN AUTO: 25.8 PG (ref 27–31)
MCHC RBC AUTO-ENTMCNC: 30.9 G/DL (ref 33–36)
MCV RBC AUTO: 83.5 FL (ref 80–94)
MONOCYTES # BLD AUTO: 0.89 X10(3)/MCL (ref 0.1–1.3)
MONOCYTES NFR BLD AUTO: 7.4 %
NEUTROPHILS # BLD AUTO: 9.44 X10(3)/MCL (ref 2.1–9.2)
NEUTROPHILS NFR BLD AUTO: 78 %
NITRITE UR QL STRIP: NEGATIVE
PCO2 BLDA: 36.5 MMHG (ref 35–45)
PH SMN: 7.4 [PH] (ref 7.35–7.45)
PH UR STRIP: 6 [PH]
PLATELET # BLD AUTO: 212 X10(3)/MCL (ref 130–400)
PMV BLD AUTO: 10.8 FL (ref 7.4–10.4)
PO2 BLDA: 63 MMHG (ref 80–100)
POC BE: -2 MMOL/L
POC SATURATED O2: 92 % (ref 95–100)
POC TCO2: 24 MMOL/L (ref 23–27)
POCT GLUCOSE: 407 MG/DL (ref 70–110)
POCT GLUCOSE: 426 MG/DL (ref 70–110)
POTASSIUM SERPL-SCNC: 4.1 MMOL/L (ref 3.5–5.1)
PROT SERPL-MCNC: 6.4 GM/DL (ref 5.8–7.6)
PROT UR QL STRIP: 100
RBC # BLD AUTO: 3.76 X10(6)/MCL (ref 4.2–5.4)
RBC #/AREA URNS AUTO: NORMAL /HPF
SAMPLE: ABNORMAL
SARS-COV-2 RNA RESP QL NAA+PROBE: NOT DETECTED
SODIUM SERPL-SCNC: 133 MMOL/L (ref 136–145)
SP GR UR STRIP.AUTO: 1.01 (ref 1–1.03)
SQUAMOUS #/AREA URNS AUTO: NORMAL /HPF
TROPONIN I SERPL-MCNC: 0.01 NG/ML (ref 0–0.04)
UROBILINOGEN UR STRIP-ACNC: 0.2
WBC # BLD AUTO: 12.1 X10(3)/MCL (ref 4.5–11.5)
WBC #/AREA URNS AUTO: NORMAL /HPF

## 2024-10-06 PROCEDURE — 85025 COMPLETE CBC W/AUTO DIFF WBC: CPT

## 2024-10-06 PROCEDURE — 99285 EMERGENCY DEPT VISIT HI MDM: CPT | Mod: 25

## 2024-10-06 PROCEDURE — 36600 WITHDRAWAL OF ARTERIAL BLOOD: CPT

## 2024-10-06 PROCEDURE — 93010 ELECTROCARDIOGRAM REPORT: CPT | Mod: ,,, | Performed by: INTERNAL MEDICINE

## 2024-10-06 PROCEDURE — 93005 ELECTROCARDIOGRAM TRACING: CPT

## 2024-10-06 PROCEDURE — 80053 COMPREHEN METABOLIC PANEL: CPT

## 2024-10-06 PROCEDURE — 82803 BLOOD GASES ANY COMBINATION: CPT

## 2024-10-06 PROCEDURE — 63600175 PHARM REV CODE 636 W HCPCS

## 2024-10-06 PROCEDURE — 0240U COVID/FLU A&B PCR: CPT

## 2024-10-06 PROCEDURE — 83880 ASSAY OF NATRIURETIC PEPTIDE: CPT

## 2024-10-06 PROCEDURE — 82962 GLUCOSE BLOOD TEST: CPT

## 2024-10-06 PROCEDURE — 84484 ASSAY OF TROPONIN QUANT: CPT

## 2024-10-06 PROCEDURE — 99900035 HC TECH TIME PER 15 MIN (STAT)

## 2024-10-06 PROCEDURE — 81003 URINALYSIS AUTO W/O SCOPE: CPT

## 2024-10-06 PROCEDURE — 96372 THER/PROPH/DIAG INJ SC/IM: CPT

## 2024-10-06 RX ADMIN — INSULIN HUMAN 6 UNITS: 100 INJECTION, SOLUTION PARENTERAL at 01:10

## 2024-10-06 NOTE — DISCHARGE INSTRUCTIONS
You presented to the ED today due to SOB.   Your labwork and x-ray was reassuring.   Follow up with your cardiologist as scheduled. If your SOB returns or you feel unwell - please return to the ED.

## 2024-10-06 NOTE — ED PROVIDER NOTES
"Encounter Date: 10/6/2024       History     Chief Complaint   Patient presents with    Shortness of Breath     Patient here via EMS w/ c/o SOB for 1hr PTA. Given duoneb en route and reports relief, denies CP. PMHx DM and Kidney Disease.      73 year old female with past medical history of HTN, DM, HLD, valve insufficiency and CKD presents to the ED via EMS due to SOB that began one hour ago. EMS states they gave patient a round of duonebs and her breathing improved and SOB resolved. Patient denies history of asthma, COPD or asthma. Patient states she has never felt SOB like this before and was concerned she may have had a panic attack. Patient states she was not doing anything when the SOB began. Denies fever, chills, chest pain, cough, abdominal pain, vomiting, diarrhea, or decrease in urine output. Patient states that she was hyperglycemia when EMS got to her so she gave her self her long acting insulin. States she has CKD stage 4 but denies being on dialysis.        Review of patient's allergies indicates:   Allergen Reactions    Baclofen Hallucinations    Donepezil Hallucinations    Erythromycin      Other reaction(s): Upset Stomach    Fluoxetine      Doesn't work    Grass pollen      Other reaction(s): per allergy test    Hydrocodone-acetaminophen      Other reaction(s): "Makes me crazy"    Ivp dye [iodinated contrast media]      Other reaction(s): Rash    Metoclopramide hcl     Rosuvastatin     Iodine Rash    Shrimp Rash     Past Medical History:   Diagnosis Date    Anemia     Anxiety     Cerebrovascular accident     Chronic back pain     Depression     DM (diabetes mellitus)     Elevated ferritin     External hemorrhoids     GERD (gastroesophageal reflux disease)     HLD (hyperlipidemia)     Hypertension     Hypothyroidism, unspecified     CRISTINA (iron deficiency anemia)     Incisional hernia     Internal hemorrhoids     Myoclonus     Osteoporosis     Personal history of colonic polyps     Renal insufficiency  "    Right arm pain     Vertigo      Past Surgical History:   Procedure Laterality Date    biopsy of breast      BONE MARROW BIOPSY W/ ASPIRATION Right 2018    CATARACT EXTRACTION Right 2021     SECTION  1977    CHOLECYSTECTOMY      COLONOSCOPY  2021    Dr. Junior Cardenas  Repeat colon     HERNIA REPAIR      HERNIA REPAIR  2020    HYSTERECTOMY  2013    LAMINECTOMY AND MICRODISCECTOMY LUMBAR SPINE  2018    3 levels.  Dr. Krause    ORIF WRIST FRACTURE      distal.    PATENT DUCTUS ARTERIOUS LIGATION      SHOULDER SURGERY Left     SPINAL CORD STIMULATOR IMPLANT  2018    Dr. Jerry    THYROIDECTOMY       Family History   Problem Relation Name Age of Onset    Diabetes Mother      GI problems Mother      Lung cancer Father Fito March     Diabetes Father Fito March     Alzheimer's disease Father Fito March      Social History     Tobacco Use    Smoking status: Never     Passive exposure: Never    Smokeless tobacco: Never   Substance Use Topics    Alcohol use: Never    Drug use: Never     Review of Systems   Constitutional:  Negative for chills and fever.   HENT:  Negative for congestion.    Respiratory:  Positive for shortness of breath.    Cardiovascular:  Negative for chest pain, palpitations and leg swelling.   Gastrointestinal:  Negative for abdominal pain, nausea and vomiting.   Genitourinary:  Negative for difficulty urinating and dysuria.   Neurological:  Negative for dizziness, numbness and headaches.       Physical Exam     Initial Vitals [10/06/24 0030]   BP Pulse Resp Temp SpO2   (!) 144/60 87 18 98.2 °F (36.8 °C) 99 %      MAP       --         Physical Exam    Nursing note and vitals reviewed.  Constitutional: She appears well-developed and well-nourished.   HENT:   Head: Normocephalic.   Eyes: Conjunctivae are normal. Pupils are equal, round, and reactive to light.   Cardiovascular:  Normal rate and regular rhythm.           Murmur  heard.  Pulmonary/Chest: Breath sounds normal. No respiratory distress. She has no wheezes. She has no rhonchi. She has no rales.   Abdominal: Abdomen is soft. There is no abdominal tenderness.   Musculoskeletal:      Comments: Trace edema to LE      Neurological: She is oriented to person, place, and time.         ED Course   Procedures  Labs Reviewed   B-TYPE NATRIURETIC PEPTIDE - Abnormal       Result Value    Natriuretic Peptide 143.8 (*)    COMPREHENSIVE METABOLIC PANEL - Abnormal    Sodium 133 (*)     Potassium 4.1      Chloride 102      CO2 19 (*)     Glucose 444 (*)     Blood Urea Nitrogen 32.5 (*)     Creatinine 1.62 (*)     Calcium 8.2 (*)     Protein Total 6.4      Albumin 3.1 (*)     Globulin 3.3      Albumin/Globulin Ratio 0.9 (*)     Bilirubin Total 0.2       (*)     ALT 60 (*)     AST 32      eGFR 33      Anion Gap 12.0      BUN/Creatinine Ratio 20     URINALYSIS, REFLEX TO URINE CULTURE - Abnormal    Color, UA Light-Yellow      Appearance, UA Clear      Specific Gravity, UA 1.015      pH, UA 6.0      Protein,  (*)     Glucose, UA >=1000 (*)     Ketones, UA Negative      Blood, UA Negative      Bilirubin, UA Negative      Urobilinogen, UA 0.2      Nitrites, UA Negative      Leukocyte Esterase, UA Negative     CBC WITH DIFFERENTIAL - Abnormal    WBC 12.10 (*)     RBC 3.76 (*)     Hgb 9.7 (*)     Hct 31.4 (*)     MCV 83.5      MCH 25.8 (*)     MCHC 30.9 (*)     RDW 14.3      Platelet 212      MPV 10.8 (*)     Neut % 78.0      Lymph % 11.9      Mono % 7.4      Eos % 1.8      Basophil % 0.4      Lymph # 1.44      Neut # 9.44 (*)     Mono # 0.89      Eos # 0.22      Baso # 0.05      IG# 0.06 (*)     IG% 0.5     POCT GLUCOSE - Abnormal    POCT Glucose 407 (*)    ISTAT PROCEDURE - Abnormal    POC PH 7.397      POC PCO2 36.5      POC PO2 63 (*)     POC HCO3 22.5 (*)     POC BE -2      POC SATURATED O2 92      POC TCO2 24      Sample ARTERIAL      FiO2 21     POCT GLUCOSE - Abnormal    POCT  Glucose 426 (*)    COVID/FLU A&B PCR - Normal    Influenza A PCR Not Detected      Influenza B PCR Not Detected      SARS-CoV-2 PCR Not Detected      Narrative:     The Xpert Xpress SARS-CoV-2/FLU/RSV plus is a rapid, multiplexed real-time PCR test intended for the simultaneous qualitative detection and differentiation of SARS-CoV-2, Influenza A, Influenza B, and respiratory syncytial virus (RSV) viral RNA in either nasopharyngeal swab or nasal swab specimens.         TROPONIN I - Normal    Troponin-I 0.011     URINALYSIS, MICROSCOPIC - Normal    Bacteria, UA None Seen      RBC, UA None Seen      WBC, UA 0-2      Squamous Epithelial Cells, UA Rare     CBC W/ AUTO DIFFERENTIAL    Narrative:     The following orders were created for panel order CBC auto differential.  Procedure                               Abnormality         Status                     ---------                               -----------         ------                     CBC with Differential[[id number]]       Abnormal            Final result                 Please view results for these tests on the individual orders.          Imaging Results              X-Ray Chest 1 View (Preliminary result)  Result time 10/06/24 02:54:34      Wet Read by Julianna Zamora DO (10/06/24 02:54:34, [facility] - Emergency Dept, Emergency Medicine)    No acute inflammatory changes.                                      Medications   insulin regular injection 6 Units 0.06 mL (6 Units Subcutaneous Given 10/6/24 0135)     Medical Decision Making  See ED course for MDM.    Amount and/or Complexity of Data Reviewed  Labs: ordered.  Radiology: ordered and independent interpretation performed.    Risk  OTC drugs.               ED Course as of 10/06/24 0258   Sun Oct 06, 2024   0226 73 year old female with past medical history of HTN, DM, HLD, valve insufficiency and CKD presents to the ED via EMS due to SOB that began one hour ago. EMS states they gave patient a  round of tushar and her breathing improved and SOB resolved. Patient denies history of asthma, COPD or asthma. Patient states she has never felt SOB like this before and was concerned she may have had a panic attack. Patient states she was not doing anything when the SOB began. Denies fever, chills, chest pain, cough, abdominal pain, vomiting, diarrhea, or decrease in urine output. Patient states that she was hyperglycemia when EMS got to her so she gave her self her long acting insulin. States she has CKD stage 4 but denies being on dialysis.  [NP]   0246 On examination patient is awake and alert. VSS. Heart is RRR. Murmur present - history of valve insufficiency. No wheezing. No SOB while talking. No abdominal tenderness. Trace edema to bilateral LE.     Differential dx consists of but not limited to CHF, COPD, pneumonia, viral infection, pulmonary edema, volume overload, electrolyte abnormality, CKD, ESRD, anemia, hyperglycemia, HHS, DKA.  [NP]   0250 CBC shows a mild leukocytosis - afebrile, suspicion for infection is low. H/H is at baseline.   CMP is 1.62 - hx of CKD. CMP does not show any signs that patient needs emergent dialysis.   Glucose is 444 - not in DKA. SQ insulin given and BG was down trending. Held off on IV fluids due to history of CKD stage 4 and significant valve insuffiencey.   Troponin is undetectable and EKG does not show acute ischemic changes. BNP is mild elevated.   ABG does not show any signs of acidosis or hypercapnia. PO2 was slightly low however patient has been satting well on monitor.     CXR is wnl.    Suspicion is patient could have had bronchospasm.  [NP]   0254 Patient ambulated multiple times in the ED without any SOB or hypoxia.     Family updated about ED workup findings and that patient does not meet any admission critieria. They are agreeable with this. [NP]   0254 Patient has a cardiology appointment coming up in a week - informed to follow up with them.  Informed patient  that if her symptoms worsen or she feel unwell - to return to the ED.  [NP]      ED Course User Index  [NP] Julianna Zamora DO                             Clinical Impression:  Final diagnoses:  [R06.02] SOB (shortness of breath)  [N18.9] Chronic kidney disease, unspecified CKD stage (Primary)          ED Disposition Condition    Discharge Stable          ED Prescriptions    None       Follow-up Information       Follow up With Specialties Details Why Contact Info    Noah Tafoya MD Internal Medicine Schedule an appointment as soon as possible for a visit in 1 week  [address] [place] [id number]  [phone]               Julianna Zamora DO  10/06/24 0258

## 2024-10-09 NOTE — PROGRESS NOTES
Internal Medicine    Patient ID: [id number]     Chief Complaint: ER Follow Up (SOB)    HPI:     Alison Langston is a 73 y.o. female here today for a follow up. Presented to ED on 10/6/24 with SOB - resolved with duonebs. Returned the following day, but not as severe. Initially diagnosed with bronchospasm. Admits to orthopnea and PASCUAL. Denies CP, palpitations, or dizziness/near syncope. Has appt with Cardiology, Dr. Valentino next week. Hyperglycemia in 400s and BNP mildly elevated in 140s. Hypertensive today - losartan was reduced by nephrology about a month ago due to hyperkalemia. Nifedipine was reduced about 4 months ago as her BLE edema was attributed to this. Was previously prescribed jardiance but self discontinued due to cost. Accompanied by her 2 daughters today. No other complaints today.     Past Medical History:   Diagnosis Date    Anemia     Anxiety     Asthma     Cerebrovascular accident     Chronic back pain     Depression     DM (diabetes mellitus)     Elevated ferritin     External hemorrhoids     GERD (gastroesophageal reflux disease)     HLD (hyperlipidemia)     Hypertension     Hypothyroidism, unspecified     CRISTINA (iron deficiency anemia)     Incisional hernia     Internal hemorrhoids     Myoclonus     Osteoporosis     Personal history of colonic polyps     Renal insufficiency     Right arm pain     Vertigo         Past Surgical History:   Procedure Laterality Date    biopsy of breast      BONE MARROW BIOPSY W/ ASPIRATION Right 2018    CATARACT EXTRACTION Right 2021     SECTION  1977    CHOLECYSTECTOMY      COLONOSCOPY  2021    Dr. Junior Cardenas  Repeat colon     HERNIA REPAIR      HERNIA REPAIR  2020    HYSTERECTOMY  2013    LAMINECTOMY AND MICRODISCECTOMY LUMBAR SPINE  2018    3 levels.  Dr. Nelida SKY WRIST FRACTURE      distal.    PATENT DUCTUS ARTERIOUS LIGATION      SHOULDER SURGERY Left     SPINAL CORD STIMULATOR IMPLANT      Dr. Jerry     "THYROIDECTOMY          Social History     Tobacco Use    Smoking status: Never     Passive exposure: Never    Smokeless tobacco: Never   Substance and Sexual Activity    Alcohol use: Never    Drug use: Never    Sexual activity: Not on file        Current Outpatient Medications   Medication Instructions    albuterol (PROAIR HFA) 90 mcg/actuation inhaler 2 puffs, Inhalation, Every 6 hours PRN, Rescue    atorvastatin (LIPITOR) 20 mg, Oral, Nightly    blood-glucose meter Misc   true metrix glucose monitor, See Instructions, to check bloodsugars BID, E11.9, # 1 EA, 5 Refill(s), Pharmacy: [id number], 154, cm, Height/Length Dosing, 04/19/21 10:31:00 CDT, 73.45, kg, Weight Dosing, 04/19/21 10:31:00 CDT    clotrimazole-betamethasone 1-0.05% (LOTRISONE) cream Topical (Top), 2 times daily    fluticasone propionate (FLONASE) 50 mcg/actuation nasal spray 1 spray, 2 times daily    folic acid (FOLVITE) 1 mg, Oral, Daily    furosemide (LASIX) 40 mg, Oral, Daily    insulin glargine U-300 conc (TOUJEO MAX U-300 SOLOSTAR) 40 Units, Subcutaneous, Nightly    levothyroxine (SYNTHROID) 200 mcg, Oral    losartan (COZAAR) 100 mg, Oral, Daily    meclizine (ANTIVERT) 12.5 mg, 3 times daily PRN    NIFEdipine (PROCARDIA-XL) 90 mg, Oral, 2 times daily    omeprazole (PRILOSEC) 40 mg, Oral, Every morning    pregabalin (LYRICA) 25 mg, Oral    sodium bicarbonate 650 mg, Oral, 2 times daily    traZODone (DESYREL) 100 mg, Oral, 2 times daily PRN    TRUE METRIX GLUCOSE TEST STRIP Strp USE 1 STRIP TO CHECK GLUCOSE ONCE DAILY    venlafaxine (EFFEXOR-XR) 150 mg, Oral       Review of patient's allergies indicates:   Allergen Reactions    Baclofen Hallucinations    Donepezil Hallucinations    Erythromycin      Other reaction(s): Upset Stomach    Fluoxetine      Doesn't work    Grass pollen      Other reaction(s): per allergy test    Hydrocodone-acetaminophen      Other reaction(s): "Makes me crazy"    Ivp dye [iodinated contrast media]      Other " reaction(s): Rash    Metoclopramide hcl     Rosuvastatin     Iodine Rash    Shrimp Rash        Patient Care Team:  Noah Tafoya MD as PCP - General (Internal Medicine)  Goldy Hand MD as Consulting Physician (Ophthalmology)  Junior Cardenas MD as Consulting Physician (Gastroenterology)  Gallito Cordova MD as Consulting Physician (Obstetrics and Gynecology)  Yonatan Perez MD as Consulting Physician (Orthopedic Surgery)  Mele Smith II, MD as Consulting Physician (Oncology)  Gary Galeano MD as Consulting Physician (Urology)  Valentino, Vernon A., MD as Consulting Physician (Cardiology)  Lynda Swanson LPN as Care Coordinator  Barbara Man MD as Consulting Physician (Nephrology)     Subjective:     Review of Systems   Constitutional:  Negative for fever.   HENT:  Negative for ear pain, rhinorrhea and sore throat.    Respiratory:  Positive for shortness of breath and wheezing.    Cardiovascular:  Positive for leg swelling. Negative for chest pain.   Gastrointestinal:  Negative for abdominal pain and vomiting.   Musculoskeletal:  Negative for neck pain.   Skin:  Negative for rash.   Neurological:  Negative for headaches.       12 point review of systems conducted, negative except as stated in the history of present illness. See HPI for details.    Objective:     Visit Vitals  BP (!) (P) 170/72   Pulse 80   Temp 97.9 °F (36.6 °C)   Resp 16   Ht 5' (1.524 m)   Wt 66.2 kg (146 lb)   SpO2 (!) 94%   BMI 28.51 kg/m²       Physical Exam  Constitutional:       Appearance: Normal appearance.   HENT:      Head: Normocephalic and atraumatic.   Eyes:      Extraocular Movements: Extraocular movements intact.      Pupils: Pupils are equal, round, and reactive to light.   Cardiovascular:      Rate and Rhythm: Normal rate and regular rhythm.      Heart sounds: Murmur heard.   Pulmonary:      Effort: Pulmonary effort is normal.      Breath sounds: Normal breath sounds. No  wheezing, rhonchi or rales.   Musculoskeletal:      Right lower le+ Edema present.      Left lower le+ Edema present.   Skin:     General: Skin is warm and dry.   Neurological:      General: No focal deficit present.      Mental Status: She is alert.   Psychiatric:         Mood and Affect: Mood normal.         Labs Reviewed:     Chemistry:  Lab Results   Component Value Date     (L) 10/06/2024    K 4.1 10/06/2024    BUN 32.5 (H) 10/06/2024    CREATININE 1.62 (H) 10/06/2024    EGFRNORACEVR 33 10/06/2024    GLUCOSE 444 (H) 10/06/2024    CALCIUM 8.2 (L) 10/06/2024    ALKPHOS 224 (H) 10/06/2024    LABPROT 6.4 10/06/2024    ALBUMIN 3.1 (L) 10/06/2024    BILIDIR 0.1 05/10/2022    IBILI 0.10 05/10/2022    AST 32 10/06/2024    ALT 60 (H) 10/06/2024    MG 1.3 (L) 2019    HCTJYPRC39YL 45.3 12/15/2022    TSH 0.020 (L) 12/15/2022    KCLLHR4GIQB 1.07 2021        Lab Results   Component Value Date    HGBA1C 10.5 (H) 2024        Hematology:  Lab Results   Component Value Date    WBC 12.10 (H) 10/06/2024    HGB 9.7 (L) 10/06/2024    HCT 31.4 (L) 10/06/2024     10/06/2024       Lipid Panel:  Lab Results   Component Value Date    CHOL 148 2024    HDL 37 2024    LDL 53.00 2024    TRIG 289 (H) 2024    TOTALCHOLEST 4 2024        Urine:  Lab Results   Component Value Date    APPEARANCEUA Clear 10/06/2024    SGUA 1.015 10/06/2024    PROTEINUA 100 (A) 10/06/2024    KETONESUA Negative 10/06/2024    LEUKOCYTESUR Negative 10/06/2024    RBCUA None Seen 10/06/2024    WBCUA 0-2 10/06/2024    BACTERIA None Seen 10/06/2024    SQEPUA Trace 2024    HYALINECASTS >20 (A) 2024    CREATRANDUR 26.6 (L) 2024    PROTEINURINE 21.0 2024    UPROTCREA 0.8 2024        Assessment:       ICD-10-CM ICD-9-CM   1. Primary hypertension  I10 401.9   2. Type 2 diabetes mellitus with stage 3b chronic kidney disease, with long-term current use of insulin  E11.22 250.40     N18.32 585.3    Z79.4 V58.67   3. PASCUAL (dyspnea on exertion)  R06.09 786.09        Plan:     1. Primary hypertension  Assessment & Plan:  Poorly controlled.   Hypertension Medications               losartan (COZAAR) 100 MG tablet Take 1 tablet (100 mg total) by mouth once daily.    NIFEdipine (PROCARDIA-XL) 60 MG (OSM) 24 hr tablet Take 60 mg by mouth every evening.   Increase losartan back to 100mg daily, add lasix for a few days + jardiance 10mg daily. Follow up next week for repeat BMP  Low Sodium Diet (DASH Diet - Less than 2 grams of sodium per day).  Monitor blood pressure daily and log. Report consistent numbers greater than 140/90.  Maintain healthy weight with goal BMI <30. Exercise 30 minutes per day, 5 days per week.    Orders:  -     Basic Metabolic Panel; Future; Expected date: 10/10/2024    2. Type 2 diabetes mellitus with stage 3b chronic kidney disease, with long-term current use of insulin  Assessment & Plan:  Lab Results   Component Value Date    HGBA1C 10.5 (H) 09/19/2024    HGBA1C 9.9 (H) 01/31/2024    LDL 53.00 01/31/2024    CREATININE 1.62 (H) 10/06/2024      Diabetes Medications               insulin glargine U-300 conc (TOUJEO MAX U-300 SOLOSTAR) 300 unit/mL (3 mL) insulin pen Inject 40 Units into the skin every evening.   Increase Toujeo to 50 units daily (by 2 units every 2-3 days monitoring for hypoglycemia.   Add jardiance 10mg daily. Samples given. Will look into PAP for help with cost  On ARB and Statin according to guidelines.  Follow ADA Diet. Avoid soda, simple sweets, and limit rice/pasta/breads/starches (no more than 45-50 grams per meal).  Maintain healthy weight with goal BMI <30.  Exercise 5 times per week for 30 minutes per day.  Stressed importance of daily foot exams.  Stressed importance of annual dilated eye exam.    Orders:  -     Basic Metabolic Panel; Future; Expected date: 10/10/2024    3. PASCUAL (dyspnea on exertion)  Assessment & Plan:  Needs ECHO + Stress test - follow  up with Dr. Valentino on Monday as planned  Looks to have some cardiomegaly on CXR completed in the ED. If cardiac workup benign and symptoms persist, will perform PFT.   Start Lasix 40mg daily x 3 days.  Follow up next week.  Strict ED precautions given    Orders:  -     Basic Metabolic Panel; Future; Expected date: 10/10/2024    Other orders  -     furosemide (LASIX) 40 MG tablet; Take 1 tablet (40 mg total) by mouth once daily.  Dispense: 30 tablet; Refill: 0         Follow up in about 1 week (around 10/17/2024) for Routine Follow Up. In addition to their scheduled follow up, the patient has also been instructed to follow up on as needed basis.     Future Appointments   Date Time Provider Department Center   [facility]   10/30/2024  1:15 PM INJECTION CHAIR 02, [facility] CHEMOTHERAPY INFUSION [facility] CHEMO [facility]   11/25/2024  1:00 PM Fox Nieto, [facility]   11/27/2024 12:40 [facility]   11/27/2024  1:15 PM INJECTION CHAIR 01, [facility]   12/26/2024  2:00 PM [facility]   12/26/2024  2:30 PM INJECTION CHAIR 01, [facility] CHEMOTHERAPY INFUSION [facility] CHEMO [facility]   1/14/2025 10:00 AM Noah Tafoya MD [facility]   1/23/2025 10:30 AM [facility]   1/23/2025 11:00 AM [facility]   1/23/2025 11:30 AM INJECTION CHAIR 01, [facility] CHEMOTHERAPY INFUSION [facility] CHEMO [facility]      E/M Level Based On Time:   5 minutes spent on reviewing chart, which includes interpreting lab results and diagnostic tests.   30 minutes spent in the room with the patient performing a history and physical exam.   5 minutes spent counseling or educating the patient/caregiver.   5 minutes spent prescribing medications, ordering labwork/diagnostic tests, or placing referrals.   5 minutes spent documenting all relevant clinical  informationin the electronic health record.     Total Time Spent: 50 minutes     E/M Level:  34730 / >40 minutes    JAKOB Santana

## 2024-10-10 ENCOUNTER — OFFICE VISIT (OUTPATIENT)
Dept: INTERNAL MEDICINE | Facility: CLINIC | Age: 74
End: 2024-10-10
Payer: MEDICARE

## 2024-10-10 VITALS
BODY MASS INDEX: 28.66 KG/M2 | WEIGHT: 146 LBS | OXYGEN SATURATION: 94 % | HEART RATE: 80 BPM | RESPIRATION RATE: 16 BRPM | TEMPERATURE: 98 F | HEIGHT: 60 IN

## 2024-10-10 DIAGNOSIS — I10 PRIMARY HYPERTENSION: Primary | ICD-10-CM

## 2024-10-10 DIAGNOSIS — N18.32 TYPE 2 DIABETES MELLITUS WITH STAGE 3B CHRONIC KIDNEY DISEASE, WITH LONG-TERM CURRENT USE OF INSULIN: ICD-10-CM

## 2024-10-10 DIAGNOSIS — E11.22 TYPE 2 DIABETES MELLITUS WITH STAGE 3B CHRONIC KIDNEY DISEASE, WITH LONG-TERM CURRENT USE OF INSULIN: ICD-10-CM

## 2024-10-10 DIAGNOSIS — R06.09 DOE (DYSPNEA ON EXERTION): ICD-10-CM

## 2024-10-10 DIAGNOSIS — Z79.4 TYPE 2 DIABETES MELLITUS WITH STAGE 3B CHRONIC KIDNEY DISEASE, WITH LONG-TERM CURRENT USE OF INSULIN: ICD-10-CM

## 2024-10-10 PROCEDURE — 3066F NEPHROPATHY DOC TX: CPT | Mod: CPTII,,, | Performed by: NURSE PRACTITIONER

## 2024-10-10 PROCEDURE — 1159F MED LIST DOCD IN RCRD: CPT | Mod: CPTII,,, | Performed by: NURSE PRACTITIONER

## 2024-10-10 PROCEDURE — 3008F BODY MASS INDEX DOCD: CPT | Mod: CPTII,,, | Performed by: NURSE PRACTITIONER

## 2024-10-10 PROCEDURE — 4010F ACE/ARB THERAPY RXD/TAKEN: CPT | Mod: CPTII,,, | Performed by: NURSE PRACTITIONER

## 2024-10-10 PROCEDURE — 1101F PT FALLS ASSESS-DOCD LE1/YR: CPT | Mod: CPTII,,, | Performed by: NURSE PRACTITIONER

## 2024-10-10 PROCEDURE — 3061F NEG MICROALBUMINURIA REV: CPT | Mod: CPTII,,, | Performed by: NURSE PRACTITIONER

## 2024-10-10 PROCEDURE — 1160F RVW MEDS BY RX/DR IN RCRD: CPT | Mod: CPTII,,, | Performed by: NURSE PRACTITIONER

## 2024-10-10 PROCEDURE — 99215 OFFICE O/P EST HI 40 MIN: CPT | Mod: ,,, | Performed by: NURSE PRACTITIONER

## 2024-10-10 PROCEDURE — 3288F FALL RISK ASSESSMENT DOCD: CPT | Mod: CPTII,,, | Performed by: NURSE PRACTITIONER

## 2024-10-10 PROCEDURE — 1126F AMNT PAIN NOTED NONE PRSNT: CPT | Mod: CPTII,,, | Performed by: NURSE PRACTITIONER

## 2024-10-10 PROCEDURE — 3046F HEMOGLOBIN A1C LEVEL >9.0%: CPT | Mod: CPTII,,, | Performed by: NURSE PRACTITIONER

## 2024-10-10 RX ORDER — FUROSEMIDE 40 MG/1
40 TABLET ORAL DAILY
Qty: 30 TABLET | Refills: 0 | Status: SHIPPED | OUTPATIENT
Start: 2024-10-10 | End: 2025-10-10

## 2024-10-10 NOTE — ASSESSMENT & PLAN NOTE
Poorly controlled.   Hypertension Medications               losartan (COZAAR) 100 MG tablet Take 1 tablet (100 mg total) by mouth once daily.    NIFEdipine (PROCARDIA-XL) 60 MG (OSM) 24 hr tablet Take 60 mg by mouth every evening.   Increase losartan back to 100mg daily, add lasix for a few days + jardiance 10mg daily. Follow up next week for repeat BMP  Low Sodium Diet (DASH Diet - Less than 2 grams of sodium per day).  Monitor blood pressure daily and log. Report consistent numbers greater than 140/90.  Maintain healthy weight with goal BMI <30. Exercise 30 minutes per day, 5 days per week.

## 2024-10-10 NOTE — ASSESSMENT & PLAN NOTE
Needs ECHO + Stress test - follow up with Dr. Valentino on Monday as planned  Looks to have some cardiomegaly on CXR completed in the ED. If cardiac workup benign and symptoms persist, will perform PFT.   Start Lasix 40mg daily x 3 days.  Follow up next week.  Strict ED precautions given

## 2024-10-10 NOTE — ASSESSMENT & PLAN NOTE
Lab Results   Component Value Date    HGBA1C 10.5 (H) 09/19/2024    HGBA1C 9.9 (H) 01/31/2024    LDL 53.00 01/31/2024    CREATININE 1.62 (H) 10/06/2024      Diabetes Medications               insulin glargine U-300 conc (TOUJEO MAX U-300 SOLOSTAR) 300 unit/mL (3 mL) insulin pen Inject 40 Units into the skin every evening.   Increase Toujeo to 50 units daily (by 2 units every 2-3 days monitoring for hypoglycemia.   Add jardiance 10mg daily. Samples given. Will look into PAP for help with cost  On ARB and Statin according to guidelines.  Follow ADA Diet. Avoid soda, simple sweets, and limit rice/pasta/breads/starches (no more than 45-50 grams per meal).  Maintain healthy weight with goal BMI <30.  Exercise 5 times per week for 30 minutes per day.  Stressed importance of daily foot exams.  Stressed importance of annual dilated eye exam.

## 2024-10-14 LAB
OHS QRS DURATION: 86 MS
OHS QTC CALCULATION: 455 MS

## 2024-10-23 ENCOUNTER — TELEPHONE (OUTPATIENT)
Dept: INTERNAL MEDICINE | Facility: CLINIC | Age: 74
End: 2024-10-23
Payer: MEDICARE

## 2024-10-23 DIAGNOSIS — R11.2 NAUSEA AND VOMITING, UNSPECIFIED VOMITING TYPE: Primary | ICD-10-CM

## 2024-10-23 RX ORDER — ONDANSETRON 8 MG/1
8 TABLET, ORALLY DISINTEGRATING ORAL EVERY 6 HOURS PRN
Qty: 24 TABLET | Refills: 0 | Status: SHIPPED | OUTPATIENT
Start: 2024-10-23

## 2024-10-23 NOTE — TELEPHONE ENCOUNTER
----- Message from Emy sent at 10/23/2024  2:22 PM CDT -----  .Who Called: Alison Langston        Preferred Method of Contact: Phone Call  Patient's Preferred Phone Number on File: [phone]   Best Call Back Number, if different:  Additional Information: pt grandson came over last night with vomiting and now she had it also with nausea she asking for meds

## 2024-10-23 NOTE — TELEPHONE ENCOUNTER
Pt stated that she has been vomiting and having nausea since 11am. Pt stated she is not having any other symptoms. Grandson went over last night with vomiting. Would like some medication called in. Please advise

## 2024-10-26 DIAGNOSIS — F32.A DEPRESSION, UNSPECIFIED DEPRESSION TYPE: ICD-10-CM

## 2024-10-26 DIAGNOSIS — E89.2 POSTPROCEDURAL HYPOPARATHYROIDISM: ICD-10-CM

## 2024-10-28 RX ORDER — VENLAFAXINE HYDROCHLORIDE 150 MG/1
150 CAPSULE, EXTENDED RELEASE ORAL
Qty: 90 CAPSULE | Refills: 0 | Status: SHIPPED | OUTPATIENT
Start: 2024-10-28

## 2024-10-28 RX ORDER — LEVOTHYROXINE SODIUM 200 UG/1
200 TABLET ORAL
Qty: 90 TABLET | Refills: 0 | Status: SHIPPED | OUTPATIENT
Start: 2024-10-28 | End: 2024-11-01

## 2024-10-30 ENCOUNTER — TELEPHONE (OUTPATIENT)
Dept: INFUSION THERAPY | Facility: HOSPITAL | Age: 74
End: 2024-10-30
Payer: MEDICARE

## 2024-11-01 ENCOUNTER — TELEPHONE (OUTPATIENT)
Dept: INTERNAL MEDICINE | Facility: CLINIC | Age: 74
End: 2024-11-01
Payer: MEDICARE

## 2024-11-01 DIAGNOSIS — K21.9 GASTROESOPHAGEAL REFLUX DISEASE, UNSPECIFIED WHETHER ESOPHAGITIS PRESENT: ICD-10-CM

## 2024-11-01 DIAGNOSIS — E89.2 POSTPROCEDURAL HYPOPARATHYROIDISM: ICD-10-CM

## 2024-11-01 RX ORDER — LEVOTHYROXINE SODIUM 200 UG/1
200 TABLET ORAL
Qty: 90 TABLET | Refills: 0 | Status: SHIPPED | OUTPATIENT
Start: 2024-11-01

## 2024-11-01 RX ORDER — OMEPRAZOLE 40 MG/1
40 CAPSULE, DELAYED RELEASE ORAL EVERY MORNING
Qty: 90 CAPSULE | Refills: 0 | Status: SHIPPED | OUTPATIENT
Start: 2024-11-01

## 2024-11-01 RX ORDER — OMEPRAZOLE 40 MG/1
40 CAPSULE, DELAYED RELEASE ORAL EVERY MORNING
Qty: 90 CAPSULE | Refills: 0 | Status: SHIPPED | OUTPATIENT
Start: 2024-11-01 | End: 2024-11-01 | Stop reason: SDUPTHER

## 2024-11-04 ENCOUNTER — TELEPHONE (OUTPATIENT)
Dept: INTERNAL MEDICINE | Facility: CLINIC | Age: 74
End: 2024-11-04
Payer: MEDICARE

## 2024-11-04 NOTE — TELEPHONE ENCOUNTER
----- Message from AllegraElite Form sent at 11/4/2024  9:19 AM CST -----  Who Called: Alison RYDER Vázquezry    Patient is returning phone call    Who Left Message for Patient:  Does the patient know what this is regarding?:coughing      Preferred Method of Contact: Phone Call  Patient's Preferred Phone Number on File: [phone]   Best Call Back Number, if different:  Additional Information: pt clld to speak with nurse regarding coughing non stop pls advise

## 2024-11-05 ENCOUNTER — OFFICE VISIT (OUTPATIENT)
Dept: INTERNAL MEDICINE | Facility: CLINIC | Age: 74
End: 2024-11-05
Payer: MEDICARE

## 2024-11-05 VITALS
SYSTOLIC BLOOD PRESSURE: 142 MMHG | WEIGHT: 140.81 LBS | HEIGHT: 60 IN | DIASTOLIC BLOOD PRESSURE: 60 MMHG | OXYGEN SATURATION: 98 % | BODY MASS INDEX: 27.64 KG/M2 | HEART RATE: 74 BPM

## 2024-11-05 DIAGNOSIS — I10 PRIMARY HYPERTENSION: ICD-10-CM

## 2024-11-05 DIAGNOSIS — R05.1 ACUTE COUGH: ICD-10-CM

## 2024-11-05 DIAGNOSIS — J01.90 ACUTE NON-RECURRENT SINUSITIS, UNSPECIFIED LOCATION: Primary | ICD-10-CM

## 2024-11-05 PROBLEM — J32.9 SINUSITIS: Status: ACTIVE | Noted: 2024-11-05

## 2024-11-05 PROCEDURE — 3077F SYST BP >= 140 MM HG: CPT | Mod: CPTII,,, | Performed by: NURSE PRACTITIONER

## 2024-11-05 PROCEDURE — 3008F BODY MASS INDEX DOCD: CPT | Mod: CPTII,,, | Performed by: NURSE PRACTITIONER

## 2024-11-05 PROCEDURE — 99214 OFFICE O/P EST MOD 30 MIN: CPT | Mod: ,,, | Performed by: NURSE PRACTITIONER

## 2024-11-05 PROCEDURE — 3078F DIAST BP <80 MM HG: CPT | Mod: CPTII,,, | Performed by: NURSE PRACTITIONER

## 2024-11-05 PROCEDURE — 3066F NEPHROPATHY DOC TX: CPT | Mod: CPTII,,, | Performed by: NURSE PRACTITIONER

## 2024-11-05 PROCEDURE — 1159F MED LIST DOCD IN RCRD: CPT | Mod: CPTII,,, | Performed by: NURSE PRACTITIONER

## 2024-11-05 PROCEDURE — 3046F HEMOGLOBIN A1C LEVEL >9.0%: CPT | Mod: CPTII,,, | Performed by: NURSE PRACTITIONER

## 2024-11-05 PROCEDURE — 3288F FALL RISK ASSESSMENT DOCD: CPT | Mod: CPTII,,, | Performed by: NURSE PRACTITIONER

## 2024-11-05 PROCEDURE — 1101F PT FALLS ASSESS-DOCD LE1/YR: CPT | Mod: CPTII,,, | Performed by: NURSE PRACTITIONER

## 2024-11-05 PROCEDURE — 4010F ACE/ARB THERAPY RXD/TAKEN: CPT | Mod: CPTII,,, | Performed by: NURSE PRACTITIONER

## 2024-11-05 PROCEDURE — 3061F NEG MICROALBUMINURIA REV: CPT | Mod: CPTII,,, | Performed by: NURSE PRACTITIONER

## 2024-11-05 PROCEDURE — 1160F RVW MEDS BY RX/DR IN RCRD: CPT | Mod: CPTII,,, | Performed by: NURSE PRACTITIONER

## 2024-11-05 RX ORDER — AMOXICILLIN AND CLAVULANATE POTASSIUM 875; 125 MG/1; MG/1
1 TABLET, FILM COATED ORAL EVERY 12 HOURS
Qty: 14 TABLET | Refills: 0 | Status: SHIPPED | OUTPATIENT
Start: 2024-11-05 | End: 2024-11-12

## 2024-11-05 RX ORDER — BENZONATATE 200 MG/1
200 CAPSULE ORAL 3 TIMES DAILY PRN
Qty: 30 CAPSULE | Refills: 0 | Status: SHIPPED | OUTPATIENT
Start: 2024-11-05 | End: 2024-11-15

## 2024-11-05 NOTE — ASSESSMENT & PLAN NOTE
Onset greater than 7 days, consider bacterial cause.  Mild improvement with OTC decongestant.  RX Augmentin 875-125mg BID x 7 days.  Recommend use of OTC claritin/zyrtec/allegra and flonase.  Avoid Irritants and allergens.  Wash hands frequently to prevent common colds.  Drink plenty of fluids to thin mucous and/or use humidifier.    Consider NeilMed Saline Sinus Rinse BID.  Apply warm compress for sinus pain.  Notify clinic if new or worsening symptoms

## 2024-11-05 NOTE — ASSESSMENT & PLAN NOTE
Slightly elevated in office today.  Patient takes she forgot to take losartan this morning.  Also reports added salt to gumbo at lunchtime.  Continue losartan 100 mg daily and nifedipine 90 mg b.i.d.  Encouraged low-sodium diet   Monitor blood pressure daily and keep log.  Report consistent numbers greater than 140/90.

## 2024-11-05 NOTE — PROGRESS NOTES
Internal Medicine    Patient Name:  Alison Langston   Patient ID: [id number]     Chief Complaint: Nasal Congestion and Sinus Problem      HPI:     Alison Langston is a 73 y.o. female, known to Dr Howard, is here today for requested visit.  Patient reports sinus congestion, productive cough with green mucus, low-grade fever, chills.  Symptoms started last week.  She has tried OTC decongestants with some relief.  Also reports nausea and vomiting x1 day prior to sinus congestion.  No recent sick contacts.    Blood pressure elevated in office.  Initial blood pressure 160/60.  Repeat blood pressure 142/60.  Patient reports she may have forgotten to take losartan this morning.  She also states that she added salt to her gumbo at lunchtime.    Last AWV: 24      Past Medical History:   Diagnosis Date    Anemia     Anxiety     Asthma     Cerebrovascular accident     Chronic back pain     Depression     DM (diabetes mellitus)     Elevated ferritin     External hemorrhoids     GERD (gastroesophageal reflux disease)     HLD (hyperlipidemia)     Hypertension     Hypothyroidism, unspecified     CRISTINA (iron deficiency anemia)     Incisional hernia     Internal hemorrhoids     Myoclonus     Osteoporosis     Personal history of colonic polyps     Renal insufficiency     Right arm pain     Vertigo         Past Surgical History:   Procedure Laterality Date    biopsy of breast      BONE MARROW BIOPSY W/ ASPIRATION Right 2018    CATARACT EXTRACTION Right 2021     SECTION  1977    CHOLECYSTECTOMY      COLONOSCOPY  2021    Dr. Junior Cardenas  Repeat colon     HERNIA REPAIR      HERNIA REPAIR  2020    HYSTERECTOMY  2013    LAMINECTOMY AND MICRODISCECTOMY LUMBAR SPINE  2018    3 levels.  Dr. Krause    ORIF WRIST FRACTURE      distal.    PATENT DUCTUS ARTERIOUS LIGATION      SHOULDER SURGERY Left     SPINAL CORD STIMULATOR IMPLANT  2018    Dr. Jerry    THYROIDECTOMY          Social History      Tobacco Use    Smoking status: Never     Passive exposure: Never    Smokeless tobacco: Never   Substance and Sexual Activity    Alcohol use: Never    Drug use: Never    Sexual activity: Not on file        Current Outpatient Medications   Medication Instructions    albuterol (PROAIR HFA) 90 mcg/actuation inhaler 2 puffs, Inhalation, Every 6 hours PRN, Rescue    amoxicillin-clavulanate 875-125mg (AUGMENTIN) 875-125 mg per tablet 1 tablet, Oral, Every 12 hours    atorvastatin (LIPITOR) 20 mg, Oral, Nightly    benzonatate (TESSALON) 200 mg, Oral, 3 times daily PRN    blood-glucose meter Misc   true metrix glucose monitor, See Instructions, to check bloodsugars BID, E11.9, # 1 EA, 5 Refill(s), Pharmacy: [id number], 154, cm, Height/Length Dosing, 04/19/21 10:31:00 CDT, 73.45, kg, Weight Dosing, 04/19/21 10:31:00 CDT    clotrimazole-betamethasone 1-0.05% (LOTRISONE) cream Topical (Top), 2 times daily    fluticasone propionate (FLONASE) 50 mcg/actuation nasal spray 1 spray, 2 times daily    folic acid (FOLVITE) 1 mg, Oral, Daily    furosemide (LASIX) 40 mg, Oral, Daily    insulin glargine U-300 conc (TOUJEO MAX U-300 SOLOSTAR) 40 Units, Subcutaneous, Nightly    levothyroxine (SYNTHROID) 200 mcg, Oral    losartan (COZAAR) 100 mg, Oral, Daily    meclizine (ANTIVERT) 12.5 mg, 3 times daily PRN    NIFEdipine (PROCARDIA-XL) 90 mg, Oral, 2 times daily    omeprazole (PRILOSEC) 40 mg, Oral, Every morning    ondansetron (ZOFRAN-ODT) 8 mg, Oral, Every 6 hours PRN    pregabalin (LYRICA) 25 mg, Oral    sodium bicarbonate 650 mg, Oral, 2 times daily    traZODone (DESYREL) 100 mg, Oral, 2 times daily PRN    TRUE METRIX GLUCOSE TEST STRIP Strp USE 1 STRIP TO CHECK GLUCOSE ONCE DAILY    venlafaxine (EFFEXOR-XR) 150 mg, Oral       Review of patient's allergies indicates:   Allergen Reactions    Baclofen Hallucinations    Donepezil Hallucinations    Erythromycin      Other reaction(s): Upset Stomach    Fluoxetine      Doesn't  "work    Grass pollen      Other reaction(s): per allergy test    Hydrocodone-acetaminophen      Other reaction(s): "Makes me crazy"    Ivp dye [iodinated contrast media]      Other reaction(s): Rash    Metoclopramide hcl     Rosuvastatin     Iodine Rash    Shrimp Rash        Patient Care Team:  Noah Tafoya MD as PCP - General (Internal Medicine)  Goldy Hand MD as Consulting Physician (Ophthalmology)  Junior Cardenas MD as Consulting Physician (Gastroenterology)  Gallito Cordova MD as Consulting Physician (Obstetrics and Gynecology)  Yonatan Perez MD as Consulting Physician (Orthopedic Surgery)  Mele Smith II, MD as Consulting Physician (Oncology)  Gary Galeano MD as Consulting Physician (Urology)  Valentino, Vernon A., MD as Consulting Physician (Cardiology)  Lynda Swanson LPN as Care Coordinator  Barbara Man MD as Consulting Physician (Nephrology)     Subjective:     Review of Systems   Constitutional:  Positive for chills and fever. Negative for appetite change and diaphoresis.   HENT:  Positive for congestion and sinus pressure. Negative for ear pain and sore throat.    Eyes:  Negative for pain and visual disturbance.   Respiratory:  Negative for cough, shortness of breath and wheezing.    Cardiovascular:  Negative for chest pain, palpitations and leg swelling.   Gastrointestinal:  Negative for abdominal pain, constipation, diarrhea, nausea and vomiting.   Endocrine: Negative for cold intolerance and heat intolerance.   Genitourinary:  Negative for difficulty urinating, dysuria, frequency and hematuria.   Musculoskeletal:  Negative for arthralgias and myalgias.   Skin:  Negative for color change and rash.   Allergic/Immunologic: Negative.    Neurological: Negative.  Negative for dizziness, syncope, light-headedness and numbness.   Hematological: Negative.    Psychiatric/Behavioral: Negative.  Negative for dysphoric mood. The patient is not " nervous/anxious.    All other systems reviewed and are negative.      Objective:     Visit Vitals  BP (!) 142/60   Pulse 74   Ht 5' (1.524 m)   Wt 63.9 kg (140 lb 12.8 oz)   SpO2 98%   BMI 27.50 kg/m²       Physical Exam  Vitals and nursing note reviewed.   Constitutional:       General: She is not in acute distress.     Appearance: She is not ill-appearing.   HENT:      Head: Normocephalic and atraumatic.      Right Ear: Tympanic membrane and ear canal normal.      Left Ear: Tympanic membrane and ear canal normal.      Nose: Congestion present. No rhinorrhea.      Mouth/Throat:      Mouth: Mucous membranes are moist.      Pharynx: Oropharynx is clear. No oropharyngeal exudate or posterior oropharyngeal erythema.   Eyes:      General: No scleral icterus.     Extraocular Movements: Extraocular movements intact.      Conjunctiva/sclera: Conjunctivae normal.      Pupils: Pupils are equal, round, and reactive to light.   Neck:      Vascular: No carotid bruit.   Cardiovascular:      Rate and Rhythm: Normal rate and regular rhythm.      Heart sounds: Normal heart sounds. No murmur heard.     No friction rub. No gallop.   Pulmonary:      Effort: Pulmonary effort is normal. No respiratory distress.      Breath sounds: Normal breath sounds. No wheezing, rhonchi or rales.   Abdominal:      General: Bowel sounds are normal. There is no distension.      Palpations: Abdomen is soft. There is no mass.      Tenderness: There is no abdominal tenderness.   Musculoskeletal:         General: Normal range of motion.      Cervical back: Normal range of motion and neck supple.   Lymphadenopathy:      Cervical: No cervical adenopathy.   Skin:     General: Skin is warm and dry.      Capillary Refill: Capillary refill takes less than 2 seconds.   Neurological:      General: No focal deficit present.      Mental Status: She is alert and oriented to person, place, and time.   Psychiatric:         Mood and Affect: Mood normal.          Behavior: Behavior normal.         Labs Reviewed:     Chemistry:  Lab Results   Component Value Date     (L) 10/06/2024    K 4.1 10/06/2024    BUN 32.5 (H) 10/06/2024    CREATININE 1.62 (H) 10/06/2024    EGFRNORACEVR 33 10/06/2024    GLUCOSE 444 (H) 10/06/2024    CALCIUM 8.2 (L) 10/06/2024    ALKPHOS 224 (H) 10/06/2024    LABPROT 6.4 10/06/2024    ALBUMIN 3.1 (L) 10/06/2024    BILIDIR 0.1 05/10/2022    IBILI 0.10 05/10/2022    AST 32 10/06/2024    ALT 60 (H) 10/06/2024    MG 1.3 (L) 12/21/2019    FLVNHKUM59VM 45.3 12/15/2022    TSH 0.020 (L) 12/15/2022    TWXALV2XJSL 1.07 04/08/2021        Lab Results   Component Value Date    HGBA1C 10.5 (H) 09/19/2024        Hematology:  Lab Results   Component Value Date    WBC 12.10 (H) 10/06/2024    HGB 9.7 (L) 10/06/2024    HCT 31.4 (L) 10/06/2024     10/06/2024       Lipid Panel:  Lab Results   Component Value Date    CHOL 148 01/31/2024    HDL 37 01/31/2024    LDL 53.00 01/31/2024    TRIG 289 (H) 01/31/2024    TOTALCHOLEST 4 01/31/2024        Urine:  Lab Results   Component Value Date    APPEARANCEUA Clear 10/06/2024    SGUA 1.015 10/06/2024    PROTEINUA 100 (A) 10/06/2024    KETONESUA Negative 10/06/2024    LEUKOCYTESUR Negative 10/06/2024    RBCUA None Seen 10/06/2024    WBCUA 0-2 10/06/2024    BACTERIA None Seen 10/06/2024    SQEPUA Trace 07/18/2024    HYALINECASTS >20 (A) 01/31/2024    CREATRANDUR 26.6 (L) 07/18/2024    PROTEINURINE 21.0 07/18/2024    UPROTCREA 0.8 07/18/2024        Assessment:       ICD-10-CM ICD-9-CM   1. Acute non-recurrent sinusitis, unspecified location  J01.90 461.9   2. Acute cough  R05.1 786.2   3. Primary hypertension  I10 401.9        Plan:     1. Acute non-recurrent sinusitis, unspecified location  Assessment & Plan:  Onset greater than 7 days, consider bacterial cause.  Mild improvement with OTC decongestant.  RX Augmentin 875-125mg BID x 7 days.  Recommend use of OTC claritin/zyrtec/allegra and flonase.  Avoid Irritants and  allergens.  Wash hands frequently to prevent common colds.  Drink plenty of fluids to thin mucous and/or use humidifier.    Consider NeilMed Saline Sinus Rinse BID.  Apply warm compress for sinus pain.  Notify clinic if new or worsening symptoms     Orders:  -     amoxicillin-clavulanate 875-125mg (AUGMENTIN) 875-125 mg per tablet; Take 1 tablet by mouth every 12 (twelve) hours. for 7 days  Dispense: 14 tablet; Refill: 0    2. Acute cough  Assessment & Plan:  RX tessalon 200mg TID as needed sent to pharmacy.  Encouraged increase fluid hydration.    Orders:  -     benzonatate (TESSALON) 200 MG capsule; Take 1 capsule (200 mg total) by mouth 3 (three) times daily as needed for Cough.  Dispense: 30 capsule; Refill: 0    3. Primary hypertension  Assessment & Plan:  Slightly elevated in office today.  Patient takes she forgot to take losartan this morning.  Also reports added salt to gumbo at lunchtime.  Continue losartan 100 mg daily and nifedipine 90 mg b.i.d.  Encouraged low-sodium diet   Monitor blood pressure daily and keep log.  Report consistent numbers greater than 140/90.               Follow up for Previously scheduled and PRN if need. In addition to their scheduled follow up, the patient has also been instructed to follow up on as needed basis.       Future Appointments   Date Time Provider Department Center   11/25/2024  1:00 PM Fox Nieto, P [facility]   [facility]2024 11[facility]20 [facility]   12/3/2024 11:45 AM INJECTION CHAIR 01, [facility] CHEMOTHERAPY INFUSION [facility] CHEMO [facility]   1/7/2025 11:10 AM [facility]   1/7/2025 11:30 AM INJECTION CHAIR 02, [facility] CHEMOTHERAPY INFUSION [facility] CHEMO [facility]   1/14/2025 10:00 AM Noah Tafoya MD [facility] [id number]   1/23/2025 10:30 AM [facility]   1/23/2025 11:00 AM [facility]le [facility] II[facility]   2/4/2025 11:20 AM LAB, [facility] LAB [facility]   2/4/2025  11:45 AM INJECTION CHAIR 01, [facility] CHEMOTHERAPY INFUSION [facility] CHEMO [facility]   7/7/2025 11:30 AM INJECTION CHAIR 02, [facility] CHEMOTHERAPY INFUSION [facility]          JAKOB Almanza

## 2024-11-12 ENCOUNTER — TELEPHONE (OUTPATIENT)
Dept: INTERNAL MEDICINE | Facility: CLINIC | Age: 74
End: 2024-11-12
Payer: MEDICARE

## 2024-11-12 NOTE — TELEPHONE ENCOUNTER
----- Message from Nurse Bateman sent at 11/12/2024 11:08 AM CST -----  Regarding: eye exam  Photo needs to be redone  ----- Message -----  From: Nadeem Hopkins, LAURITA  Sent: 9/16/2024   2:18 PM CST  To: Noah Tafoya MD; #    Poor photo quality OU--photos should be retaken, or set up for next available routine eye exam

## 2024-12-30 ENCOUNTER — LAB VISIT (OUTPATIENT)
Dept: LAB | Facility: HOSPITAL | Age: 74
End: 2024-12-30
Attending: INTERNAL MEDICINE
Payer: MEDICARE

## 2024-12-30 ENCOUNTER — TELEPHONE (OUTPATIENT)
Dept: INTERNAL MEDICINE | Facility: CLINIC | Age: 74
End: 2024-12-30
Payer: MEDICARE

## 2024-12-30 DIAGNOSIS — N18.32 TYPE 2 DIABETES MELLITUS WITH STAGE 3B CHRONIC KIDNEY DISEASE, WITH LONG-TERM CURRENT USE OF INSULIN: ICD-10-CM

## 2024-12-30 DIAGNOSIS — N18.2 STAGE 2 CHRONIC KIDNEY DISEASE: ICD-10-CM

## 2024-12-30 DIAGNOSIS — Z79.4 TYPE 2 DIABETES MELLITUS WITH STAGE 3B CHRONIC KIDNEY DISEASE, WITH LONG-TERM CURRENT USE OF INSULIN: ICD-10-CM

## 2024-12-30 DIAGNOSIS — Z79.4 TYPE 2 DIABETES MELLITUS WITH STAGE 3B CHRONIC KIDNEY DISEASE, WITH LONG-TERM CURRENT USE OF INSULIN: Primary | ICD-10-CM

## 2024-12-30 DIAGNOSIS — E11.22 TYPE 2 DIABETES MELLITUS WITH STAGE 3B CHRONIC KIDNEY DISEASE, WITH LONG-TERM CURRENT USE OF INSULIN: ICD-10-CM

## 2024-12-30 DIAGNOSIS — I10 PRIMARY HYPERTENSION: ICD-10-CM

## 2024-12-30 DIAGNOSIS — N18.32 TYPE 2 DIABETES MELLITUS WITH STAGE 3B CHRONIC KIDNEY DISEASE, WITH LONG-TERM CURRENT USE OF INSULIN: Primary | ICD-10-CM

## 2024-12-30 DIAGNOSIS — E11.22 TYPE 2 DIABETES MELLITUS WITH STAGE 3B CHRONIC KIDNEY DISEASE, WITH LONG-TERM CURRENT USE OF INSULIN: Primary | ICD-10-CM

## 2024-12-30 LAB
ALBUMIN SERPL-MCNC: 3.9 G/DL (ref 3.4–4.8)
ALBUMIN/GLOB SERPL: 1.2 RATIO (ref 1.1–2)
ALP SERPL-CCNC: 138 UNIT/L (ref 40–150)
ALT SERPL-CCNC: 32 UNIT/L (ref 0–55)
ANION GAP SERPL CALC-SCNC: 9 MEQ/L
AST SERPL-CCNC: 25 UNIT/L (ref 5–34)
BACTERIA #/AREA URNS AUTO: ABNORMAL /HPF
BILIRUB SERPL-MCNC: 0.3 MG/DL
BILIRUB UR QL STRIP.AUTO: NEGATIVE
BUN SERPL-MCNC: 36.4 MG/DL (ref 9.8–20.1)
CALCIUM SERPL-MCNC: 9.4 MG/DL (ref 8.4–10.2)
CHLORIDE SERPL-SCNC: 107 MMOL/L (ref 98–107)
CLARITY UR: CLEAR
CO2 SERPL-SCNC: 28 MMOL/L (ref 23–31)
COLOR UR AUTO: ABNORMAL
CREAT SERPL-MCNC: 1.06 MG/DL (ref 0.55–1.02)
CREAT UR-MCNC: 71.6 MG/DL (ref 45–106)
CREAT UR-MCNC: 71.8 MG/DL (ref 45–106)
CREAT/UREA NIT SERPL: 34
GFR SERPLBLD CREATININE-BSD FMLA CKD-EPI: 55 ML/MIN/1.73/M2
GLOBULIN SER-MCNC: 3.2 GM/DL (ref 2.4–3.5)
GLUCOSE SERPL-MCNC: 191 MG/DL (ref 82–115)
GLUCOSE UR QL STRIP: NORMAL
HGB UR QL STRIP: ABNORMAL
KETONES UR QL STRIP: NEGATIVE
LEUKOCYTE ESTERASE UR QL STRIP: NEGATIVE
MICROALBUMIN UR-MCNC: >2000 UG/ML
MICROALBUMIN/CREAT RATIO PNL UR: ABNORMAL
NITRITE UR QL STRIP: NEGATIVE
PH UR STRIP: 6.5 [PH]
PHOSPHATE SERPL-MCNC: 3.7 MG/DL (ref 2.3–4.7)
POTASSIUM SERPL-SCNC: 5 MMOL/L (ref 3.5–5.1)
PROT SERPL-MCNC: 7.1 GM/DL (ref 5.8–7.6)
PROT UR QL STRIP: ABNORMAL
PROT UR STRIP-MCNC: 496.4 MG/DL
PTH-INTACT SERPL-MCNC: 92.9 PG/ML (ref 8.7–77)
RBC #/AREA URNS AUTO: ABNORMAL /HPF
SODIUM SERPL-SCNC: 144 MMOL/L (ref 136–145)
SP GR UR STRIP.AUTO: 1.02 (ref 1–1.03)
SQUAMOUS #/AREA URNS LPF: ABNORMAL /HPF
URINE PROTEIN/CREATININE RATIO (OLG): 6.9
UROBILINOGEN UR STRIP-ACNC: NORMAL
WBC #/AREA URNS AUTO: ABNORMAL /HPF

## 2024-12-30 PROCEDURE — 36415 COLL VENOUS BLD VENIPUNCTURE: CPT

## 2024-12-30 PROCEDURE — 84156 ASSAY OF PROTEIN URINE: CPT

## 2024-12-30 PROCEDURE — 82570 ASSAY OF URINE CREATININE: CPT

## 2024-12-30 PROCEDURE — 84100 ASSAY OF PHOSPHORUS: CPT

## 2024-12-30 PROCEDURE — 83970 ASSAY OF PARATHORMONE: CPT

## 2024-12-30 PROCEDURE — 81001 URINALYSIS AUTO W/SCOPE: CPT

## 2024-12-30 PROCEDURE — 80053 COMPREHEN METABOLIC PANEL: CPT

## 2024-12-30 NOTE — TELEPHONE ENCOUNTER
----- Message from Med Assistant Cox sent at 12/30/2024 11:00 AM CST -----  Regarding: PV 1/14/25 @ 10:00 Dr. Howard  1. Are there any outstanding tasks in the patient's chart? Yes, fasting labs    2. Is there any documentation in the chart? No    3.Has patient been seen in an ER, Urgent care clinic, or been admitted since last visit?  If yes, When, where, and why    4. Has patient seen any other healthcare providers since last visit?  If yes, when, where, and why    5. Has patient had any bloodwork or XR done since last visit?    6. Is patient signed up for patient portal?    7. Does patient have home blood pressure cuff?   If yes, please have patient bring to appointment for validation.

## 2025-01-13 ENCOUNTER — INFUSION (OUTPATIENT)
Dept: INFUSION THERAPY | Facility: HOSPITAL | Age: 75
End: 2025-01-13
Attending: INTERNAL MEDICINE
Payer: MEDICARE

## 2025-01-13 DIAGNOSIS — M81.0 OSTEOPOROSIS, UNSPECIFIED OSTEOPOROSIS TYPE, UNSPECIFIED PATHOLOGICAL FRACTURE PRESENCE: ICD-10-CM

## 2025-01-13 DIAGNOSIS — D64.9 NORMOCYTIC ANEMIA: Primary | ICD-10-CM

## 2025-01-13 PROCEDURE — 63600175 PHARM REV CODE 636 W HCPCS: Mod: JZ,TB | Performed by: INTERNAL MEDICINE

## 2025-01-13 PROCEDURE — 96372 THER/PROPH/DIAG INJ SC/IM: CPT

## 2025-01-13 RX ADMIN — DENOSUMAB 60 MG: 60 INJECTION SUBCUTANEOUS at 02:01

## 2025-01-13 NOTE — PLAN OF CARE
Retacrit held for Hgb 11.5. Prolia q6m given. Tolerated well. Denies any dental procedures in the last 3 months or any scheduled dental procedures in the next 3 months. Next appts given to pt. Dc'd home in stable condition.

## 2025-01-14 ENCOUNTER — OFFICE VISIT (OUTPATIENT)
Dept: INTERNAL MEDICINE | Facility: CLINIC | Age: 75
End: 2025-01-14
Payer: MEDICARE

## 2025-01-14 VITALS
WEIGHT: 145 LBS | RESPIRATION RATE: 16 BRPM | HEART RATE: 65 BPM | TEMPERATURE: 98 F | HEIGHT: 60 IN | BODY MASS INDEX: 28.47 KG/M2 | SYSTOLIC BLOOD PRESSURE: 146 MMHG | DIASTOLIC BLOOD PRESSURE: 72 MMHG | OXYGEN SATURATION: 99 %

## 2025-01-14 DIAGNOSIS — Z79.4 TYPE 2 DIABETES MELLITUS WITH STAGE 3B CHRONIC KIDNEY DISEASE, WITH LONG-TERM CURRENT USE OF INSULIN: ICD-10-CM

## 2025-01-14 DIAGNOSIS — N04.9 NEPHROTIC SYNDROME: ICD-10-CM

## 2025-01-14 DIAGNOSIS — N18.32 TYPE 2 DIABETES MELLITUS WITH STAGE 3B CHRONIC KIDNEY DISEASE, WITH LONG-TERM CURRENT USE OF INSULIN: ICD-10-CM

## 2025-01-14 DIAGNOSIS — E11.22 TYPE 2 DIABETES MELLITUS WITH STAGE 3B CHRONIC KIDNEY DISEASE, WITH LONG-TERM CURRENT USE OF INSULIN: ICD-10-CM

## 2025-01-14 DIAGNOSIS — E11.3213 TYPE 2 DIABETES MELLITUS WITH BOTH EYES AFFECTED BY MILD NONPROLIFERATIVE RETINOPATHY AND MACULAR EDEMA, WITH LONG-TERM CURRENT USE OF INSULIN: Primary | ICD-10-CM

## 2025-01-14 DIAGNOSIS — Z79.4 TYPE 2 DIABETES MELLITUS WITH BOTH EYES AFFECTED BY MILD NONPROLIFERATIVE RETINOPATHY AND MACULAR EDEMA, WITH LONG-TERM CURRENT USE OF INSULIN: Primary | ICD-10-CM

## 2025-01-14 DIAGNOSIS — N18.4 CHRONIC KIDNEY DISEASE, STAGE 4 (SEVERE): ICD-10-CM

## 2025-01-14 PROBLEM — K43.2 INCISIONAL HERNIA: Status: RESOLVED | Noted: 2023-08-24 | Resolved: 2025-01-14

## 2025-01-14 PROBLEM — K21.9 GERD (GASTROESOPHAGEAL REFLUX DISEASE): Status: RESOLVED | Noted: 2023-08-24 | Resolved: 2025-01-14

## 2025-01-14 PROBLEM — M54.9 CHRONIC BACK PAIN: Status: RESOLVED | Noted: 2022-05-31 | Resolved: 2025-01-14

## 2025-01-14 PROBLEM — Z00.00 MEDICARE ANNUAL WELLNESS VISIT, SUBSEQUENT: Status: RESOLVED | Noted: 2023-06-01 | Resolved: 2025-01-14

## 2025-01-14 PROBLEM — J32.9 SINUSITIS: Status: RESOLVED | Noted: 2024-11-05 | Resolved: 2025-01-14

## 2025-01-14 PROBLEM — G89.29 CHRONIC BACK PAIN: Status: RESOLVED | Noted: 2022-05-31 | Resolved: 2025-01-14

## 2025-01-14 PROBLEM — N18.31 STAGE 3A CHRONIC KIDNEY DISEASE: Status: RESOLVED | Noted: 2023-06-01 | Resolved: 2025-01-14

## 2025-01-14 PROBLEM — Z97.3 WEARS EYEGLASSES: Status: RESOLVED | Noted: 2023-08-24 | Resolved: 2025-01-14

## 2025-01-14 PROCEDURE — 3288F FALL RISK ASSESSMENT DOCD: CPT | Mod: CPTII,,, | Performed by: INTERNAL MEDICINE

## 2025-01-14 PROCEDURE — 1159F MED LIST DOCD IN RCRD: CPT | Mod: CPTII,,, | Performed by: INTERNAL MEDICINE

## 2025-01-14 PROCEDURE — 1126F AMNT PAIN NOTED NONE PRSNT: CPT | Mod: CPTII,,, | Performed by: INTERNAL MEDICINE

## 2025-01-14 PROCEDURE — 3077F SYST BP >= 140 MM HG: CPT | Mod: CPTII,,, | Performed by: INTERNAL MEDICINE

## 2025-01-14 PROCEDURE — 1101F PT FALLS ASSESS-DOCD LE1/YR: CPT | Mod: CPTII,,, | Performed by: INTERNAL MEDICINE

## 2025-01-14 PROCEDURE — 3008F BODY MASS INDEX DOCD: CPT | Mod: CPTII,,, | Performed by: INTERNAL MEDICINE

## 2025-01-14 PROCEDURE — 3078F DIAST BP <80 MM HG: CPT | Mod: CPTII,,, | Performed by: INTERNAL MEDICINE

## 2025-01-14 PROCEDURE — 99214 OFFICE O/P EST MOD 30 MIN: CPT | Mod: ,,, | Performed by: INTERNAL MEDICINE

## 2025-01-14 RX ORDER — BLOOD-GLUCOSE SENSOR
1 EACH MISCELLANEOUS
Qty: 2 EACH | Refills: 6 | Status: SHIPPED | OUTPATIENT
Start: 2025-01-14 | End: 2026-01-14

## 2025-01-14 RX ORDER — DAPAGLIFLOZIN 5 MG/1
5 TABLET, FILM COATED ORAL DAILY
Qty: 90 TABLET | Refills: 3 | Status: SHIPPED | OUTPATIENT
Start: 2025-01-14

## 2025-01-14 NOTE — ASSESSMENT & PLAN NOTE
Patient's FSGs are uncontrolled due to hyperglycemia on current medication regimen.  Last A1c reviewed-   Lab Results   Component Value Date    HGBA1C 10.5 (H) 09/19/2024   -

## 2025-01-14 NOTE — PROGRESS NOTES
Internal Medicine    Patient ID: [id number]     Chief Complaint: Diabetes (6 month f/u)      HPI:     Alison Langston is a 74 y.o. female here today for a follow up.   Patient reports compliance with her medication but did not take her medicine this morning. She has 2 g of microalbumin;  spot protein noted to be at 496. She does admit to bubbles in her urine, polyuria.  BUN of 38 creatinine of 1.31 potassium 5.2 calcium level is at 9.1 she is on bicarbonate tablets.  She missed her last appointment with Nephrology but needs to call and reschedule.  She is not on SGLT2 inhibition even though I have sent in prescriptions in the past. Also numerous referrals to diabetic education. Having some marital issues at current.  Past Medical History:   Diagnosis Date    Anemia     Anxiety     Asthma     Cerebrovascular accident     Chronic back pain     Depression     DM (diabetes mellitus)     Elevated ferritin     External hemorrhoids     GERD (gastroesophageal reflux disease)     HLD (hyperlipidemia)     Hypertension     Hypothyroidism, unspecified     CRISTINA (iron deficiency anemia)     Incisional hernia     Internal hemorrhoids     Myoclonus     Osteoporosis     Personal history of colonic polyps     Renal insufficiency     Right arm pain     Vertigo         Past Surgical History:   Procedure Laterality Date    biopsy of breast      BONE MARROW BIOPSY W/ ASPIRATION Right     CATARACT EXTRACTION Right 2021     SECTION  1977    CHOLECYSTECTOMY      COLONOSCOPY  2021    Dr. Junior Cardenas  Repeat colon     HERNIA REPAIR      HERNIA REPAIR  2020    HYSTERECTOMY  2013    LAMINECTOMY AND MICRODISCECTOMY LUMBAR SPINE  2018    3 levels.  Dr. Krause    ORIF WRIST FRACTURE      distal.    PATENT DUCTUS ARTERIOUS LIGATION      SHOULDER SURGERY Left     SPINAL CORD STIMULATOR IMPLANT  2018    Dr. Jerry    THYROIDECTOMY          Social History     Tobacco Use    Smoking status: Never      Passive exposure: Never    Smokeless tobacco: Never   Substance and Sexual Activity    Alcohol use: Never    Drug use: Never    Sexual activity: Not on file        Current Outpatient Medications   Medication Instructions    albuterol (PROAIR HFA) 90 mcg/actuation inhaler 2 puffs, Inhalation, Every 6 hours PRN, Rescue    atorvastatin (LIPITOR) 20 mg, Oral, Nightly    blood-glucose meter Misc   true metrix glucose monitor, See Instructions, to check bloodsugars BID, E11.9, # 1 EA, 5 Refill(s), Pharmacy: [facility] 415, 154, cm, Height/Length Dosing, 04/19/21 10:31:00 CDT, 73.45, kg, Weight Dosing, 04/19/21 10:31:00 CDT    blood-glucose sensor (FREESTYLE APOLONIA 3 SENSOR) Anisa 1 each, Misc.(Non-Drug; Combo Route), Every 14 days    clotrimazole-betamethasone 1-0.05% (LOTRISONE) cream Topical (Top), 2 times daily    dapagliflozin propanediol (FARXIGA) 5 mg, Oral, Daily    fluticasone propionate (FLONASE) 50 mcg/actuation nasal spray 1 spray, 2 times daily    folic acid (FOLVITE) 1 mg, Oral, Daily    insulin glargine U-300 conc (TOUJEO MAX U-300 SOLOSTAR) 40 Units, Subcutaneous, Nightly    levothyroxine (SYNTHROID) 200 mcg, Oral    losartan (COZAAR) 100 mg, Oral, Daily    meclizine (ANTIVERT) 12.5 mg, 3 times daily PRN    NIFEdipine (PROCARDIA-XL) 90 mg, Oral, 2 times daily    omeprazole (PRILOSEC) 40 mg, Oral, Every morning    ondansetron (ZOFRAN-ODT) 8 mg, Oral, Every 6 hours PRN    pregabalin (LYRICA) 25 mg, Oral    sodium bicarbonate 650 mg, Oral, 2 times daily    traZODone (DESYREL) 100 mg, Oral, 2 times daily PRN    TRUE METRIX GLUCOSE TEST STRIP Strp USE 1 STRIP TO CHECK GLUCOSE ONCE DAILY    venlafaxine (EFFEXOR-XR) 150 mg, Oral       Review of patient's allergies indicates:   Allergen Reactions    Baclofen Hallucinations    Donepezil Hallucinations    Erythromycin      Other reaction(s): Upset Stomach    Fluoxetine      Doesn't work    Grass pollen      Other reaction(s): per allergy test     "Hydrocodone-acetaminophen      Other reaction(s): "Makes me crazy"    Ivp dye [iodinated contrast media]      Other reaction(s): Rash    Metoclopramide hcl     Rosuvastatin     Iodine Rash    Shrimp Rash        Patient Care Team:  Noah Tafoya MD as PCP - General (Internal Medicine)  Goldy Hand MD as Consulting Physician (Ophthalmology)  Junior Cardenas MD as Consulting Physician (Gastroenterology)  Gallito Cordova MD as Consulting Physician (Obstetrics and Gynecology)  Yonatan Perez MD as Consulting Physician (Orthopedic Surgery)  Mele Smith II, MD as Consulting Physician (Oncology)  Gary Galeano MD as Consulting Physician (Urology)  Valentino, Vernon A., MD as Consulting Physician (Cardiology)  Lynda Swanson LPN as Care Coordinator  Barbara Man MD as Consulting Physician (Nephrology)     Subjective:     Review of Systems    12 point review of systems conducted, negative except as stated in the history of present illness. See HPI for details.    Objective:     Visit Vitals  BP (!) 146/72 (BP Location: Left arm, Patient Position: Sitting)   Pulse 65   Temp 97.5 °F (36.4 °C) (Temporal)   Resp 16   Ht 5' (1.524 m)   Wt 65.8 kg (145 lb)   SpO2 99%   BMI 28.32 kg/m²       Physical Exam  Constitutional:       Appearance: Normal appearance. She is obese.   HENT:      Head: Normocephalic and atraumatic.   Eyes:      Extraocular Movements: Extraocular movements intact.      Pupils: Pupils are equal, round, and reactive to light.   Cardiovascular:      Rate and Rhythm: Normal rate and regular rhythm.   Pulmonary:      Effort: Pulmonary effort is normal.      Breath sounds: Normal breath sounds.   Skin:     General: Skin is warm and dry.   Neurological:      General: No focal deficit present.      Mental Status: She is alert.   Psychiatric:         Mood and Affect: Mood normal.         Labs Reviewed:     Chemistry:  Lab Results   Component Value Date     " 01/13/2025    K 5.2 (H) 01/13/2025    BUN 38.7 (H) 01/13/2025    CREATININE 1.31 (H) 01/13/2025    EGFRNORACEVR 43 01/13/2025    GLUCOSE 441 (H) 01/13/2025    CALCIUM 9.1 01/13/2025    ALKPHOS 141 01/13/2025    LABPROT 7.3 01/13/2025    ALBUMIN 3.4 01/13/2025    BILIDIR 0.1 05/10/2022    IBILI 0.10 05/10/2022    AST 17 01/13/2025    ALT 21 01/13/2025    MG 1.3 (L) 12/21/2019    PHOS 3.7 12/30/2024    BOAXQLDI12CN 45.3 12/15/2022    TSH 0.020 (L) 12/15/2022    ZHFWTT3LASY 1.07 04/08/2021        Lab Results   Component Value Date    HGBA1C 10.5 (H) 09/19/2024        Hematology:  Lab Results   Component Value Date    WBC 7.15 01/13/2025    HGB 11.5 (L) 01/13/2025    HCT 36.4 (L) 01/13/2025     01/13/2025       Lipid Panel:  Lab Results   Component Value Date    CHOL 148 01/31/2024    HDL 37 01/31/2024    LDL 53.00 01/31/2024    TRIG 289 (H) 01/31/2024    TOTALCHOLEST 4 01/31/2024        Urine:  Lab Results   Component Value Date    APPEARANCEUA Clear 12/30/2024    SGUA 1.017 12/30/2024    PROTEINUA 3+ (A) 12/30/2024    KETONESUA Negative 12/30/2024    LEUKOCYTESUR Negative 12/30/2024    RBCUA 0-5 12/30/2024    WBCUA 0-5 12/30/2024    BACTERIA Trace 12/30/2024    SQEPUA Trace 12/30/2024    HYALINECASTS >20 (A) 01/31/2024    CREATRANDUR 71.6 12/30/2024    CREATRANDUR 71.8 12/30/2024    PROTEINURINE 496.4 12/30/2024    UPROTCREA 6.9 12/30/2024        Assessment:       ICD-10-CM ICD-9-CM   1. Type 2 diabetes mellitus with both eyes affected by mild nonproliferative retinopathy and macular edema, with long-term current use of insulin  E11.3213 250.50    Z79.4 362.04     362.07     V58.67   2. Chronic kidney disease, stage 4 (severe)  N18.4 585.4   3. Type 2 diabetes mellitus with stage 3b chronic kidney disease, with long-term current use of insulin  E11.22 250.40    N18.32 585.3    Z79.4 V58.67   4. Nephrotic syndrome  N04.9 581.9        Plan:     1. Type 2 diabetes mellitus with both eyes affected by mild  nonproliferative retinopathy and macular edema, with long-term current use of insulin  Assessment & Plan:  Patient's FSGs are uncontrolled due to hyperglycemia on current medication regimen.  Last A1c reviewed-   Lab Results   Component Value Date    HGBA1C 10.5 (H) 09/19/2024   -    Orders:  -     Ambulatory referral/consult to Diabetes Education; Future; Expected date: 01/21/2025    2. Chronic kidney disease, stage 4 (severe)  Assessment & Plan:  Creatine stable for now. BMP reviewed- noted Estimated Creatinine Clearance: 31.9 mL/min (A) (based on SCr of 1.31 mg/dL (H)). according to latest data. Based on current GFR, CKD stage is stage 3 - GFR 30-59.   Renally dose meds. Avoid nephrotoxic medications and procedures.    Orders:  -     Ambulatory referral/consult to Nephrology; Future; Expected date: 01/21/2025    3. Type 2 diabetes mellitus with stage 3b chronic kidney disease, with long-term current use of insulin    4. Nephrotic syndrome  Assessment & Plan:  add Farxiga 10mg,  continue losartan 100.   FU with Nephrology    Orders:  -     Ambulatory referral/consult to Diabetes Education; Future; Expected date: 01/21/2025    Other orders  -     blood-glucose sensor (FREESTYLE APOLONIA 3 SENSOR) Anisa; 1 each by Misc.(Non-Drug; Combo Route) route every 14 (fourteen) days.  Dispense: 2 each; Refill: 6  -     dapagliflozin propanediol (FARXIGA) 5 mg Tab tablet; Take 1 tablet (5 mg total) by mouth once daily.  Dispense: 90 tablet; Refill: 3         Follow up in about 3 months (around 4/14/2025). In addition to their scheduled follow up, the patient has also been instructed to follow up on as needed basis.     Future Appointments   Date Time Provider Department Center   2/4/2025 10:10 AM LAB, [facility] OLB LAB [facility]   2/4/2025 10:40 AM Mele Smith II, MD [facility] HEMONC [facility]   2/4/2025 11:00 AM INJECTION CHAIR 02, [facility] CHEMOTHERAPY INFUSION [facility] CHEMO [facility]   4/15/2025  9:20 AM Noah Tafoya MD  [facility] [id number]   7/14/2025 10:30 AM INJECTION CHAIR 02, [facility] CHEMOTHERAPY INFUSION [facility] CHEMO [facility]        Noah Tafoya MD

## 2025-01-14 NOTE — ASSESSMENT & PLAN NOTE
Creatine stable for now. BMP reviewed- noted Estimated Creatinine Clearance: 31.9 mL/min (A) (based on SCr of 1.31 mg/dL (H)). according to latest data. Based on current GFR, CKD stage is stage 3 - GFR 30-59.   Renally dose meds. Avoid nephrotoxic medications and procedures.

## 2025-01-24 DIAGNOSIS — N18.4 CHRONIC KIDNEY DISEASE, STAGE 4 (SEVERE): Primary | ICD-10-CM

## 2025-01-25 DIAGNOSIS — Z79.4 TYPE 2 DIABETES MELLITUS WITH HYPERGLYCEMIA, WITH LONG-TERM CURRENT USE OF INSULIN: ICD-10-CM

## 2025-01-25 DIAGNOSIS — E78.1 HYPERTRIGLYCERIDEMIA: ICD-10-CM

## 2025-01-25 DIAGNOSIS — F32.A DEPRESSION, UNSPECIFIED DEPRESSION TYPE: ICD-10-CM

## 2025-01-25 DIAGNOSIS — E11.65 TYPE 2 DIABETES MELLITUS WITH HYPERGLYCEMIA, WITH LONG-TERM CURRENT USE OF INSULIN: ICD-10-CM

## 2025-01-27 RX ORDER — VENLAFAXINE HYDROCHLORIDE 150 MG/1
150 CAPSULE, EXTENDED RELEASE ORAL
Qty: 90 CAPSULE | Refills: 0 | Status: SHIPPED | OUTPATIENT
Start: 2025-01-27

## 2025-01-27 RX ORDER — ATORVASTATIN CALCIUM 20 MG/1
20 TABLET, FILM COATED ORAL NIGHTLY
Qty: 90 TABLET | Refills: 0 | Status: SHIPPED | OUTPATIENT
Start: 2025-01-27

## 2025-01-27 RX ORDER — LOSARTAN POTASSIUM 100 MG/1
100 TABLET ORAL
Qty: 90 TABLET | Refills: 0 | Status: SHIPPED | OUTPATIENT
Start: 2025-01-27

## 2025-02-04 DIAGNOSIS — E11.65 TYPE 2 DIABETES MELLITUS WITH HYPERGLYCEMIA, WITH LONG-TERM CURRENT USE OF INSULIN: ICD-10-CM

## 2025-02-04 DIAGNOSIS — N04.9 NEPHROTIC SYNDROME: Primary | ICD-10-CM

## 2025-02-04 DIAGNOSIS — Z79.4 TYPE 2 DIABETES MELLITUS WITH HYPERGLYCEMIA, WITH LONG-TERM CURRENT USE OF INSULIN: ICD-10-CM

## 2025-02-04 DIAGNOSIS — G56.11 RIGHT MEDIAN NERVE NEUROPATHY: ICD-10-CM

## 2025-02-04 RX ORDER — ATORVASTATIN CALCIUM 20 MG/1
20 TABLET, FILM COATED ORAL NIGHTLY
Qty: 90 TABLET | Refills: 0 | OUTPATIENT
Start: 2025-02-04

## 2025-02-04 RX ORDER — PREGABALIN 25 MG/1
25 CAPSULE ORAL
Qty: 90 CAPSULE | Refills: 0 | Status: SHIPPED | OUTPATIENT
Start: 2025-02-04

## 2025-03-03 DIAGNOSIS — G47.00 INSOMNIA, UNSPECIFIED TYPE: ICD-10-CM

## 2025-03-03 RX ORDER — TRAZODONE HYDROCHLORIDE 100 MG/1
TABLET ORAL
Qty: 90 TABLET | Refills: 0 | Status: SHIPPED | OUTPATIENT
Start: 2025-03-03

## 2025-03-26 ENCOUNTER — PATIENT OUTREACH (OUTPATIENT)
Facility: CLINIC | Age: 75
End: 2025-03-26
Payer: MEDICARE

## 2025-03-26 NOTE — PROGRESS NOTES
Population Health Outreach.    Health Maintenance Topic(s) Outreach Outcomes & Actions Taken:    Blood Pressure - Outreach Outcomes & Actions Taken  : Primary Care Follow Up Visit Scheduled   BP: 146/72 Abnormal   as of 1/14/2025   Patient reports compliance with her medication but did not take her medicine morning of 1/14/25.   Called patient for updated home BP. No answer/left VM.  Next PCP visit <3 weeks with BP check.      Additional Notes:  Hemoglobin A1C & Lipid Panel due -has orders for both.   Next Primary Care Visit Date: 4/15/2025        Care Management, Digital Medicine, and/or Education Referrals  Digital Medicine Declined    PCP Referred to DM Educator 1/14/25

## 2025-03-31 ENCOUNTER — TELEPHONE (OUTPATIENT)
Dept: INTERNAL MEDICINE | Facility: CLINIC | Age: 75
End: 2025-03-31
Payer: MEDICARE

## 2025-03-31 DIAGNOSIS — E11.65 TYPE 2 DIABETES MELLITUS WITH HYPERGLYCEMIA, WITH LONG-TERM CURRENT USE OF INSULIN: Primary | ICD-10-CM

## 2025-03-31 DIAGNOSIS — Z79.4 TYPE 2 DIABETES MELLITUS WITH HYPERGLYCEMIA, WITH LONG-TERM CURRENT USE OF INSULIN: Primary | ICD-10-CM

## 2025-03-31 RX ORDER — FLASH GLUCOSE SENSOR
1 KIT MISCELLANEOUS
Qty: 2 KIT | Refills: 11 | Status: SHIPPED | OUTPATIENT
Start: 2025-03-31

## 2025-03-31 NOTE — TELEPHONE ENCOUNTER
----- Message from Elvia Leal sent at 3/31/2025 11:26 AM CDT -----  Who Called: Alison LangstonRefill or New Rx:New RxWhat supplies are needed:Van for freestyle bree 2What is the brand of the supplies: freestyle libreIf checking glucose, how many times do they check it?:N/AWho prescribed the original supplies:BelleloList of preferred pharmacies on file (remove uneeded): [facility] [address] [place] [address], [place] - [address]What [employer]ached [employer] company is the patient requesting?: N/APreferred Method of Contact: Phone CallPatient's Preferred Phone Number on File: [phone] Best Call Back Number, if different:Additional Information:

## 2025-04-07 ENCOUNTER — TELEPHONE (OUTPATIENT)
Dept: INTERNAL MEDICINE | Facility: CLINIC | Age: 75
End: 2025-04-07
Payer: MEDICARE

## 2025-04-07 DIAGNOSIS — Z79.4 TYPE 2 DIABETES MELLITUS WITH HYPERGLYCEMIA, WITH LONG-TERM CURRENT USE OF INSULIN: Primary | ICD-10-CM

## 2025-04-07 DIAGNOSIS — E11.65 TYPE 2 DIABETES MELLITUS WITH HYPERGLYCEMIA, WITH LONG-TERM CURRENT USE OF INSULIN: Primary | ICD-10-CM

## 2025-04-07 NOTE — TELEPHONE ENCOUNTER
----- Message from Med Assistant Cox sent at 4/7/2025  8:47 AM CDT -----  Regarding: PV 4/15/25 @ 9:20 Dr. Howard  1. Are there any outstanding tasks in the patient's chart? Yes, Fasting Labs 2. Does patient have home blood pressure cuff?  [ ] Yes  /   [ ] No(If yes, please have patient bring to appointment for validation.)3. Remind patient to bring in a list of medications or bottles of all medications including: A. All Prescription MedicationsB. Over-the-Counter Supplements and/or VitaminsC. Drops (ear and/or eye)D. Topical Creams

## 2025-04-13 DIAGNOSIS — G47.00 INSOMNIA, UNSPECIFIED TYPE: ICD-10-CM

## 2025-04-14 RX ORDER — TRAZODONE HYDROCHLORIDE 100 MG/1
TABLET ORAL
Qty: 90 TABLET | Refills: 0 | Status: SHIPPED | OUTPATIENT
Start: 2025-04-14

## 2025-04-15 ENCOUNTER — RESULTS FOLLOW-UP (OUTPATIENT)
Dept: HEPATOLOGY | Facility: HOSPITAL | Age: 75
End: 2025-04-15
Payer: MEDICARE

## 2025-04-15 ENCOUNTER — OFFICE VISIT (OUTPATIENT)
Dept: INTERNAL MEDICINE | Facility: CLINIC | Age: 75
End: 2025-04-15
Payer: MEDICARE

## 2025-04-15 VITALS
DIASTOLIC BLOOD PRESSURE: 84 MMHG | RESPIRATION RATE: 16 BRPM | HEART RATE: 70 BPM | WEIGHT: 147 LBS | BODY MASS INDEX: 28.86 KG/M2 | TEMPERATURE: 97 F | SYSTOLIC BLOOD PRESSURE: 152 MMHG | HEIGHT: 60 IN | OXYGEN SATURATION: 97 %

## 2025-04-15 DIAGNOSIS — E11.3213 TYPE 2 DIABETES MELLITUS WITH BOTH EYES AFFECTED BY MILD NONPROLIFERATIVE RETINOPATHY AND MACULAR EDEMA, WITH LONG-TERM CURRENT USE OF INSULIN: ICD-10-CM

## 2025-04-15 DIAGNOSIS — I10 PRIMARY HYPERTENSION: ICD-10-CM

## 2025-04-15 DIAGNOSIS — R05.1 ACUTE COUGH: ICD-10-CM

## 2025-04-15 DIAGNOSIS — J02.9 PHARYNGITIS, UNSPECIFIED ETIOLOGY: Primary | ICD-10-CM

## 2025-04-15 DIAGNOSIS — J02.9 SORE THROAT: ICD-10-CM

## 2025-04-15 DIAGNOSIS — Z79.4 TYPE 2 DIABETES MELLITUS WITH BOTH EYES AFFECTED BY MILD NONPROLIFERATIVE RETINOPATHY AND MACULAR EDEMA, WITH LONG-TERM CURRENT USE OF INSULIN: ICD-10-CM

## 2025-04-15 PROCEDURE — 3079F DIAST BP 80-89 MM HG: CPT | Mod: CPTII,,, | Performed by: INTERNAL MEDICINE

## 2025-04-15 PROCEDURE — 1126F AMNT PAIN NOTED NONE PRSNT: CPT | Mod: CPTII,,, | Performed by: INTERNAL MEDICINE

## 2025-04-15 PROCEDURE — 4010F ACE/ARB THERAPY RXD/TAKEN: CPT | Mod: CPTII,,, | Performed by: INTERNAL MEDICINE

## 2025-04-15 PROCEDURE — 0241U COVID/RSV/FLU A&B PCR: CPT | Performed by: INTERNAL MEDICINE

## 2025-04-15 PROCEDURE — 3077F SYST BP >= 140 MM HG: CPT | Mod: CPTII,,, | Performed by: INTERNAL MEDICINE

## 2025-04-15 PROCEDURE — 1159F MED LIST DOCD IN RCRD: CPT | Mod: CPTII,,, | Performed by: INTERNAL MEDICINE

## 2025-04-15 PROCEDURE — 1160F RVW MEDS BY RX/DR IN RCRD: CPT | Mod: CPTII,,, | Performed by: INTERNAL MEDICINE

## 2025-04-15 PROCEDURE — 3008F BODY MASS INDEX DOCD: CPT | Mod: CPTII,,, | Performed by: INTERNAL MEDICINE

## 2025-04-15 PROCEDURE — 87880 STREP A ASSAY W/OPTIC: CPT | Mod: QW,,, | Performed by: INTERNAL MEDICINE

## 2025-04-15 PROCEDURE — 99213 OFFICE O/P EST LOW 20 MIN: CPT | Mod: ,,, | Performed by: INTERNAL MEDICINE

## 2025-04-15 NOTE — PROGRESS NOTES
Internal Medicine    Patient ID: [id number]     Chief Complaint: Diabetes (3 month f/u) and Sore Throat (Pt notes it started this morning)      HPI:     Alison Langstno is a 74 y.o. female here today for a follow up.     Patient reports waking up this morning with a mild sore throat, experiencing pain when swallowing. She had chills last night. She feels tired, which she attributes to staying at the hospital with her brother who is in the ICU for pancreatitis caused by high triglycerides. She reports a family history of pancreatitis.    She denies fever and body aches.        Past Medical History:   Diagnosis Date    Anemia     Anxiety     Asthma     Cerebrovascular accident     Chronic back pain     Depression     DM (diabetes mellitus)     Elevated ferritin     External hemorrhoids     GERD (gastroesophageal reflux disease)     HLD (hyperlipidemia)     Hypertension     Hypothyroidism, unspecified     CRISTINA (iron deficiency anemia)     Incisional hernia     Internal hemorrhoids     Myoclonus     Osteoporosis     Personal history of colonic polyps     Renal insufficiency     Right arm pain     Vertigo         Past Surgical History:   Procedure Laterality Date    biopsy of breast      BONE MARROW BIOPSY W/ ASPIRATION Right 2018    CATARACT EXTRACTION Right 2021     SECTION  1977    CHOLECYSTECTOMY      COLONOSCOPY  2021    Dr. Junior Cardenas  Repeat colon     HERNIA REPAIR      HERNIA REPAIR  2020    HYSTERECTOMY  2013    LAMINECTOMY AND MICRODISCECTOMY LUMBAR SPINE  2018    3 levels.  Dr. Krause    ORIF WRIST FRACTURE      distal.    PATENT DUCTUS ARTERIOUS LIGATION      SHOULDER SURGERY Left     SPINAL CORD STIMULATOR IMPLANT  2018    Dr. Jerry    THYROIDECTOMY          Social History     Tobacco Use    Smoking status: Never     Passive exposure: Never    Smokeless tobacco: Never   Substance and Sexual Activity    Alcohol use: Never    Drug use: Never    Sexual activity: Not  on file        Current Outpatient Medications   Medication Instructions    albuterol (PROAIR HFA) 90 mcg/actuation inhaler 2 puffs, Inhalation, Every 6 hours PRN, Rescue    atorvastatin (LIPITOR) 20 mg, Oral, Nightly    blood-glucose meter Misc   true metrix glucose monitor, See Instructions, to check bloodsugars BID, E11.9, # 1 EA, 5 Refill(s), Pharmacy: [facility] 415, 154, cm, Height/Length Dosing, 04/19/21 10:31:00 CDT, 73.45, kg, Weight Dosing, 04/19/21 10:31:00 CDT    blood-glucose sensor (FREESTYLE APOLONIA 3 SENSOR) Anisa 1 each, Misc.(Non-Drug; Combo Route), Every 14 days    clotrimazole-betamethasone 1-0.05% (LOTRISONE) cream Topical (Top), 2 times daily    dapagliflozin propanediol (FARXIGA) 5 mg, Oral, Daily    flash glucose sensor (FREESTYLE APOLONIA 2 SENSOR) Kit 1 each, Misc.(Non-Drug; Combo Route), Every 14 days    fluticasone propionate (FLONASE) 50 mcg/actuation nasal spray 1 spray, 2 times daily    folic acid (FOLVITE) 1 mg, Oral, Daily    insulin glargine U-300 conc (TOUJEO MAX U-300 SOLOSTAR) 40 Units, Subcutaneous, Nightly    levothyroxine (SYNTHROID) 200 mcg, Oral    losartan (COZAAR) 100 mg, Oral    meclizine (ANTIVERT) 12.5 mg, 3 times daily PRN    NIFEdipine (PROCARDIA-XL) 90 mg, Oral, 2 times daily    omeprazole (PRILOSEC) 40 mg, Oral, Every morning    ondansetron (ZOFRAN-ODT) 8 mg, Oral, Every 6 hours PRN    pregabalin (LYRICA) 25 mg, Oral    sodium bicarbonate 650 mg, Oral, 2 times daily    traZODone (DESYREL) 100 MG tablet TAKE 1 TABLET BY MOUTH AT BEDTIME AT  9PM    TRUE METRIX GLUCOSE TEST STRIP Strp USE 1 STRIP TO CHECK GLUCOSE ONCE DAILY    venlafaxine (EFFEXOR-XR) 150 mg, Oral       Review of patient's allergies indicates:   Allergen Reactions    Baclofen Hallucinations    Donepezil Hallucinations    Erythromycin      Other reaction(s): Upset Stomach    Fluoxetine      Doesn't work    Grass pollen      Other reaction(s): per allergy test    Hydrocodone-acetaminophen      Other  "reaction(s): "Makes me crazy"    Ivp dye [iodinated contrast media]      Other reaction(s): Rash    Metoclopramide hcl     Rosuvastatin     Iodine Rash    Shrimp Rash        Patient Care Team:  Noah Tafoya MD as PCP - General (Internal Medicine)  Goldy Hand MD as Consulting Physician (Ophthalmology)  Junior Cardenas MD as Consulting Physician (Gastroenterology)  Gallito Cordova MD as Consulting Physician (Obstetrics and Gynecology)  Yonatan Perez MD as Consulting Physician (Orthopedic Surgery)  Mele Smith II, MD as Consulting Physician (Oncology)  Gary Galeano MD as Consulting Physician (Urology)  Valentino, Vernon A., MD as Consulting Physician (Cardiology)  Lynda Swanson LPN as Care Coordinator  Barbara Man MD as Consulting Physician (Nephrology)     Subjective:     Review of Systems    12 point review of systems conducted, negative except as stated in the history of present illness. See HPI for details.    Objective:     Visit Vitals  BP (!) 152/84 (BP Location: Left arm, Patient Position: Sitting)   Pulse 70   Temp 97.1 °F (36.2 °C) (Temporal)   Resp 16   Ht 5' (1.524 m)   Wt 66.7 kg (147 lb)   SpO2 97%   BMI 28.71 kg/m²       Physical Exam  Constitutional:       Appearance: Normal appearance.   HENT:      Head: Normocephalic and atraumatic.      Right Ear: Tympanic membrane and ear canal normal.      Left Ear: Tympanic membrane and ear canal normal.      Mouth/Throat:      Pharynx: Posterior oropharyngeal erythema present. No oropharyngeal exudate.   Eyes:      Extraocular Movements: Extraocular movements intact.      Pupils: Pupils are equal, round, and reactive to light.   Cardiovascular:      Rate and Rhythm: Normal rate and regular rhythm.   Pulmonary:      Effort: Pulmonary effort is normal.      Breath sounds: Normal breath sounds.   Skin:     General: Skin is warm and dry.   Neurological:      General: No focal deficit present.      " Mental Status: She is alert.   Psychiatric:         Mood and Affect: Mood normal.         Labs Reviewed:     Chemistry:  Lab Results   Component Value Date     01/13/2025    K 5.2 (H) 01/13/2025    BUN 38.7 (H) 01/13/2025    CREATININE 1.31 (H) 01/13/2025    EGFRNORACEVR 43 01/13/2025    GLUCOSE 441 (H) 01/13/2025    CALCIUM 9.1 01/13/2025    ALKPHOS 141 01/13/2025    LABPROT 7.3 01/13/2025    ALBUMIN 3.4 01/13/2025    BILIDIR 0.1 05/10/2022    IBILI 0.10 05/10/2022    AST 17 01/13/2025    ALT 21 01/13/2025    MG 1.3 (L) 12/21/2019    PHOS 3.7 12/30/2024    [id number] 45.3 12/15/2022    TSH 0.020 (L) 12/15/2022    YHZCVO8CMKF 1.07 04/08/2021        Lab Results   Component Value Date    HGBA1C 10.5 (H) 09/19/2024        Hematology:  Lab Results   Component Value Date    WBC 7.15 01/13/2025    HGB 11.5 (L) 01/13/2025    HCT 36.4 (L) 01/13/2025     01/13/2025       Lipid Panel:  Lab Results   Component Value Date    CHOL 148 01/31/2024    HDL 37 01/31/2024    LDL 53.00 01/31/2024    TRIG 289 (H) 01/31/2024    TOTALCHOLEST 4 01/31/2024        Urine:  Lab Results   Component Value Date    APPEARANCEUA Clear 12/30/2024    SGUA 1.017 12/30/2024    PROTEINUA 3+ (A) 12/30/2024    KETONESUA Negative 12/30/2024    LEUKOCYTESUR Negative 12/30/2024    RBCUA 0-5 12/30/2024    WBCUA 0-5 12/30/2024    BACTERIA Trace 12/30/2024    SQEPUA Trace 12/30/2024    HYALINECASTS >20 (A) 01/31/2024    CREATRANDUR 71.6 12/30/2024    CREATRANDUR 71.8 12/30/2024    PROTEINURINE 496.4 12/30/2024    UPROTCREA 6.9 12/30/2024        Assessment and Plan:     Assessment & Plan    J02.9 Pharyngitis, unspecified etiology    IMPRESSION:   Patient presents with sore throat and chills.   Strep throat as primary diagnosis, taking into account recent hospital exposure due to family member's hospitalization.    J02.9 PHARYNGITIS, UNSPECIFIED ETIOLOGY:  - Ordered strep throat swab test.  - If negative, ordered COVID-19 and influenza tests.         Patient did not do her diabetic labs prior to the visit will go ahead and order those and will call her with her results    Follow up in about 3 months (around 7/15/2025) for DIABETIC REVISIT, with labs prior to visit. In addition to their scheduled follow up, the patient has also been instructed to follow up on as needed basis.     Future Appointments   Date Time Provider Department Center   4/16/2025  2:20 PM Inocencia Walton, ETHANP [facility] NEPH [facility]   7/14/2025 10:30 AM INJECTION CHAIR 02, [facility] CHEMOTHERAPY INFUSION [facility] CHEMO [facility]        Noah Tafoya MD

## 2025-04-29 ENCOUNTER — PATIENT MESSAGE (OUTPATIENT)
Facility: CLINIC | Age: 75
End: 2025-04-29
Payer: MEDICARE

## 2025-04-30 ENCOUNTER — TELEPHONE (OUTPATIENT)
Facility: CLINIC | Age: 75
End: 2025-04-30
Payer: MEDICARE

## 2025-04-30 DIAGNOSIS — E78.1 HYPERTRIGLYCERIDEMIA: ICD-10-CM

## 2025-04-30 DIAGNOSIS — Z79.4 TYPE 2 DIABETES MELLITUS WITH HYPERGLYCEMIA, WITH LONG-TERM CURRENT USE OF INSULIN: ICD-10-CM

## 2025-04-30 DIAGNOSIS — E11.65 TYPE 2 DIABETES MELLITUS WITH HYPERGLYCEMIA, WITH LONG-TERM CURRENT USE OF INSULIN: ICD-10-CM

## 2025-04-30 DIAGNOSIS — F32.A DEPRESSION, UNSPECIFIED DEPRESSION TYPE: ICD-10-CM

## 2025-04-30 RX ORDER — VENLAFAXINE HYDROCHLORIDE 150 MG/1
150 CAPSULE, EXTENDED RELEASE ORAL
Qty: 90 CAPSULE | Refills: 0 | Status: SHIPPED | OUTPATIENT
Start: 2025-04-30

## 2025-04-30 RX ORDER — FLASH GLUCOSE SENSOR
1 KIT MISCELLANEOUS
Qty: 2 KIT | Refills: 11 | Status: SHIPPED | OUTPATIENT
Start: 2025-04-30

## 2025-04-30 RX ORDER — LOSARTAN POTASSIUM 100 MG/1
100 TABLET ORAL
Qty: 90 TABLET | Refills: 0 | Status: SHIPPED | OUTPATIENT
Start: 2025-04-30

## 2025-04-30 RX ORDER — ATORVASTATIN CALCIUM 20 MG/1
20 TABLET, FILM COATED ORAL NIGHTLY
Qty: 90 TABLET | Refills: 0 | Status: SHIPPED | OUTPATIENT
Start: 2025-04-30

## 2025-04-30 NOTE — TELEPHONE ENCOUNTER
Patient called  Requesting Rx for FreeStyle Jose [place] 2.  [facility] in [place]        Rx sent to pharmacy

## 2025-05-01 ENCOUNTER — TELEPHONE (OUTPATIENT)
Dept: INTERNAL MEDICINE | Facility: CLINIC | Age: 75
End: 2025-05-01
Payer: MEDICARE

## 2025-05-01 DIAGNOSIS — E11.65 TYPE 2 DIABETES MELLITUS WITH HYPERGLYCEMIA, WITH LONG-TERM CURRENT USE OF INSULIN: Primary | ICD-10-CM

## 2025-05-01 DIAGNOSIS — Z79.4 TYPE 2 DIABETES MELLITUS WITH BOTH EYES AFFECTED BY MILD NONPROLIFERATIVE RETINOPATHY AND MACULAR EDEMA, WITH LONG-TERM CURRENT USE OF INSULIN: ICD-10-CM

## 2025-05-01 DIAGNOSIS — Z79.4 TYPE 2 DIABETES MELLITUS WITH HYPERGLYCEMIA, WITH LONG-TERM CURRENT USE OF INSULIN: Primary | ICD-10-CM

## 2025-05-01 DIAGNOSIS — E11.3213 TYPE 2 DIABETES MELLITUS WITH BOTH EYES AFFECTED BY MILD NONPROLIFERATIVE RETINOPATHY AND MACULAR EDEMA, WITH LONG-TERM CURRENT USE OF INSULIN: ICD-10-CM

## 2025-05-01 RX ORDER — FLASH GLUCOSE SCANNING READER
1 EACH MISCELLANEOUS
Qty: 2 EACH | Refills: 11 | Status: SHIPPED | OUTPATIENT
Start: 2025-05-01

## 2025-05-01 NOTE — TELEPHONE ENCOUNTER
Copied from CRM #[id number]. Topic: Medications - Pharmacy  >> May 1, 2025 12:57 PM Fox wrote:  .Who Called: [facility] [place] [facility] [address] (Pharmacy)    Refill or New Rx:Refill  What supplies are needed:FREESTYLE APOLONIA 2 [place]  What is the brand of the supplies:FREESTYLE APOLONIA 2   If checking glucose, how many times do they check it?:  Who prescribed the original supplies:  List of preferred pharmacies on file (remove uneeded): [facility]  [place] [facility] [address] (Pharmacy)  If different, enter Pharmacy information here including location and phone number:    What Medical Supply company is the patient requesting?:     Preferred Method of Contact: Phone Call  Patient's Preferred Phone Number on File: [phone]   Best Call Back Number, if different:[phone]  Additional Information:

## 2025-05-03 DIAGNOSIS — K21.9 GASTROESOPHAGEAL REFLUX DISEASE, UNSPECIFIED WHETHER ESOPHAGITIS PRESENT: ICD-10-CM

## 2025-05-05 RX ORDER — OMEPRAZOLE 40 MG/1
40 CAPSULE, DELAYED RELEASE ORAL EVERY MORNING
Qty: 90 CAPSULE | Refills: 0 | Status: SHIPPED | OUTPATIENT
Start: 2025-05-05

## 2025-05-09 DIAGNOSIS — I10 PRIMARY HYPERTENSION: ICD-10-CM

## 2025-05-09 DIAGNOSIS — N18.4 CHRONIC KIDNEY DISEASE, STAGE 4 (SEVERE): Primary | ICD-10-CM

## 2025-05-09 DIAGNOSIS — E11.29 TYPE 2 DIABETES MELLITUS WITH MICROALBUMINURIA, WITHOUT LONG-TERM CURRENT USE OF INSULIN: ICD-10-CM

## 2025-05-09 DIAGNOSIS — G56.11 RIGHT MEDIAN NERVE NEUROPATHY: ICD-10-CM

## 2025-05-09 DIAGNOSIS — R80.9 TYPE 2 DIABETES MELLITUS WITH MICROALBUMINURIA, WITHOUT LONG-TERM CURRENT USE OF INSULIN: ICD-10-CM

## 2025-05-09 DIAGNOSIS — E89.2 POSTPROCEDURAL HYPOPARATHYROIDISM: ICD-10-CM

## 2025-05-09 RX ORDER — NIFEDIPINE 90 MG/1
90 TABLET, EXTENDED RELEASE ORAL 2 TIMES DAILY
Qty: 60 TABLET | Refills: 11 | Status: SHIPPED | OUTPATIENT
Start: 2025-05-09 | End: 2026-05-09

## 2025-05-09 RX ORDER — LEVOTHYROXINE SODIUM 200 UG/1
200 TABLET ORAL
Qty: 90 TABLET | Refills: 0 | Status: SHIPPED | OUTPATIENT
Start: 2025-05-09

## 2025-05-12 RX ORDER — PREGABALIN 25 MG/1
25 CAPSULE ORAL
Qty: 90 CAPSULE | Refills: 0 | Status: SHIPPED | OUTPATIENT
Start: 2025-05-12

## 2025-06-01 DIAGNOSIS — G47.00 INSOMNIA, UNSPECIFIED TYPE: ICD-10-CM

## 2025-06-02 RX ORDER — TRAZODONE HYDROCHLORIDE 100 MG/1
TABLET ORAL
Qty: 90 TABLET | Refills: 0 | Status: SHIPPED | OUTPATIENT
Start: 2025-06-02

## 2025-06-05 ENCOUNTER — TELEPHONE (OUTPATIENT)
Dept: INTERNAL MEDICINE | Facility: CLINIC | Age: 75
End: 2025-06-05
Payer: MEDICARE

## 2025-06-10 ENCOUNTER — LAB VISIT (OUTPATIENT)
Dept: LAB | Facility: HOSPITAL | Age: 75
End: 2025-06-10
Attending: INTERNAL MEDICINE
Payer: MEDICARE

## 2025-06-10 ENCOUNTER — TELEPHONE (OUTPATIENT)
Dept: NEPHROLOGY | Facility: CLINIC | Age: 75
End: 2025-06-10
Payer: MEDICARE

## 2025-06-10 ENCOUNTER — RESULTS FOLLOW-UP (OUTPATIENT)
Dept: NEPHROLOGY | Facility: CLINIC | Age: 75
End: 2025-06-10
Payer: MEDICARE

## 2025-06-10 DIAGNOSIS — N18.4 CHRONIC KIDNEY DISEASE, STAGE 4 (SEVERE): ICD-10-CM

## 2025-06-10 LAB
ALBUMIN SERPL-MCNC: 3.5 G/DL (ref 3.4–4.8)
ALBUMIN/GLOB SERPL: 1 RATIO (ref 1.1–2)
ALP SERPL-CCNC: 118 UNIT/L (ref 40–150)
ALT SERPL-CCNC: 20 UNIT/L (ref 0–55)
ANION GAP SERPL CALC-SCNC: 13 MEQ/L
AST SERPL-CCNC: 15 UNIT/L (ref 11–45)
BACTERIA #/AREA URNS AUTO: ABNORMAL /HPF
BASOPHILS # BLD AUTO: 0.04 X10(3)/MCL
BASOPHILS NFR BLD AUTO: 0.5 %
BILIRUB SERPL-MCNC: 0.2 MG/DL
BILIRUB UR QL STRIP.AUTO: NEGATIVE
BUN SERPL-MCNC: 45.9 MG/DL (ref 9.8–20.1)
CALCIUM SERPL-MCNC: 8.5 MG/DL (ref 8.4–10.2)
CHLORIDE SERPL-SCNC: 103 MMOL/L (ref 98–107)
CLARITY UR: CLEAR
CO2 SERPL-SCNC: 22 MMOL/L (ref 23–31)
COLOR UR AUTO: COLORLESS
CREAT SERPL-MCNC: 1.62 MG/DL (ref 0.55–1.02)
CREAT UR-MCNC: 26.7 MG/DL (ref 45–106)
CREAT/UREA NIT SERPL: 28
EOSINOPHIL # BLD AUTO: 0.18 X10(3)/MCL (ref 0–0.9)
EOSINOPHIL NFR BLD AUTO: 2 %
ERYTHROCYTE [DISTWIDTH] IN BLOOD BY AUTOMATED COUNT: 14.1 % (ref 11.5–17)
GFR SERPLBLD CREATININE-BSD FMLA CKD-EPI: 33 ML/MIN/1.73/M2
GLOBULIN SER-MCNC: 3.4 GM/DL (ref 2.4–3.5)
GLUCOSE SERPL-MCNC: 636 MG/DL (ref 82–115)
GLUCOSE UR QL STRIP: ABNORMAL
HCT VFR BLD AUTO: 35.6 % (ref 37–47)
HGB BLD-MCNC: 11.3 G/DL (ref 12–16)
HGB UR QL STRIP: NEGATIVE
IMM GRANULOCYTES # BLD AUTO: 0.05 X10(3)/MCL (ref 0–0.04)
IMM GRANULOCYTES NFR BLD AUTO: 0.6 %
KETONES UR QL STRIP: NEGATIVE
LEUKOCYTE ESTERASE UR QL STRIP: NEGATIVE
LYMPHOCYTES # BLD AUTO: 1.13 X10(3)/MCL (ref 0.6–4.6)
LYMPHOCYTES NFR BLD AUTO: 12.8 %
MCH RBC QN AUTO: 26.2 PG (ref 27–31)
MCHC RBC AUTO-ENTMCNC: 31.7 G/DL (ref 33–36)
MCV RBC AUTO: 82.6 FL (ref 80–94)
MONOCYTES # BLD AUTO: 0.62 X10(3)/MCL (ref 0.1–1.3)
MONOCYTES NFR BLD AUTO: 7 %
MUCOUS THREADS URNS QL MICRO: ABNORMAL /LPF
NEUTROPHILS # BLD AUTO: 6.8 X10(3)/MCL (ref 2.1–9.2)
NEUTROPHILS NFR BLD AUTO: 77.1 %
NITRITE UR QL STRIP: NEGATIVE
NRBC BLD AUTO-RTO: 0 %
PH UR STRIP: 5.5 [PH]
PHOSPHATE SERPL-MCNC: 6.6 MG/DL (ref 2.3–4.7)
PLATELET # BLD AUTO: 207 X10(3)/MCL (ref 130–400)
PMV BLD AUTO: 11 FL (ref 7.4–10.4)
POTASSIUM SERPL-SCNC: 5.6 MMOL/L (ref 3.5–5.1)
PROT SERPL-MCNC: 6.9 GM/DL (ref 5.8–7.6)
PROT UR QL STRIP: ABNORMAL
PROT UR STRIP-MCNC: 36.2 MG/DL
PTH-INTACT SERPL-MCNC: 98.3 PG/ML (ref 8.7–77)
RBC # BLD AUTO: 4.31 X10(6)/MCL (ref 4.2–5.4)
RBC #/AREA URNS AUTO: ABNORMAL /HPF
SODIUM SERPL-SCNC: 138 MMOL/L (ref 136–145)
SP GR UR STRIP.AUTO: 1.02 (ref 1–1.03)
SQUAMOUS #/AREA URNS LPF: ABNORMAL /HPF
URINE PROTEIN/CREATININE RATIO (OLG): 1.4
UROBILINOGEN UR STRIP-ACNC: NORMAL
WBC # BLD AUTO: 8.82 X10(3)/MCL (ref 4.5–11.5)
WBC #/AREA URNS AUTO: ABNORMAL /HPF

## 2025-06-10 PROCEDURE — 80053 COMPREHEN METABOLIC PANEL: CPT

## 2025-06-10 PROCEDURE — 84100 ASSAY OF PHOSPHORUS: CPT

## 2025-06-10 PROCEDURE — 85025 COMPLETE CBC W/AUTO DIFF WBC: CPT

## 2025-06-10 PROCEDURE — 81015 MICROSCOPIC EXAM OF URINE: CPT

## 2025-06-10 PROCEDURE — 83970 ASSAY OF PARATHORMONE: CPT

## 2025-06-10 PROCEDURE — 36415 COLL VENOUS BLD VENIPUNCTURE: CPT

## 2025-06-10 PROCEDURE — 82570 ASSAY OF URINE CREATININE: CPT

## 2025-06-10 NOTE — TELEPHONE ENCOUNTER
Called to inform patient of elevated Glucose of 636. No answer left patient VM. Also forward results to PCP.

## 2025-06-24 DIAGNOSIS — N18.4 CHRONIC KIDNEY DISEASE, STAGE 4 (SEVERE): ICD-10-CM

## 2025-07-01 ENCOUNTER — OFFICE VISIT (OUTPATIENT)
Dept: NEPHROLOGY | Facility: CLINIC | Age: 75
End: 2025-07-01
Payer: MEDICARE

## 2025-07-01 VITALS
DIASTOLIC BLOOD PRESSURE: 71 MMHG | RESPIRATION RATE: 18 BRPM | HEART RATE: 71 BPM | TEMPERATURE: 99 F | BODY MASS INDEX: 28.86 KG/M2 | SYSTOLIC BLOOD PRESSURE: 144 MMHG | WEIGHT: 147 LBS | HEIGHT: 60 IN | OXYGEN SATURATION: 97 %

## 2025-07-01 DIAGNOSIS — E87.5 HYPERKALEMIA: ICD-10-CM

## 2025-07-01 DIAGNOSIS — Z79.4 TYPE 2 DIABETES MELLITUS WITH DIABETIC NEPHROPATHY, WITH LONG-TERM CURRENT USE OF INSULIN: ICD-10-CM

## 2025-07-01 DIAGNOSIS — I10 PRIMARY HYPERTENSION: ICD-10-CM

## 2025-07-01 DIAGNOSIS — N28.1 RENAL CYST, LEFT: ICD-10-CM

## 2025-07-01 DIAGNOSIS — E83.39 HYPERPHOSPHATEMIA: ICD-10-CM

## 2025-07-01 DIAGNOSIS — E11.21 TYPE 2 DIABETES MELLITUS WITH DIABETIC NEPHROPATHY, WITH LONG-TERM CURRENT USE OF INSULIN: ICD-10-CM

## 2025-07-01 DIAGNOSIS — N18.32 STAGE 3B CHRONIC KIDNEY DISEASE: Primary | ICD-10-CM

## 2025-07-01 PROCEDURE — 1159F MED LIST DOCD IN RCRD: CPT | Mod: CPTII,S$GLB,, | Performed by: NURSE PRACTITIONER

## 2025-07-01 PROCEDURE — 99214 OFFICE O/P EST MOD 30 MIN: CPT | Mod: S$GLB,,, | Performed by: NURSE PRACTITIONER

## 2025-07-01 PROCEDURE — 3288F FALL RISK ASSESSMENT DOCD: CPT | Mod: CPTII,S$GLB,, | Performed by: NURSE PRACTITIONER

## 2025-07-01 PROCEDURE — 3066F NEPHROPATHY DOC TX: CPT | Mod: CPTII,S$GLB,, | Performed by: NURSE PRACTITIONER

## 2025-07-01 PROCEDURE — 3078F DIAST BP <80 MM HG: CPT | Mod: CPTII,S$GLB,, | Performed by: NURSE PRACTITIONER

## 2025-07-01 PROCEDURE — 1101F PT FALLS ASSESS-DOCD LE1/YR: CPT | Mod: CPTII,S$GLB,, | Performed by: NURSE PRACTITIONER

## 2025-07-01 PROCEDURE — 99999 PR PBB SHADOW E&M-EST. PATIENT-LVL IV: CPT | Mod: PBBFAC,,, | Performed by: NURSE PRACTITIONER

## 2025-07-01 PROCEDURE — 3008F BODY MASS INDEX DOCD: CPT | Mod: CPTII,S$GLB,, | Performed by: NURSE PRACTITIONER

## 2025-07-01 PROCEDURE — 3077F SYST BP >= 140 MM HG: CPT | Mod: CPTII,S$GLB,, | Performed by: NURSE PRACTITIONER

## 2025-07-01 PROCEDURE — 4010F ACE/ARB THERAPY RXD/TAKEN: CPT | Mod: CPTII,S$GLB,, | Performed by: NURSE PRACTITIONER

## 2025-07-01 NOTE — PROGRESS NOTES
[facility] Note      HPI  Alison Langston, 74 y.o. female, presents to office for a follow up visit.  Patient no showed for her last 2 scheduled follow up visits and she has not been seen by Nephrology since last year.  Patient with CKD 3b related to diabetic nephropathy ischemic nephrosclerosis.   She has had issues with hyperkalemia and hyperglycemia.  Blood glucose on labs for appointment was 636 and potassium was 5.6. Patient was contacted by PCP's office and patient noted that that was nonfasting labs that she had just eaten lunch.  It does not appear that her hyperkalemia was addressed.  Renal function has been fluctuating but baseline creatinine is likely 1.4.  Also noted hyperphosphatemia with phosphorus 6.6.  Proteinuria stabilizing possibly now at 1.4 g.  She is maintained on losartan 100 mg daily and although it is not on her medication list she reports she was switched from Farxiga 5 mg 2 now Jardiance 25 mg daily.  She denies any recent hospitalizations otherwise doing well.    Patient denies taking NSAIDs or new antibiotics. Also denies recent episode of dehydration, diarrhea, vomiting, acute illness, hospitalization, recent angiograms or exposure to IV radiocontrast.        Medical Diagnoses:   Past Medical History:   Diagnosis Date    Anemia     Anxiety     Asthma     Cerebrovascular accident     Chronic back pain     Depression     DM (diabetes mellitus)     Elevated ferritin     External hemorrhoids     GERD (gastroesophageal reflux disease)     HLD (hyperlipidemia)     Hypertension     Hypothyroidism, unspecified     CRISTINA (iron deficiency anemia)     Incisional hernia     Internal hemorrhoids     Myoclonus     Osteoporosis     Personal history of colonic polyps     Renal insufficiency     Right arm pain     Vertigo      Patient Active Problem List   Diagnosis    Normocytic anemia    Depression    Personal history of colonic polyps    Osteoporosis    Hx of percutaneous  transcatheter closure of congenital ASD    History of transient ischemic attack    Anemia due to chronic kidney disease    Anxiety    Hyperlipidemia    Hypertension    Hypothyroidism    Iron deficiency anemia    Kidney stones    Restless legs syndrome    Tremor    Moderate aortic stenosis by prior echocardiogram    Nephrotic syndrome    Type 2 diabetes mellitus with stage 3b chronic kidney disease, with long-term current use of insulin    Right median nerve neuropathy    PASCUAL (dyspnea on exertion)    Acute cough    Type 2 diabetes mellitus with both eyes affected by mild nonproliferative retinopathy and macular edema, with long-term current use of insulin    Chronic kidney disease, stage 4 (severe)    Sore throat       Surgical History:   Past Surgical History:   Procedure Laterality Date    biopsy of breast      BONE MARROW BIOPSY W/ ASPIRATION Right 2018    CATARACT EXTRACTION Right 2021     SECTION  1977    CHOLECYSTECTOMY      COLONOSCOPY  2021    Dr. Junior Cardenas  Repeat colon     HERNIA REPAIR      HERNIA REPAIR  2020    HYSTERECTOMY  2013    LAMINECTOMY AND MICRODISCECTOMY LUMBAR SPINE  2018    3 levels.  Dr. Krause    ORIF WRIST FRACTURE      distal.    PATENT DUCTUS ARTERIOUS LIGATION      SHOULDER SURGERY Left     SPINAL CORD STIMULATOR IMPLANT  2018    Dr. Jerry    THYROIDECTOMY         Family History:   Family History   Problem Relation Name Age of Onset    Diabetes Mother      GI problems Mother      Lung cancer Father Fito March     Diabetes Father Fito March     Alzheimer's disease Father Fito March        Social History:   Social History     Tobacco Use    Smoking status: Never     Passive exposure: Never    Smokeless tobacco: Never   Substance Use Topics    Alcohol use: Never       Allergies:  Review of patient's allergies indicates:   Allergen Reactions    Baclofen Hallucinations    Donepezil Hallucinations    Erythromycin      Other  "reaction(s): Upset Stomach    Fluoxetine      Doesn't work    Grass pollen      Other reaction(s): per allergy test    Hydrocodone-acetaminophen      Other reaction(s): "Makes me crazy"    Ivp dye [iodinated contrast media]      Other reaction(s): Rash    Metoclopramide hcl     Rosuvastatin     Iodine Rash    Shrimp Rash       Medications:  Outpatient Medications Marked as Taking for the 7/1/25 encounter (Office Visit) with Inocencia Walton ACNP   Medication Sig Dispense Refill    atorvastatin (LIPITOR) 20 MG tablet TAKE 1 TABLET BY MOUTH ONCE DAILY IN THE EVENING 90 tablet 0    blood-glucose meter Misc   true metrix glucose monitor, See Instructions, to check bloodsugars BID, E11.9, # 1 EA, 5 Refill(s), Pharmacy: [id number], 154, cm, Height/Length Dosing, 04/19/21 10:31:00 CDT, 73.45, kg, Weight Dosing, 04/19/21 10:31:00 CDT      blood-glucose sensor (FREESTYLE APOLONIA 3 SENSOR) Anisa 1 each by Misc.(Non-Drug; Combo Route) route every 14 (fourteen) days. 2 each 6    clotrimazole-betamethasone 1-0.05% (LOTRISONE) cream Apply topically 2 (two) times daily. 45 g 1    flash glucose scanning reader (FREESTYLE APOLONIA 2 READER) Misc 1 each by Misc.(Non-Drug; Combo Route) route every 14 (fourteen) days. 2 each 11    flash glucose sensor (FREESTYLE APOLONIA 2 SENSOR) Kit 1 each by Misc.(Non-Drug; Combo Route) route every 14 (fourteen) days. 2 kit 11    fluticasone propionate (FLONASE) 50 mcg/actuation nasal spray 1 spray by Each Nostril route 2 (two) times daily.      insulin glargine U-300 conc (TOUJEO MAX U-300 SOLOSTAR) 300 unit/mL (3 mL) insulin pen Inject 40 Units into the skin every evening. 3 Pen 5    levothyroxine (SYNTHROID) 200 MCG tablet Take 1 tablet by mouth once daily 90 tablet 0    losartan (COZAAR) 100 MG tablet Take 1 tablet by mouth once daily 90 tablet 0    meclizine (ANTIVERT) 12.5 mg tablet Take 12.5 mg by mouth 3 (three) times daily as needed.      NIFEdipine (PROCARDIA-XL) 90 MG (OSM) 24 hr " tablet Take 1 tablet (90 mg total) by mouth 2 (two) times a day. 60 tablet 11    omeprazole (PRILOSEC) 40 MG capsule Take 1 capsule by mouth in the morning 90 capsule 0    ondansetron (ZOFRAN-ODT) 8 MG TbDL Take 1 tablet (8 mg total) by mouth every 6 (six) hours as needed (vomiting). 24 tablet 0    pregabalin (LYRICA) 25 MG capsule TAKE 1 CAPSULE BY MOUTH IN THE EVENING 90 capsule 0    traZODone (DESYREL) 100 MG tablet TAKE 1 TABLET BY MOUTH ONCE DAILY AT BEDTIME AT  9  PM 90 tablet 0    TRUE METRIX GLUCOSE TEST STRIP Strp USE 1 STRIP TO CHECK GLUCOSE ONCE DAILY 50 each 0    venlafaxine (EFFEXOR-XR) 150 MG Cp24 Take 1 capsule by mouth once daily 90 capsule 0          Review of Systems:    Constitutional: Denies fever, fatigue, generalized weakness  Skin: Denies wounds, no rashes, no itching, no new skin lesions  Respiratory:  Denies cough, shortness of breath, or wheezing  Cardiovascular: Denies chest pain, palpitations, occasional lower extremity edema  Gastrointestional: Denies abdominal pain, nausea, vomiting, diarrhea, or constipation  Genitourinary: Denies dysuria, hematuria, foamy urine, or incontinence; reports able to empty bladder  Musculoskeletal: Denies back or flank pain  Neurological: Denies headaches, dizziness, paresthesias, tremors or focal weakness      Vital Signs:  BP (!) 144/71 (BP Location: Left arm, Patient Position: Sitting)   Pulse 71   Temp 98.6 °F (37 °C) (Oral)   Resp 18   Ht 5' (1.524 m)   Wt 66.7 kg (147 lb)   SpO2 97%   BMI 28.71 kg/m²   Body mass index is 28.71 kg/m².      Physical Exam:    General: no acute distress, awake, alert  Eyes: conjunctiva clear, eyelids without swelling  HENT: atraumatic, oropharynx and nasal mucosa patent  Neck: supple, trache midline, no JVD  Respiratory: equal, unlabored, clear to auscultation A/P  Cardiovascular: RRR systolic ejection murmur 3/ 6 at left sternal border  Edema:  Trace lower extremity  Gastrointestinal: soft, non-tender,  non-distended; positive bowel sounds; no masses to palpation; no ascites  Genitourinary: no CVA tenderness upon palpation  Musculoskeletal: ROM without new limitation or discomfort  Integumentary: warm, dry; no rashes, wounds, or skin lesions  Neurological: oriented x4, appropriate, no acute deficits; no asterixis      Labs:        Component Value Date/Time     06/10/2025 1341     01/13/2025 1347    K 5.6 (H) 06/10/2025 1341    K 5.2 (H) 01/13/2025 1347     06/10/2025 1341     01/13/2025 1347    CO2 22 (L) 06/10/2025 1341    CO2 25 01/13/2025 1347    BUN 45.9 (H) 06/10/2025 1341    BUN 38.7 (H) 01/13/2025 1347    CREATININE 1.62 (H) 06/10/2025 1341    CREATININE 1.31 (H) 01/13/2025 1347    CREATININE 1.06 (H) 12/30/2024 0920    CREATININE 1.62 (H) 10/06/2024 0036    CALCIUM 8.5 06/10/2025 1341    CALCIUM 9.1 01/13/2025 1347    PHOS 6.6 (H) 06/10/2025 1341    PHOS 3.7 12/30/2024 0920    PTH 98.3 (H) 06/10/2025 1341    PTH 92.9 (H) 12/30/2024 0920    PTH 65.9 07/18/2024 0952           Component Value Date/Time    WBC 8.82 06/10/2025 1341    WBC 7.15 01/13/2025 1347    HGB 11.3 (L) 06/10/2025 1341    HGB 11.5 (L) 01/13/2025 1347    HCT 35.6 (L) 06/10/2025 1341    HCT 36.4 (L) 01/13/2025 1347    HCT 35.0 (L) 07/27/2020 1046     06/10/2025 1341     01/13/2025 1347       Urine Creatinine   Date Value Ref Range Status   06/10/2025 26.7 (L) 45.0 - 106.0 mg/dL Final   12/30/2024 71.6 45.0 - 106.0 mg/dL Final   12/30/2024 71.8 45.0 - 106.0 mg/dL Final       Urine Protein Level   Date Value Ref Range Status   06/10/2025 36.2 mg/dL Final   12/30/2024 496.4 mg/dL Final           Imaging:  Retroperitoneal Ultrasound:  FINDINGS:  Right kidney measures 10.7 cm. Left kidney measures 11.6 cm.  No stones or hydronephrosis.  Renal echogenicity is normal.  Simple left renal cyst measures 1.0 cm.     Urinary bladder is normal with a prevoid volume of 93 cc.     Impression:     No acute findings         Electronically signed by:Macho Mendosa MD  Date:                                            02/15/2024  Time:                                           09:27      Impression:    1. Stage 3b chronic kidney disease    2. Primary hypertension    3. Type 2 diabetes mellitus with diabetic nephropathy, with long-term current use of insulin    4. Renal cyst, left    5. Hyperkalemia    6. Hyperphosphatemia        CKD 3 B related to diabetic nephropathy ischemic nephrosclerosis  Some mild renal function deterioration noted on previous labs the ever patient was hyperglycemic with blood glucose of 636 at the time.  Proteinuria stabilizing at 1.4 g.  She is maintained on losartan and now reports that she was switched from Farxiga to Jardiance by PCP.  She thinks she is taking 25 mg dose daily.    Does not appear hyperkalemia was addressed.  Patient was hyperglycemic at that time and with blood glucose of 636.  She also is maintained on ARB.    Uncontrolled DM2 with recent A1c 10.1.  Managed per PCP.  Patient does report recent episode was nonfasting.  She now is using freestyle continuous monitoring which she finds easier.    BP mildly elevated today however overall improved.  She does report recent follow up with Cardiology     Plan:  Decrease losartan to 50 mg daily. Continue home blood pressure monitoring. Notify office if elevated > 160/80.   Repeat labs this week to monitor potassium level.  If remains elevated we will treat with Lokelma.  Discussed importance of glycemic control, hypertensive control, weight loss in slowing CKD progression.  Adequate water intake  Low potassium diet   Low phos diet   Avoid NSAIDs (Aleve, Mobic, Celebrex, Ibuprofen, Advil, Toradol and Diclofenac).  Only take tylenol occasionally if needed for aches/pains.    Labs next week  If stable labs and follow-up visit in 2 months    Inocencia Walton [facility]        This note was created with the assistance of [employer] voice recognition software or  phone dictation. There may be transcription errors as a result of using this technology however minimal. Effort has been made to assure accuracy of transcription but any obvious errors or omissions should be clarified with the author of the document.

## 2025-07-08 ENCOUNTER — TELEPHONE (OUTPATIENT)
Dept: INTERNAL MEDICINE | Facility: CLINIC | Age: 75
End: 2025-07-08
Payer: MEDICARE

## 2025-07-08 DIAGNOSIS — Z79.4 TYPE 2 DIABETES MELLITUS WITH HYPERGLYCEMIA, WITH LONG-TERM CURRENT USE OF INSULIN: Primary | ICD-10-CM

## 2025-07-08 DIAGNOSIS — E11.65 TYPE 2 DIABETES MELLITUS WITH HYPERGLYCEMIA, WITH LONG-TERM CURRENT USE OF INSULIN: Primary | ICD-10-CM

## 2025-07-08 NOTE — TELEPHONE ENCOUNTER
----- Message from Med Assistant Cox sent at 7/8/2025  9:26 AM CDT -----  Regarding: ANDRA 7/22/25 @ 10:00 Dr. Howard  1. Are there any outstanding tasks in the patient's chart? Yes, Fasting Labs     2. Does patient have home blood pressure cuff?  [ ] Yes  /   [ ] No  (If yes, please have patient bring to appointment for validation.)    3. Remind patient to bring in a list of medications or bottles of all medications including:   A. All Prescription Medications  B. Over-the-Counter Supplements and/or Vitamins  C. Drops (ear and/or eye)  D. Topical Creams

## 2025-07-14 ENCOUNTER — TELEPHONE (OUTPATIENT)
Dept: INFUSION THERAPY | Facility: HOSPITAL | Age: 75
End: 2025-07-14
Payer: MEDICARE

## 2025-07-24 ENCOUNTER — PATIENT OUTREACH (OUTPATIENT)
Facility: CLINIC | Age: 75
End: 2025-07-24
Payer: MEDICARE

## 2025-07-24 NOTE — PROGRESS NOTES
Population Health Outreach.    Contacted via: Telephone   Contacted about: Pop Health: Labs, Schedule PCP Visit         Health Maintenance Topic(s) Outreach Outcomes & Actions Taken:    Lab(s) - Outreach Outcomes & Actions Taken  : Reminded to complete    Blood Pressure - Outreach Outcomes & Actions Taken  : Per pt. Does not check BP @ home. Sees Nephrology     Primary Care Appt - Outreach Outcomes & Actions Taken  : Patient Declined Scheduling or Will Call Back to Schedule  She will call to reschedule no-showed PCP appointment once she completes overdue labs.      Care Management, Digital Medicine, and/or Education Referrals    [facility] Enrollment: Closed. Patient followed throughout the last year with no progress made. Declines need for assistance every call. Multiple PCP DM Educator referrals declined. Declines Digital medicine.Declined need for CHW referral.  No-shows multiple PCP and Specialists appointments and labs.    Next Steps - Referral Actions: Declines all referrals/assistance

## 2025-07-27 DIAGNOSIS — E78.1 HYPERTRIGLYCERIDEMIA: ICD-10-CM

## 2025-07-27 DIAGNOSIS — E11.65 TYPE 2 DIABETES MELLITUS WITH HYPERGLYCEMIA, WITH LONG-TERM CURRENT USE OF INSULIN: ICD-10-CM

## 2025-07-27 DIAGNOSIS — Z79.4 TYPE 2 DIABETES MELLITUS WITH HYPERGLYCEMIA, WITH LONG-TERM CURRENT USE OF INSULIN: ICD-10-CM

## 2025-07-27 DIAGNOSIS — F32.A DEPRESSION, UNSPECIFIED DEPRESSION TYPE: ICD-10-CM

## 2025-07-28 ENCOUNTER — TELEPHONE (OUTPATIENT)
Dept: INTERNAL MEDICINE | Facility: CLINIC | Age: 75
End: 2025-07-28
Payer: MEDICARE

## 2025-07-28 RX ORDER — LOSARTAN POTASSIUM 100 MG/1
100 TABLET ORAL
Qty: 90 TABLET | Refills: 0 | Status: SHIPPED | OUTPATIENT
Start: 2025-07-28

## 2025-07-28 RX ORDER — VENLAFAXINE HYDROCHLORIDE 150 MG/1
150 CAPSULE, EXTENDED RELEASE ORAL
Qty: 90 CAPSULE | Refills: 0 | Status: SHIPPED | OUTPATIENT
Start: 2025-07-28

## 2025-07-28 RX ORDER — ATORVASTATIN CALCIUM 20 MG/1
20 TABLET, FILM COATED ORAL NIGHTLY
Qty: 90 TABLET | Refills: 0 | Status: SHIPPED | OUTPATIENT
Start: 2025-07-28

## 2025-07-29 DIAGNOSIS — K21.9 GASTROESOPHAGEAL REFLUX DISEASE, UNSPECIFIED WHETHER ESOPHAGITIS PRESENT: ICD-10-CM

## 2025-07-29 RX ORDER — OMEPRAZOLE 40 MG/1
40 CAPSULE, DELAYED RELEASE ORAL EVERY MORNING
Qty: 90 CAPSULE | Refills: 0 | Status: SHIPPED | OUTPATIENT
Start: 2025-07-29

## 2025-08-04 DIAGNOSIS — E89.2 POSTPROCEDURAL HYPOPARATHYROIDISM: ICD-10-CM

## 2025-08-04 RX ORDER — LEVOTHYROXINE SODIUM 200 UG/1
200 TABLET ORAL
Qty: 90 TABLET | Refills: 0 | Status: SHIPPED | OUTPATIENT
Start: 2025-08-04

## 2025-08-18 DIAGNOSIS — Z79.4 TYPE 2 DIABETES MELLITUS WITH STAGE 3B CHRONIC KIDNEY DISEASE, WITH LONG-TERM CURRENT USE OF INSULIN: Primary | ICD-10-CM

## 2025-08-18 DIAGNOSIS — E11.22 TYPE 2 DIABETES MELLITUS WITH STAGE 3B CHRONIC KIDNEY DISEASE, WITH LONG-TERM CURRENT USE OF INSULIN: Primary | ICD-10-CM

## 2025-08-18 DIAGNOSIS — N18.32 TYPE 2 DIABETES MELLITUS WITH STAGE 3B CHRONIC KIDNEY DISEASE, WITH LONG-TERM CURRENT USE OF INSULIN: Primary | ICD-10-CM

## 2025-08-18 RX ORDER — EMPAGLIFLOZIN 25 MG/1
25 TABLET, FILM COATED ORAL
Qty: 90 TABLET | Refills: 2 | Status: SHIPPED | OUTPATIENT
Start: 2025-08-18

## 2025-08-25 ENCOUNTER — PATIENT OUTREACH (OUTPATIENT)
Facility: CLINIC | Age: 75
End: 2025-08-25
Payer: MEDICARE

## 2025-09-02 ENCOUNTER — TELEPHONE (OUTPATIENT)
Dept: NEPHROLOGY | Facility: CLINIC | Age: 75
End: 2025-09-02
Payer: MEDICARE

## 2025-09-02 DIAGNOSIS — G47.00 INSOMNIA, UNSPECIFIED TYPE: ICD-10-CM

## 2025-09-02 RX ORDER — TRAZODONE HYDROCHLORIDE 100 MG/1
TABLET ORAL
Qty: 90 TABLET | Refills: 0 | Status: SHIPPED | OUTPATIENT
Start: 2025-09-02